# Patient Record
Sex: MALE | Race: WHITE | Employment: OTHER | URBAN - METROPOLITAN AREA
[De-identification: names, ages, dates, MRNs, and addresses within clinical notes are randomized per-mention and may not be internally consistent; named-entity substitution may affect disease eponyms.]

---

## 2017-01-16 PROBLEM — E11.9 TYPE 2 DIABETES MELLITUS WITHOUT COMPLICATION, WITHOUT LONG-TERM CURRENT USE OF INSULIN (HCC): Status: ACTIVE | Noted: 2017-01-16

## 2017-11-29 PROBLEM — C61 PROSTATE CANCER (HCC): Status: ACTIVE | Noted: 2017-11-29

## 2017-12-01 PROBLEM — E78.2 MIXED HYPERLIPIDEMIA: Status: ACTIVE | Noted: 2017-12-01

## 2018-04-13 DIAGNOSIS — I10 ESSENTIAL HYPERTENSION: Chronic | ICD-10-CM

## 2018-04-13 DIAGNOSIS — C61 PROSTATE CANCER (HCC): ICD-10-CM

## 2018-04-13 DIAGNOSIS — E78.2 MIXED HYPERLIPIDEMIA: ICD-10-CM

## 2018-04-16 ENCOUNTER — HOSPITAL ENCOUNTER (OUTPATIENT)
Age: 68
Discharge: HOME | End: 2018-04-18
Payer: MEDICARE

## 2018-04-16 DIAGNOSIS — I10 ESSENTIAL HYPERTENSION: Chronic | ICD-10-CM

## 2018-04-16 DIAGNOSIS — E78.2 MIXED HYPERLIPIDEMIA: ICD-10-CM

## 2018-04-16 DIAGNOSIS — C61 PROSTATE CANCER (HCC): ICD-10-CM

## 2018-04-16 LAB
ANION GAP SERPL CALCULATED.3IONS-SCNC: 18 MMOL/L (ref 7–16)
BASOPHILS ABSOLUTE: 0.11 E9/L (ref 0–0.2)
BASOPHILS RELATIVE PERCENT: 1.4 % (ref 0–2)
BUN BLDV-MCNC: 11 MG/DL (ref 8–23)
CALCIUM SERPL-MCNC: 9.9 MG/DL (ref 8.6–10.2)
CHLORIDE BLD-SCNC: 100 MMOL/L (ref 98–107)
CHOLESTEROL, TOTAL: 167 MG/DL (ref 0–199)
CO2: 26 MMOL/L (ref 22–29)
CREAT SERPL-MCNC: 0.9 MG/DL (ref 0.7–1.2)
EOSINOPHILS ABSOLUTE: 0.32 E9/L (ref 0.05–0.5)
EOSINOPHILS RELATIVE PERCENT: 4.1 % (ref 0–6)
GFR AFRICAN AMERICAN: >60
GFR NON-AFRICAN AMERICAN: >60 ML/MIN/1.73
GLUCOSE BLD-MCNC: 187 MG/DL (ref 74–109)
HBA1C MFR BLD: 8.7 % (ref 4.8–5.9)
HCT VFR BLD CALC: 45.2 % (ref 37–54)
HDLC SERPL-MCNC: 35 MG/DL
HEMOGLOBIN: 15.1 G/DL (ref 12.5–16.5)
IMMATURE GRANULOCYTES #: 0.03 E9/L
IMMATURE GRANULOCYTES %: 0.4 % (ref 0–5)
LDL CHOLESTEROL CALCULATED: 77 MG/DL (ref 0–99)
LYMPHOCYTES ABSOLUTE: 2.48 E9/L (ref 1.5–4)
LYMPHOCYTES RELATIVE PERCENT: 31.6 % (ref 20–42)
MCH RBC QN AUTO: 31.9 PG (ref 26–35)
MCHC RBC AUTO-ENTMCNC: 33.4 % (ref 32–34.5)
MCV RBC AUTO: 95.6 FL (ref 80–99.9)
MONOCYTES ABSOLUTE: 0.78 E9/L (ref 0.1–0.95)
MONOCYTES RELATIVE PERCENT: 9.9 % (ref 2–12)
NEUTROPHILS ABSOLUTE: 4.12 E9/L (ref 1.8–7.3)
NEUTROPHILS RELATIVE PERCENT: 52.6 % (ref 43–80)
PDW BLD-RTO: 14.1 FL (ref 11.5–15)
PLATELET # BLD: 255 E9/L (ref 130–450)
PMV BLD AUTO: 10.7 FL (ref 7–12)
POTASSIUM SERPL-SCNC: 4.6 MMOL/L (ref 3.5–5)
PROSTATE SPECIFIC ANTIGEN: 0.44 NG/ML (ref 0–4)
RBC # BLD: 4.73 E12/L (ref 3.8–5.8)
SODIUM BLD-SCNC: 144 MMOL/L (ref 132–146)
TRIGL SERPL-MCNC: 273 MG/DL (ref 0–149)
VLDLC SERPL CALC-MCNC: 55 MG/DL
WBC # BLD: 7.8 E9/L (ref 4.5–11.5)

## 2018-04-16 PROCEDURE — 80061 LIPID PANEL: CPT

## 2018-04-16 PROCEDURE — 80048 BASIC METABOLIC PNL TOTAL CA: CPT

## 2018-04-16 PROCEDURE — 83036 HEMOGLOBIN GLYCOSYLATED A1C: CPT

## 2018-04-16 PROCEDURE — 85025 COMPLETE CBC W/AUTO DIFF WBC: CPT

## 2018-04-16 PROCEDURE — 84153 ASSAY OF PSA TOTAL: CPT

## 2018-05-16 RX ORDER — CARVEDILOL 12.5 MG/1
12.5 TABLET ORAL 2 TIMES DAILY WITH MEALS
Qty: 180 TABLET | Refills: 1 | Status: SHIPPED | OUTPATIENT
Start: 2018-05-16 | End: 2018-08-21 | Stop reason: SDUPTHER

## 2018-07-17 DIAGNOSIS — C61 PROSTATE CANCER (HCC): ICD-10-CM

## 2018-07-17 DIAGNOSIS — E11.9 TYPE 2 DIABETES MELLITUS WITHOUT COMPLICATION, WITHOUT LONG-TERM CURRENT USE OF INSULIN (HCC): Primary | ICD-10-CM

## 2018-07-17 DIAGNOSIS — E78.5 DYSLIPIDEMIA: ICD-10-CM

## 2018-07-19 ENCOUNTER — HOSPITAL ENCOUNTER (OUTPATIENT)
Age: 68
Discharge: HOME | End: 2018-07-21
Payer: MEDICARE

## 2018-07-19 ENCOUNTER — NURSE ONLY (OUTPATIENT)
Dept: FAMILY MEDICINE CLINIC | Age: 68
End: 2018-07-19
Payer: MEDICARE

## 2018-07-19 DIAGNOSIS — E78.5 DYSLIPIDEMIA: ICD-10-CM

## 2018-07-19 DIAGNOSIS — C61 PROSTATE CANCER (HCC): ICD-10-CM

## 2018-07-19 DIAGNOSIS — E11.9 TYPE 2 DIABETES MELLITUS WITHOUT COMPLICATION, WITHOUT LONG-TERM CURRENT USE OF INSULIN (HCC): ICD-10-CM

## 2018-07-19 DIAGNOSIS — E78.2 MIXED HYPERLIPIDEMIA: ICD-10-CM

## 2018-07-19 DIAGNOSIS — I10 ESSENTIAL HYPERTENSION: Chronic | ICD-10-CM

## 2018-07-19 DIAGNOSIS — I50.22 CHRONIC SYSTOLIC CONGESTIVE HEART FAILURE (HCC): ICD-10-CM

## 2018-07-19 LAB
ALBUMIN SERPL-MCNC: 4 G/DL (ref 3.5–5.2)
ALP BLD-CCNC: 68 U/L (ref 40–129)
ALT SERPL-CCNC: 19 U/L (ref 0–40)
ANION GAP SERPL CALCULATED.3IONS-SCNC: 14 MMOL/L (ref 7–16)
AST SERPL-CCNC: 27 U/L (ref 0–39)
BASOPHILS ABSOLUTE: 0.12 E9/L (ref 0–0.2)
BASOPHILS RELATIVE PERCENT: 1.5 % (ref 0–2)
BILIRUB SERPL-MCNC: 0.6 MG/DL (ref 0–1.2)
BUN BLDV-MCNC: 9 MG/DL (ref 8–23)
CALCIUM SERPL-MCNC: 9.8 MG/DL (ref 8.6–10.2)
CHLORIDE BLD-SCNC: 99 MMOL/L (ref 98–107)
CHOLESTEROL, TOTAL: 168 MG/DL (ref 0–199)
CO2: 25 MMOL/L (ref 22–29)
CREAT SERPL-MCNC: 0.9 MG/DL (ref 0.7–1.2)
EOSINOPHILS ABSOLUTE: 0.31 E9/L (ref 0.05–0.5)
EOSINOPHILS RELATIVE PERCENT: 3.9 % (ref 0–6)
GFR AFRICAN AMERICAN: >60
GFR NON-AFRICAN AMERICAN: >60 ML/MIN/1.73
GLUCOSE BLD-MCNC: 180 MG/DL (ref 74–109)
HBA1C MFR BLD: 7.3 % (ref 4–5.6)
HCT VFR BLD CALC: 46.5 % (ref 37–54)
HDLC SERPL-MCNC: 33 MG/DL
HEMOGLOBIN: 15.6 G/DL (ref 12.5–16.5)
IMMATURE GRANULOCYTES #: 0.05 E9/L
IMMATURE GRANULOCYTES %: 0.6 % (ref 0–5)
LDL CHOLESTEROL CALCULATED: 83 MG/DL (ref 0–99)
LYMPHOCYTES ABSOLUTE: 2.24 E9/L (ref 1.5–4)
LYMPHOCYTES RELATIVE PERCENT: 28.3 % (ref 20–42)
MCH RBC QN AUTO: 32.3 PG (ref 26–35)
MCHC RBC AUTO-ENTMCNC: 33.5 % (ref 32–34.5)
MCV RBC AUTO: 96.3 FL (ref 80–99.9)
MONOCYTES ABSOLUTE: 0.64 E9/L (ref 0.1–0.95)
MONOCYTES RELATIVE PERCENT: 8.1 % (ref 2–12)
NEUTROPHILS ABSOLUTE: 4.55 E9/L (ref 1.8–7.3)
NEUTROPHILS RELATIVE PERCENT: 57.6 % (ref 43–80)
PDW BLD-RTO: 13.8 FL (ref 11.5–15)
PLATELET # BLD: 252 E9/L (ref 130–450)
PMV BLD AUTO: 10.4 FL (ref 7–12)
POTASSIUM SERPL-SCNC: 5 MMOL/L (ref 3.5–5)
PROSTATE SPECIFIC ANTIGEN: 1.54 NG/ML (ref 0–4)
RBC # BLD: 4.83 E12/L (ref 3.8–5.8)
SODIUM BLD-SCNC: 138 MMOL/L (ref 132–146)
TOTAL PROTEIN: 7.8 G/DL (ref 6.4–8.3)
TRIGL SERPL-MCNC: 262 MG/DL (ref 0–149)
VLDLC SERPL CALC-MCNC: 52 MG/DL
WBC # BLD: 7.9 E9/L (ref 4.5–11.5)

## 2018-07-19 PROCEDURE — 85025 COMPLETE CBC W/AUTO DIFF WBC: CPT

## 2018-07-19 PROCEDURE — 36415 COLL VENOUS BLD VENIPUNCTURE: CPT | Performed by: FAMILY MEDICINE

## 2018-07-19 PROCEDURE — 80053 COMPREHEN METABOLIC PANEL: CPT

## 2018-07-19 PROCEDURE — 83036 HEMOGLOBIN GLYCOSYLATED A1C: CPT

## 2018-07-19 PROCEDURE — 80061 LIPID PANEL: CPT

## 2018-07-19 PROCEDURE — 84153 ASSAY OF PSA TOTAL: CPT

## 2018-07-19 NOTE — PROGRESS NOTES
Labs done per Dr. Berlin Colunga's orders.     Electronically signed by Deborah Wilde MA on 7/19/2018 at 8:16 AM

## 2018-07-20 DIAGNOSIS — I25.10 CORONARY ARTERY DISEASE INVOLVING NATIVE HEART WITHOUT ANGINA PECTORIS, UNSPECIFIED VESSEL OR LESION TYPE: ICD-10-CM

## 2018-07-20 DIAGNOSIS — I50.22 CHRONIC SYSTOLIC CONGESTIVE HEART FAILURE (HCC): Primary | ICD-10-CM

## 2018-07-31 ENCOUNTER — TELEPHONE (OUTPATIENT)
Dept: CARDIOLOGY CLINIC | Age: 68
End: 2018-07-31

## 2018-08-01 ENCOUNTER — TELEPHONE (OUTPATIENT)
Dept: SURGERY | Age: 68
End: 2018-08-01

## 2018-08-01 ENCOUNTER — TELEPHONE (OUTPATIENT)
Dept: FAMILY MEDICINE CLINIC | Age: 68
End: 2018-08-01

## 2018-08-01 DIAGNOSIS — J02.9 SORE THROAT: Primary | ICD-10-CM

## 2018-08-01 RX ORDER — AMOXICILLIN 500 MG/1
500 CAPSULE ORAL 3 TIMES DAILY
Qty: 30 CAPSULE | Refills: 0 | Status: SHIPPED | OUTPATIENT
Start: 2018-08-01 | End: 2018-08-11

## 2018-08-01 NOTE — TELEPHONE ENCOUNTER
Patient is requesting antibiotics be sent to [facility] for sinus infection. C/O sore throat; sinus drainage and cough. Says he gets it this time every year.

## 2018-08-21 DIAGNOSIS — I50.9 CONGESTIVE HEART FAILURE, UNSPECIFIED HF CHRONICITY, UNSPECIFIED HEART FAILURE TYPE (HCC): Primary | ICD-10-CM

## 2018-08-21 DIAGNOSIS — E11.9 TYPE 2 DIABETES MELLITUS WITHOUT COMPLICATION, WITHOUT LONG-TERM CURRENT USE OF INSULIN (HCC): ICD-10-CM

## 2018-08-21 RX ORDER — CARVEDILOL 12.5 MG/1
12.5 TABLET ORAL 2 TIMES DAILY WITH MEALS
Qty: 180 TABLET | Refills: 1 | Status: SHIPPED | OUTPATIENT
Start: 2018-08-21 | End: 2018-12-22 | Stop reason: SDUPTHER

## 2018-08-28 ENCOUNTER — OFFICE VISIT (OUTPATIENT)
Dept: SURGERY | Age: 68
End: 2018-08-28

## 2018-08-28 ENCOUNTER — TELEPHONE (OUTPATIENT)
Dept: SURGERY | Age: 68
End: 2018-08-28

## 2018-08-28 ENCOUNTER — PREP FOR PROCEDURE (OUTPATIENT)
Dept: SURGERY | Age: 68
End: 2018-08-28

## 2018-08-28 ENCOUNTER — OFFICE VISIT (OUTPATIENT)
Dept: FAMILY MEDICINE CLINIC | Age: 68
End: 2018-08-28
Payer: MEDICARE

## 2018-08-28 ENCOUNTER — HOSPITAL ENCOUNTER (OUTPATIENT)
Age: 68
Discharge: HOME | End: 2018-08-30
Payer: MEDICARE

## 2018-08-28 VITALS
RESPIRATION RATE: 18 BRPM | DIASTOLIC BLOOD PRESSURE: 80 MMHG | OXYGEN SATURATION: 96 % | HEART RATE: 97 BPM | SYSTOLIC BLOOD PRESSURE: 120 MMHG

## 2018-08-28 VITALS
SYSTOLIC BLOOD PRESSURE: 129 MMHG | WEIGHT: 220 LBS | HEART RATE: 75 BPM | OXYGEN SATURATION: 96 % | RESPIRATION RATE: 16 BRPM | DIASTOLIC BLOOD PRESSURE: 89 MMHG | BODY MASS INDEX: 29.84 KG/M2

## 2018-08-28 DIAGNOSIS — Z86.010 HISTORY OF ADENOMATOUS POLYP OF COLON: Primary | ICD-10-CM

## 2018-08-28 DIAGNOSIS — Z80.9 FAMILY HISTORY OF CANCER: ICD-10-CM

## 2018-08-28 DIAGNOSIS — R53.83 FATIGUE, UNSPECIFIED TYPE: ICD-10-CM

## 2018-08-28 DIAGNOSIS — I50.9 CONGESTIVE HEART FAILURE, UNSPECIFIED HF CHRONICITY, UNSPECIFIED HEART FAILURE TYPE (HCC): Primary | ICD-10-CM

## 2018-08-28 DIAGNOSIS — R42 VERTIGO: ICD-10-CM

## 2018-08-28 DIAGNOSIS — I50.9 CONGESTIVE HEART FAILURE, UNSPECIFIED HF CHRONICITY, UNSPECIFIED HEART FAILURE TYPE (HCC): ICD-10-CM

## 2018-08-28 DIAGNOSIS — Z80.9 FAMILY HISTORY OF CANCER: Primary | ICD-10-CM

## 2018-08-28 DIAGNOSIS — E11.9 TYPE 2 DIABETES MELLITUS WITHOUT COMPLICATION, WITHOUT LONG-TERM CURRENT USE OF INSULIN (HCC): ICD-10-CM

## 2018-08-28 DIAGNOSIS — I10 ESSENTIAL HYPERTENSION: Chronic | ICD-10-CM

## 2018-08-28 DIAGNOSIS — Z86.010 HISTORY OF ADENOMATOUS POLYP OF COLON: ICD-10-CM

## 2018-08-28 LAB
ALBUMIN SERPL-MCNC: 4.1 G/DL (ref 3.5–5.2)
ALP BLD-CCNC: 77 U/L (ref 40–129)
ALT SERPL-CCNC: 20 U/L (ref 0–40)
ANION GAP SERPL CALCULATED.3IONS-SCNC: 23 MMOL/L (ref 7–16)
AST SERPL-CCNC: 24 U/L (ref 0–39)
BASOPHILS ABSOLUTE: 0.09 E9/L (ref 0–0.2)
BASOPHILS RELATIVE PERCENT: 1.1 % (ref 0–2)
BILIRUB SERPL-MCNC: 0.5 MG/DL (ref 0–1.2)
BUN BLDV-MCNC: 14 MG/DL (ref 8–23)
CALCIUM SERPL-MCNC: 9.3 MG/DL (ref 8.6–10.2)
CHLORIDE BLD-SCNC: 100 MMOL/L (ref 98–107)
CO2: 19 MMOL/L (ref 22–29)
CREAT SERPL-MCNC: 0.9 MG/DL (ref 0.7–1.2)
EOSINOPHILS ABSOLUTE: 0.46 E9/L (ref 0.05–0.5)
EOSINOPHILS RELATIVE PERCENT: 5.5 % (ref 0–6)
GFR AFRICAN AMERICAN: >60
GFR NON-AFRICAN AMERICAN: >60 ML/MIN/1.73
GLUCOSE BLD-MCNC: 229 MG/DL (ref 74–109)
HCT VFR BLD CALC: 43.8 % (ref 37–54)
HEMOGLOBIN: 14.2 G/DL (ref 12.5–16.5)
IMMATURE GRANULOCYTES #: 0.04 E9/L
IMMATURE GRANULOCYTES %: 0.5 % (ref 0–5)
LYMPHOCYTES ABSOLUTE: 2.54 E9/L (ref 1.5–4)
LYMPHOCYTES RELATIVE PERCENT: 30.6 % (ref 20–42)
MCH RBC QN AUTO: 31.7 PG (ref 26–35)
MCHC RBC AUTO-ENTMCNC: 32.4 % (ref 32–34.5)
MCV RBC AUTO: 97.8 FL (ref 80–99.9)
MONOCYTES ABSOLUTE: 0.62 E9/L (ref 0.1–0.95)
MONOCYTES RELATIVE PERCENT: 7.5 % (ref 2–12)
NEUTROPHILS ABSOLUTE: 4.56 E9/L (ref 1.8–7.3)
NEUTROPHILS RELATIVE PERCENT: 54.8 % (ref 43–80)
PDW BLD-RTO: 14.2 FL (ref 11.5–15)
PLATELET # BLD: 242 E9/L (ref 130–450)
PMV BLD AUTO: 10.4 FL (ref 7–12)
POTASSIUM SERPL-SCNC: 4.6 MMOL/L (ref 3.5–5)
RBC # BLD: 4.48 E12/L (ref 3.8–5.8)
SODIUM BLD-SCNC: 142 MMOL/L (ref 132–146)
TOTAL PROTEIN: 7.5 G/DL (ref 6.4–8.3)
TSH SERPL DL<=0.05 MIU/L-ACNC: 1.73 UIU/ML (ref 0.27–4.2)
WBC # BLD: 8.3 E9/L (ref 4.5–11.5)

## 2018-08-28 PROCEDURE — G8419 CALC BMI OUT NRM PARAM NOF/U: HCPCS | Performed by: FAMILY MEDICINE

## 2018-08-28 PROCEDURE — 84443 ASSAY THYROID STIM HORMONE: CPT

## 2018-08-28 PROCEDURE — 4040F PNEUMOC VAC/ADMIN/RCVD: CPT | Performed by: FAMILY MEDICINE

## 2018-08-28 PROCEDURE — G8598 ASA/ANTIPLAT THER USED: HCPCS | Performed by: FAMILY MEDICINE

## 2018-08-28 PROCEDURE — 1036F TOBACCO NON-USER: CPT | Performed by: FAMILY MEDICINE

## 2018-08-28 PROCEDURE — 80053 COMPREHEN METABOLIC PANEL: CPT

## 2018-08-28 PROCEDURE — 3045F PR MOST RECENT HEMOGLOBIN A1C LEVEL 7.0-9.0%: CPT | Performed by: FAMILY MEDICINE

## 2018-08-28 PROCEDURE — 99212 OFFICE O/P EST SF 10 MIN: CPT | Performed by: FAMILY MEDICINE

## 2018-08-28 PROCEDURE — 96372 THER/PROPH/DIAG INJ SC/IM: CPT | Performed by: FAMILY MEDICINE

## 2018-08-28 PROCEDURE — G8427 DOCREV CUR MEDS BY ELIG CLIN: HCPCS | Performed by: FAMILY MEDICINE

## 2018-08-28 PROCEDURE — 99999 PR OFFICE/OUTPT VISIT,PROCEDURE ONLY: CPT | Performed by: SURGERY

## 2018-08-28 PROCEDURE — 3017F COLORECTAL CA SCREEN DOC REV: CPT | Performed by: FAMILY MEDICINE

## 2018-08-28 PROCEDURE — 1101F PT FALLS ASSESS-DOCD LE1/YR: CPT | Performed by: FAMILY MEDICINE

## 2018-08-28 PROCEDURE — 85025 COMPLETE CBC W/AUTO DIFF WBC: CPT

## 2018-08-28 PROCEDURE — 1123F ACP DISCUSS/DSCN MKR DOCD: CPT | Performed by: FAMILY MEDICINE

## 2018-08-28 PROCEDURE — 2022F DILAT RTA XM EVC RTNOPTHY: CPT | Performed by: FAMILY MEDICINE

## 2018-08-28 RX ORDER — ASPIRIN 325 MG
325 TABLET ORAL
COMMUNITY
Start: 2013-10-08 | End: 2018-08-28 | Stop reason: CLARIF

## 2018-08-28 RX ORDER — MECLIZINE HYDROCHLORIDE 25 MG/1
25 TABLET ORAL 3 TIMES DAILY PRN
Qty: 30 TABLET | Refills: 2 | Status: SHIPPED | OUTPATIENT
Start: 2018-08-28 | End: 2018-09-07

## 2018-08-28 RX ORDER — 0.9 % SODIUM CHLORIDE 0.9 %
10 VIAL (ML) INJECTION PRN
Status: CANCELLED | OUTPATIENT
Start: 2018-08-28 | End: 2019-08-28

## 2018-08-28 RX ORDER — METHYLPREDNISOLONE 4 MG/1
TABLET ORAL
Qty: 1 KIT | Refills: 0 | Status: SHIPPED | OUTPATIENT
Start: 2018-08-28 | End: 2018-08-30

## 2018-08-28 RX ORDER — 0.9 % SODIUM CHLORIDE 0.9 %
10 VIAL (ML) INJECTION EVERY 12 HOURS SCHEDULED
Status: CANCELLED | OUTPATIENT
Start: 2018-08-28 | End: 2019-08-28

## 2018-08-28 RX ORDER — DEXAMETHASONE SODIUM PHOSPHATE 4 MG/ML
4 INJECTION, SOLUTION INTRA-ARTICULAR; INTRALESIONAL; INTRAMUSCULAR; INTRAVENOUS; SOFT TISSUE ONCE
Status: COMPLETED | OUTPATIENT
Start: 2018-08-28 | End: 2018-08-28

## 2018-08-28 RX ORDER — POLYETHYLENE GLYCOL 3350, SODIUM CHLORIDE, POTASSIUM CHLORIDE, SODIUM BICARBONATE, AND SODIUM SULFATE 240; 5.84; 2.98; 6.72; 22.72 G/4L; G/4L; G/4L; G/4L; G/4L
4000 POWDER, FOR SOLUTION ORAL ONCE
Qty: 1 BOTTLE | Refills: 0 | Status: SHIPPED | OUTPATIENT
Start: 2018-08-28 | End: 2018-08-28

## 2018-08-28 RX ORDER — SODIUM CHLORIDE 9 MG/ML
INJECTION, SOLUTION INTRAVENOUS CONTINUOUS
Status: CANCELLED | OUTPATIENT
Start: 2018-08-28 | End: 2019-08-28

## 2018-08-28 RX ORDER — GUAIFENESIN 600 MG/1
600 TABLET, EXTENDED RELEASE ORAL 2 TIMES DAILY
Qty: 100 TABLET | Refills: 3 | Status: SHIPPED
Start: 2018-08-28 | End: 2020-03-13

## 2018-08-28 RX ORDER — OMEPRAZOLE 20 MG/1
20 CAPSULE, DELAYED RELEASE ORAL
COMMUNITY
Start: 2013-07-05 | End: 2018-08-30

## 2018-08-28 RX ORDER — FUROSEMIDE 20 MG/1
20 TABLET ORAL
COMMUNITY
Start: 2013-10-08 | End: 2018-08-30

## 2018-08-28 RX ADMIN — DEXAMETHASONE SODIUM PHOSPHATE 4 MG: 4 INJECTION, SOLUTION INTRA-ARTICULAR; INTRALESIONAL; INTRAMUSCULAR; INTRAVENOUS; SOFT TISSUE at 13:32

## 2018-08-28 ASSESSMENT — ENCOUNTER SYMPTOMS
GASTROINTESTINAL NEGATIVE: 1
BACK PAIN: 0
SHORTNESS OF BREATH: 1
WHEEZING: 0
EYE REDNESS: 0
BLOOD IN STOOL: 0
SPUTUM PRODUCTION: 0
ORTHOPNEA: 0
EYE DISCHARGE: 0
EYES NEGATIVE: 1
SINUS PAIN: 0
VISUAL CHANGE: 0
EYE PAIN: 0
STRIDOR: 0
BLURRED VISION: 0
DIARRHEA: 0
HEARTBURN: 0
PHOTOPHOBIA: 0
HEMOPTYSIS: 0
CONSTIPATION: 0
DOUBLE VISION: 0

## 2018-08-28 ASSESSMENT — PATIENT HEALTH QUESTIONNAIRE - PHQ9
SUM OF ALL RESPONSES TO PHQ QUESTIONS 1-9: 0
SUM OF ALL RESPONSES TO PHQ9 QUESTIONS 1 & 2: 0
SUM OF ALL RESPONSES TO PHQ QUESTIONS 1-9: 0
1. LITTLE INTEREST OR PLEASURE IN DOING THINGS: 0
2. FEELING DOWN, DEPRESSED OR HOPELESS: 0

## 2018-08-28 NOTE — TELEPHONE ENCOUNTER
Scheduled patient for colonoscopy on 9/11/18 at 1:30pm. Patient needs to report at the front entrance 1 hour prior to the procedure, no ASA products for 5 days, remind patient to do the colon prep as well as a clear liquid diet a day before. Patient verbalized understanding. Instruction letter mailed. Encouraged patient to call our office if any questions.     Electronically signed by Juan Sanderson on 8/28/18 at 2:26 PM

## 2018-08-28 NOTE — PATIENT INSTRUCTIONS
that could be dangerous until the medicine wears off and you can think clearly and react easily. · Rest when you feel tired. Getting enough sleep will help you recover. · You must have a reliable adult drive you home and stay with you overnight after your procedure or your procedure may be cancelled. Diet  · You can eat your normal diet, unless your doctor gives you other instructions. If your stomach is upset, try clear liquids and bland, low-fat foods like plain toast or rice. · Drink plenty of fluids (unless your doctor tells you not to). · Don't drink alcohol for 24 hours. Medicines  · Be safe with medicines. Read and follow all instructions on the label. ¨ If the doctor gave you a prescription medicine for pain, take it as prescribed. ¨ If you are not taking a prescription pain medicine, ask your doctor if you can take an over-the-counter medicine. · If you think your pain medicine is making you sick to your stomach:  ¨ Take your medicine after meals (unless your doctor has told you not to). ¨ Ask your doctor for a different pain medicine. When should you call for help? Call 911 anytime you think you may need emergency care. For example, call if:  · You have severe trouble breathing. · You passed out (lost consciousness). Call your doctor now or seek immediate medical care if:  · You have trouble breathing. · You have ongoing or worsening nausea or vomiting. · You have a fever. · You have a new or worse headache. · The medicine is not wearing off and you can't think clearly. Watch closely for changes in your health, and be sure to contact your doctor if:  · You do not get better as expected. Where can you learn more? Go to [email or web address] org and sign in to your [employer] account. Enter G973 in the [employer] box to learn more about \"Sedation for a Medical Procedure: Care Instructions. \"     If you do not have an account, please click on the \"Sign Up Now\"

## 2018-08-28 NOTE — H&P
GENERAL SURGERY  HISTORY & PHYSICAL    Sherin Rose  76 y.o. male   Nasir Bustillo,      CC:  Colorectal cancer screening    HPI  The patient was sent here to discuss colorectal cancer screening. The patient denies any weight loss, rectal bleeding, or abdominal pain. He occasionally has stool frequency and gas. There is no family history of IBD bu there may be a history of colorectal cancer in his father and/or brother (unsure). He had a tubular adenoma found in august 2015. Past Medical History:   Diagnosis Date    CAD (coronary artery disease)     Combined systolic and diastolic heart failure ([address] 6/15/13    echo LVEF of 30-35%  stage II diastolic dysfunction    [facility]     Diabetes mellitus [facility]     GERD (gastroesophageal reflux disease)     Hypertension     Sigmoid diverticulosis 8/31/2015    Tubular adenoma of colon 8/31/2015       Past Surgical History:   Procedure Laterality Date    CARDIAC DEFIBRILLATOR PLACEMENT      COLONOSCOPY  8/31/15    with polypectomy (x2) - Dr. Pauline Mcmanus  1/13/14    3.5/15 Xience in [place] and 3.0/15 Resolute in th 1st obtuse marginal.    ECHO COMPL W DOP COLOR FLOW  6/14/2013         ECHO COMPL W DOP COLOR FLOW  1/14/2014         JOINT REPLACEMENT      UMBILICAL HERNIA REPAIR         Social History     Social History    Marital status:      Spouse name: N/A    Number of children: N/A    Years of education: N/A     Occupational History    Not on file.      Social History Main Topics    Smoking status: Former Smoker     Packs/day: 2.00     Types: Cigarettes     Quit date: 1/11/2014    Smokeless tobacco: Never Used    Alcohol use No      Comment: rarely    Drug use: No    Sexual activity: Yes     Other Topics Concern    Not on file     Social History Narrative    No narrative on file        Family History   Problem Relation Age of Onset    Heart

## 2018-08-28 NOTE — PROGRESS NOTES
Disease Mother     Cancer Father         abdominal    Cancer Brother         prostate    Cancer Brother         digestive        Current Outpatient Prescriptions on File Prior to Visit   Medication Sig Dispense Refill    atorvastatin (LIPITOR) 40 MG tablet TAKE ONE TABLET BY MOUTH EVERY DAY 90 tablet 1    carvedilol (COREG) 12.5 MG tablet Take 1 tablet by mouth 2 times daily (with meals) 180 tablet 1    metFORMIN (GLUCOPHAGE) 500 MG tablet TAKE ONE TABLET BY MOUTH FOUR TIMES A  tablet 1    clopidogrel (PLAVIX) 75 MG tablet TAKE ONE TABLET BY MOUTH DAILY 90 tablet 0    lisinopril (PRINIVIL;ZESTRIL) 5 MG tablet TAKE ONE TABLET BY MOUTH EVERY DAY 90 tablet 0    spironolactone (ALDACTONE) 25 MG tablet Take 1 tablet by mouth daily 90 tablet 1    nitroGLYCERIN (NITROSTAT) 0.4 MG SL tablet Place 1 tablet under the tongue every 5 minutes as needed for Chest pain. 25 tablet 3    aspirin 81 MG tablet Take 81 mg by mouth daily.        Current Facility-Administered Medications on File Prior to Visit   Medication Dose Route Frequency Provider Last Rate Last Dose    betamethasone acetate-betamethasone sodium phosphate (CELESTONE) injection 6 mg  6 mg Intra-articular Once Hernan Kramer MD        lidocaine 1 % injection 1 mL  1 mL Intradermal Once Hernan Kramer MD           No Known Allergies     Review of Systems  Negative except as noted in HPI and reports vertigo x 2 days; reports heart disease is stable    Physical Exam   /89 (Site: Left Arm, Position: Sitting, Cuff Size: Small Adult)   Pulse 75   Resp 16   Wt 220 lb (99.8 kg)   SpO2 96%   BMI 29.84 kg/m²   General - no acute distress, comfortable   Head - normocephalic,atraumatic  Eyes - pupils symmetric, conjunctivae pink  ENT - nose/ears appear normal,  Upper/lower dentures, moist oral mucosa   Neck - no cervical adenopathy, mass, thyromegaly   Respiratory - normal effort, clear to auscultation  CV - regular rate and rhythm, strong femoral pulses, no pedal edema; AICD left upper chest wall  GI - no scars, non distended,  non tender, no hernia, no mass, no hepatosplenomegaly   Anorectal - deferred  Skin - warm, dry, good turgor; no rash  Musculoskeletal - normal gait, no deformities   Psychiatric - alert and oriented x 3; normal mood, affect, judgement, thought content    Assessment    History of adenomatous polyps  Possible family history of colorectal cancer (father? Brother?)  Vertigo - labyrinthitis? Plan  Screening colonoscopy at [facility] with split dose golytely bowel prep  Sending to Chadd León, DO office today for vertigo eval    I discussed the options for colorectal cancer screening with the patient including options of fecal occult blood testing with flexible sigmoidoscopy or air contrast barium enema. I also discussed the risks and benefits of colonoscopy with possible biopsy/polypectomy/cauterization. I recommended colonoscopy with deep sedation. The patient understands the risks of bleeding and perforation and agrees to proceed. Jazmín Schaefer MD, [facility] General Surgery    NOTE: This report was transcribed using voice recognition software. Every effort was made to ensure accuracy; however, inadvertent computerized transcription errors may be present.

## 2018-08-28 NOTE — PROGRESS NOTES
include no autonomic neuropathy, CVA, nephropathy, peripheral neuropathy, PVD or retinopathy. Risk factors for coronary artery disease include hypertension, male sex, tobacco exposure, stress, diabetes mellitus, dyslipidemia and family history. Current diabetic treatment includes diet and oral agent (monotherapy). He is compliant with treatment some of the time. He is following a generally unhealthy diet. An ACE inhibitor/angiotensin II receptor blocker is being taken. He does not see a podiatrist.Eye exam is not current. ROS:  Review of Systems   Constitutional: Negative for fatigue, malaise/fatigue and weight loss. HENT: Negative. Negative for ear discharge, ear pain, hearing loss, nosebleeds, sinus pain and tinnitus. Eyes: Negative. Negative for blurred vision, double vision, photophobia, pain, discharge and redness. Respiratory: Positive for shortness of breath. Negative for hemoptysis, sputum production, wheezing and stridor. Cardiovascular: Negative. Negative for chest pain, palpitations, orthopnea, claudication, leg swelling and PND. Gastrointestinal: Negative. Negative for blood in stool, constipation, diarrhea, heartburn and melena. Genitourinary: Negative. Negative for dysuria, flank pain, frequency, hematuria and urgency. Musculoskeletal: Negative. Negative for back pain, falls, joint pain and neck pain. Skin: Negative. Negative for itching. Neurological: Positive for weakness. Negative for tingling, tremors, sensory change, speech change, focal weakness, seizures, loss of consciousness and headaches. Endo/Heme/Allergies: Negative for environmental allergies, polydipsia and polyphagia. Bruises/bleeds easily. Psychiatric/Behavioral: Negative.       Past Medical/Surgical Hx;  Reviewed with patient      Diagnosis Date    CAD (coronary artery disease)     Combined systolic and diastolic heart failure [facility] [address] 6/15/13    echo LVEF of 30-35%  stage II diastolic dysfunction    [facility]     Diabetes mellitus ([facility] [address]     GERD (gastroesophageal reflux disease)     Hypertension     Sigmoid diverticulosis 8/31/2015    Tubular adenoma of colon 8/31/2015     Past Surgical History:   Procedure Laterality Date    CARDIAC DEFIBRILLATOR PLACEMENT      COLONOSCOPY  8/31/15    with polypectomy (x2) - Dr. Jolynn Corado  1/13/14    3.5/15 Xience in [place] and 3.0/15 Resolute in th 1st obtuse marginal.    ECHO COMPL W DOP COLOR FLOW  6/14/2013         ECHO COMPL W DOP COLOR FLOW  1/14/2014         JOINT REPLACEMENT      UMBILICAL HERNIA REPAIR         Past Family Hx:  Reviewed with patient  Family History   Problem Relation Age of Onset    Heart Disease Mother     Cancer Father         abdominal    Cancer Brother         prostate    Cancer Brother         digestive       Social Hx:  Reviewed with patient  Social History   Substance Use Topics    Smoking status: Former Smoker     Packs/day: 2.00     Types: Cigarettes     Quit date: 1/11/2014    Smokeless tobacco: Never Used    Alcohol use No      Comment: rarely       OBJECTIVE  /80   Pulse 97   Resp 18   SpO2 96%     Problem List:  Blake Heard  does not have any pertinent problems on file. PHYS EX:  Physical Exam   Constitutional: He is oriented to person, place, and time. He appears well-developed and well-nourished. No distress. HENT:   Head: Normocephalic and atraumatic. Right Ear: External ear normal.   Left Ear: External ear normal.   Nose: Nose normal.   Mouth/Throat: Oropharynx is clear and moist. No oropharyngeal exudate. Eyes: Pupils are equal, round, and reactive to light. Conjunctivae and EOM are normal. Right eye exhibits no discharge. Left eye exhibits no discharge. No scleral icterus. Neck: Normal range of motion. Neck supple. No JVD present. No tracheal deviation present. No thyromegaly present.    Cardiovascular: Normal rate, Facility-Administered Encounter Medications as of 8/28/2018   Medication Dose Route Frequency Provider Last Rate Last Dose    [COMPLETED] dexamethasone (DECADRON) injection 4 mg  4 mg Intramuscular Once Celester Rachel Colunga    4 mg at 08/28/18 1332    betamethasone acetate-betamethasone sodium phosphate (CELESTONE) injection 6 mg  6 mg Intra-articular Once Victor Hugo Lama MD        lidocaine 1 % injection 1 mL  1 mL Intradermal Once Victor Hugo Lama MD           Return in about 14 days (around 9/11/2018).         Reviewed recent labs related to Federico's current problems      Discussed importance of regular Health Maintenance follow up  Health Maintenance   Topic    AAA screen     Hepatitis C screen     Diabetic foot exam     Diabetic retinal exam     DTaP/Tdap/Td vaccine (1 - Tdap)    Shingles Vaccine (1 of 2 - 2 Dose Series)    Pneumococcal low/med risk (1 of 2 - PCV13)    Colon cancer screen colonoscopy     Flu vaccine (1)    Diabetic microalbuminuria test     A1C test (Diabetic or Prediabetic)     Lipid screen     Potassium monitoring     Creatinine monitoring

## 2018-08-30 ENCOUNTER — TELEPHONE (OUTPATIENT)
Dept: FAMILY MEDICINE CLINIC | Age: 68
End: 2018-08-30

## 2018-08-30 NOTE — TELEPHONE ENCOUNTER
Leander from [employer] contacted office. Patient scheduled 9/11 for colonoscopy Anesthesia needs to know model of defibrillator and if it has been interrogated  Delia Michaels also needs to know why patient is taking Antivert.  Delia Michaels is back in office on 9/4

## 2018-08-31 DIAGNOSIS — Z95.810 ICD (IMPLANTABLE CARDIOVERTER-DEFIBRILLATOR) IN PLACE: ICD-10-CM

## 2018-08-31 DIAGNOSIS — I25.10 CORONARY ARTERY DISEASE INVOLVING NATIVE HEART WITHOUT ANGINA PECTORIS, UNSPECIFIED VESSEL OR LESION TYPE: ICD-10-CM

## 2018-08-31 DIAGNOSIS — I50.9 CONGESTIVE HEART FAILURE, UNSPECIFIED HF CHRONICITY, UNSPECIFIED HEART FAILURE TYPE (HCC): Primary | ICD-10-CM

## 2018-09-04 ENCOUNTER — TELEPHONE (OUTPATIENT)
Dept: SURGERY | Age: 68
End: 2018-09-04

## 2018-09-04 NOTE — TELEPHONE ENCOUNTER
MA spoke with pt regarding ICD- pt states that he has a card with the information about the ICD and will drop it off to the office for our records. Pt also states that he believes that at this time his colonoscopy is postponed.      Electronically signed by Khloe Milian MA on 9/4/18 at 10:06 AM

## 2018-09-04 NOTE — TELEPHONE ENCOUNTER
MA spoke with Jose Rafael Escobar at [address] aware that pt is scheduled with [facility] to reestablish. Per Jose Rafael Escobar pt's ICD has not been interrogated since 2016 and Anesthesiology will not agree to put pt under until pt is cleared from a cardiac standpoint. Per last note, pt to drop off ICD information card to office. Pt is scheduled to re-establish with Cardiologist on 10/02.     Electronically signed by Aylin Antoine MA on 9/4/18 at 10:13 AM

## 2018-09-05 NOTE — TELEPHONE ENCOUNTER
Notified the patient that colonoscopy is cancelled. Told the patient to call our clinic once he is cleared by cardiologist and PCP for dizziness. The patient verbalized understanding.    Electronically signed by Sloan Abdullahi on 9/5/18 at 11:26 AM

## 2018-09-07 DIAGNOSIS — R42 DIZZINESS: ICD-10-CM

## 2018-09-07 DIAGNOSIS — I50.9 CONGESTIVE HEART FAILURE, UNSPECIFIED HF CHRONICITY, UNSPECIFIED HEART FAILURE TYPE (HCC): Primary | ICD-10-CM

## 2018-09-26 ENCOUNTER — HOSPITAL ENCOUNTER (OUTPATIENT)
Dept: NUCLEAR MEDICINE | Age: 68
Discharge: HOME | End: 2018-09-28
Payer: MEDICARE

## 2018-09-26 ENCOUNTER — HOSPITAL ENCOUNTER (OUTPATIENT)
Dept: NON INVASIVE DIAGNOSTICS | Age: 68
Discharge: HOME | End: 2018-09-26
Payer: MEDICARE

## 2018-09-26 DIAGNOSIS — I25.10 ATHEROSCLEROSIS OF NATIVE CORONARY ARTERY OF NATIVE HEART WITHOUT ANGINA PECTORIS: ICD-10-CM

## 2018-09-26 LAB
LV EF: 41 %
LVEF MODALITY: NORMAL

## 2018-09-26 PROCEDURE — 3430000000 HC RX DIAGNOSTIC RADIOPHARMACEUTICAL: Performed by: RADIOLOGY

## 2018-09-26 PROCEDURE — 93017 CV STRESS TEST TRACING ONLY: CPT

## 2018-09-26 PROCEDURE — 78452 HT MUSCLE IMAGE SPECT MULT: CPT

## 2018-09-26 PROCEDURE — A9500 TC99M SESTAMIBI: HCPCS | Performed by: RADIOLOGY

## 2018-09-26 PROCEDURE — 6360000002 HC RX W HCPCS: Performed by: INTERNAL MEDICINE

## 2018-09-26 RX ADMIN — Medication 34 MILLICURIE: at 14:54

## 2018-09-26 RX ADMIN — REGADENOSON 0.4 MG: 0.08 INJECTION, SOLUTION INTRAVENOUS at 14:43

## 2018-09-26 RX ADMIN — Medication 10 MILLICURIE: at 12:24

## 2018-09-26 NOTE — PROCEDURES
Procedure: Israel David stress test     Ordering physician: Katina Reynoso  Referring physician: Brittney Colunga    Indication Chest Pain   Pretest evaluation no chest pain, no shortness of breath  Resting EKG showed: sinus rhythm with T inversion in the inferolateral leads     Protocol: Patient was given 0.4mg of Lexiscan followed by Cardiolite injection    Heart rate response:   Resting heart rate: 82 BPM   Stress heart rate: 78 BPM     Blood pressure response:   Resting blood pressure:128/84 mmHg   Stress blood pressure: 104/60 mmHg     Symptoms and signs: shortness of breath     EKG changes:  Resting EKG: T wave inversion in the inferolateral leads  Stress EKG :T wave inversion in the inferolateral leads with occasional PVCs.     Impression:   Clinical: nonischemic   EKG: T wave inversion in the inferolateral leads    Cardiolite injected and nuclear images are pending

## 2018-10-01 DIAGNOSIS — I71.40 ABDOMINAL AORTIC ANEURYSM (AAA) WITHOUT RUPTURE: Primary | ICD-10-CM

## 2018-10-01 DIAGNOSIS — I50.9 CONGESTIVE HEART FAILURE, UNSPECIFIED HF CHRONICITY, UNSPECIFIED HEART FAILURE TYPE (HCC): ICD-10-CM

## 2018-10-01 RX ORDER — SOTALOL HYDROCHLORIDE 80 MG/1
40 TABLET ORAL 2 TIMES DAILY
Qty: 60 TABLET | Refills: 3 | Status: SHIPPED | OUTPATIENT
Start: 2018-10-01 | End: 2019-01-28 | Stop reason: SDUPTHER

## 2018-10-25 ENCOUNTER — TELEPHONE (OUTPATIENT)
Dept: FAMILY MEDICINE CLINIC | Age: 68
End: 2018-10-25

## 2018-10-25 ENCOUNTER — TELEPHONE (OUTPATIENT)
Dept: SURGERY | Age: 68
End: 2018-10-25

## 2018-10-26 DIAGNOSIS — F17.210 SMOKING GREATER THAN 30 PACK YEARS: ICD-10-CM

## 2018-10-26 DIAGNOSIS — R73.01 IFG (IMPAIRED FASTING GLUCOSE): ICD-10-CM

## 2018-10-26 DIAGNOSIS — Z87.891 PERSONAL HISTORY OF TOBACCO USE: Primary | ICD-10-CM

## 2018-10-26 DIAGNOSIS — R53.83 FATIGUE, UNSPECIFIED TYPE: ICD-10-CM

## 2018-10-26 DIAGNOSIS — I71.40 ABDOMINAL AORTIC ANEURYSM (AAA) WITHOUT RUPTURE: ICD-10-CM

## 2018-10-26 DIAGNOSIS — E78.5 DYSLIPIDEMIA: ICD-10-CM

## 2018-10-26 DIAGNOSIS — J01.90 ACUTE SINUSITIS, RECURRENCE NOT SPECIFIED, UNSPECIFIED LOCATION: ICD-10-CM

## 2018-10-26 DIAGNOSIS — C61 PROSTATE CANCER (HCC): ICD-10-CM

## 2018-10-26 RX ORDER — FLUTICASONE PROPIONATE 50 MCG
1 SPRAY, SUSPENSION (ML) NASAL DAILY
Qty: 1 BOTTLE | Refills: 3 | Status: SHIPPED
Start: 2018-10-26 | End: 2020-03-13

## 2018-10-26 RX ORDER — GUAIFENESIN 600 MG/1
600 TABLET, EXTENDED RELEASE ORAL 2 TIMES DAILY
Qty: 100 TABLET | Refills: 3 | Status: SHIPPED
Start: 2018-10-26 | End: 2020-03-13

## 2018-10-26 RX ORDER — AMOXICILLIN 500 MG/1
500 CAPSULE ORAL 3 TIMES DAILY
Qty: 30 CAPSULE | Refills: 0 | Status: SHIPPED | OUTPATIENT
Start: 2018-10-26 | End: 2018-11-05

## 2018-11-16 ENCOUNTER — TELEPHONE (OUTPATIENT)
Dept: FAMILY MEDICINE CLINIC | Age: 68
End: 2018-11-16

## 2018-11-16 DIAGNOSIS — K21.00 GASTROESOPHAGEAL REFLUX DISEASE WITH ESOPHAGITIS: Primary | ICD-10-CM

## 2018-11-16 RX ORDER — SUCRALFATE 1 G/1
1 TABLET ORAL 4 TIMES DAILY
Qty: 120 TABLET | Refills: 3 | Status: SHIPPED
Start: 2018-11-16 | End: 2020-03-13

## 2018-11-16 NOTE — TELEPHONE ENCOUNTER
Patient called this afternoon stating you were communicating with him and going to send a medicine in for him. Says he went to [facility] and they don't have anything.

## 2018-12-22 DIAGNOSIS — I50.9 CONGESTIVE HEART FAILURE, UNSPECIFIED HF CHRONICITY, UNSPECIFIED HEART FAILURE TYPE (HCC): ICD-10-CM

## 2018-12-22 RX ORDER — CARVEDILOL 12.5 MG/1
12.5 TABLET ORAL 2 TIMES DAILY WITH MEALS
Qty: 180 TABLET | Refills: 1 | Status: SHIPPED | OUTPATIENT
Start: 2018-12-22 | End: 2019-04-23 | Stop reason: SDUPTHER

## 2019-01-03 ENCOUNTER — TELEPHONE (OUTPATIENT)
Dept: FAMILY MEDICINE CLINIC | Age: 69
End: 2019-01-03

## 2019-01-04 ENCOUNTER — TELEPHONE (OUTPATIENT)
Dept: SURGERY | Age: 69
End: 2019-01-04

## 2019-01-25 DIAGNOSIS — I10 ESSENTIAL HYPERTENSION: Primary | ICD-10-CM

## 2019-01-25 RX ORDER — LISINOPRIL 10 MG/1
10 TABLET ORAL DAILY
Qty: 90 TABLET | Refills: 1 | Status: SHIPPED | OUTPATIENT
Start: 2019-01-25 | End: 2019-01-28 | Stop reason: SDUPTHER

## 2019-01-28 DIAGNOSIS — E11.9 TYPE 2 DIABETES MELLITUS WITHOUT COMPLICATION, WITHOUT LONG-TERM CURRENT USE OF INSULIN (HCC): ICD-10-CM

## 2019-01-28 DIAGNOSIS — I10 ESSENTIAL HYPERTENSION: ICD-10-CM

## 2019-01-28 DIAGNOSIS — I50.9 CONGESTIVE HEART FAILURE, UNSPECIFIED HF CHRONICITY, UNSPECIFIED HEART FAILURE TYPE (HCC): ICD-10-CM

## 2019-01-28 RX ORDER — SOTALOL HYDROCHLORIDE 80 MG/1
40 TABLET ORAL 2 TIMES DAILY
Qty: 90 TABLET | Refills: 1 | Status: SHIPPED | OUTPATIENT
Start: 2019-01-28 | End: 2019-04-23 | Stop reason: SDUPTHER

## 2019-01-28 RX ORDER — LISINOPRIL 10 MG/1
10 TABLET ORAL DAILY
Qty: 90 TABLET | Refills: 1 | Status: SHIPPED | OUTPATIENT
Start: 2019-01-28 | End: 2019-04-23 | Stop reason: SDUPTHER

## 2019-01-28 RX ORDER — CLOPIDOGREL BISULFATE 75 MG/1
TABLET ORAL
Qty: 90 TABLET | Refills: 1 | Status: SHIPPED | OUTPATIENT
Start: 2019-01-28 | End: 2019-04-23 | Stop reason: SDUPTHER

## 2019-02-01 ENCOUNTER — NURSE ONLY (OUTPATIENT)
Dept: FAMILY MEDICINE CLINIC | Age: 69
End: 2019-02-01

## 2019-02-01 ENCOUNTER — HOSPITAL ENCOUNTER (OUTPATIENT)
Age: 69
Discharge: HOME | End: 2019-02-03
Payer: MEDICARE

## 2019-02-01 DIAGNOSIS — E78.5 DYSLIPIDEMIA: ICD-10-CM

## 2019-02-01 DIAGNOSIS — R73.01 IFG (IMPAIRED FASTING GLUCOSE): ICD-10-CM

## 2019-02-01 DIAGNOSIS — R53.83 FATIGUE, UNSPECIFIED TYPE: ICD-10-CM

## 2019-02-01 DIAGNOSIS — C61 PROSTATE CANCER (HCC): ICD-10-CM

## 2019-02-01 LAB
ANION GAP SERPL CALCULATED.3IONS-SCNC: 16 MMOL/L (ref 7–16)
BASOPHILS ABSOLUTE: 0.1 E9/L (ref 0–0.2)
BASOPHILS RELATIVE PERCENT: 1.4 % (ref 0–2)
BUN BLDV-MCNC: 12 MG/DL (ref 8–23)
CALCIUM SERPL-MCNC: 9.3 MG/DL (ref 8.6–10.2)
CHLORIDE BLD-SCNC: 98 MMOL/L (ref 98–107)
CHOLESTEROL, TOTAL: 157 MG/DL (ref 0–199)
CO2: 24 MMOL/L (ref 22–29)
CREAT SERPL-MCNC: 0.8 MG/DL (ref 0.7–1.2)
EOSINOPHILS ABSOLUTE: 0.4 E9/L (ref 0.05–0.5)
EOSINOPHILS RELATIVE PERCENT: 5.7 % (ref 0–6)
GFR AFRICAN AMERICAN: >60
GFR NON-AFRICAN AMERICAN: >60 ML/MIN/1.73
GLUCOSE BLD-MCNC: 196 MG/DL (ref 74–99)
HBA1C MFR BLD: 8.6 % (ref 4–5.6)
HCT VFR BLD CALC: 45.3 % (ref 37–54)
HDLC SERPL-MCNC: 37 MG/DL
HEMOGLOBIN: 14.7 G/DL (ref 12.5–16.5)
IMMATURE GRANULOCYTES #: 0.03 E9/L
IMMATURE GRANULOCYTES %: 0.4 % (ref 0–5)
LDL CHOLESTEROL CALCULATED: 71 MG/DL (ref 0–99)
LYMPHOCYTES ABSOLUTE: 1.41 E9/L (ref 1.5–4)
LYMPHOCYTES RELATIVE PERCENT: 20.2 % (ref 20–42)
MCH RBC QN AUTO: 31.7 PG (ref 26–35)
MCHC RBC AUTO-ENTMCNC: 32.5 % (ref 32–34.5)
MCV RBC AUTO: 97.8 FL (ref 80–99.9)
MONOCYTES ABSOLUTE: 0.74 E9/L (ref 0.1–0.95)
MONOCYTES RELATIVE PERCENT: 10.6 % (ref 2–12)
NEUTROPHILS ABSOLUTE: 4.31 E9/L (ref 1.8–7.3)
NEUTROPHILS RELATIVE PERCENT: 61.7 % (ref 43–80)
PDW BLD-RTO: 13.7 FL (ref 11.5–15)
PLATELET # BLD: 236 E9/L (ref 130–450)
PMV BLD AUTO: 9.9 FL (ref 7–12)
POTASSIUM SERPL-SCNC: 4.9 MMOL/L (ref 3.5–5)
PROSTATE SPECIFIC ANTIGEN: 0.83 NG/ML (ref 0–4)
RBC # BLD: 4.63 E12/L (ref 3.8–5.8)
SODIUM BLD-SCNC: 138 MMOL/L (ref 132–146)
TRIGL SERPL-MCNC: 244 MG/DL (ref 0–149)
TSH SERPL DL<=0.05 MIU/L-ACNC: 2.26 UIU/ML (ref 0.27–4.2)
VLDLC SERPL CALC-MCNC: 49 MG/DL
WBC # BLD: 7 E9/L (ref 4.5–11.5)

## 2019-02-01 PROCEDURE — 80061 LIPID PANEL: CPT

## 2019-02-01 PROCEDURE — 80048 BASIC METABOLIC PNL TOTAL CA: CPT

## 2019-02-01 PROCEDURE — 85025 COMPLETE CBC W/AUTO DIFF WBC: CPT

## 2019-02-01 PROCEDURE — 84443 ASSAY THYROID STIM HORMONE: CPT

## 2019-02-01 PROCEDURE — 84153 ASSAY OF PSA TOTAL: CPT

## 2019-02-01 PROCEDURE — 83036 HEMOGLOBIN GLYCOSYLATED A1C: CPT

## 2019-02-22 DIAGNOSIS — E78.2 MIXED HYPERLIPIDEMIA: Primary | ICD-10-CM

## 2019-02-22 RX ORDER — ICOSAPENT ETHYL 1000 MG/1
2 CAPSULE ORAL 2 TIMES DAILY
Qty: 360 CAPSULE | Refills: 3 | Status: SHIPPED | OUTPATIENT
Start: 2019-02-22 | End: 2019-07-31 | Stop reason: ALTCHOICE

## 2019-03-18 ENCOUNTER — CARE COORDINATION (OUTPATIENT)
Dept: CARE COORDINATION | Age: 69
End: 2019-03-18

## 2019-03-20 RX ORDER — ATORVASTATIN CALCIUM 40 MG/1
TABLET, FILM COATED ORAL
Qty: 90 TABLET | Refills: 1 | Status: SHIPPED | OUTPATIENT
Start: 2019-03-20 | End: 2019-04-23 | Stop reason: SDUPTHER

## 2019-04-01 ENCOUNTER — TELEPHONE (OUTPATIENT)
Dept: SURGERY | Age: 69
End: 2019-04-01

## 2019-04-01 NOTE — TELEPHONE ENCOUNTER
He can be scheduled for his colonoscopy at [facility] ([facility]) (not [facility] this time). I don't need to see him in the office first - unless the patient prefers to see me before the scope.     Claudio Lewis MD, FACS

## 2019-04-01 NOTE — TELEPHONE ENCOUNTER
Pt was scheduled for a colonoscopy on 9/11/18. Procedure was canceled due to pt experiencing vertigo. Dr. Lynn Lee wanted us to wait 3 months before rescheduling because pt was also going through radiation. Routing message to Dr. Luis Phillip to see if pt needs to be seen in office prior to scheduling procedure.      Electronically signed by Rodney Prieto CMA on 4/1/19 at 12:54 PM

## 2019-04-17 ENCOUNTER — TELEPHONE (OUTPATIENT)
Dept: SURGERY | Age: 69
End: 2019-04-17

## 2019-04-17 NOTE — TELEPHONE ENCOUNTER
2nd attempt to contact pt to schedule a colonoscopy with Dr. Luis Phillip. Per Dr. Luis Phillip, pt does not need to be seen in office prior to the procedure. Pt does not have a voicemail set up.     Electronically signed by Rodney Prieto CMA on 4/17/19 at 9:37 AM

## 2019-04-23 DIAGNOSIS — E11.9 TYPE 2 DIABETES MELLITUS WITHOUT COMPLICATION, WITHOUT LONG-TERM CURRENT USE OF INSULIN (HCC): ICD-10-CM

## 2019-04-23 DIAGNOSIS — I10 ESSENTIAL HYPERTENSION: ICD-10-CM

## 2019-04-23 DIAGNOSIS — I50.9 CONGESTIVE HEART FAILURE, UNSPECIFIED HF CHRONICITY, UNSPECIFIED HEART FAILURE TYPE (HCC): ICD-10-CM

## 2019-04-23 RX ORDER — LISINOPRIL 10 MG/1
10 TABLET ORAL DAILY
Qty: 90 TABLET | Refills: 1 | Status: SHIPPED | OUTPATIENT
Start: 2019-04-23 | End: 2019-11-30 | Stop reason: SDUPTHER

## 2019-04-23 RX ORDER — CARVEDILOL 12.5 MG/1
12.5 TABLET ORAL 2 TIMES DAILY WITH MEALS
Qty: 180 TABLET | Refills: 1 | Status: SHIPPED | OUTPATIENT
Start: 2019-04-23 | End: 2019-09-09 | Stop reason: DRUGHIGH

## 2019-04-23 RX ORDER — CLOPIDOGREL BISULFATE 75 MG/1
TABLET ORAL
Qty: 90 TABLET | Refills: 1 | Status: SHIPPED | OUTPATIENT
Start: 2019-04-23 | End: 2019-11-20 | Stop reason: SDUPTHER

## 2019-04-23 RX ORDER — SOTALOL HYDROCHLORIDE 80 MG/1
40 TABLET ORAL 2 TIMES DAILY
Qty: 90 TABLET | Refills: 1 | Status: SHIPPED | OUTPATIENT
Start: 2019-04-23 | End: 2019-11-20 | Stop reason: SDUPTHER

## 2019-04-23 RX ORDER — ATORVASTATIN CALCIUM 40 MG/1
TABLET, FILM COATED ORAL
Qty: 90 TABLET | Refills: 1 | Status: SHIPPED | OUTPATIENT
Start: 2019-04-23 | End: 2019-10-16 | Stop reason: SDUPTHER

## 2019-04-23 NOTE — TELEPHONE ENCOUNTER
Pharmacy called the clinical care line requesting medication refill. Chart reviewed and medication sent to the pharmacy.     Electronically signed by Devan Soliman on 4/23/19 at 4:09 PM

## 2019-05-06 ENCOUNTER — HOSPITAL ENCOUNTER (OUTPATIENT)
Age: 69
Discharge: HOME | End: 2019-05-06
Payer: MEDICARE

## 2019-05-06 LAB — PROSTATE SPECIFIC ANTIGEN: 0.35 NG/ML (ref 0–4)

## 2019-05-06 PROCEDURE — 36415 COLL VENOUS BLD VENIPUNCTURE: CPT

## 2019-05-06 PROCEDURE — 84153 ASSAY OF PSA TOTAL: CPT

## 2019-06-19 DIAGNOSIS — C61 PROSTATE CANCER (HCC): Primary | ICD-10-CM

## 2019-06-19 DIAGNOSIS — E78.2 MIXED HYPERLIPIDEMIA: ICD-10-CM

## 2019-06-19 DIAGNOSIS — E11.9 TYPE 2 DIABETES MELLITUS WITHOUT COMPLICATION, WITHOUT LONG-TERM CURRENT USE OF INSULIN (HCC): ICD-10-CM

## 2019-06-19 DIAGNOSIS — R53.83 FATIGUE, UNSPECIFIED TYPE: ICD-10-CM

## 2019-06-25 ENCOUNTER — HOSPITAL ENCOUNTER (OUTPATIENT)
Age: 69
Discharge: HOME | End: 2019-06-27
Payer: MEDICARE

## 2019-06-25 ENCOUNTER — NURSE ONLY (OUTPATIENT)
Dept: FAMILY MEDICINE CLINIC | Age: 69
End: 2019-06-25
Payer: MEDICARE

## 2019-06-25 DIAGNOSIS — R53.83 FATIGUE, UNSPECIFIED TYPE: ICD-10-CM

## 2019-06-25 DIAGNOSIS — I10 ESSENTIAL HYPERTENSION: Chronic | ICD-10-CM

## 2019-06-25 DIAGNOSIS — E78.2 MIXED HYPERLIPIDEMIA: ICD-10-CM

## 2019-06-25 DIAGNOSIS — C61 PROSTATE CANCER (HCC): ICD-10-CM

## 2019-06-25 DIAGNOSIS — J44.9 COPD, VERY SEVERE (HCC): Chronic | ICD-10-CM

## 2019-06-25 DIAGNOSIS — I50.9 CONGESTIVE HEART FAILURE, UNSPECIFIED HF CHRONICITY, UNSPECIFIED HEART FAILURE TYPE (HCC): ICD-10-CM

## 2019-06-25 DIAGNOSIS — E11.649 UNCONTROLLED TYPE 2 DIABETES MELLITUS WITH HYPOGLYCEMIA WITHOUT COMA (HCC): Chronic | ICD-10-CM

## 2019-06-25 DIAGNOSIS — E11.9 TYPE 2 DIABETES MELLITUS WITHOUT COMPLICATION, WITHOUT LONG-TERM CURRENT USE OF INSULIN (HCC): ICD-10-CM

## 2019-06-25 DIAGNOSIS — I25.10 CORONARY ARTERY DISEASE INVOLVING NATIVE HEART WITHOUT ANGINA PECTORIS, UNSPECIFIED VESSEL OR LESION TYPE: ICD-10-CM

## 2019-06-25 LAB
ALBUMIN SERPL-MCNC: 4 G/DL (ref 3.5–5.2)
ALP BLD-CCNC: 107 U/L (ref 40–129)
ALT SERPL-CCNC: 15 U/L (ref 0–40)
ANION GAP SERPL CALCULATED.3IONS-SCNC: 14 MMOL/L (ref 7–16)
AST SERPL-CCNC: 19 U/L (ref 0–39)
BASOPHILS ABSOLUTE: 0.08 E9/L (ref 0–0.2)
BASOPHILS RELATIVE PERCENT: 1.1 % (ref 0–2)
BILIRUB SERPL-MCNC: 0.4 MG/DL (ref 0–1.2)
BUN BLDV-MCNC: 9 MG/DL (ref 8–23)
CALCIUM SERPL-MCNC: 9.8 MG/DL (ref 8.6–10.2)
CHLORIDE BLD-SCNC: 102 MMOL/L (ref 98–107)
CHOLESTEROL, TOTAL: 163 MG/DL (ref 0–199)
CO2: 22 MMOL/L (ref 22–29)
CREAT SERPL-MCNC: 0.9 MG/DL (ref 0.7–1.2)
EOSINOPHILS ABSOLUTE: 0.28 E9/L (ref 0.05–0.5)
EOSINOPHILS RELATIVE PERCENT: 3.8 % (ref 0–6)
GFR AFRICAN AMERICAN: >60
GFR NON-AFRICAN AMERICAN: >60 ML/MIN/1.73
GLUCOSE BLD-MCNC: 226 MG/DL (ref 74–99)
HBA1C MFR BLD: 8.7 % (ref 4–5.6)
HCT VFR BLD CALC: 43.6 % (ref 37–54)
HDLC SERPL-MCNC: 34 MG/DL
HEMOGLOBIN: 14.6 G/DL (ref 12.5–16.5)
IMMATURE GRANULOCYTES #: 0.06 E9/L
IMMATURE GRANULOCYTES %: 0.8 % (ref 0–5)
LDL CHOLESTEROL CALCULATED: 79 MG/DL (ref 0–99)
LYMPHOCYTES ABSOLUTE: 1.69 E9/L (ref 1.5–4)
LYMPHOCYTES RELATIVE PERCENT: 22.7 % (ref 20–42)
MCH RBC QN AUTO: 31.9 PG (ref 26–35)
MCHC RBC AUTO-ENTMCNC: 33.5 % (ref 32–34.5)
MCV RBC AUTO: 95.4 FL (ref 80–99.9)
MONOCYTES ABSOLUTE: 0.7 E9/L (ref 0.1–0.95)
MONOCYTES RELATIVE PERCENT: 9.4 % (ref 2–12)
NEUTROPHILS ABSOLUTE: 4.62 E9/L (ref 1.8–7.3)
NEUTROPHILS RELATIVE PERCENT: 62.2 % (ref 43–80)
PDW BLD-RTO: 14.3 FL (ref 11.5–15)
PLATELET # BLD: 250 E9/L (ref 130–450)
PMV BLD AUTO: 10.1 FL (ref 7–12)
POTASSIUM SERPL-SCNC: 4.5 MMOL/L (ref 3.5–5)
PROSTATE SPECIFIC ANTIGEN: 0.27 NG/ML (ref 0–4)
RBC # BLD: 4.57 E12/L (ref 3.8–5.8)
SODIUM BLD-SCNC: 138 MMOL/L (ref 132–146)
TOTAL PROTEIN: 7.4 G/DL (ref 6.4–8.3)
TRIGL SERPL-MCNC: 249 MG/DL (ref 0–149)
TSH SERPL DL<=0.05 MIU/L-ACNC: 2.97 UIU/ML (ref 0.27–4.2)
VLDLC SERPL CALC-MCNC: 50 MG/DL
WBC # BLD: 7.4 E9/L (ref 4.5–11.5)

## 2019-06-25 PROCEDURE — 36415 COLL VENOUS BLD VENIPUNCTURE: CPT | Performed by: FAMILY MEDICINE

## 2019-06-25 PROCEDURE — 84443 ASSAY THYROID STIM HORMONE: CPT

## 2019-06-25 PROCEDURE — 80061 LIPID PANEL: CPT

## 2019-06-25 PROCEDURE — 83036 HEMOGLOBIN GLYCOSYLATED A1C: CPT

## 2019-06-25 PROCEDURE — 84153 ASSAY OF PSA TOTAL: CPT

## 2019-06-25 PROCEDURE — 80053 COMPREHEN METABOLIC PANEL: CPT

## 2019-06-25 PROCEDURE — 85025 COMPLETE CBC W/AUTO DIFF WBC: CPT

## 2019-06-25 NOTE — PROGRESS NOTES
Labs drawn per Dr. Karie Palacios orders.     Electronically signed by Sheldon Acevedo MA on 6/25/19 at 8:10 AM

## 2019-07-17 DIAGNOSIS — L03.312 CELLULITIS OF BACK EXCEPT BUTTOCK: Primary | ICD-10-CM

## 2019-07-17 RX ORDER — DOXYCYCLINE HYCLATE 100 MG
100 TABLET ORAL 2 TIMES DAILY
Qty: 20 TABLET | Refills: 0 | Status: SHIPPED | OUTPATIENT
Start: 2019-07-17 | End: 2019-07-27

## 2019-09-09 ENCOUNTER — NURSE ONLY (OUTPATIENT)
Dept: FAMILY MEDICINE CLINIC | Age: 69
End: 2019-09-09
Payer: MEDICARE

## 2019-09-09 ENCOUNTER — HOSPITAL ENCOUNTER (OUTPATIENT)
Age: 69
Discharge: HOME | End: 2019-09-11
Payer: MEDICARE

## 2019-09-09 DIAGNOSIS — E11.9 TYPE 2 DIABETES MELLITUS WITHOUT COMPLICATION, WITHOUT LONG-TERM CURRENT USE OF INSULIN (HCC): Primary | ICD-10-CM

## 2019-09-09 DIAGNOSIS — E11.9 TYPE 2 DIABETES MELLITUS WITHOUT COMPLICATION, WITHOUT LONG-TERM CURRENT USE OF INSULIN (HCC): ICD-10-CM

## 2019-09-09 DIAGNOSIS — I50.9 CONGESTIVE HEART FAILURE, UNSPECIFIED HF CHRONICITY, UNSPECIFIED HEART FAILURE TYPE (HCC): ICD-10-CM

## 2019-09-09 DIAGNOSIS — C61 PROSTATE CANCER (HCC): ICD-10-CM

## 2019-09-09 DIAGNOSIS — R53.83 FATIGUE, UNSPECIFIED TYPE: ICD-10-CM

## 2019-09-09 DIAGNOSIS — E78.5 DYSLIPIDEMIA: ICD-10-CM

## 2019-09-09 LAB
ANION GAP SERPL CALCULATED.3IONS-SCNC: 16 MMOL/L (ref 7–16)
BASOPHILS ABSOLUTE: 0.08 E9/L (ref 0–0.2)
BASOPHILS RELATIVE PERCENT: 1.1 % (ref 0–2)
BUN BLDV-MCNC: 10 MG/DL (ref 8–23)
CALCIUM SERPL-MCNC: 9.5 MG/DL (ref 8.6–10.2)
CHLORIDE BLD-SCNC: 100 MMOL/L (ref 98–107)
CHOLESTEROL, TOTAL: 147 MG/DL (ref 0–199)
CO2: 24 MMOL/L (ref 22–29)
CREAT SERPL-MCNC: 0.9 MG/DL (ref 0.7–1.2)
EOSINOPHILS ABSOLUTE: 0.2 E9/L (ref 0.05–0.5)
EOSINOPHILS RELATIVE PERCENT: 2.8 % (ref 0–6)
GFR AFRICAN AMERICAN: >60
GFR NON-AFRICAN AMERICAN: >60 ML/MIN/1.73
GLUCOSE BLD-MCNC: 174 MG/DL (ref 74–99)
HBA1C MFR BLD: 8.4 % (ref 4–5.6)
HCT VFR BLD CALC: 46.6 % (ref 37–54)
HDLC SERPL-MCNC: 30 MG/DL
HEMOGLOBIN: 15.2 G/DL (ref 12.5–16.5)
IMMATURE GRANULOCYTES #: 0.04 E9/L
IMMATURE GRANULOCYTES %: 0.6 % (ref 0–5)
LDL CHOLESTEROL CALCULATED: 84 MG/DL (ref 0–99)
LYMPHOCYTES ABSOLUTE: 1.56 E9/L (ref 1.5–4)
LYMPHOCYTES RELATIVE PERCENT: 22.2 % (ref 20–42)
MCH RBC QN AUTO: 31.5 PG (ref 26–35)
MCHC RBC AUTO-ENTMCNC: 32.6 % (ref 32–34.5)
MCV RBC AUTO: 96.5 FL (ref 80–99.9)
MONOCYTES ABSOLUTE: 0.67 E9/L (ref 0.1–0.95)
MONOCYTES RELATIVE PERCENT: 9.5 % (ref 2–12)
NEUTROPHILS ABSOLUTE: 4.49 E9/L (ref 1.8–7.3)
NEUTROPHILS RELATIVE PERCENT: 63.8 % (ref 43–80)
PDW BLD-RTO: 13.7 FL (ref 11.5–15)
PLATELET # BLD: 245 E9/L (ref 130–450)
PMV BLD AUTO: 10.1 FL (ref 7–12)
POTASSIUM SERPL-SCNC: 4.9 MMOL/L (ref 3.5–5)
PROSTATE SPECIFIC ANTIGEN: 0.22 NG/ML (ref 0–4)
RBC # BLD: 4.83 E12/L (ref 3.8–5.8)
SODIUM BLD-SCNC: 140 MMOL/L (ref 132–146)
TRIGL SERPL-MCNC: 167 MG/DL (ref 0–149)
TSH SERPL DL<=0.05 MIU/L-ACNC: 2.28 UIU/ML (ref 0.27–4.2)
VLDLC SERPL CALC-MCNC: 33 MG/DL
WBC # BLD: 7 E9/L (ref 4.5–11.5)

## 2019-09-09 PROCEDURE — 84153 ASSAY OF PSA TOTAL: CPT

## 2019-09-09 PROCEDURE — 93000 ELECTROCARDIOGRAM COMPLETE: CPT | Performed by: FAMILY MEDICINE

## 2019-09-09 PROCEDURE — 84443 ASSAY THYROID STIM HORMONE: CPT

## 2019-09-09 PROCEDURE — 83036 HEMOGLOBIN GLYCOSYLATED A1C: CPT

## 2019-09-09 PROCEDURE — 80048 BASIC METABOLIC PNL TOTAL CA: CPT

## 2019-09-09 PROCEDURE — 80061 LIPID PANEL: CPT

## 2019-09-09 PROCEDURE — 85025 COMPLETE CBC W/AUTO DIFF WBC: CPT

## 2019-09-09 RX ORDER — CARVEDILOL 12.5 MG/1
6.25 TABLET ORAL 2 TIMES DAILY WITH MEALS
Qty: 180 TABLET | Refills: 1 | Status: SHIPPED
Start: 2019-09-09 | End: 2019-10-16 | Stop reason: SDUPTHER

## 2019-09-11 DIAGNOSIS — E11.9 TYPE 2 DIABETES MELLITUS WITHOUT COMPLICATION, WITHOUT LONG-TERM CURRENT USE OF INSULIN (HCC): Primary | ICD-10-CM

## 2019-09-11 RX ORDER — METFORMIN HYDROCHLORIDE 750 MG/1
750 TABLET, EXTENDED RELEASE ORAL
Qty: 90 TABLET | Refills: 1 | Status: SHIPPED
Start: 2019-09-11 | End: 2020-03-13

## 2019-10-16 DIAGNOSIS — I50.9 CONGESTIVE HEART FAILURE, UNSPECIFIED HF CHRONICITY, UNSPECIFIED HEART FAILURE TYPE (HCC): ICD-10-CM

## 2019-10-17 RX ORDER — ATORVASTATIN CALCIUM 40 MG/1
TABLET, FILM COATED ORAL
Qty: 90 TABLET | Refills: 1 | Status: SHIPPED
Start: 2019-10-17 | End: 2020-03-13

## 2019-10-17 RX ORDER — CARVEDILOL 12.5 MG/1
TABLET ORAL
Qty: 180 TABLET | Refills: 1 | Status: SHIPPED
Start: 2019-10-17 | End: 2020-03-13

## 2019-11-20 DIAGNOSIS — I50.9 CONGESTIVE HEART FAILURE, UNSPECIFIED HF CHRONICITY, UNSPECIFIED HEART FAILURE TYPE (HCC): ICD-10-CM

## 2019-11-22 RX ORDER — CLOPIDOGREL BISULFATE 75 MG/1
TABLET ORAL
Qty: 90 TABLET | Refills: 1 | Status: SHIPPED
Start: 2019-11-22 | End: 2020-02-24 | Stop reason: SDUPTHER

## 2019-11-22 RX ORDER — SOTALOL HYDROCHLORIDE 80 MG/1
TABLET ORAL
Qty: 90 TABLET | Refills: 1 | Status: SHIPPED
Start: 2019-11-22 | End: 2020-02-24 | Stop reason: SDUPTHER

## 2020-01-06 RX ORDER — AMOXICILLIN 500 MG/1
500 CAPSULE ORAL 3 TIMES DAILY
Qty: 21 CAPSULE | Refills: 0 | Status: SHIPPED | OUTPATIENT
Start: 2020-01-06 | End: 2020-01-13

## 2020-01-08 RX ORDER — METHYLPREDNISOLONE 4 MG/1
TABLET ORAL
Qty: 1 KIT | Refills: 0 | Status: SHIPPED | OUTPATIENT
Start: 2020-01-08 | End: 2020-01-14

## 2020-02-06 ENCOUNTER — HOSPITAL ENCOUNTER (OUTPATIENT)
Age: 70
Discharge: HOME | End: 2020-02-08
Payer: MEDICARE

## 2020-02-06 LAB
ALBUMIN SERPL-MCNC: 4.1 G/DL (ref 3.5–5.2)
ALP BLD-CCNC: 83 U/L (ref 40–129)
ALT SERPL-CCNC: 17 U/L (ref 0–40)
ANION GAP SERPL CALCULATED.3IONS-SCNC: 18 MMOL/L (ref 7–16)
AST SERPL-CCNC: 22 U/L (ref 0–39)
BASOPHILS ABSOLUTE: 0.08 E9/L (ref 0–0.2)
BASOPHILS RELATIVE PERCENT: 1.1 % (ref 0–2)
BILIRUB SERPL-MCNC: 0.5 MG/DL (ref 0–1.2)
BUN BLDV-MCNC: 10 MG/DL (ref 8–23)
CALCIUM SERPL-MCNC: 10.4 MG/DL (ref 8.6–10.2)
CHLORIDE BLD-SCNC: 101 MMOL/L (ref 98–107)
CHOLESTEROL, TOTAL: 158 MG/DL (ref 0–199)
CO2: 21 MMOL/L (ref 22–29)
CREAT SERPL-MCNC: 1.1 MG/DL (ref 0.7–1.2)
EOSINOPHILS ABSOLUTE: 0.17 E9/L (ref 0.05–0.5)
EOSINOPHILS RELATIVE PERCENT: 2.4 % (ref 0–6)
GFR AFRICAN AMERICAN: >60
GFR NON-AFRICAN AMERICAN: >60 ML/MIN/1.73
GLUCOSE BLD-MCNC: 188 MG/DL (ref 74–99)
HBA1C MFR BLD: 7.7 % (ref 4–5.6)
HCT VFR BLD CALC: 48.5 % (ref 37–54)
HDLC SERPL-MCNC: 32 MG/DL
HEMOGLOBIN: 15.5 G/DL (ref 12.5–16.5)
IMMATURE GRANULOCYTES #: 0.03 E9/L
IMMATURE GRANULOCYTES %: 0.4 % (ref 0–5)
LDL CHOLESTEROL CALCULATED: 77 MG/DL (ref 0–99)
LYMPHOCYTES ABSOLUTE: 1.83 E9/L (ref 1.5–4)
LYMPHOCYTES RELATIVE PERCENT: 26 % (ref 20–42)
MCH RBC QN AUTO: 30.4 PG (ref 26–35)
MCHC RBC AUTO-ENTMCNC: 32 % (ref 32–34.5)
MCV RBC AUTO: 95.1 FL (ref 80–99.9)
MONOCYTES ABSOLUTE: 0.65 E9/L (ref 0.1–0.95)
MONOCYTES RELATIVE PERCENT: 9.2 % (ref 2–12)
NEUTROPHILS ABSOLUTE: 4.28 E9/L (ref 1.8–7.3)
NEUTROPHILS RELATIVE PERCENT: 60.9 % (ref 43–80)
PDW BLD-RTO: 13.9 FL (ref 11.5–15)
PLATELET # BLD: 239 E9/L (ref 130–450)
PMV BLD AUTO: 10.2 FL (ref 7–12)
POTASSIUM SERPL-SCNC: 4.7 MMOL/L (ref 3.5–5)
PROSTATE SPECIFIC ANTIGEN: 0.09 NG/ML (ref 0–4)
RBC # BLD: 5.1 E12/L (ref 3.8–5.8)
SODIUM BLD-SCNC: 140 MMOL/L (ref 132–146)
TOTAL PROTEIN: 7.8 G/DL (ref 6.4–8.3)
TRIGL SERPL-MCNC: 247 MG/DL (ref 0–149)
TSH SERPL DL<=0.05 MIU/L-ACNC: 3.39 UIU/ML (ref 0.27–4.2)
VLDLC SERPL CALC-MCNC: 49 MG/DL
WBC # BLD: 7 E9/L (ref 4.5–11.5)

## 2020-02-06 PROCEDURE — G0103 PSA SCREENING: HCPCS

## 2020-02-06 PROCEDURE — 83036 HEMOGLOBIN GLYCOSYLATED A1C: CPT

## 2020-02-06 PROCEDURE — 80061 LIPID PANEL: CPT

## 2020-02-06 PROCEDURE — 84443 ASSAY THYROID STIM HORMONE: CPT

## 2020-02-06 PROCEDURE — 80053 COMPREHEN METABOLIC PANEL: CPT

## 2020-02-06 PROCEDURE — 85025 COMPLETE CBC W/AUTO DIFF WBC: CPT

## 2020-02-24 VITALS — SYSTOLIC BLOOD PRESSURE: 120 MMHG | HEART RATE: 63 BPM | RESPIRATION RATE: 18 BRPM | DIASTOLIC BLOOD PRESSURE: 78 MMHG

## 2020-02-24 RX ORDER — SOTALOL HYDROCHLORIDE 80 MG/1
TABLET ORAL
Qty: 90 TABLET | Refills: 1 | Status: SHIPPED
Start: 2020-02-24 | End: 2020-03-13

## 2020-02-24 RX ORDER — CLOPIDOGREL BISULFATE 75 MG/1
TABLET ORAL
Qty: 90 TABLET | Refills: 1 | Status: SHIPPED
Start: 2020-02-24 | End: 2020-03-13

## 2020-03-13 ENCOUNTER — APPOINTMENT (OUTPATIENT)
Dept: GENERAL RADIOLOGY | Age: 70
DRG: 956 | End: 2020-03-13
Payer: MEDICARE

## 2020-03-13 ENCOUNTER — APPOINTMENT (OUTPATIENT)
Dept: CT IMAGING | Age: 70
DRG: 956 | End: 2020-03-13
Payer: MEDICARE

## 2020-03-13 ENCOUNTER — HOSPITAL ENCOUNTER (INPATIENT)
Age: 70
LOS: 3 days | Discharge: NURSING, REHAB OR LONG-TERM CARE | DRG: 956 | End: 2020-03-16
Attending: EMERGENCY MEDICINE | Admitting: INTERNAL MEDICINE
Payer: MEDICARE

## 2020-03-13 PROBLEM — S72.001A HIP FRACTURE REQUIRING OPERATIVE REPAIR, RIGHT, CLOSED, INITIAL ENCOUNTER (HCC): Status: ACTIVE | Noted: 2020-03-13

## 2020-03-13 LAB
ABO/RH: NORMAL
ALBUMIN SERPL-MCNC: 3.9 G/DL (ref 3.5–5.2)
ALP BLD-CCNC: 86 U/L (ref 40–129)
ALT SERPL-CCNC: 20 U/L (ref 0–40)
ANION GAP SERPL CALCULATED.3IONS-SCNC: 13 MMOL/L (ref 7–16)
ANTIBODY SCREEN: NORMAL
APTT: 28.7 SEC (ref 24.5–35.1)
AST SERPL-CCNC: 34 U/L (ref 0–39)
BASOPHILS ABSOLUTE: 0.07 E9/L (ref 0–0.2)
BASOPHILS RELATIVE PERCENT: 0.7 % (ref 0–2)
BILIRUB SERPL-MCNC: 0.7 MG/DL (ref 0–1.2)
BUN BLDV-MCNC: 10 MG/DL (ref 8–23)
CALCIUM SERPL-MCNC: 10 MG/DL (ref 8.6–10.2)
CHLORIDE BLD-SCNC: 98 MMOL/L (ref 98–107)
CO2: 24 MMOL/L (ref 22–29)
CREAT SERPL-MCNC: 0.8 MG/DL (ref 0.7–1.2)
EKG ATRIAL RATE: 81 BPM
EKG P AXIS: 47 DEGREES
EKG P-R INTERVAL: 144 MS
EKG Q-T INTERVAL: 376 MS
EKG QRS DURATION: 78 MS
EKG QTC CALCULATION (BAZETT): 436 MS
EKG R AXIS: 54 DEGREES
EKG T AXIS: -73 DEGREES
EKG VENTRICULAR RATE: 81 BPM
EOSINOPHILS ABSOLUTE: 0.05 E9/L (ref 0.05–0.5)
EOSINOPHILS RELATIVE PERCENT: 0.5 % (ref 0–6)
GFR AFRICAN AMERICAN: >60
GFR NON-AFRICAN AMERICAN: >60 ML/MIN/1.73
GLUCOSE BLD-MCNC: 185 MG/DL (ref 74–99)
HCT VFR BLD CALC: 44.6 % (ref 37–54)
HEMOGLOBIN: 14.5 G/DL (ref 12.5–16.5)
IMMATURE GRANULOCYTES #: 0.05 E9/L
IMMATURE GRANULOCYTES %: 0.5 % (ref 0–5)
INR BLD: 1.1
LYMPHOCYTES ABSOLUTE: 0.74 E9/L (ref 1.5–4)
LYMPHOCYTES RELATIVE PERCENT: 7.7 % (ref 20–42)
MAGNESIUM: 1.6 MG/DL (ref 1.6–2.6)
MCH RBC QN AUTO: 29.9 PG (ref 26–35)
MCHC RBC AUTO-ENTMCNC: 32.5 % (ref 32–34.5)
MCV RBC AUTO: 92 FL (ref 80–99.9)
MONOCYTES ABSOLUTE: 0.48 E9/L (ref 0.1–0.95)
MONOCYTES RELATIVE PERCENT: 5 % (ref 2–12)
NEUTROPHILS ABSOLUTE: 8.26 E9/L (ref 1.8–7.3)
NEUTROPHILS RELATIVE PERCENT: 85.6 % (ref 43–80)
PDW BLD-RTO: 13.4 FL (ref 11.5–15)
PHOSPHORUS: 2.3 MG/DL (ref 2.5–4.5)
PLATELET # BLD: 233 E9/L (ref 130–450)
PMV BLD AUTO: 9.5 FL (ref 7–12)
POTASSIUM REFLEX MAGNESIUM: 5.2 MMOL/L (ref 3.5–5)
PROTHROMBIN TIME: 12.1 SEC (ref 9.3–12.4)
RBC # BLD: 4.85 E12/L (ref 3.8–5.8)
SODIUM BLD-SCNC: 135 MMOL/L (ref 132–146)
TOTAL PROTEIN: 7.8 G/DL (ref 6.4–8.3)
TROPONIN: <0.01 NG/ML (ref 0–0.03)
WBC # BLD: 9.7 E9/L (ref 4.5–11.5)

## 2020-03-13 PROCEDURE — 85610 PROTHROMBIN TIME: CPT

## 2020-03-13 PROCEDURE — 83735 ASSAY OF MAGNESIUM: CPT

## 2020-03-13 PROCEDURE — 85025 COMPLETE CBC W/AUTO DIFF WBC: CPT

## 2020-03-13 PROCEDURE — 99285 EMERGENCY DEPT VISIT HI MDM: CPT

## 2020-03-13 PROCEDURE — 96374 THER/PROPH/DIAG INJ IV PUSH: CPT

## 2020-03-13 PROCEDURE — 70450 CT HEAD/BRAIN W/O DYE: CPT

## 2020-03-13 PROCEDURE — 85730 THROMBOPLASTIN TIME PARTIAL: CPT

## 2020-03-13 PROCEDURE — 99222 1ST HOSP IP/OBS MODERATE 55: CPT | Performed by: ORTHOPAEDIC SURGERY

## 2020-03-13 PROCEDURE — 2580000003 HC RX 258: Performed by: INTERNAL MEDICINE

## 2020-03-13 PROCEDURE — 93005 ELECTROCARDIOGRAM TRACING: CPT | Performed by: EMERGENCY MEDICINE

## 2020-03-13 PROCEDURE — 1200000000 HC SEMI PRIVATE

## 2020-03-13 PROCEDURE — 6360000002 HC RX W HCPCS: Performed by: INTERNAL MEDICINE

## 2020-03-13 PROCEDURE — 93010 ELECTROCARDIOGRAM REPORT: CPT | Performed by: INTERNAL MEDICINE

## 2020-03-13 PROCEDURE — 72125 CT NECK SPINE W/O DYE: CPT

## 2020-03-13 PROCEDURE — 86850 RBC ANTIBODY SCREEN: CPT

## 2020-03-13 PROCEDURE — 6370000000 HC RX 637 (ALT 250 FOR IP): Performed by: INTERNAL MEDICINE

## 2020-03-13 PROCEDURE — 84100 ASSAY OF PHOSPHORUS: CPT

## 2020-03-13 PROCEDURE — 73502 X-RAY EXAM HIP UNI 2-3 VIEWS: CPT

## 2020-03-13 PROCEDURE — 86901 BLOOD TYPING SEROLOGIC RH(D): CPT

## 2020-03-13 PROCEDURE — 71045 X-RAY EXAM CHEST 1 VIEW: CPT

## 2020-03-13 PROCEDURE — 84484 ASSAY OF TROPONIN QUANT: CPT

## 2020-03-13 PROCEDURE — 6360000002 HC RX W HCPCS: Performed by: PHYSICAL MEDICINE & REHABILITATION

## 2020-03-13 PROCEDURE — 86900 BLOOD TYPING SEROLOGIC ABO: CPT

## 2020-03-13 PROCEDURE — 80053 COMPREHEN METABOLIC PANEL: CPT

## 2020-03-13 RX ORDER — LISINOPRIL 10 MG/1
10 TABLET ORAL DAILY
Status: DISCONTINUED | OUTPATIENT
Start: 2020-03-13 | End: 2020-03-15

## 2020-03-13 RX ORDER — ATORVASTATIN CALCIUM 40 MG/1
40 TABLET, FILM COATED ORAL DAILY
COMMUNITY
End: 2020-06-12 | Stop reason: SDUPTHER

## 2020-03-13 RX ORDER — OXYCODONE HYDROCHLORIDE 5 MG/1
5 TABLET ORAL EVERY 4 HOURS PRN
Status: DISCONTINUED | OUTPATIENT
Start: 2020-03-13 | End: 2020-03-14

## 2020-03-13 RX ORDER — KETOROLAC TROMETHAMINE 30 MG/ML
15 INJECTION, SOLUTION INTRAMUSCULAR; INTRAVENOUS EVERY 6 HOURS PRN
Status: DISCONTINUED | OUTPATIENT
Start: 2020-03-13 | End: 2020-03-16 | Stop reason: HOSPADM

## 2020-03-13 RX ORDER — ONDANSETRON 2 MG/ML
4 INJECTION INTRAMUSCULAR; INTRAVENOUS EVERY 6 HOURS PRN
Status: DISCONTINUED | OUTPATIENT
Start: 2020-03-13 | End: 2020-03-13

## 2020-03-13 RX ORDER — FENTANYL CITRATE 50 UG/ML
50 INJECTION, SOLUTION INTRAMUSCULAR; INTRAVENOUS ONCE
Status: COMPLETED | OUTPATIENT
Start: 2020-03-13 | End: 2020-03-13

## 2020-03-13 RX ORDER — SODIUM CHLORIDE 0.9 % (FLUSH) 0.9 %
10 SYRINGE (ML) INJECTION EVERY 12 HOURS SCHEDULED
Status: DISCONTINUED | OUTPATIENT
Start: 2020-03-13 | End: 2020-03-16 | Stop reason: HOSPADM

## 2020-03-13 RX ORDER — CARVEDILOL 6.25 MG/1
12.5 TABLET ORAL 2 TIMES DAILY WITH MEALS
Status: DISCONTINUED | OUTPATIENT
Start: 2020-03-13 | End: 2020-03-15

## 2020-03-13 RX ORDER — POLYETHYLENE GLYCOL 3350 17 G/17G
17 POWDER, FOR SOLUTION ORAL DAILY PRN
Status: DISCONTINUED | OUTPATIENT
Start: 2020-03-13 | End: 2020-03-16 | Stop reason: HOSPADM

## 2020-03-13 RX ORDER — ACETAMINOPHEN 325 MG/1
650 TABLET ORAL EVERY 6 HOURS PRN
Status: DISCONTINUED | OUTPATIENT
Start: 2020-03-13 | End: 2020-03-16 | Stop reason: HOSPADM

## 2020-03-13 RX ORDER — CLOPIDOGREL BISULFATE 75 MG/1
75 TABLET ORAL DAILY
Status: DISCONTINUED | OUTPATIENT
Start: 2020-03-13 | End: 2020-03-14

## 2020-03-13 RX ORDER — HEPARIN SODIUM 10000 [USP'U]/ML
5000 INJECTION, SOLUTION INTRAVENOUS; SUBCUTANEOUS EVERY 8 HOURS SCHEDULED
Status: DISCONTINUED | OUTPATIENT
Start: 2020-03-13 | End: 2020-03-16 | Stop reason: HOSPADM

## 2020-03-13 RX ORDER — SOTALOL HYDROCHLORIDE 80 MG/1
40 TABLET ORAL 2 TIMES DAILY
Status: DISCONTINUED | OUTPATIENT
Start: 2020-03-13 | End: 2020-03-16 | Stop reason: HOSPADM

## 2020-03-13 RX ORDER — PROMETHAZINE HYDROCHLORIDE 25 MG/1
12.5 TABLET ORAL EVERY 6 HOURS PRN
Status: DISCONTINUED | OUTPATIENT
Start: 2020-03-13 | End: 2020-03-13

## 2020-03-13 RX ORDER — LISINOPRIL 10 MG/1
10 TABLET ORAL DAILY
COMMUNITY
End: 2020-06-12 | Stop reason: SDUPTHER

## 2020-03-13 RX ORDER — ACETAMINOPHEN 650 MG/1
650 SUPPOSITORY RECTAL EVERY 6 HOURS PRN
Status: DISCONTINUED | OUTPATIENT
Start: 2020-03-13 | End: 2020-03-16 | Stop reason: HOSPADM

## 2020-03-13 RX ORDER — SODIUM CHLORIDE 0.9 % (FLUSH) 0.9 %
10 SYRINGE (ML) INJECTION PRN
Status: DISCONTINUED | OUTPATIENT
Start: 2020-03-13 | End: 2020-03-16 | Stop reason: HOSPADM

## 2020-03-13 RX ORDER — IBUPROFEN 200 MG
200 TABLET ORAL EVERY 6 HOURS PRN
COMMUNITY
End: 2021-09-13

## 2020-03-13 RX ORDER — CARVEDILOL 12.5 MG/1
6.25 TABLET ORAL 2 TIMES DAILY WITH MEALS
COMMUNITY
End: 2020-06-11

## 2020-03-13 RX ORDER — CLOPIDOGREL BISULFATE 75 MG/1
75 TABLET ORAL DAILY
COMMUNITY
End: 2020-12-10 | Stop reason: SDUPTHER

## 2020-03-13 RX ORDER — SOTALOL HYDROCHLORIDE 80 MG/1
40 TABLET ORAL 2 TIMES DAILY
COMMUNITY
End: 2020-12-07

## 2020-03-13 RX ORDER — ATORVASTATIN CALCIUM 40 MG/1
40 TABLET, FILM COATED ORAL DAILY
Status: DISCONTINUED | OUTPATIENT
Start: 2020-03-13 | End: 2020-03-16 | Stop reason: HOSPADM

## 2020-03-13 RX ORDER — CEFAZOLIN SODIUM 2 G/50ML
2 SOLUTION INTRAVENOUS SEE ADMIN INSTRUCTIONS
Status: DISCONTINUED | OUTPATIENT
Start: 2020-03-13 | End: 2020-03-14 | Stop reason: HOSPADM

## 2020-03-13 RX ORDER — MORPHINE SULFATE 2 MG/ML
2 INJECTION, SOLUTION INTRAMUSCULAR; INTRAVENOUS EVERY 4 HOURS PRN
Status: DISCONTINUED | OUTPATIENT
Start: 2020-03-13 | End: 2020-03-14

## 2020-03-13 RX ORDER — ASPIRIN 81 MG/1
81 TABLET, CHEWABLE ORAL DAILY
Status: DISCONTINUED | OUTPATIENT
Start: 2020-03-13 | End: 2020-03-14

## 2020-03-13 RX ADMIN — LISINOPRIL 10 MG: 10 TABLET ORAL at 18:03

## 2020-03-13 RX ADMIN — OXYCODONE 5 MG: 5 TABLET ORAL at 20:40

## 2020-03-13 RX ADMIN — METFORMIN HYDROCHLORIDE 1000 MG: 1000 TABLET ORAL at 18:03

## 2020-03-13 RX ADMIN — ATORVASTATIN CALCIUM 40 MG: 40 TABLET, FILM COATED ORAL at 20:40

## 2020-03-13 RX ADMIN — Medication 10 ML: at 20:38

## 2020-03-13 RX ADMIN — CARVEDILOL 12.5 MG: 6.25 TABLET, FILM COATED ORAL at 18:02

## 2020-03-13 RX ADMIN — FENTANYL CITRATE 50 MCG: 50 INJECTION, SOLUTION INTRAMUSCULAR; INTRAVENOUS at 13:50

## 2020-03-13 RX ADMIN — KETOROLAC TROMETHAMINE 15 MG: 30 INJECTION, SOLUTION INTRAMUSCULAR; INTRAVENOUS at 16:33

## 2020-03-13 RX ADMIN — SOTALOL HYDROCHLORIDE 40 MG: 80 TABLET ORAL at 20:40

## 2020-03-13 ASSESSMENT — PAIN DESCRIPTION - FREQUENCY: FREQUENCY: CONTINUOUS

## 2020-03-13 ASSESSMENT — ENCOUNTER SYMPTOMS
RESPIRATORY NEGATIVE: 1
EYES NEGATIVE: 1
ALLERGIC/IMMUNOLOGIC NEGATIVE: 1
GASTROINTESTINAL NEGATIVE: 1

## 2020-03-13 ASSESSMENT — PAIN DESCRIPTION - PAIN TYPE
TYPE: ACUTE PAIN
TYPE: ACUTE PAIN

## 2020-03-13 ASSESSMENT — PAIN DESCRIPTION - LOCATION
LOCATION: HIP
LOCATION: HIP

## 2020-03-13 ASSESSMENT — PAIN SCALES - GENERAL
PAINLEVEL_OUTOF10: 7
PAINLEVEL_OUTOF10: 5
PAINLEVEL_OUTOF10: 8
PAINLEVEL_OUTOF10: 5
PAINLEVEL_OUTOF10: 8

## 2020-03-13 ASSESSMENT — PAIN DESCRIPTION - DESCRIPTORS: DESCRIPTORS: ACHING

## 2020-03-13 ASSESSMENT — PAIN DESCRIPTION - ORIENTATION
ORIENTATION: RIGHT
ORIENTATION: RIGHT

## 2020-03-13 NOTE — CONSULTS
tobacco.  ETOH:   reports no history of alcohol use. DRUGS:   reports no history of drug use. ACTIVITIES OF DAILY LIVING:    OCCUPATION:    Family History:       Problem Relation Age of Onset    Heart Disease Mother     Cancer Father         abdominal    Cancer Brother         prostate    Cancer Brother         digestive       REVIEW OF SYSTEMS:  CONSTITUTIONAL:  negative for  fevers, chills  EYES:  negative for blurred vision, visual disturbance  HEENT:  negative for  hearing loss, voice change  RESPIRATORY:  negative for  dyspnea, wheezing  CARDIOVASCULAR:  negative for  chest pain, palpitations  GASTROINTESTINAL:  negative for nausea, vomiting  GENITOURINARY:  negative for frequency, urinary incontinence  HEMATOLOGIC/LYMPHATIC:  negative for bleeding and petechiae  MUSCULOSKELETAL:  positive for  pain  NEUROLOGICAL:  negative for headaches, dizziness  BEHAVIOR/PSYCH:  negative for increased agitation and anxiety    PHYSICAL EXAM:    VITALS:  BP (!) 188/110   Pulse 81   Temp 97.6 °F (36.4 °C)   Resp 16   Wt 208 lb (94.3 kg)   SpO2 95%   BMI 28.21 kg/m²   CONSTITUTIONAL:  awake, alert, cooperative, no apparent distress, and appears stated age  MUSCULOSKELETAL:  Right lower Extremity:  · Skin intact  · No ecchymosis  · Compartments soft compressible  · Positive tenderness palpation at the right hip  · Positive logroll  · No pain at the knee or ankle  · Abrasion to right knee  · Demonstrates ability plantarflex/dorsiflex/extend great toe  · Sensation intact light touch DP/SP/T/S/S nerve distributions  · DP pulse 2+    Secondary Exam:   · bilateralUE: No obvious signs of trauma. -TTP to fingers, hand, wrist, forearm, elbow, humerus, shoulder or clavicle. -- Patient able to flex/extend fingers, wrist, elbow and shoulder with active and passive ROM without pain, +2/4 Radial pulse, cap refill <3sec, +AIN/PIN/Radial/Ulnar/Median N, distal sensation grossly intact to C4-T1 dermatomes, compartments soft and compressible. · leftLE: No obvious signs of trauma. -TTP to foot, ankle, leg, knee, thigh, hip.-- Patient able to flex/extend toes, ankle, knee and hip with active and passive ROM without pain,+2/4 DP & PT pulses, cap refill <3sec, +5/5 PF/DF/EHL, distal sensation grossly intact to L4-S1 dermatomes, compartments soft and compressible. · Pelvis: -TTP, -Log roll, -Heel strike     DATA:    CBC:   Lab Results   Component Value Date    WBC 7.0 02/06/2020    RBC 5.10 02/06/2020    HGB 15.5 02/06/2020    HCT 48.5 02/06/2020    MCV 95.1 02/06/2020    MCH 30.4 02/06/2020    MCHC 32.0 02/06/2020    RDW 13.9 02/06/2020     02/06/2020    MPV 10.2 02/06/2020     PT/INR:    Lab Results   Component Value Date    PROTIME 12.3  01/11/2014    INR 1.0 01/11/2014       Radiology Review:  XR hip right:  Demonstrates a intertrochanteric fracture with minimal displacement    IMPRESSION:  · Closed right intertrochanteric fracture    PLAN:  · Nonweightbearing right lower extremity  · Plan for ORIF tomorrow  · Preop labs and imaging  · Treatment consent  · N.p.o. after midnight  · No anticoagulants  · Medical admission  · Pain IV and p.o. · Discussed with Dr. Emmie Benitez Attending     I have seen and evaluated the patient with the resident and agree with the above assessments on today's visit. I have performed the key components of the history and physical examination and concur completely with the findings and plans as documented above.     Review of Systems   Constitutional: Negative for fever and chills. HENT: Negative for ear pain, sore throat and sinus pressure. Eyes: Negative for pain, discharge and redness. Respiratory: Negative for cough, shortness of breath and wheezing. Cardiovascular: Negative for chest pain. Gastrointestinal: Negative for nausea, vomiting, diarrhea and abdominal distention. Genitourinary: Negative for dysuria and frequency.    Musculoskeletal: Negative for back pain and

## 2020-03-13 NOTE — CONSULTS
to  confrontation. Hearing is equal bilaterally. MUSCULOSKELETAL:  Complaining of pain in the right hip. NEUROLOGICAL:  Movements of the cervical spine are fair. Handgrip is  equal bilaterally. No focal motor or sensory deficit noted in the upper  extremities. No focal motor or sensory deficit noted in the left lower  extremity. He has difficulty lifting his right leg because of the  fracture, but sensation intact in that leg, and also the distal motor  functions are intact. DIAGNOSTIC DATA:  The studies were reviewed by me. IMPRESSION:  1. Possible left cerebellar hemorrhagic area. 2.  Close head trauma, etiology not clear. 3.  Fracture of the right hip.  4.  Multiple medical comorbidities. RECOMMENDATIONS:  As the patient has been on Plavix and aspirin, a  repeat CT scan of the brain will be helpful in ruling out increase in  the size of the intracranial hemorrhage. I will follow along. No  neurosurgical intervention is planned or indicated at this time.         Lety Castellanos MD    D: 03/13/2020 11:56:13       T: 03/13/2020 13:44:38     [email or web address]  Job#: [id number]     Doc#: [id number]    CC:

## 2020-03-13 NOTE — ED NOTES
Bed: 21  Expected date:   Expected time:   Means of arrival:   Comments:  [address] [facility] [address], RN  03/13/20 0900

## 2020-03-13 NOTE — ED PROVIDER NOTES
Patient is a 70 yo male who presents via EMS with complaints of R hip pain following mechanical fall from standing height. He was taking out his trash this morning when he tripped over the curb. He landed on his right side. He was unable to get up from the ground. The patient's neighbor saw him on the ground and called EMS. He denies any syncopal episodes prior to the fall or loss of consciousness proceeding the fall. He denies hitting his head. Patient does take Plavix. Review of Systems   Constitutional: Negative. HENT: Negative. Eyes: Negative. Respiratory: Negative. Cardiovascular: Negative. Gastrointestinal: Negative. Endocrine: Negative. Genitourinary: Negative. Musculoskeletal:        Right hip pain   Skin: Negative. Allergic/Immunologic: Negative. Neurological: Negative. Hematological: Negative. Psychiatric/Behavioral: Negative. Physical Exam  Vitals signs and nursing note reviewed. Constitutional:       General: He is awake. He is not in acute distress. Appearance: Normal appearance. HENT:      Head: Normocephalic and atraumatic. Nose: Nose normal.      Mouth/Throat:      Mouth: Mucous membranes are moist.      Pharynx: Oropharynx is clear. Eyes:      Extraocular Movements: Extraocular movements intact. Conjunctiva/sclera: Conjunctivae normal.      Pupils: Pupils are equal, round, and reactive to light. Neck:      Musculoskeletal: Normal range of motion and neck supple. Cardiovascular:      Rate and Rhythm: Normal rate and regular rhythm. Pulses: Normal pulses. Heart sounds: Normal heart sounds. Pulmonary:      Effort: Pulmonary effort is normal.      Breath sounds: Normal breath sounds. Abdominal:      General: Bowel sounds are normal.      Palpations: Abdomen is soft. Musculoskeletal:      Right hip: He exhibits decreased range of motion and tenderness. Skin:     General: Skin is warm and dry. Neurological:      General: No focal deficit present. Mental Status: He is alert and oriented to person, place, and time. Cranial Nerves: No cranial nerve deficit. Sensory: No sensory deficit. Psychiatric:         Mood and Affect: Mood normal.         Behavior: Behavior normal. Behavior is cooperative. Thought Content: Thought content normal.         Judgment: Judgment normal.          Procedures     MDM  Number of Diagnoses or Management Options  Closed fracture of right hip, initial encounter [facility]):   Diagnosis management comments: Intertrochanteric hip fracture 2/2 mechanical fall from standing height. Stabe, small hyperdensity seen within left cerebellum likely 2/2 petechial hemorrhage. Neurosurgery and orthopedic surgery consulted. Patient is on blood thinners. Patient to be admitted for surgical intervention of hip fracture.           --------------------------------------------- PAST HISTORY ---------------------------------------------  Past Medical History:  has a past medical history of Arthritis, CAD (coronary artery disease), Cancer ([facility] Combined systolic and diastolic heart failure ([facility] [address] COPD (chronic obstructive pulmonary disease) ([facility] [address] Diabetes mellitus ([facility] [address] GERD (gastroesophageal reflux disease), History of placement of internal cardiac defibrillator, Hypertension, Sigmoid diverticulosis, Sinusitis, Tubular adenoma of colon, and Vertigo. Past Surgical History:  has a past surgical history that includes Coronary angioplasty with stent (1/13/14); Umbilical hernia repair; Colonoscopy (8/31/15); Cardiac defibrillator placement; and Hip fracture surgery (Right, 3/14/2020). Social History:  reports that he quit smoking about 6 years ago. His smoking use included cigarettes. He smoked 2.00 packs per day. He has never used smokeless tobacco. He reports that he does not drink alcohol or use drugs.     Family History: family history includes Cancer in his brother, brother, and father; Heart Disease in his mother. The patients home medications have been reviewed. Allergies: Patient has no known allergies.     -------------------------------------------------- RESULTS -------------------------------------------------    LABS:  Results for orders placed or performed during the hospital encounter of 03/13/20   CBC Auto Differential   Result Value Ref Range    WBC 9.7 4.5 - 11.5 E9/L    RBC 4.85 3.80 - 5.80 E12/L    Hemoglobin 14.5 12.5 - 16.5 g/dL    Hematocrit 44.6 37.0 - 54.0 %    MCV 92.0 80.0 - 99.9 fL    MCH 29.9 26.0 - 35.0 pg    MCHC 32.5 32.0 - 34.5 %    RDW 13.4 11.5 - 15.0 fL    Platelets 475 154 - 707 E9/L    MPV 9.5 7.0 - 12.0 fL    Neutrophils % 85.6 (H) 43.0 - 80.0 %    Immature Granulocytes % 0.5 0.0 - 5.0 %    Lymphocytes % 7.7 (L) 20.0 - 42.0 %    Monocytes % 5.0 2.0 - 12.0 %    Eosinophils % 0.5 0.0 - 6.0 %    Basophils % 0.7 0.0 - 2.0 %    Neutrophils Absolute 8.26 (H) 1.80 - 7.30 E9/L    Immature Granulocytes # 0.05 E9/L    Lymphocytes Absolute 0.74 (L) 1.50 - 4.00 E9/L    Monocytes Absolute 0.48 0.10 - 0.95 E9/L    Eosinophils Absolute 0.05 0.05 - 0.50 E9/L    Basophils Absolute 0.07 0.00 - 0.20 E9/L   Comprehensive Metabolic Panel w/ Reflex to MG   Result Value Ref Range    Sodium 135 132 - 146 mmol/L    Potassium reflex Magnesium 5.2 (H) 3.5 - 5.0 mmol/L    Chloride 98 98 - 107 mmol/L    CO2 24 22 - 29 mmol/L    Anion Gap 13 7 - 16 mmol/L    Glucose 185 (H) 74 - 99 mg/dL    BUN 10 8 - 23 mg/dL    CREATININE 0.8 0.7 - 1.2 mg/dL    GFR Non-African American >60 >=60 mL/min/1.73    GFR African American >60     Calcium 10.0 8.6 - 10.2 mg/dL    Total Protein 7.8 6.4 - 8.3 g/dL    Alb 3.9 3.5 - 5.2 g/dL    Total Bilirubin 0.7 0.0 - 1.2 mg/dL    Alkaline Phosphatase 86 40 - 129 U/L    ALT 20 0 - 40 U/L    AST 34 0 - 39 U/L   Troponin   Result Value Ref Range    Troponin <0.01 0.00 - 0.03 ng/mL   Magnesium   Result Value Ref Range    Magnesium 1.6 1.6 - 2.6 mg/dL mg/dL    CREATININE 1.0 0.7 - 1.2 mg/dL    GFR Non-African American >60 >=60 mL/min/1.73    GFR African American >60     Calcium 9.1 8.6 - 10.2 mg/dL    Total Protein 6.6 6.4 - 8.3 g/dL    Alb 3.4 (L) 3.5 - 5.2 g/dL    Total Bilirubin 0.6 0.0 - 1.2 mg/dL    Alkaline Phosphatase 63 40 - 129 U/L    ALT 14 0 - 40 U/L    AST 19 0 - 39 U/L   CBC auto differential   Result Value Ref Range    WBC 13.4 (H) 4.5 - 11.5 E9/L    RBC 4.06 3.80 - 5.80 E12/L    Hemoglobin 12.4 (L) 12.5 - 16.5 g/dL    Hematocrit 37.5 37.0 - 54.0 %    MCV 92.4 80.0 - 99.9 fL    MCH 30.5 26.0 - 35.0 pg    MCHC 33.1 32.0 - 34.5 %    RDW 13.5 11.5 - 15.0 fL    Platelets 759 170 - 493 E9/L    MPV 9.9 7.0 - 12.0 fL    Neutrophils % 85.8 (H) 43.0 - 80.0 %    Immature Granulocytes % 0.4 0.0 - 5.0 %    Lymphocytes % 6.0 (L) 20.0 - 42.0 %    Monocytes % 7.7 2.0 - 12.0 %    Eosinophils % 0.0 0.0 - 6.0 %    Basophils % 0.1 0.0 - 2.0 %    Neutrophils Absolute 11.50 (H) 1.80 - 7.30 E9/L    Immature Granulocytes # 0.06 E9/L    Lymphocytes Absolute 0.81 (L) 1.50 - 4.00 E9/L    Monocytes Absolute 1.04 (H) 0.10 - 0.95 E9/L    Eosinophils Absolute 0.00 (L) 0.05 - 0.50 E9/L    Basophils Absolute 0.02 0.00 - 0.20 J1/C   Basic metabolic panel   Result Value Ref Range    Sodium 131 (L) 132 - 146 mmol/L    Potassium 4.2 3.5 - 5.0 mmol/L    Chloride 97 (L) 98 - 107 mmol/L    CO2 20 (L) 22 - 29 mmol/L    Anion Gap 14 7 - 16 mmol/L    Glucose 216 (H) 74 - 99 mg/dL    BUN 32 (H) 8 - 23 mg/dL    CREATININE 1.3 (H) 0.7 - 1.2 mg/dL    GFR Non-African American 55 >=60 mL/min/1.73    GFR African American >60     Calcium 9.1 8.6 - 10.2 mg/dL   Magnesium   Result Value Ref Range    Magnesium 1.8 1.6 - 2.6 mg/dL   Phosphorus   Result Value Ref Range    Phosphorus 3.7 2.5 - 4.5 mg/dL   CBC   Result Value Ref Range    WBC 13.6 (H) 4.5 - 11.5 E9/L    RBC 3.53 (L) 3.80 - 5.80 E12/L    Hemoglobin 10.9 (L) 12.5 - 16.5 g/dL    Hematocrit 33.1 (L) 37.0 - 54.0 %    MCV 93.8 80.0 - 99.9 fL

## 2020-03-14 ENCOUNTER — ANESTHESIA EVENT (OUTPATIENT)
Dept: OPERATING ROOM | Age: 70
DRG: 956 | End: 2020-03-14
Payer: MEDICARE

## 2020-03-14 ENCOUNTER — ANESTHESIA (OUTPATIENT)
Dept: OPERATING ROOM | Age: 70
DRG: 956 | End: 2020-03-14
Payer: MEDICARE

## 2020-03-14 ENCOUNTER — APPOINTMENT (OUTPATIENT)
Dept: GENERAL RADIOLOGY | Age: 70
DRG: 956 | End: 2020-03-14
Payer: MEDICARE

## 2020-03-14 ENCOUNTER — APPOINTMENT (OUTPATIENT)
Dept: CT IMAGING | Age: 70
DRG: 956 | End: 2020-03-14
Payer: MEDICARE

## 2020-03-14 VITALS — SYSTOLIC BLOOD PRESSURE: 92 MMHG | OXYGEN SATURATION: 99 % | DIASTOLIC BLOOD PRESSURE: 63 MMHG

## 2020-03-14 PROBLEM — I61.4 CEREBELLAR HEMORRHAGE (HCC): Status: ACTIVE | Noted: 2020-03-14

## 2020-03-14 PROBLEM — I50.22 CHRONIC SYSTOLIC (CONGESTIVE) HEART FAILURE (HCC): Chronic | Status: ACTIVE | Noted: 2020-03-14

## 2020-03-14 LAB
ANION GAP SERPL CALCULATED.3IONS-SCNC: 15 MMOL/L (ref 7–16)
BASOPHILS ABSOLUTE: 0.09 E9/L (ref 0–0.2)
BASOPHILS RELATIVE PERCENT: 0.9 % (ref 0–2)
BUN BLDV-MCNC: 15 MG/DL (ref 8–23)
CALCIUM SERPL-MCNC: 9.7 MG/DL (ref 8.6–10.2)
CHLORIDE BLD-SCNC: 100 MMOL/L (ref 98–107)
CO2: 22 MMOL/L (ref 22–29)
CREAT SERPL-MCNC: 1 MG/DL (ref 0.7–1.2)
EOSINOPHILS ABSOLUTE: 0.19 E9/L (ref 0.05–0.5)
EOSINOPHILS RELATIVE PERCENT: 1.9 % (ref 0–6)
GFR AFRICAN AMERICAN: >60
GFR NON-AFRICAN AMERICAN: >60 ML/MIN/1.73
GLUCOSE BLD-MCNC: 174 MG/DL (ref 74–99)
HBA1C MFR BLD: 7.4 % (ref 4–5.6)
HCT VFR BLD CALC: 41.7 % (ref 37–54)
HEMOGLOBIN: 13.6 G/DL (ref 12.5–16.5)
IMMATURE GRANULOCYTES #: 0.04 E9/L
IMMATURE GRANULOCYTES %: 0.4 % (ref 0–5)
LYMPHOCYTES ABSOLUTE: 0.98 E9/L (ref 1.5–4)
LYMPHOCYTES RELATIVE PERCENT: 9.6 % (ref 20–42)
MCH RBC QN AUTO: 30 PG (ref 26–35)
MCHC RBC AUTO-ENTMCNC: 32.6 % (ref 32–34.5)
MCV RBC AUTO: 92.1 FL (ref 80–99.9)
METER GLUCOSE: 159 MG/DL (ref 74–99)
METER GLUCOSE: 193 MG/DL (ref 74–99)
METER GLUCOSE: 217 MG/DL (ref 74–99)
MONOCYTES ABSOLUTE: 0.96 E9/L (ref 0.1–0.95)
MONOCYTES RELATIVE PERCENT: 9.4 % (ref 2–12)
NEUTROPHILS ABSOLUTE: 7.93 E9/L (ref 1.8–7.3)
NEUTROPHILS RELATIVE PERCENT: 77.8 % (ref 43–80)
PDW BLD-RTO: 13.8 FL (ref 11.5–15)
PLATELET # BLD: 219 E9/L (ref 130–450)
PMV BLD AUTO: 9.9 FL (ref 7–12)
POTASSIUM REFLEX MAGNESIUM: 4.3 MMOL/L (ref 3.5–5)
RBC # BLD: 4.53 E12/L (ref 3.8–5.8)
SODIUM BLD-SCNC: 137 MMOL/L (ref 132–146)
VITAMIN D 25-HYDROXY: 6 NG/ML (ref 30–100)
WBC # BLD: 10.2 E9/L (ref 4.5–11.5)

## 2020-03-14 PROCEDURE — C1713 ANCHOR/SCREW BN/BN,TIS/BN: HCPCS | Performed by: ORTHOPAEDIC SURGERY

## 2020-03-14 PROCEDURE — 36415 COLL VENOUS BLD VENIPUNCTURE: CPT

## 2020-03-14 PROCEDURE — 82962 GLUCOSE BLOOD TEST: CPT

## 2020-03-14 PROCEDURE — 3209999900 FLUORO FOR SURGICAL PROCEDURES

## 2020-03-14 PROCEDURE — 2720000010 HC SURG SUPPLY STERILE: Performed by: ORTHOPAEDIC SURGERY

## 2020-03-14 PROCEDURE — 82306 VITAMIN D 25 HYDROXY: CPT

## 2020-03-14 PROCEDURE — 6360000002 HC RX W HCPCS: Performed by: STUDENT IN AN ORGANIZED HEALTH CARE EDUCATION/TRAINING PROGRAM

## 2020-03-14 PROCEDURE — 6370000000 HC RX 637 (ALT 250 FOR IP): Performed by: STUDENT IN AN ORGANIZED HEALTH CARE EDUCATION/TRAINING PROGRAM

## 2020-03-14 PROCEDURE — 2580000003 HC RX 258: Performed by: INTERNAL MEDICINE

## 2020-03-14 PROCEDURE — 7100000000 HC PACU RECOVERY - FIRST 15 MIN: Performed by: ORTHOPAEDIC SURGERY

## 2020-03-14 PROCEDURE — 73552 X-RAY EXAM OF FEMUR 2/>: CPT

## 2020-03-14 PROCEDURE — 7100000001 HC PACU RECOVERY - ADDTL 15 MIN: Performed by: ORTHOPAEDIC SURGERY

## 2020-03-14 PROCEDURE — 27245 TREAT THIGH FRACTURE: CPT | Performed by: ORTHOPAEDIC SURGERY

## 2020-03-14 PROCEDURE — 3700000001 HC ADD 15 MINUTES (ANESTHESIA): Performed by: ORTHOPAEDIC SURGERY

## 2020-03-14 PROCEDURE — 3600000005 HC SURGERY LEVEL 5 BASE: Performed by: ORTHOPAEDIC SURGERY

## 2020-03-14 PROCEDURE — 6360000002 HC RX W HCPCS: Performed by: NURSE ANESTHETIST, CERTIFIED REGISTERED

## 2020-03-14 PROCEDURE — 0QS606Z REPOSITION RIGHT UPPER FEMUR WITH INTRAMEDULLARY INTERNAL FIXATION DEVICE, OPEN APPROACH: ICD-10-PCS | Performed by: ORTHOPAEDIC SURGERY

## 2020-03-14 PROCEDURE — 80048 BASIC METABOLIC PNL TOTAL CA: CPT

## 2020-03-14 PROCEDURE — 70470 CT HEAD/BRAIN W/O & W/DYE: CPT

## 2020-03-14 PROCEDURE — 2580000003 HC RX 258: Performed by: NURSE ANESTHETIST, CERTIFIED REGISTERED

## 2020-03-14 PROCEDURE — 2500000003 HC RX 250 WO HCPCS: Performed by: NURSE ANESTHETIST, CERTIFIED REGISTERED

## 2020-03-14 PROCEDURE — 6360000004 HC RX CONTRAST MEDICATION: Performed by: RADIOLOGY

## 2020-03-14 PROCEDURE — C1776 JOINT DEVICE (IMPLANTABLE): HCPCS | Performed by: ORTHOPAEDIC SURGERY

## 2020-03-14 PROCEDURE — 3600000015 HC SURGERY LEVEL 5 ADDTL 15MIN: Performed by: ORTHOPAEDIC SURGERY

## 2020-03-14 PROCEDURE — 2580000003 HC RX 258: Performed by: STUDENT IN AN ORGANIZED HEALTH CARE EDUCATION/TRAINING PROGRAM

## 2020-03-14 PROCEDURE — 73502 X-RAY EXAM HIP UNI 2-3 VIEWS: CPT

## 2020-03-14 PROCEDURE — 1200000000 HC SEMI PRIVATE

## 2020-03-14 PROCEDURE — 2709999900 HC NON-CHARGEABLE SUPPLY: Performed by: ORTHOPAEDIC SURGERY

## 2020-03-14 PROCEDURE — 3700000000 HC ANESTHESIA ATTENDED CARE: Performed by: ORTHOPAEDIC SURGERY

## 2020-03-14 PROCEDURE — 83036 HEMOGLOBIN GLYCOSYLATED A1C: CPT

## 2020-03-14 PROCEDURE — 2500000003 HC RX 250 WO HCPCS: Performed by: ANESTHESIOLOGY

## 2020-03-14 PROCEDURE — 85025 COMPLETE CBC W/AUTO DIFF WBC: CPT

## 2020-03-14 DEVICE — SCREW BNE L38MM DIA5MM TIB LT GRN TI ST CANN LOK FULL THRD: Type: IMPLANTABLE DEVICE | Site: HIP | Status: FUNCTIONAL

## 2020-03-14 DEVICE — SCREW BNE L42MM DIA5MM TIB LT GRN TI ST CANN LOK FULL THRD: Type: IMPLANTABLE DEVICE | Site: HIP | Status: FUNCTIONAL

## 2020-03-14 DEVICE — NAIL IM L400MM DIA11MM 130DEG LNG R PROX FEM GRN TI CANN: Type: IMPLANTABLE DEVICE | Site: HIP | Status: FUNCTIONAL

## 2020-03-14 DEVICE — SCREW TFNA FEN 100MM: Type: IMPLANTABLE DEVICE | Site: HIP | Status: FUNCTIONAL

## 2020-03-14 RX ORDER — DEXTROSE MONOHYDRATE 50 MG/ML
100 INJECTION, SOLUTION INTRAVENOUS PRN
Status: DISCONTINUED | OUTPATIENT
Start: 2020-03-14 | End: 2020-03-16 | Stop reason: HOSPADM

## 2020-03-14 RX ORDER — MORPHINE SULFATE 4 MG/ML
4 INJECTION, SOLUTION INTRAMUSCULAR; INTRAVENOUS
Status: DISCONTINUED | OUTPATIENT
Start: 2020-03-14 | End: 2020-03-16

## 2020-03-14 RX ORDER — ONDANSETRON 2 MG/ML
4 INJECTION INTRAMUSCULAR; INTRAVENOUS EVERY 6 HOURS PRN
Status: DISCONTINUED | OUTPATIENT
Start: 2020-03-14 | End: 2020-03-14 | Stop reason: ALTCHOICE

## 2020-03-14 RX ORDER — LIDOCAINE HYDROCHLORIDE 20 MG/ML
INJECTION, SOLUTION INTRAVENOUS PRN
Status: DISCONTINUED | OUTPATIENT
Start: 2020-03-14 | End: 2020-03-14 | Stop reason: SDUPTHER

## 2020-03-14 RX ORDER — PROMETHAZINE HYDROCHLORIDE 25 MG/1
12.5 TABLET ORAL EVERY 6 HOURS PRN
Status: DISCONTINUED | OUTPATIENT
Start: 2020-03-14 | End: 2020-03-16 | Stop reason: HOSPADM

## 2020-03-14 RX ORDER — PHENYLEPHRINE HYDROCHLORIDE 10 MG/ML
INJECTION INTRAVENOUS PRN
Status: DISCONTINUED | OUTPATIENT
Start: 2020-03-14 | End: 2020-03-14 | Stop reason: SDUPTHER

## 2020-03-14 RX ORDER — SODIUM CHLORIDE 0.9 % (FLUSH) 0.9 %
10 SYRINGE (ML) INJECTION PRN
Status: DISCONTINUED | OUTPATIENT
Start: 2020-03-14 | End: 2020-03-16 | Stop reason: HOSPADM

## 2020-03-14 RX ORDER — MEPERIDINE HYDROCHLORIDE 50 MG/ML
12.5 INJECTION INTRAMUSCULAR; INTRAVENOUS; SUBCUTANEOUS EVERY 5 MIN PRN
Status: DISCONTINUED | OUTPATIENT
Start: 2020-03-14 | End: 2020-03-14 | Stop reason: HOSPADM

## 2020-03-14 RX ORDER — PROPOFOL 10 MG/ML
INJECTION, EMULSION INTRAVENOUS PRN
Status: DISCONTINUED | OUTPATIENT
Start: 2020-03-14 | End: 2020-03-14 | Stop reason: SDUPTHER

## 2020-03-14 RX ORDER — CEFAZOLIN SODIUM 1 G/3ML
INJECTION, POWDER, FOR SOLUTION INTRAMUSCULAR; INTRAVENOUS PRN
Status: DISCONTINUED | OUTPATIENT
Start: 2020-03-14 | End: 2020-03-14 | Stop reason: SDUPTHER

## 2020-03-14 RX ORDER — ASPIRIN 81 MG/1
81 TABLET, CHEWABLE ORAL DAILY
Status: DISCONTINUED | OUTPATIENT
Start: 2020-03-15 | End: 2020-03-14 | Stop reason: SDUPTHER

## 2020-03-14 RX ORDER — FENTANYL CITRATE 50 UG/ML
INJECTION, SOLUTION INTRAMUSCULAR; INTRAVENOUS PRN
Status: DISCONTINUED | OUTPATIENT
Start: 2020-03-14 | End: 2020-03-14 | Stop reason: SDUPTHER

## 2020-03-14 RX ORDER — ONDANSETRON 2 MG/ML
INJECTION INTRAMUSCULAR; INTRAVENOUS PRN
Status: DISCONTINUED | OUTPATIENT
Start: 2020-03-14 | End: 2020-03-14 | Stop reason: SDUPTHER

## 2020-03-14 RX ORDER — OXYCODONE HYDROCHLORIDE AND ACETAMINOPHEN 5; 325 MG/1; MG/1
1 TABLET ORAL EVERY 6 HOURS PRN
Qty: 28 TABLET | Refills: 0 | Status: SHIPPED | OUTPATIENT
Start: 2020-03-14 | End: 2020-03-21

## 2020-03-14 RX ORDER — CLOPIDOGREL BISULFATE 75 MG/1
75 TABLET ORAL DAILY
Status: DISCONTINUED | OUTPATIENT
Start: 2020-03-15 | End: 2020-03-14 | Stop reason: SDUPTHER

## 2020-03-14 RX ORDER — SODIUM CHLORIDE 0.9 % (FLUSH) 0.9 %
10 SYRINGE (ML) INJECTION EVERY 12 HOURS SCHEDULED
Status: DISCONTINUED | OUTPATIENT
Start: 2020-03-14 | End: 2020-03-16 | Stop reason: HOSPADM

## 2020-03-14 RX ORDER — OXYCODONE HYDROCHLORIDE 10 MG/1
10 TABLET ORAL EVERY 4 HOURS PRN
Status: DISCONTINUED | OUTPATIENT
Start: 2020-03-14 | End: 2020-03-16 | Stop reason: HOSPADM

## 2020-03-14 RX ORDER — MIDAZOLAM HYDROCHLORIDE 1 MG/ML
INJECTION INTRAMUSCULAR; INTRAVENOUS PRN
Status: DISCONTINUED | OUTPATIENT
Start: 2020-03-14 | End: 2020-03-14 | Stop reason: SDUPTHER

## 2020-03-14 RX ORDER — GLYCOPYRROLATE 1 MG/5 ML
SYRINGE (ML) INTRAVENOUS PRN
Status: DISCONTINUED | OUTPATIENT
Start: 2020-03-14 | End: 2020-03-14 | Stop reason: SDUPTHER

## 2020-03-14 RX ORDER — ASPIRIN 81 MG/1
81 TABLET, CHEWABLE ORAL DAILY
Status: DISCONTINUED | OUTPATIENT
Start: 2020-03-15 | End: 2020-03-16 | Stop reason: HOSPADM

## 2020-03-14 RX ORDER — NICOTINE POLACRILEX 4 MG
15 LOZENGE BUCCAL PRN
Status: DISCONTINUED | OUTPATIENT
Start: 2020-03-14 | End: 2020-03-16 | Stop reason: HOSPADM

## 2020-03-14 RX ORDER — PROMETHAZINE HYDROCHLORIDE 25 MG/ML
25 INJECTION, SOLUTION INTRAMUSCULAR; INTRAVENOUS PRN
Status: DISCONTINUED | OUTPATIENT
Start: 2020-03-14 | End: 2020-03-14 | Stop reason: HOSPADM

## 2020-03-14 RX ORDER — POLYETHYLENE GLYCOL 3350 17 G/17G
17 POWDER, FOR SOLUTION ORAL DAILY PRN
Status: DISCONTINUED | OUTPATIENT
Start: 2020-03-14 | End: 2020-03-14 | Stop reason: SDUPTHER

## 2020-03-14 RX ORDER — SODIUM CHLORIDE 9 MG/ML
INJECTION, SOLUTION INTRAVENOUS CONTINUOUS PRN
Status: DISCONTINUED | OUTPATIENT
Start: 2020-03-14 | End: 2020-03-14 | Stop reason: SDUPTHER

## 2020-03-14 RX ORDER — DEXTROSE MONOHYDRATE 25 G/50ML
12.5 INJECTION, SOLUTION INTRAVENOUS PRN
Status: DISCONTINUED | OUTPATIENT
Start: 2020-03-14 | End: 2020-03-16 | Stop reason: HOSPADM

## 2020-03-14 RX ORDER — PROMETHAZINE HYDROCHLORIDE 25 MG/1
12.5 TABLET ORAL EVERY 6 HOURS PRN
Status: DISCONTINUED | OUTPATIENT
Start: 2020-03-14 | End: 2020-03-14 | Stop reason: ALTCHOICE

## 2020-03-14 RX ORDER — CLOPIDOGREL BISULFATE 75 MG/1
75 TABLET ORAL DAILY
Status: DISCONTINUED | OUTPATIENT
Start: 2020-03-15 | End: 2020-03-16 | Stop reason: HOSPADM

## 2020-03-14 RX ORDER — NEOSTIGMINE METHYLSULFATE 1 MG/ML
INJECTION, SOLUTION INTRAVENOUS PRN
Status: DISCONTINUED | OUTPATIENT
Start: 2020-03-14 | End: 2020-03-14 | Stop reason: SDUPTHER

## 2020-03-14 RX ORDER — DEXAMETHASONE SODIUM PHOSPHATE 10 MG/ML
INJECTION, SOLUTION INTRAMUSCULAR; INTRAVENOUS PRN
Status: DISCONTINUED | OUTPATIENT
Start: 2020-03-14 | End: 2020-03-14 | Stop reason: SDUPTHER

## 2020-03-14 RX ORDER — LABETALOL HYDROCHLORIDE 5 MG/ML
5 INJECTION, SOLUTION INTRAVENOUS EVERY 10 MIN PRN
Status: DISCONTINUED | OUTPATIENT
Start: 2020-03-14 | End: 2020-03-14 | Stop reason: HOSPADM

## 2020-03-14 RX ORDER — MORPHINE SULFATE 2 MG/ML
2 INJECTION, SOLUTION INTRAMUSCULAR; INTRAVENOUS
Status: DISCONTINUED | OUTPATIENT
Start: 2020-03-14 | End: 2020-03-16

## 2020-03-14 RX ORDER — OXYCODONE HYDROCHLORIDE 5 MG/1
5 TABLET ORAL EVERY 4 HOURS PRN
Status: DISCONTINUED | OUTPATIENT
Start: 2020-03-14 | End: 2020-03-16 | Stop reason: HOSPADM

## 2020-03-14 RX ORDER — ROCURONIUM BROMIDE 10 MG/ML
INJECTION, SOLUTION INTRAVENOUS PRN
Status: DISCONTINUED | OUTPATIENT
Start: 2020-03-14 | End: 2020-03-14 | Stop reason: SDUPTHER

## 2020-03-14 RX ORDER — CEFAZOLIN SODIUM 2 G/50ML
2 SOLUTION INTRAVENOUS EVERY 8 HOURS
Status: COMPLETED | OUTPATIENT
Start: 2020-03-14 | End: 2020-03-15

## 2020-03-14 RX ADMIN — INSULIN LISPRO 1 UNITS: 100 INJECTION, SOLUTION INTRAVENOUS; SUBCUTANEOUS at 17:33

## 2020-03-14 RX ADMIN — PHENYLEPHRINE HYDROCHLORIDE 200 MCG: 10 INJECTION INTRAVENOUS at 13:11

## 2020-03-14 RX ADMIN — FENTANYL CITRATE 50 MCG: 50 INJECTION, SOLUTION INTRAMUSCULAR; INTRAVENOUS at 12:53

## 2020-03-14 RX ADMIN — ONDANSETRON HYDROCHLORIDE 4 MG: 2 INJECTION, SOLUTION INTRAMUSCULAR; INTRAVENOUS at 13:17

## 2020-03-14 RX ADMIN — PHENYLEPHRINE HYDROCHLORIDE 200 MCG: 10 INJECTION INTRAVENOUS at 13:16

## 2020-03-14 RX ADMIN — ATORVASTATIN CALCIUM 40 MG: 40 TABLET, FILM COATED ORAL at 21:10

## 2020-03-14 RX ADMIN — INSULIN LISPRO 1 UNITS: 100 INJECTION, SOLUTION INTRAVENOUS; SUBCUTANEOUS at 21:11

## 2020-03-14 RX ADMIN — FENTANYL CITRATE 100 MCG: 50 INJECTION, SOLUTION INTRAMUSCULAR; INTRAVENOUS at 12:09

## 2020-03-14 RX ADMIN — PROPOFOL 150 MG: 10 INJECTION, EMULSION INTRAVENOUS at 12:09

## 2020-03-14 RX ADMIN — SODIUM CHLORIDE: 9 INJECTION, SOLUTION INTRAVENOUS at 12:04

## 2020-03-14 RX ADMIN — PHENYLEPHRINE HYDROCHLORIDE 100 MCG: 10 INJECTION INTRAVENOUS at 13:06

## 2020-03-14 RX ADMIN — CARVEDILOL 12.5 MG: 6.25 TABLET, FILM COATED ORAL at 17:38

## 2020-03-14 RX ADMIN — PHENYLEPHRINE HYDROCHLORIDE 50 MCG: 10 INJECTION INTRAVENOUS at 13:01

## 2020-03-14 RX ADMIN — Medication 0.6 MG: at 13:17

## 2020-03-14 RX ADMIN — ROCURONIUM BROMIDE 40 MG: 10 INJECTION, SOLUTION INTRAVENOUS at 12:09

## 2020-03-14 RX ADMIN — CEFAZOLIN 2000 MG: 1 INJECTION, POWDER, FOR SOLUTION INTRAMUSCULAR; INTRAVENOUS at 12:27

## 2020-03-14 RX ADMIN — LABETALOL HYDROCHLORIDE 5 MG: 5 INJECTION INTRAVENOUS at 14:08

## 2020-03-14 RX ADMIN — LIDOCAINE HYDROCHLORIDE 100 MG: 20 INJECTION, SOLUTION INTRAVENOUS at 12:09

## 2020-03-14 RX ADMIN — FENTANYL CITRATE 50 MCG: 50 INJECTION, SOLUTION INTRAMUSCULAR; INTRAVENOUS at 13:33

## 2020-03-14 RX ADMIN — OXYCODONE HYDROCHLORIDE 10 MG: 10 TABLET ORAL at 21:09

## 2020-03-14 RX ADMIN — ROCURONIUM BROMIDE 10 MG: 10 INJECTION, SOLUTION INTRAVENOUS at 12:39

## 2020-03-14 RX ADMIN — Medication 10 ML: at 09:00

## 2020-03-14 RX ADMIN — FENTANYL CITRATE 50 MCG: 50 INJECTION, SOLUTION INTRAMUSCULAR; INTRAVENOUS at 12:49

## 2020-03-14 RX ADMIN — IOPAMIDOL 90 ML: 755 INJECTION, SOLUTION INTRAVENOUS at 01:09

## 2020-03-14 RX ADMIN — Medication 3 MG: at 13:17

## 2020-03-14 RX ADMIN — Medication 10 ML: at 21:10

## 2020-03-14 RX ADMIN — MIDAZOLAM 2 MG: 1 INJECTION INTRAMUSCULAR; INTRAVENOUS at 12:04

## 2020-03-14 RX ADMIN — DEXAMETHASONE SODIUM PHOSPHATE 8 MG: 10 INJECTION INTRAMUSCULAR; INTRAVENOUS at 12:09

## 2020-03-14 RX ADMIN — CEFAZOLIN SODIUM 2 G: 2 SOLUTION INTRAVENOUS at 21:11

## 2020-03-14 RX ADMIN — SOTALOL HYDROCHLORIDE 40 MG: 80 TABLET ORAL at 21:10

## 2020-03-14 ASSESSMENT — PULMONARY FUNCTION TESTS
PIF_VALUE: 16
PIF_VALUE: 1
PIF_VALUE: 14
PIF_VALUE: 19
PIF_VALUE: 0
PIF_VALUE: 19
PIF_VALUE: 17
PIF_VALUE: 20
PIF_VALUE: 19
PIF_VALUE: 19
PIF_VALUE: 18
PIF_VALUE: 10
PIF_VALUE: 19
PIF_VALUE: 20
PIF_VALUE: 8
PIF_VALUE: 10
PIF_VALUE: 19
PIF_VALUE: 20
PIF_VALUE: 14
PIF_VALUE: 19
PIF_VALUE: 1
PIF_VALUE: 20
PIF_VALUE: 19
PIF_VALUE: 3
PIF_VALUE: 18
PIF_VALUE: 14
PIF_VALUE: 19
PIF_VALUE: 18
PIF_VALUE: 2
PIF_VALUE: 19
PIF_VALUE: 30
PIF_VALUE: 16
PIF_VALUE: 20
PIF_VALUE: 19
PIF_VALUE: 5
PIF_VALUE: 19
PIF_VALUE: 2
PIF_VALUE: 2
PIF_VALUE: 29
PIF_VALUE: 14
PIF_VALUE: 18
PIF_VALUE: 19
PIF_VALUE: 18
PIF_VALUE: 5
PIF_VALUE: 19
PIF_VALUE: 18
PIF_VALUE: 19
PIF_VALUE: 19
PIF_VALUE: 18
PIF_VALUE: 19
PIF_VALUE: 2
PIF_VALUE: 18
PIF_VALUE: 19
PIF_VALUE: 19
PIF_VALUE: 1
PIF_VALUE: 19
PIF_VALUE: 2
PIF_VALUE: 19
PIF_VALUE: 18
PIF_VALUE: 1
PIF_VALUE: 1
PIF_VALUE: 19
PIF_VALUE: 19
PIF_VALUE: 16
PIF_VALUE: 19
PIF_VALUE: 19
PIF_VALUE: 14
PIF_VALUE: 20
PIF_VALUE: 18
PIF_VALUE: 17
PIF_VALUE: 19
PIF_VALUE: 14
PIF_VALUE: 19
PIF_VALUE: 6
PIF_VALUE: 19
PIF_VALUE: 19
PIF_VALUE: 18
PIF_VALUE: 18

## 2020-03-14 ASSESSMENT — PAIN DESCRIPTION - PAIN TYPE
TYPE: ACUTE PAIN
TYPE: ACUTE PAIN

## 2020-03-14 ASSESSMENT — PAIN DESCRIPTION - ONSET
ONSET: GRADUAL
ONSET: ON-GOING

## 2020-03-14 ASSESSMENT — PAIN DESCRIPTION - LOCATION
LOCATION: HIP
LOCATION: HIP

## 2020-03-14 ASSESSMENT — PAIN DESCRIPTION - FREQUENCY
FREQUENCY: CONTINUOUS
FREQUENCY: INTERMITTENT

## 2020-03-14 ASSESSMENT — PAIN - FUNCTIONAL ASSESSMENT: PAIN_FUNCTIONAL_ASSESSMENT: PREVENTS OR INTERFERES WITH MANY ACTIVE NOT PASSIVE ACTIVITIES

## 2020-03-14 ASSESSMENT — PAIN SCALES - GENERAL
PAINLEVEL_OUTOF10: 0
PAINLEVEL_OUTOF10: 2
PAINLEVEL_OUTOF10: 7
PAINLEVEL_OUTOF10: 0

## 2020-03-14 ASSESSMENT — PAIN DESCRIPTION - PROGRESSION: CLINICAL_PROGRESSION: NOT CHANGED

## 2020-03-14 ASSESSMENT — PAIN DESCRIPTION - ORIENTATION
ORIENTATION: RIGHT
ORIENTATION: RIGHT

## 2020-03-14 ASSESSMENT — COPD QUESTIONNAIRES: CAT_SEVERITY: SEVERE

## 2020-03-14 ASSESSMENT — PAIN DESCRIPTION - DESCRIPTORS
DESCRIPTORS: DISCOMFORT;SORE;SHOOTING
DESCRIPTORS: DISCOMFORT

## 2020-03-14 NOTE — PROCEDURES
Spoke with Matthew Colby, patient has a defibrillator placed in around 2010. We need make and model to determine MRI eligibility.

## 2020-03-14 NOTE — H&P
[address] [facility]  History and Physical      CHIEF COMPLAINT:  Fall    History of Present Illness:   Patient suffered what he thinks was a mechanical fall yesterday. He does not remember the events surrounding the fall very well. He is not even sure if he hit his head. He did fall on his right side and was found to have a right intertrochanteric hip fracture. He currently describes severe nonradiating sharp pain in the right hip, worse if he moves it around, somewhat alleviated by analgesics. He does not recall any chest pain, shortness of breath, chest pressure, or other anginal type symptoms prior to this event. He currently is chest pain-free. He has no shortness of breath. He states that prior to this hip fracture, he was quite active and could easily climb a few flights of stairs without developing chest pain or shortness of breath. He does have a distant history of 2 stents, he states it was many years ago. He denies any history of stroke. He has an ICD in place. I do not have recent echocardiograms, although I do have a stress test in September 2018 showing estimated ejection fraction 41% and no reversible ischemia. In addition to the hip fracture, he was found to have a small possible ICH. He denies any headache or neurologic symptoms such as focal weakness or sensory changes. Past Medical History:   Diagnosis Date    Arthritis     CAD (coronary artery disease)     Cancer ([address] 2017    Colon    Combined systolic and diastolic heart failure ([address] 6/15/13    echo LVEF of 30-35%  stage II diastolic dysfunction    COPD (chronic obstructive pulmonary disease) ([address]     Diabetes mellitus ([address]     GERD (gastroesophageal reflux disease)     History of placement of internal cardiac defibrillator 2014?     [employer] ([place])    Hypertension     Sigmoid diverticulosis 8/31/2015    Sinusitis     Tubular adenoma of colon 8/31/2015    Vertigo          Past Surgical History:   Procedure Laterality Date    CARDIAC DEFIBRILLATOR PLACEMENT      COLONOSCOPY  8/31/15    with polypectomy (x2) - Dr. Cameron Livers  1/13/14    3.5/15 Xience in [place] and 3.0/15 Resolute in th 1st obtuse marginal.    UMBILICAL HERNIA REPAIR         Medications Prior to Admission:    Medications Prior to Admission: atorvastatin (LIPITOR) 40 MG tablet, Take 40 mg by mouth daily  carvedilol (COREG) 12.5 MG tablet, Take 12.5 mg by mouth 2 times daily (with meals)  clopidogrel (PLAVIX) 75 MG tablet, Take 75 mg by mouth daily  lisinopril (PRINIVIL;ZESTRIL) 10 MG tablet, Take 10 mg by mouth daily  metFORMIN (GLUCOPHAGE) 500 MG tablet, Take 500 mg by mouth 4 times daily  sotalol (BETAPACE) 80 MG tablet, Take 40 mg by mouth 2 times daily  ibuprofen (ADVIL;MOTRIN) 200 MG tablet, Take 200 mg by mouth every 6 hours as needed for Pain  aspirin 81 MG tablet, Take 81 mg by mouth daily. nitroGLYCERIN (NITROSTAT) 0.4 MG SL tablet, Place 1 tablet under the tongue every 5 minutes as needed for Chest pain. Note that the patient's home medications were reviewed and the above list is accurate to the best of my knowledge at the time of the exam.    Allergies:    Patient has no known allergies. Social History:    reports that he quit smoking about 6 years ago. His smoking use included cigarettes. He smoked 2.00 packs per day. He has never used smokeless tobacco. He reports that he does not drink alcohol or use drugs. Family History:   family history includes Cancer in his brother, brother, and father; Heart Disease in his mother.     REVIEW OF SYSTEMS:  As above in the HPI, otherwise negative    PHYSICAL EXAM:    Vitals:  BP (!) 159/81   Pulse 81   Temp 97.7 °F (36.5 °C) (Temporal)   Resp 20   Ht 6' (1.829 m)   Wt 208 lb 12.4 oz (94.7 kg)   SpO2 (!) 88%   BMI 28.32 kg/m²     General appearance: NAD, conversant  HEENT: AT/NC, MMM  Neck: FROM, supple  Lungs: Clear to auscultation  CV: RRR, no MRGs  Abdomen: Soft, non-tender; no masses or HSM, +BS  Extremities: No peripheral edema or digital cyanosis  Skin: no rash, lesions or ulcers  Psych: Calm and cooperative  Neuro: Alert and interactive, nonfocal.  Face symmetric. Speech fluent. LABS:  All labs reviewed. Of note:  CBC:   Lab Results   Component Value Date    WBC 10.2 03/14/2020    RBC 4.53 03/14/2020    HGB 13.6 03/14/2020    HCT 41.7 03/14/2020    MCV 92.1 03/14/2020    MCH 30.0 03/14/2020    MCHC 32.6 03/14/2020    RDW 13.8 03/14/2020     03/14/2020    MPV 9.9 03/14/2020     BMP:    Lab Results   Component Value Date     03/14/2020    K 4.3 03/14/2020     03/14/2020    CO2 22 03/14/2020    BUN 15 03/14/2020    LABALBU 3.9 03/13/2020    LABALBU 4.1 03/03/2012    CREATININE 1.0 03/14/2020    CALCIUM 9.7 03/14/2020    GFRAA >60 03/14/2020    LABGLOM >60 03/14/2020    GLUCOSE 174 03/14/2020    GLUCOSE 124 03/03/2012     Last 3 Troponin:    Lab Results   Component Value Date    TROPONINI <0.01 03/13/2020    TROPONINI <0.01 01/11/2014    TROPONINI 0.04 06/13/2013    TROPONINI <0.01 03/03/2012    TROPONINI <0.01 03/03/2012       Imaging:  I've personally reviewed the patient's pelvis XR  Right femur intratrochanteric fracture    EKG:  I've personally reviewed the patient's EKG:  Nsr, a few PVCs, nonspecific sub-1mm lateral STD's which are unchanged from 2019, and some new inferior sub-1mm STD's    ASSESSMENT/PLAN:  Principal Problem:    Hip fracture requiring operative repair, right, closed, initial encounter ([facility]  Active Problems:    Essential hypertension    COPD, very severe ([facility] [address]    Diabetes mellitus type II, uncontrolled ([facility] [address]    Cerebellar hemorrhage ([facility] [address]    Chronic systolic (congestive) heart failure ([address]  Resolved Problems:    * No resolved hospital problems. *        He appears medically optimized for hip fracture repair.   Due to multiple medical comorbidities, he is at above-average risk for

## 2020-03-14 NOTE — PROGRESS NOTES
Patient arrived back to assigned room. Calm and quiet. Ice water offered to patient and left at bedside. Patient incision site covered with 2 Aquacel dressing dry clean and intact. Patient denies any complaints of pain at this time. reoriented to the room. Patient left resting in bed call light in reach no complaints of discomfort at this time.  Will continue to monitor

## 2020-03-14 NOTE — ANESTHESIA PRE PROCEDURE
Department of Anesthesiology  Preprocedure Note       Name:  Marc Arguello   Age:  71 y.o.  :  1950                                          MRN:  [id number]         Date:  3/14/2020      Surgeon: Pita Cochran):  Guerda Nur DO    Procedure: RIGHT HIP OPEN REDUCTION INTERNAL FIXATION NAIL-SYNTHES -- OC 2 (Right )    Medications prior to admission:   Prior to Admission medications    Medication Sig Start Date End Date Taking? Authorizing Provider   atorvastatin (LIPITOR) 40 MG tablet Take 40 mg by mouth daily   Yes Historical Provider, MD   carvedilol (COREG) 12.5 MG tablet Take 12.5 mg by mouth 2 times daily (with meals)   Yes Historical Provider, MD   clopidogrel (PLAVIX) 75 MG tablet Take 75 mg by mouth daily   Yes Historical Provider, MD   lisinopril (PRINIVIL;ZESTRIL) 10 MG tablet Take 10 mg by mouth daily   Yes Historical Provider, MD   metFORMIN (GLUCOPHAGE) 500 MG tablet Take 500 mg by mouth 4 times daily   Yes Historical Provider, MD   sotalol (BETAPACE) 80 MG tablet Take 40 mg by mouth 2 times daily   Yes Historical Provider, MD   ibuprofen (ADVIL;MOTRIN) 200 MG tablet Take 200 mg by mouth every 6 hours as needed for Pain   Yes Historical Provider, MD   aspirin 81 MG tablet Take 81 mg by mouth daily. Yes Historical Provider, MD   nitroGLYCERIN (NITROSTAT) 0.4 MG SL tablet Place 1 tablet under the tongue every 5 minutes as needed for Chest pain.  14   Liliana Byrd, DO       Current medications:    Current Facility-Administered Medications   Medication Dose Route Frequency Provider Last Rate Last Dose    ceFAZolin (ANCEF) 2 g in dextrose 3 % 50 mL IVPB (duplex)  2 g Intravenous See Admin Instructions Rosita Sneed DO        oxyCODONE (ROXICODONE) immediate release tablet 5 mg  5 mg Oral Q4H PRN Bonita Velazquez MD   5 mg at 20    ketorolac (TORADOL) injection 15 mg  15 mg Intravenous Q6H PRN Bonita Velazquez MD   15 mg at 20 1633    morphine (PF) injection 2 mg  2 without long-term current use of insulin (HCC) E11.9    Prostate cancer ([facility] C61    Mixed hyperlipidemia E78.2    History of adenomatous polyp of colon Z86.010    Family history of cancer Z80.9    ICD (implantable cardioverter-defibrillator) in place Z95.810    Hip fracture requiring operative repair, right, closed, initial encounter ([facility] S72.001A       Past Medical History:        Diagnosis Date    Arthritis     CAD (coronary artery disease)     Cancer ([facility] 2017    Colon    Combined systolic and diastolic heart failure ([facility] 6/15/13    echo LVEF of 30-35%  stage II diastolic dysfunction    COPD (chronic obstructive pulmonary disease) ([facility]     Diabetes mellitus ([facility]     GERD (gastroesophageal reflux disease)     History of placement of internal cardiac defibrillator ?     [employer] ([place])    Hypertension     Sigmoid diverticulosis 2015    Sinusitis     Tubular adenoma of colon 2015    Vertigo        Past Surgical History:        Procedure Laterality Date    CARDIAC DEFIBRILLATOR PLACEMENT      COLONOSCOPY  8/31/15    with polypectomy (x2) - Dr. Thom Corbin  14    3.5/15 Xience in [place] and 3.0/15 Resolute in th 1st obtuse marginal.    UMBILICAL HERNIA REPAIR         Social History:    Social History     Tobacco Use    Smoking status: Former Smoker     Packs/day: 2.00     Types: Cigarettes     Last attempt to quit: 2014     Years since quittin.1    Smokeless tobacco: Never Used   Substance Use Topics    Alcohol use: No     Comment: rarely                                Counseling given: Not Answered      Vital Signs (Current):   Vitals:    20 2145 20 2245 20 0000 20 0330   BP: 132/88  136/73 (!) 159/81   Pulse: 88  62 81   Resp:   18 20   Temp:   99.2 °F (37.3 °C) 97.7 °F (36.5 °C)   TempSrc:   Temporal Temporal   SpO2: 97% 92%  (!) 88%   Weight:       Height:

## 2020-03-14 NOTE — PROGRESS NOTES
MRI called to notify that because of the defibrillator MRI can not and will not preformed. MRI staff says they will notify .

## 2020-03-15 ENCOUNTER — APPOINTMENT (OUTPATIENT)
Dept: CT IMAGING | Age: 70
DRG: 956 | End: 2020-03-15
Payer: MEDICARE

## 2020-03-15 LAB
ALBUMIN SERPL-MCNC: 3.4 G/DL (ref 3.5–5.2)
ALP BLD-CCNC: 63 U/L (ref 40–129)
ALT SERPL-CCNC: 14 U/L (ref 0–40)
ANION GAP SERPL CALCULATED.3IONS-SCNC: 13 MMOL/L (ref 7–16)
ANION GAP SERPL CALCULATED.3IONS-SCNC: 14 MMOL/L (ref 7–16)
AST SERPL-CCNC: 19 U/L (ref 0–39)
BASOPHILS ABSOLUTE: 0.02 E9/L (ref 0–0.2)
BASOPHILS RELATIVE PERCENT: 0.1 % (ref 0–2)
BILIRUB SERPL-MCNC: 0.6 MG/DL (ref 0–1.2)
BUN BLDV-MCNC: 22 MG/DL (ref 8–23)
BUN BLDV-MCNC: 32 MG/DL (ref 8–23)
CALCIUM SERPL-MCNC: 9.1 MG/DL (ref 8.6–10.2)
CALCIUM SERPL-MCNC: 9.1 MG/DL (ref 8.6–10.2)
CHLORIDE BLD-SCNC: 100 MMOL/L (ref 98–107)
CHLORIDE BLD-SCNC: 97 MMOL/L (ref 98–107)
CO2: 20 MMOL/L (ref 22–29)
CO2: 23 MMOL/L (ref 22–29)
CREAT SERPL-MCNC: 1 MG/DL (ref 0.7–1.2)
CREAT SERPL-MCNC: 1.3 MG/DL (ref 0.7–1.2)
EOSINOPHILS ABSOLUTE: 0 E9/L (ref 0.05–0.5)
EOSINOPHILS RELATIVE PERCENT: 0 % (ref 0–6)
GFR AFRICAN AMERICAN: >60
GFR AFRICAN AMERICAN: >60
GFR NON-AFRICAN AMERICAN: 55 ML/MIN/1.73
GFR NON-AFRICAN AMERICAN: >60 ML/MIN/1.73
GLUCOSE BLD-MCNC: 210 MG/DL (ref 74–99)
GLUCOSE BLD-MCNC: 216 MG/DL (ref 74–99)
HCT VFR BLD CALC: 33.1 % (ref 37–54)
HCT VFR BLD CALC: 37.5 % (ref 37–54)
HEMOGLOBIN: 10.9 G/DL (ref 12.5–16.5)
HEMOGLOBIN: 12.4 G/DL (ref 12.5–16.5)
IMMATURE GRANULOCYTES #: 0.06 E9/L
IMMATURE GRANULOCYTES %: 0.4 % (ref 0–5)
LYMPHOCYTES ABSOLUTE: 0.81 E9/L (ref 1.5–4)
LYMPHOCYTES RELATIVE PERCENT: 6 % (ref 20–42)
MAGNESIUM: 1.8 MG/DL (ref 1.6–2.6)
MCH RBC QN AUTO: 30.5 PG (ref 26–35)
MCH RBC QN AUTO: 30.9 PG (ref 26–35)
MCHC RBC AUTO-ENTMCNC: 32.9 % (ref 32–34.5)
MCHC RBC AUTO-ENTMCNC: 33.1 % (ref 32–34.5)
MCV RBC AUTO: 92.4 FL (ref 80–99.9)
MCV RBC AUTO: 93.8 FL (ref 80–99.9)
METER GLUCOSE: 164 MG/DL (ref 74–99)
METER GLUCOSE: 166 MG/DL (ref 74–99)
METER GLUCOSE: 206 MG/DL (ref 74–99)
METER GLUCOSE: 216 MG/DL (ref 74–99)
METER GLUCOSE: 235 MG/DL (ref 74–99)
METER GLUCOSE: 235 MG/DL (ref 74–99)
MONOCYTES ABSOLUTE: 1.04 E9/L (ref 0.1–0.95)
MONOCYTES RELATIVE PERCENT: 7.7 % (ref 2–12)
NEUTROPHILS ABSOLUTE: 11.5 E9/L (ref 1.8–7.3)
NEUTROPHILS RELATIVE PERCENT: 85.8 % (ref 43–80)
PDW BLD-RTO: 13.5 FL (ref 11.5–15)
PDW BLD-RTO: 13.7 FL (ref 11.5–15)
PHOSPHORUS: 3.7 MG/DL (ref 2.5–4.5)
PLATELET # BLD: 185 E9/L (ref 130–450)
PLATELET # BLD: 186 E9/L (ref 130–450)
PMV BLD AUTO: 10 FL (ref 7–12)
PMV BLD AUTO: 9.9 FL (ref 7–12)
POTASSIUM REFLEX MAGNESIUM: 4.8 MMOL/L (ref 3.5–5)
POTASSIUM SERPL-SCNC: 4.2 MMOL/L (ref 3.5–5)
RBC # BLD: 3.53 E12/L (ref 3.8–5.8)
RBC # BLD: 4.06 E12/L (ref 3.8–5.8)
SODIUM BLD-SCNC: 131 MMOL/L (ref 132–146)
SODIUM BLD-SCNC: 136 MMOL/L (ref 132–146)
TOTAL PROTEIN: 6.6 G/DL (ref 6.4–8.3)
WBC # BLD: 13.4 E9/L (ref 4.5–11.5)
WBC # BLD: 13.6 E9/L (ref 4.5–11.5)

## 2020-03-15 PROCEDURE — 93005 ELECTROCARDIOGRAM TRACING: CPT | Performed by: INTERNAL MEDICINE

## 2020-03-15 PROCEDURE — 6370000000 HC RX 637 (ALT 250 FOR IP): Performed by: STUDENT IN AN ORGANIZED HEALTH CARE EDUCATION/TRAINING PROGRAM

## 2020-03-15 PROCEDURE — 85027 COMPLETE CBC AUTOMATED: CPT

## 2020-03-15 PROCEDURE — 84100 ASSAY OF PHOSPHORUS: CPT

## 2020-03-15 PROCEDURE — 6360000002 HC RX W HCPCS: Performed by: STUDENT IN AN ORGANIZED HEALTH CARE EDUCATION/TRAINING PROGRAM

## 2020-03-15 PROCEDURE — 2580000003 HC RX 258: Performed by: STUDENT IN AN ORGANIZED HEALTH CARE EDUCATION/TRAINING PROGRAM

## 2020-03-15 PROCEDURE — 36415 COLL VENOUS BLD VENIPUNCTURE: CPT

## 2020-03-15 PROCEDURE — 2580000003 HC RX 258: Performed by: INTERNAL MEDICINE

## 2020-03-15 PROCEDURE — 83735 ASSAY OF MAGNESIUM: CPT

## 2020-03-15 PROCEDURE — 80053 COMPREHEN METABOLIC PANEL: CPT

## 2020-03-15 PROCEDURE — 97535 SELF CARE MNGMENT TRAINING: CPT

## 2020-03-15 PROCEDURE — 80048 BASIC METABOLIC PNL TOTAL CA: CPT

## 2020-03-15 PROCEDURE — 97166 OT EVAL MOD COMPLEX 45 MIN: CPT

## 2020-03-15 PROCEDURE — 82962 GLUCOSE BLOOD TEST: CPT

## 2020-03-15 PROCEDURE — 97530 THERAPEUTIC ACTIVITIES: CPT

## 2020-03-15 PROCEDURE — 70450 CT HEAD/BRAIN W/O DYE: CPT

## 2020-03-15 PROCEDURE — 85025 COMPLETE CBC W/AUTO DIFF WBC: CPT

## 2020-03-15 PROCEDURE — 2060000000 HC ICU INTERMEDIATE R&B

## 2020-03-15 PROCEDURE — 97162 PT EVAL MOD COMPLEX 30 MIN: CPT

## 2020-03-15 RX ORDER — SODIUM CHLORIDE, SODIUM LACTATE, POTASSIUM CHLORIDE, CALCIUM CHLORIDE 600; 310; 30; 20 MG/100ML; MG/100ML; MG/100ML; MG/100ML
INJECTION, SOLUTION INTRAVENOUS CONTINUOUS
Status: DISCONTINUED | OUTPATIENT
Start: 2020-03-15 | End: 2020-03-16 | Stop reason: HOSPADM

## 2020-03-15 RX ORDER — SODIUM CHLORIDE 9 MG/ML
INJECTION, SOLUTION INTRAVENOUS CONTINUOUS
Status: DISCONTINUED | OUTPATIENT
Start: 2020-03-15 | End: 2020-03-15

## 2020-03-15 RX ORDER — 0.9 % SODIUM CHLORIDE 0.9 %
250 INTRAVENOUS SOLUTION INTRAVENOUS ONCE
Status: COMPLETED | OUTPATIENT
Start: 2020-03-15 | End: 2020-03-15

## 2020-03-15 RX ORDER — CARVEDILOL 3.12 MG/1
3.12 TABLET ORAL 2 TIMES DAILY WITH MEALS
Status: DISCONTINUED | OUTPATIENT
Start: 2020-03-16 | End: 2020-03-16 | Stop reason: HOSPADM

## 2020-03-15 RX ADMIN — INSULIN LISPRO 1 UNITS: 100 INJECTION, SOLUTION INTRAVENOUS; SUBCUTANEOUS at 21:59

## 2020-03-15 RX ADMIN — LISINOPRIL 10 MG: 10 TABLET ORAL at 09:00

## 2020-03-15 RX ADMIN — ASPIRIN 81 MG 81 MG: 81 TABLET ORAL at 09:00

## 2020-03-15 RX ADMIN — ATORVASTATIN CALCIUM 40 MG: 40 TABLET, FILM COATED ORAL at 21:55

## 2020-03-15 RX ADMIN — Medication 10 ML: at 21:55

## 2020-03-15 RX ADMIN — CARVEDILOL 12.5 MG: 6.25 TABLET, FILM COATED ORAL at 09:00

## 2020-03-15 RX ADMIN — PROMETHAZINE HYDROCHLORIDE 12.5 MG: 25 TABLET ORAL at 21:59

## 2020-03-15 RX ADMIN — CEFAZOLIN SODIUM 2 G: 2 SOLUTION INTRAVENOUS at 03:45

## 2020-03-15 RX ADMIN — SODIUM CHLORIDE 75 ML/HR: 9 INJECTION, SOLUTION INTRAVENOUS at 08:15

## 2020-03-15 RX ADMIN — SOTALOL HYDROCHLORIDE 40 MG: 80 TABLET ORAL at 21:57

## 2020-03-15 RX ADMIN — OXYCODONE HYDROCHLORIDE 10 MG: 10 TABLET ORAL at 20:35

## 2020-03-15 RX ADMIN — SODIUM CHLORIDE, POTASSIUM CHLORIDE, SODIUM LACTATE AND CALCIUM CHLORIDE: 600; 310; 30; 20 INJECTION, SOLUTION INTRAVENOUS at 22:30

## 2020-03-15 RX ADMIN — SOTALOL HYDROCHLORIDE 40 MG: 80 TABLET ORAL at 08:16

## 2020-03-15 RX ADMIN — INSULIN LISPRO 3 UNITS: 100 INJECTION, SOLUTION INTRAVENOUS; SUBCUTANEOUS at 11:54

## 2020-03-15 RX ADMIN — CLOPIDOGREL 75 MG: 75 TABLET, FILM COATED ORAL at 09:00

## 2020-03-15 RX ADMIN — CARVEDILOL 12.5 MG: 6.25 TABLET, FILM COATED ORAL at 17:49

## 2020-03-15 RX ADMIN — INSULIN LISPRO 2 UNITS: 100 INJECTION, SOLUTION INTRAVENOUS; SUBCUTANEOUS at 08:18

## 2020-03-15 RX ADMIN — SODIUM CHLORIDE 250 ML: 9 INJECTION, SOLUTION INTRAVENOUS at 20:28

## 2020-03-15 ASSESSMENT — PAIN SCALES - GENERAL
PAINLEVEL_OUTOF10: 0
PAINLEVEL_OUTOF10: 7
PAINLEVEL_OUTOF10: 0
PAINLEVEL_OUTOF10: 0

## 2020-03-15 NOTE — PROGRESS NOTES
Physical Therapy  Physical Therapy Initial Assessment     Name: Virginia Adjutant  : 1950  MRN: [id number]    Referring Provider:  Norberto Collazo DO    Date of Service: 3/15/2020    Evaluating PT:  Kleber Chicas PT, DPT. [id number]    Room #:  9568/2295-B  Diagnosis:  Hip fx requiring operative repair, right   Reason for admission:  Fall   Precautions:  Falls, WBAT RLE, orthostatic hypotension  Procedures: 3/14 R ORIF   Equipment Needs:  TBD     SUBJECTIVE:  Pt lives alone in a split level home with 1-2 stair(s) to enter and 1 rail(s) into bottom level of home. Bed is on 2nd floor and bath is on 2nd floor with 6+6 steps and 1 rail up. If pt enters front entrance it is either 6 up or 6 down. Pt ambulated with no AD PTA. Pt independent  for ADL performance. OBJECTIVE:   Initial Evaluation  Date: 3/15 Treatment   Short Term/ Long Term   Goals   AM-PAC 6 Clicks      Was pt agreeable to Eval/treatment? Yes      Does pt have pain?  Denies at rest; 7/10 with activity      Bed Mobility  Rolling: NT  Supine to sit: MaxA x2  Sit to supine: Dep x2  Scooting: Dep x2  Shashank   Transfers Sit to stand: NT  Stand to sit: NT  Stand pivot: NT  -see comments  Shashank   Ambulation    NT    >50 feet with AAD Shashank   Stair negotiation: ascended and descended  NT  >6 steps with 1 rail Shashank   ROM BUE:  See OT eval   BLE:  WFL     Strength BUE:  See OT eval   RLE deferred testing, LLE 5/5  5/5   Balance Sitting EOB:  Shashank  Dynamic Standing:  NT  Sitting EOB:  independent  Dynamic Standing:  Shashank AAD     -Pt is A & O x 4  -Sensation:  Unremarkable   -Edema:  Unremarkable     Therapeutic Exercises:  Functional activity - educated on supine therex completion    Patient education  Pt educated on safety, sequencing of transfers, weight bearing, and role of PT    Patient response to education:   Pt verbalized understanding Pt demonstrated skill Pt requires further education in this area   Yes  No  Yes      ASSESSMENT:    Comments:  Pt modalities to obtain goals. Patient and family education will also be administered as needed. Frequency of treatments: 2-5x/week x 1-2 weeks. Time in  0834  Time out  0910    Total Treatment Time 25 minutes     Evaluation Time includes thorough review of current medical information, gathering information on past medical history/social history and prior level of function, completion of standardized testing/informal observation of tasks, assessment of data and education on plan of care and goals.     CPT codes:  [x] Low Complexity PT evaluation 07579  [] Moderate Complexity PT evaluation 24152  [] High Complexity PT evaluation 81993  [] PT Re-evaluation 52307  [] Gait training 69649 - minutes  [] Manual therapy 35687 - minutes  [x] Therapeutic activities 06265 25 minutes  [] Therapeutic exercises 08997 - minutes  [] Neuromuscular reeducation 46373 - minutes     Siva Hull, PT, DPT  [id number]

## 2020-03-15 NOTE — OP NOTE
[address]                  [address] [facility]  [facility]                                OPERATIVE REPORT    PATIENT NAME: Connor Culver                  :        1950  MED REC NO:   [id number]                            ROOM:       5418  ACCOUNT NO:   [de-identified]                           ADMIT DATE: 2020  PROVIDER:     Leanne Hurtado DO    DATE OF PROCEDURE:  2020    SURGEON:  Leanne Hurtado DO    ASSISTANTS:  Cristofer Ohara DO    PREOPERATIVE DIAGNOSIS:  Right femur intertrochanteric fracture. POSTOPERATIVE DIAGNOSIS:  Right femur intertrochanteric fracture. PROCEDURE:  Right femur intertrochanteric fracture cephalomedullary nail  stabilization. ANESTHESIA:  General.    ESTIMATED BLOOD LOSS:  Minimal.    COMPLICATIONS:  None. IMPLANTS:  Synthes TFNA 11 x 400 mm, 130-degree neck shaft angle. One  proximal lag screw and one distal interlocking screw. INDICATIONS:  Right femur intertrochanteric fracture in a community  ambulator. The patient was admitted to the ER after transfer from [facility] for his fracture. Treatment options were discussed  and outlined. He elected for surgical intervention. He was seen and  optimized by the medicine team and cleared for surgery. The risks and  benefits of surgery were outlined in detail with the patient and family. He verbalized understanding of all that was discussed. All questions  were addressed to their satisfaction. He did elect to proceed with the  procedure as outlined. DESCRIPTION OF THE PROCEDURE:  The patient was brought to the operating  suite where he received a general anesthetic by Department of Anesthesia  as well as 2 gm of Ancef intravenously. He was now carefully  transferred onto the fracture table in the supine position. All bony  prominences were carefully padded. Feet were padded and secured to the  traction boots.   Surgical timeout was now performed per protocol by all  members of the surgical team.  The leg was now manipulated to the  fracture table. Once we were able to obtain appropriate reduction in  the AP and lateral views, the right hemipelvis and right thigh was  sterilely prepped and draped out in standard sterile fashion using  shower curtain drape. Fluoroscopic localization for bony landmarks. Proximal incision was now made to the tip of the greater trochanter  laterally. Skin was incised with scalpel. Electrocautery was taken  through the subcutaneous tissues. Fascia was bluntly divided. Threaded  guide pin was now placed on the tip of the greater trochanter. Appropriate starting point was confirmed with fluoroscopy in the AP and  lateral views. This was passed in the proximal segment. The open  reamer was now passed over the wire within a soft tissue protection  sleeve. These were removed. A ball-tip guidewire was now seated down  the femoral canal.  The length of the wire was measured. Canal was now  reamed incrementally starting with an end-cutting reamer and ending at  12.5 mm. There was some isthmal chatter at 11 mm. An 11 mm diameter  peyton was now passed over the wire. Seating was confirmed with  fluoroscopy using the guide trocar system. A separate stab incision was  made over the proximal lateral fascia through the skin, subcutaneous  tissue and iliotibial band. Trocar was compressed against the lateral  cortex of the femur. Threaded guidepin was now placed through the guide  system in the center-center position of the femoral head. Length of  wire was measured. Step reamer was utilized followed by insertion of  the lag screw. Once this was appropriately seated, rotational mechanism  was locked proximally. Guide system was removed proximally and  distally.   A single interlocking screw was placed from lateral to medial  trajectory using perfect [place] fluoroscopy through a small

## 2020-03-15 NOTE — PROGRESS NOTES
became lightheaded at EOB-unable to test)   Modified [place]    Bed Mobility  Supine to sit: Maximal Assist x2 (d/t pain)  Sit to supine: Dependent (pt became unresponsive-read treatment section below)  Supine to sit: Minimal Assist   Sit to supine: Minimal Assist    Functional Transfers NT (pt became dizzy at EOB)   Minimal Assist w/ ww   Functional Mobility NT (pt became dizzy at EOB)  Minimal Assist w/ ww   Balance Sitting:     Static: Min. A    Dynamic:Min. A  Standing: NT       Activity Tolerance Fair-  good   Visual/  Perceptual Glasses: readers                  Hand dominance: R     Strength ROM Additional Info:    RUE  WFL noted during ADLs WFL good  and wfl FMC/dexterity noted during ADL tasks       LUE WFL noted during ADLs  WFL good  and wfl FMC/dexterity noted during ADL tasks         Hearing: WFL   Sensation:  No c/o numbness or tingling   Tone: WFL   Edema: none noted            Treatment: Upon arrival, patient lying in bed and agreeable to OT session at this time in collaboration with PT. RN approved. Therapist facilitated bed mobility(increased assistance noted d/t pain and slow in movement) - skilled cuing on hand placement, posture, body mechanics and safety. Therapist facilitated self-care retraining: UB/LB self-care tasks(socks; simulated gown) and seated grooming task(EOB) while educating pt on modified techniques, posture, safety and energy conservation techniques. Skilled monitoring of HR, O2 sats and pts response to treatment. Pt on 3L O2 and maintained WNL throughout session. When pt was seated EOB c/o of mild dizziness, educated pt on taking rest breaks and pursed lip breathing after positional changes. After sitting EOB for 5 mins, pt became unresponsive to verbal commands and face became flushed and pale in color. Pt placed back into supine position and was starting to respond to verbal commands after 45 secs in supine. RN aware and checked PZ=207/68.  Pt's color in face back to normal after ~1 min in supine position and pt reported dizziness had subsided. At end of session, patient lying in bed eating breakfast with call light and phone within reach, all lines and tubes intact. RN aware. Comments:  Overall pt demonstrated decreased independence and safety during completion of ADL/functional transfers/mobility tasks. Pt demonstrating fair understanding of education/techniques, requiring additional training / education. Pt would benefit from continued skilled OT to increase functional independence and quality of life.       Eval Complexity: Mod  mod  Profile and History- med (extensive chart review)  Assessment of Occupational Performance and Identification of Deficits- med  Clinical Decision Making- med    (Evaluation time includes thorough review of current medical information, gathering information on past medical history/social history and prior level of function, completion of standardized testing/informal observation of tasks, assessment of data, and development of POC/Goals.)      Assessment of current deficits   Functional mobility [x]  ADLs [x] Strength [x]  Cognition []  Functional transfers  [x] IADLs [x] Safety Awareness [x]  Endurance [x]  Fine Motor Coordination [] Balance [x] Vision/perception [] Sensation []   Gross Motor Coordination [] ROM [] Delirium []                  Motor Control []    Plan of Care (5-7 days):   ADL retraining [x]   Equipment needs [x]   Neuromuscular re-education [x] Energy Conservation Techniques [x]  Functional Transfer training [x] Patient and/or Family Education [x]  Functional Mobility training [x]  Environmental Modifications [x]  Cognitive re-training []   Compensatory techniques for ADLs [x]  Splinting Needs []   Positioning to improve overall function [x]   Therapeutic Activity [x]  Therapeutic Exercise  [x]  Visual/Perceptual: []    Delirium prevention/treatment  []   Other:  []    Rehab Potential: Good for established goals     Patient / Family Goal: Not stated     Patient and/or family were instructed diagnosis, prognosis/goals and plan of care. Demonstrated fair understanding.         Mod Evaluation +   Treatment Time In: 8:40            Treatment Time Out: 9:10              Treatment Charges: Mins Units   Ther Ex  04219     Manual Therapy 51544     Thera Activities 95104 20 1   ADL/Home Mgt 31169 10 1   Neuro Re-ed 02561     Group Therapy      Orthotic manage/training  16307     Non-Billable Time     Total Timed Treatment 30 Andre 172, OTR/L #[id number]

## 2020-03-15 NOTE — ANESTHESIA POSTPROCEDURE EVALUATION
Department of Anesthesiology  Postprocedure Note    Patient: Dominick De Jesus  MRN: [id number]  [place]: 1950  Date of evaluation: 3/15/2020  Time:  6:26 AM     Procedure Summary     Date:  03/14/20 Room / Location:  [facility] 09 / [facility]    Anesthesia Start:  1409 Anesthesia Stop:  2921    Procedure:  RIGHT HIP OPEN REDUCTION INTERNAL FIXATION NAIL-SYNTHES -- OC 2 (Right Hip) Diagnosis:  (.)    Surgeon:  Ora Cardona DO Responsible Provider:  Rom Palm MD    Anesthesia Type:  general ASA Status:  3          Anesthesia Type: general    Arpan Phase I: Arpan Score: 10    Arpan Phase II:      Last vitals: Reviewed and per EMR flowsheets.        Anesthesia Post Evaluation    Patient location during evaluation: PACU  Patient participation: complete - patient participated  Level of consciousness: awake  Airway patency: patent  Nausea & Vomiting: no nausea and no vomiting  Complications: no  Cardiovascular status: hemodynamically stable  Respiratory status: acceptable  Hydration status: stable

## 2020-03-15 NOTE — PROGRESS NOTES
Subjective:    Patient feels okay today. Pain is well controlled. Objective:    /75   Pulse 71   Temp 98.4 °F (36.9 °C) (Temporal)   Resp 16   Ht 6' (1.829 m)   Wt 208 lb 12.4 oz (94.7 kg)   SpO2 97%   BMI 28.32 kg/m²     Current medications that patient is taking have been reviewed. General appearance: NAD, conversant  HEENT: AT/NC, MMM  Neck: FROM, supple  Lungs: Clear to auscultation  CV: RRR, no MRGs  Abdomen: Soft, non-tender; no masses or HSM, +BS  Extremities: Appropriate degree of pain with range of motion of the right hip. Appropriate degree of postoperative swelling. Skin: no rash, lesions or ulcers  Psych: Calm and cooperative  Neuro: Alert and interactive, nonfocal    Labs:  CBC:   Lab Results   Component Value Date    WBC 13.4 03/15/2020    RBC 4.06 03/15/2020    HGB 12.4 03/15/2020    HCT 37.5 03/15/2020    MCV 92.4 03/15/2020    MCH 30.5 03/15/2020    MCHC 33.1 03/15/2020    RDW 13.5 03/15/2020     03/15/2020    MPV 9.9 03/15/2020     BMP:    Lab Results   Component Value Date     03/15/2020    K 4.8 03/15/2020     03/15/2020    CO2 23 03/15/2020    BUN 22 03/15/2020    LABALBU 3.4 03/15/2020    LABALBU 4.1 03/03/2012    CREATININE 1.0 03/15/2020    CALCIUM 9.1 03/15/2020    GFRAA >60 03/15/2020    LABGLOM >60 03/15/2020    GLUCOSE 210 03/15/2020    GLUCOSE 124 03/03/2012        Assessment/Plan:    Principal Problem:    Hip fracture requiring operative repair, right, closed, initial encounter ([address]  Active Problems:    Essential hypertension    COPD, very severe ([address]    Diabetes mellitus type II, uncontrolled ([address]    Cerebellar hemorrhage ([address]    Chronic systolic (congestive) heart failure ([address]  Resolved Problems:    * No resolved hospital problems.  *    POD#1 s/p IMN R hip  Doing well  Glucoses reasonably well controlled  BP well controlled  COPD stable  Euvolemic from CHF standpoint  Appreciate neurosurgery assistance with the intracranial hemorrhage

## 2020-03-15 NOTE — PROGRESS NOTES
No new findings neurosurgically. There is no change in patient's neurosurgical status. Will continue to follow along. Nothing new to add from neurosurgical stand point at this time.

## 2020-03-16 ENCOUNTER — TELEPHONE (OUTPATIENT)
Dept: ORTHOPEDIC SURGERY | Age: 70
End: 2020-03-16

## 2020-03-16 VITALS
SYSTOLIC BLOOD PRESSURE: 158 MMHG | OXYGEN SATURATION: 95 % | TEMPERATURE: 97.7 F | HEIGHT: 72 IN | WEIGHT: 208.78 LBS | HEART RATE: 83 BPM | BODY MASS INDEX: 28.28 KG/M2 | RESPIRATION RATE: 18 BRPM | DIASTOLIC BLOOD PRESSURE: 90 MMHG

## 2020-03-16 LAB
ANION GAP SERPL CALCULATED.3IONS-SCNC: 16 MMOL/L (ref 7–16)
BUN BLDV-MCNC: 29 MG/DL (ref 8–23)
CALCIUM SERPL-MCNC: 8.9 MG/DL (ref 8.6–10.2)
CHLORIDE BLD-SCNC: 104 MMOL/L (ref 98–107)
CO2: 20 MMOL/L (ref 22–29)
CREAT SERPL-MCNC: 1 MG/DL (ref 0.7–1.2)
GFR AFRICAN AMERICAN: >60
GFR NON-AFRICAN AMERICAN: >60 ML/MIN/1.73
GLUCOSE BLD-MCNC: 173 MG/DL (ref 74–99)
HCT VFR BLD CALC: 31.3 % (ref 37–54)
HEMOGLOBIN: 10 G/DL (ref 12.5–16.5)
MAGNESIUM: 1.9 MG/DL (ref 1.6–2.6)
MCH RBC QN AUTO: 30.5 PG (ref 26–35)
MCHC RBC AUTO-ENTMCNC: 31.9 % (ref 32–34.5)
MCV RBC AUTO: 95.4 FL (ref 80–99.9)
METER GLUCOSE: 151 MG/DL (ref 74–99)
METER GLUCOSE: 176 MG/DL (ref 74–99)
METER GLUCOSE: 212 MG/DL (ref 74–99)
PDW BLD-RTO: 13.7 FL (ref 11.5–15)
PLATELET # BLD: 180 E9/L (ref 130–450)
PMV BLD AUTO: 10.7 FL (ref 7–12)
POTASSIUM SERPL-SCNC: 4.7 MMOL/L (ref 3.5–5)
RBC # BLD: 3.28 E12/L (ref 3.8–5.8)
SODIUM BLD-SCNC: 140 MMOL/L (ref 132–146)
WBC # BLD: 10.4 E9/L (ref 4.5–11.5)

## 2020-03-16 PROCEDURE — 97535 SELF CARE MNGMENT TRAINING: CPT

## 2020-03-16 PROCEDURE — 6370000000 HC RX 637 (ALT 250 FOR IP): Performed by: STUDENT IN AN ORGANIZED HEALTH CARE EDUCATION/TRAINING PROGRAM

## 2020-03-16 PROCEDURE — 2580000003 HC RX 258: Performed by: STUDENT IN AN ORGANIZED HEALTH CARE EDUCATION/TRAINING PROGRAM

## 2020-03-16 PROCEDURE — 85027 COMPLETE CBC AUTOMATED: CPT

## 2020-03-16 PROCEDURE — 83735 ASSAY OF MAGNESIUM: CPT

## 2020-03-16 PROCEDURE — 6370000000 HC RX 637 (ALT 250 FOR IP): Performed by: INTERNAL MEDICINE

## 2020-03-16 PROCEDURE — 80048 BASIC METABOLIC PNL TOTAL CA: CPT

## 2020-03-16 PROCEDURE — 82962 GLUCOSE BLOOD TEST: CPT

## 2020-03-16 PROCEDURE — 2580000003 HC RX 258: Performed by: INTERNAL MEDICINE

## 2020-03-16 PROCEDURE — 97530 THERAPEUTIC ACTIVITIES: CPT

## 2020-03-16 PROCEDURE — 36415 COLL VENOUS BLD VENIPUNCTURE: CPT

## 2020-03-16 RX ORDER — SENNA PLUS 8.6 MG/1
2 TABLET ORAL NIGHTLY PRN
Qty: 60 TABLET | Refills: 11 | DISCHARGE
Start: 2020-03-16 | End: 2020-05-12 | Stop reason: CLARIF

## 2020-03-16 RX ORDER — POLYETHYLENE GLYCOL 3350 17 G/17G
17 POWDER, FOR SOLUTION ORAL DAILY
Qty: 527 G | Refills: 0 | DISCHARGE
Start: 2020-03-16 | End: 2020-04-15

## 2020-03-16 RX ADMIN — CARVEDILOL 3.12 MG: 6.25 TABLET, FILM COATED ORAL at 16:46

## 2020-03-16 RX ADMIN — CARVEDILOL 3.12 MG: 6.25 TABLET, FILM COATED ORAL at 08:33

## 2020-03-16 RX ADMIN — ASPIRIN 81 MG 81 MG: 81 TABLET ORAL at 08:32

## 2020-03-16 RX ADMIN — SODIUM CHLORIDE, POTASSIUM CHLORIDE, SODIUM LACTATE AND CALCIUM CHLORIDE: 600; 310; 30; 20 INJECTION, SOLUTION INTRAVENOUS at 07:47

## 2020-03-16 RX ADMIN — Medication 10 ML: at 11:43

## 2020-03-16 RX ADMIN — METFORMIN HYDROCHLORIDE 1000 MG: 1000 TABLET ORAL at 08:33

## 2020-03-16 RX ADMIN — CLOPIDOGREL 75 MG: 75 TABLET, FILM COATED ORAL at 08:32

## 2020-03-16 RX ADMIN — INSULIN LISPRO 1 UNITS: 100 INJECTION, SOLUTION INTRAVENOUS; SUBCUTANEOUS at 11:24

## 2020-03-16 RX ADMIN — SOTALOL HYDROCHLORIDE 40 MG: 80 TABLET ORAL at 08:33

## 2020-03-16 RX ADMIN — OXYCODONE HYDROCHLORIDE 10 MG: 10 TABLET ORAL at 08:32

## 2020-03-16 RX ADMIN — INSULIN LISPRO 1 UNITS: 100 INJECTION, SOLUTION INTRAVENOUS; SUBCUTANEOUS at 08:32

## 2020-03-16 RX ADMIN — INSULIN LISPRO 2 UNITS: 100 INJECTION, SOLUTION INTRAVENOUS; SUBCUTANEOUS at 16:48

## 2020-03-16 RX ADMIN — METFORMIN HYDROCHLORIDE 1000 MG: 1000 TABLET ORAL at 16:46

## 2020-03-16 ASSESSMENT — PAIN DESCRIPTION - LOCATION
LOCATION: HIP

## 2020-03-16 ASSESSMENT — PAIN SCALES - GENERAL
PAINLEVEL_OUTOF10: 0
PAINLEVEL_OUTOF10: 4
PAINLEVEL_OUTOF10: 0
PAINLEVEL_OUTOF10: 5
PAINLEVEL_OUTOF10: 8
PAINLEVEL_OUTOF10: 7

## 2020-03-16 ASSESSMENT — PAIN DESCRIPTION - PAIN TYPE
TYPE: ACUTE PAIN

## 2020-03-16 ASSESSMENT — PAIN DESCRIPTION - PROGRESSION
CLINICAL_PROGRESSION: NOT CHANGED
CLINICAL_PROGRESSION: GRADUALLY IMPROVING
CLINICAL_PROGRESSION: GRADUALLY IMPROVING

## 2020-03-16 ASSESSMENT — PAIN - FUNCTIONAL ASSESSMENT
PAIN_FUNCTIONAL_ASSESSMENT: PREVENTS OR INTERFERES SOME ACTIVE ACTIVITIES AND ADLS

## 2020-03-16 ASSESSMENT — PAIN DESCRIPTION - FREQUENCY
FREQUENCY: INTERMITTENT

## 2020-03-16 ASSESSMENT — PAIN DESCRIPTION - ORIENTATION
ORIENTATION: RIGHT

## 2020-03-16 ASSESSMENT — PAIN DESCRIPTION - DESCRIPTORS
DESCRIPTORS: DISCOMFORT

## 2020-03-16 ASSESSMENT — PAIN DESCRIPTION - ONSET
ONSET: GRADUAL
ONSET: GRADUAL

## 2020-03-16 ASSESSMENT — PAIN SCALES - WONG BAKER
WONGBAKER_NUMERICALRESPONSE: 0
WONGBAKER_NUMERICALRESPONSE: 8
WONGBAKER_NUMERICALRESPONSE: 2
WONGBAKER_NUMERICALRESPONSE: 0

## 2020-03-16 NOTE — PROGRESS NOTES
Physical Therapy  Physical Therapy Treatment Note    Name: Christos Mallory  : 1950  MRN: [id number]    Referring Provider:  Oneil Topete DO    Date of Service: 3/16/2020    Evaluating PT:  Tessa Jaeger, PT, DPT. [id number]    Room #:  0319/7551-I  Diagnosis:  Hip fx requiring operative repair, right   Reason for admission:  Fall   Precautions:  Falls, WBAT RLE, orthostatic hypotension  Procedures: 3/14 R ORIF   Equipment Needs:  TBD     SUBJECTIVE:  Pt lives alone in a split level home with 1-2 stair(s) to enter and 1 rail(s) into bottom level of home. Bed is on 2nd floor and bath is on 2nd floor with 6+6 steps and 1 rail up. If pt enters front entrance it is either 6 up or 6 down. Pt ambulated with no AD PTA. Pt independent  for ADL performance. OBJECTIVE:   Initial Evaluation  Date: 3/15 Treatment  Date see above    Short Term/ Long Term   Goals   AM-PAC 6 Clicks 4/63 3/48    Was pt agreeable to Eval/treatment? Yes  Yes     Does pt have pain?  Denies at rest; /10 with activity  6 or 7/10 hip pain     Be d Mobility  Rolling: NT  Supine to sit: MaxA x2  Sit to supine: Dep x2  Scooting: Dep x2 Rolling nt  Supine to sit  Max 2  Scooting min assist  In Supine using rails  Sit to supine max of 2   See comments   Shashank   Transfers Sit to stand: NT  Stand to sit: NT  Stand pivot: NT  -see comments Sit to stand max of 2  Stand to sit max of 2  Flexed posture  Stand pivot nt  See comments  Shashank   Ambulation    NT   NT >50 feet with AAD Shashank   Stair negotiation: ascended and descended  NT NT >6 steps with 1 rail Shashank   ROM BUE:  See OT eval   BLE:  WFL  NT    Strength BUE:  See OT eval   RLE deferred testing, LLE 5/5 NT 5/5   Balance Sitting EOB:  Shashank  Dynamic Standing:  NT Sitting eob  sba  Standing max 2  Sitting EOB:  independent  Dynamic Standing:  Shashank AAD   Pt is A & O x  3      Therapeutic Exercises:   Function, glut sets  Tried to do quad sets pt not understanding    Patient education  Pt educated

## 2020-03-16 NOTE — CARE COORDINATION
Spoke with patient, he is from home alone (with his dog.) We discussed [facility] for rehab. He is unsure and asked that he go to a facility where Dr. Steve Real has privileges if possible. I discussed with him [facility] since his PCP does have privileges there. [facility]. Referral pending to [facility], notified facility that patient is likely stable for discharge today if they can accept. Patient accepted at [facility]. Bed available today. Spoke with sisterUlises and updated her that patient is accepted there. Patient for discharge to [facility] the [place] today at 1800. [facility] ambulance arranged. SisterUlises notified of discharge time. The Plan for Transition of Care is related to the following treatment goals: discharge planning when stable     The Patient and/or patient representative Lexy Ortez was provided with a choice of provider and agrees   with the discharge plan. [x] Yes [] No    Freedom of choice list was provided with basic dialogue that supports the patient's individualized plan of care/goals, treatment preferences and shares the quality data associated with the providers.  [x] Yes [] No

## 2020-03-16 NOTE — DISCHARGE SUMMARY
Physician Discharge Summary     Patient ID:  Kiara Ayala  [id number]  65 y.o.  1950    Admit date: 3/13/2020    Discharge date and time:  3/16/2020    Discharge Diagnoses: Principal Problem:    Hip fracture requiring operative repair, right, closed, initial encounter [facility])  Active Problems:    Essential hypertension    COPD, very severe ([facility]    Diabetes mellitus type II, uncontrolled ([facility]    Cerebellar hemorrhage ([facility]    Chronic systolic (congestive) heart failure ([facility]  Resolved Problems:    * No resolved hospital problems. *      Consults: IP CONSULT TO ORTHOPEDIC SURGERY  IP CONSULT TO NEUROSURGERY  IP CONSULT TO HOSPITALIST  IP CONSULT TO CASE MANAGEMENT  IP CONSULT TO SOCIAL WORK    Procedures: see below    Hospital Course: 20-year-old male admitted status post fall with right hip fracture s/p IMN R hip 3/14. Initially there was some concern about intracranial hemorrhage. Multiple CT scans of the head showed no change in the area of hypodensity. Patient was evaluated by neurosurgery with no recommended intervention. Full anticoagulation and antiplatelet should be held until cleared by neurosurgery. Check labs today--reviewed  Pain control, mobilize  Orthopedic consult appreciated  Neurosurgery consult appreciated--   DC planning  Likely SNF today    Discharge Exam:  See progress note from today    Condition:  Stable    Disposition: SNF    Patient Instructions:   Current Discharge Medication List      START taking these medications    Details   polyethylene glycol (GLYCOLAX) packet Take 17 g by mouth daily  Qty: 527 g, Refills: 0      senna (SENOKOT) 8.6 MG tablet Take 2 tablets by mouth nightly as needed for Constipation  Qty: 60 tablet, Refills: 11      oxyCODONE-acetaminophen (PERCOCET) 5-325 MG per tablet Take 1 tablet by mouth every 6 hours as needed for Pain for up to 7 days. Intended supply: 7 days.  Take lowest dose possible to manage pain  Qty: 28 tablet, Refills: 0    Comments: Reduce

## 2020-03-16 NOTE — DISCHARGE INSTR - COC
I49.9    Essential hypertension I10    CHF (congestive heart failure) (HCC) I50.9    COPD, very severe ([facility]) J44.9    Reflux laryngitis J04.0, K21.9    CAD (coronary artery disease) I25.10    Abscess of bursa, left elbow M71.022    Diabetes mellitus type II, uncontrolled ([facility]) E11.65    Tubular adenoma of colon D12.6    Sigmoid diverticulosis K57.30    Adhesive capsulitis of both shoulders M75.01, M75.02    Type 2 diabetes mellitus without complication, without long-term current use of insulin (HCC) E11.9    Prostate cancer ([facility]) C61    Mixed hyperlipidemia E78.2    History of adenomatous polyp of colon Z86.010    Family history of cancer Z80.9    ICD (implantable cardioverter-defibrillator) in place Z95.810    Hip fracture requiring operative repair, right, closed, initial encounter ([facility] S72.001A    Cerebellar hemorrhage ([facility]) I61.4    Chronic systolic (congestive) heart failure ([facility]) I50.22       Isolation/Infection:   Isolation          No Isolation        Patient Infection Status     None to display          Nurse Assessment:  Last Vital Signs: /78   Pulse 102   Temp 98 °F (36.7 °C) (Temporal)   Resp 18   Ht 6' (1.829 m)   Wt 208 lb 12.4 oz (94.7 kg)   SpO2 96%   BMI 28.32 kg/m²     Last documented pain score (0-10 scale): Pain Level: 8  Last Weight:   Wt Readings from Last 1 Encounters:   03/13/20 208 lb 12.4 oz (94.7 kg)     Mental Status:  oriented    IV Access:  - None    Nursing Mobility/ADLs:  Walking   Assisted  Transfer  Assisted  Bathing  Assisted  Dressing  Assisted  Toileting  Assisted  Feeding  Independent  Med Admin  Independent  Med Delivery   whole    Wound Care Documentation and Therapy:        Elimination:  Continence:   · Bowel: {YES / DD:36917}  · Bladder: {YES / EK:39644}  Urinary Catheter: None   Colostomy/Ileostomy/Ileal Conduit: {YES / JM:71623}       Date of Last BM: ***    Intake/Output Summary (Last 24 hours) at 3/16/2020 1002  Last data filed at 3/16/2020 0611  Gross per 24 hour   Intake 1047 ml   Output 500 ml   Net 547 ml     I/O last 3 completed shifts: In: 4758 [P.O.:720; I.V.:567]  Out: 700 [Urine:700]    Safety Concerns: At Risk for Falls    Impairments/Disabilities:      None    Nutrition Therapy:  Current Nutrition Therapy:   - Oral Diet:  Carb Control 4 carbs/meal (1800kcals/day)    Routes of Feeding: Oral  Liquids: No Restrictions  Daily Fluid Restriction: no  Last Modified Barium Swallow with Video (Video Swallowing Test): not done    Treatments at the Time of Hospital Discharge:   Respiratory Treatments: ***  Oxygen Therapy:  is not on home oxygen therapy.   Ventilator:    - No ventilator support    Rehab Therapies: Physical Therapy  Weight Bearing Status/Restrictions: No weight bearing restirctions  Other Medical Equipment (for information only, NOT a DME order):  walker and bedside commode  Other Treatments:    Patient's personal belongings (please select all that are sent with patient):  Dentures lower    RN SIGNATURE:  Electronically signed by Ajit Sury on 3/16/20 at 11:40 AM EDT    CASE MANAGEMENT/SOCIAL WORK SECTION    Inpatient Status Date: 03/16/2020    Readmission Risk Assessment Score:  Readmission Risk              Risk of Unplanned Readmission:        15           Discharging to Facility/ Agency   · Name: [facility]  · Address:  · Phone:  · Fax:    Dialysis Facility (if applicable)   · Name:  · Address:  · Dialysis Schedule:  · Phone:  · Fax:    / signature: Electronically signed by COURTNEY Rodriguez on 3/16/20 at 10:02 AM EDT    PHYSICIAN SECTION    Prognosis: Good    Condition at Discharge: Stable    Rehab Potential (if transferring to Rehab): Good    Recommended Labs or Other Treatments After Discharge: ***    Physician Certification: I certify the above information and transfer of Burton Ratliff  is necessary for the continuing treatment of the diagnosis listed and that he

## 2020-03-16 NOTE — PROGRESS NOTES
Department of Orthopedic Surgery  Resident Progress Note    Patient seen and examined. Pain controlled. No new complaints. Denies chest pain, shortness of breath, calf pain, dizziness/lightheadedness. VITALS:  /62   Pulse 106   Temp 97 °F (36.1 °C) (Temporal)   Resp 18   Ht 6' (1.829 m)   Wt 208 lb 12.4 oz (94.7 kg)   SpO2 93%   BMI 28.32 kg/m²     GENERAL: alert, mildly confused  MUSCULOSKELETAL:   right lower extremity:  · Dressings C/D/I  · Compartments soft and compressible, calf non-tender  · Palpable dorsalis pedis and posterior tibialis pulse, brisk cap refill to toes, foot warm and perfused  · Sensation intact to light touch in sural/deep peroneal/superficial peroneal/saphenous/posterior tibial nerve distributions to foot/ankle. · Demonstrates active ankle plantar/dorsiflexion/great toe extension    CBC:   Lab Results   Component Value Date    WBC 13.6 03/15/2020    HGB 10.9 03/15/2020    HCT 33.1 03/15/2020     03/15/2020       ASSESSMENT  · S/p R CMN 3/14    PLAN    WBAT  Pain control PO  DVT prophylaxis- plavix and aspirin, early mobilization  Monitor Labs  Bowel regiment  Pulmonary hygiene   Trend H&H- 10.9 today  PT/OT  D/C planning, SW/PT recs  Discuss with attending    Orthopaedic Attending    I have seen and evaluated the patient with the resident and agree with the above assessments on today's visit. I have performed the key components of the history and physical examination and concur completely with the findings and plans as documented above.     Electronically signed by   Livan Michaud DO  3/16/2020

## 2020-03-16 NOTE — SIGNIFICANT EVENT
Rapid Response Team Note  Date of event: 3/15/2020   Time of event: Dillan Veras 71y.o. year old male   YOB: 1950   Admit date:  3/13/2020   [facility]: [facility]   Witnessed? : []Yes  [] No  Monitored? : []Yes  [] No  Code status: [x] Full  [] DNR-CCA  []DNR-CC  ______________________________________________________________________  Reason for RRT:    [] RR < 8     [] RR > 28   [] SpO2 <90%   [] HR < 40 bpm   [] HR > 130 bpm  [] SBP < 90 mmHg    [] SpO2 <90%   [] LOC   [] Seizures    [] Significant Bleeding Event    [x] Other: Manda Lopez patient felt \"passing out\"  Subjective:   70-year old male with past medical history of CAD, COPD, HTN,diabetes, HFrEF on ICD, who was admitted on 3/13/2020 after mechanical fall, he underwent ORIF of right hip yesterday. Patient was RRT dizziness and felt like passing out when he was trying to sit up in his bed. On arrival, patient was awake alert oriented x3, no neurological deficit noted, patient reports feeling dizzy when he was trying to sit up like he was about to pass out. Patient denies any headache, blurring of vision, nausea vomiting,shortness of breath, no weakness. He reports he feels better.     On examination,  Objective:   Vital signs: Temp: 97.9 BP: 103/66RR:18 HR: 82  Initial Condition:  Conscious   [x] Yes  [] No     Breathing [x] Yes  [] No     Pulse  [x] Yes  [] No    Airway:   [x] Open/ Clear     Intervention: [x] None  [] Pooled secretions     [] Suctioned  [] Stridor      [] Intubation    Lungs:   [] Symmetrical chest rise/ CTABL Intervention: [] None  [] Use of accessory muscles    [] NIV (CPAP/BiPAP)  [] Cyanosis      [] Nasal Oxygen/Mask  [] Wheezing       [] ABG             [] CXR  [] Other:    Circulation:   Rhythm:  [x] Sinus [x] Other : with PVC Intervention: [x] None            [x] IV Access  [] Peripheral              [] Central            [x] EKG            [] Cardioversion            [] Defibrillation     Capillary Refill:  [] > 2 seconds [] < 2 seconds    Neurologic:   [] NIHSS      [] Pupillary Response: Reactive and bilateral equal.  Response to pain:   [x] Yes  [] No  Follow commands:  [x] Yes  [] No  Facial asymmetry:  [] Yes  [x] No  Motor strength:  [] Equal  [] Focal deficit: none  Diagnostic Test:  Blood Sugar:  235 mg/dL  EKG:    Sinus rhythm with PVC  ABG:    No results found for: PH, PCO2, PO2, HCO3, O2SAT  Medication(s) Given:  []  Narcan (Naloxone)   []  Romazicon (Flumazenil)    []  Breathing Treatment    []  Steroids (Prednisone, Solu-Medrol)  []  Adenosine  [] Cardiac Medicines: IV fluid    Infusion(s):   [x] Normal Saline    Amount: 500  Cc bolus      [] Lactate Ringers      [] Other:     Imaging:   [] CXR:   [] Normal         [] Pneumothorax         [] Pulmonary Edema  [] Infiltrate          [x] CT Head  [] Normal          [] ICB          [] [facility]          [] Other:    Laboratory Tests:     CBC:   Lab Results   Component Value Date    WBC 13.6 03/15/2020    RBC 3.53 03/15/2020    HGB 10.9 03/15/2020    HCT 33.1 03/15/2020    MCV 93.8 03/15/2020    MCH 30.9 03/15/2020    MCHC 32.9 03/15/2020    RDW 13.7 03/15/2020     03/15/2020    MPV 10.0 03/15/2020     BMP:    Lab Results   Component Value Date     03/15/2020    K 4.2 03/15/2020    K 4.8 03/15/2020    CL 97 03/15/2020    CO2 20 03/15/2020    BUN 32 03/15/2020    LABALBU 3.4 03/15/2020    LABALBU 4.1 03/03/2012    CREATININE 1.3 03/15/2020    CALCIUM 9.1 03/15/2020    GFRAA >60 03/15/2020    LABGLOM 55 03/15/2020    GLUCOSE 216 03/15/2020    GLUCOSE 124 03/03/2012         Teams Assessment and Plan:  1. Near syncopal, likely secondary to Orthostatic Hypotension. ?  -orthostatic hypotension noted , supine 103/66, sitting 83/53    - ivf bolus given    - will check CBC, BMP,CT head     -BP med's with holding parameter  ?  - transfer to telemetry    ?   Disposition:  [] No transfer   [x] Transfer to monitor floor  [] Transfer to: [] MICU [] NICU []

## 2020-03-17 ASSESSMENT — ENCOUNTER SYMPTOMS
ABDOMINAL PAIN: 0
NAUSEA: 0
VOMITING: 0
EYE REDNESS: 0
SHORTNESS OF BREATH: 0

## 2020-03-17 NOTE — ED PROVIDER NOTES
Chief complaint: Fall      HPI:  3/17/20, Time: 4:25 PM EDT         Luciano Sosa is a 71 y.o. male presenting to the ED for R hip pain following mechanical fall from standing height. He was taking out his trash this morning when he tripped over the curb. He landed on his right side. He was unable to get up from the ground. The patient's neighbor saw him on the ground and called EMS. He denies any syncopal episodes prior to the fall or loss of consciousness proceeding the fall. He denies hitting his head. Patient does take Plavix. Pain is located in the right hip. Pain described as sharp, pain is nonradiating. Pain currently rated 10/10. Pain is worse with palpation and attempted movement. The patient has not any treatments for pain prior to arrival.  He denies any numbness, weakness or paresthesias of his right leg. The patient denies hitting his head with the fall. ROS:   Review of Systems   Constitutional: Negative for chills and fever. HENT: Negative for congestion. Eyes: Negative for redness. Respiratory: Negative for shortness of breath. Cardiovascular: Negative for chest pain. Gastrointestinal: Negative for abdominal pain, nausea and vomiting. Genitourinary: Negative for dysuria. Musculoskeletal: Positive for arthralgias. Skin: Negative for rash. Neurological: Negative for light-headedness. Psychiatric/Behavioral: Negative for confusion.       --------------------------------------------- PAST HISTORY ---------------------------------------------  Past Medical History:  has a past medical history of Arthritis, CAD (coronary artery disease), Cancer ([facility] Combined systolic and diastolic heart failure ([facility] COPD (chronic obstructive pulmonary disease) ([facility] Diabetes mellitus ([facility] GERD (gastroesophageal reflux disease), History of placement of internal cardiac defibrillator, Hypertension, Sigmoid diverticulosis, Sinusitis, Tubular adenoma of colon, and Vertigo.     Past Result Value Ref Range    aPTT 28.7 24.5 - 35.1 sec   Vitamin D 25 Hydroxy   Result Value Ref Range    Vit D, 25-Hydroxy 6 (L) 30 - 100 ng/mL   Basic Metabolic Panel w/ Reflex to MG   Result Value Ref Range    Sodium 137 132 - 146 mmol/L    Potassium reflex Magnesium 4.3 3.5 - 5.0 mmol/L    Chloride 100 98 - 107 mmol/L    CO2 22 22 - 29 mmol/L    Anion Gap 15 7 - 16 mmol/L    Glucose 174 (H) 74 - 99 mg/dL    BUN 15 8 - 23 mg/dL    CREATININE 1.0 0.7 - 1.2 mg/dL    GFR Non-African American >60 >=60 mL/min/1.73    GFR African American >60     Calcium 9.7 8.6 - 10.2 mg/dL   CBC auto differential   Result Value Ref Range    WBC 10.2 4.5 - 11.5 E9/L    RBC 4.53 3.80 - 5.80 E12/L    Hemoglobin 13.6 12.5 - 16.5 g/dL    Hematocrit 41.7 37.0 - 54.0 %    MCV 92.1 80.0 - 99.9 fL    MCH 30.0 26.0 - 35.0 pg    MCHC 32.6 32.0 - 34.5 %    RDW 13.8 11.5 - 15.0 fL    Platelets 004 014 - 333 E9/L    MPV 9.9 7.0 - 12.0 fL    Neutrophils % 77.8 43.0 - 80.0 %    Immature Granulocytes % 0.4 0.0 - 5.0 %    Lymphocytes % 9.6 (L) 20.0 - 42.0 %    Monocytes % 9.4 2.0 - 12.0 %    Eosinophils % 1.9 0.0 - 6.0 %    Basophils % 0.9 0.0 - 2.0 %    Neutrophils Absolute 7.93 (H) 1.80 - 7.30 E9/L    Immature Granulocytes # 0.04 E9/L    Lymphocytes Absolute 0.98 (L) 1.50 - 4.00 E9/L    Monocytes Absolute 0.96 (H) 0.10 - 0.95 E9/L    Eosinophils Absolute 0.19 0.05 - 0.50 E9/L    Basophils Absolute 0.09 0.00 - 0.20 E9/L   Hemoglobin A1c   Result Value Ref Range    Hemoglobin A1C 7.4 (H) 4.0 - 5.6 %   Comprehensive Metabolic Panel w/ Reflex to MG   Result Value Ref Range    Sodium 136 132 - 146 mmol/L    Potassium reflex Magnesium 4.8 3.5 - 5.0 mmol/L    Chloride 100 98 - 107 mmol/L    CO2 23 22 - 29 mmol/L    Anion Gap 13 7 - 16 mmol/L    Glucose 210 (H) 74 - 99 mg/dL    BUN 22 8 - 23 mg/dL    CREATININE 1.0 0.7 - 1.2 mg/dL    GFR Non-African American >60 >=60 mL/min/1.73    GFR African American >60     Calcium 9.1 8.6 - 10.2 mg/dL    Total Protein degrees    R Axis 54 degrees    T Axis -73 degrees   TYPE AND SCREEN   Result Value Ref Range    ABO/Rh O POS     Antibody Screen NEG        RADIOLOGY:  Interpreted by Radiologist.  CT Head WO Contrast   Final Result   Unchanged hypodensity in the left cerebellar hemisphere. See comments   above. Atrophy and suspected chronic microvascular ischemic disease. XR FEMUR RIGHT (MIN 2 VIEWS)   Final Result   Anatomic alignment of intertrochanteric fracture fragments post   placement of gamma nail with long femoral intramedullary peyton. No   unexpected findings. XR HIP RIGHT (2-3 VIEWS)   Final Result   Anatomic alignment of intertrochanteric fracture fragments post   placement of gamma nail with long femoral intramedullary peyton. No   unexpected findings. FLUORO FOR SURGICAL PROCEDURES   Final Result   ORIF right hip fracture. This study was dictated by Sonia Mckeon PA-C and Ron Varghese MD   reviewed and concurred with the findings. CT HEAD W WO CONTRAST   Final Result   Persistent small focus of hyperdensity left cerebellar   hemisphere as above commented. The can consider additional evaluation   with MRI study. CT Head WO Contrast   Final Result   Persistent tiny petechial hemorrhage/hemorrhagic mass in the left   cerebellar hemisphere. Consider further assessment by MRI. There is slight ectasia of the basilar artery tip. XR CHEST PORTABLE   Final Result   No acute process               CT Head WO Contrast   Final Result   Moderate chronic ischemic/degenerative changes in the white matter. 4 mm hyperdense area in the left cerebellar hemisphere. Although this   could reflect an area of hemorrhage, a vascular malformation is not   excluded. Recommend MRI without and with contrast.         CT Cervical Spine WO Contrast   Final Result   No cervical fractures are noted. Moderate amount of bilateral carotid arterial vascular plaque.    Recommend duplex Doppler carotid ultrasound         XR HIP 2-3 VW W PELVIS RIGHT   Final Result   Right femur intratrochanteric fracture                   ------------------------- NURSING NOTES AND VITALS REVIEWED ---------------------------   The nursing notes within the ED encounter and vital signs as below have been reviewed by myself. BP (!) 158/90   Pulse 83   Temp 97.7 °F (36.5 °C) (Temporal)   Resp 18   Ht 6' (1.829 m)   Wt 208 lb 12.4 oz (94.7 kg)   SpO2 95%   BMI 28.32 kg/m²   Oxygen Saturation Interpretation: Normal    The patients available past medical records and past encounters were reviewed. ------------------------------ ED COURSE/MEDICAL DECISION MAKING----------------------  Medications   fentaNYL (SUBLIMAZE) injection 50 mcg (50 mcg Intravenous Given 3/13/20 1350)   iopamidol (ISOVUE-370) 76 % injection 90 mL (90 mLs Intravenous Given 3/14/20 0109)   ceFAZolin (ANCEF) 2 g in dextrose 3 % 50 mL IVPB (duplex) (0 g Intravenous Stopped 3/15/20 0415)   0.9 % sodium chloride bolus (0 mLs Intravenous Stopped 3/15/20 2058)             Medical Decision Making:   I, Dr. Sophie Martinez am the primary physician of record. Annmarie Rios is a 71 y.o. male presenting to the emergency department the chief plane of fall. Imaging reviewed. Patient found to have hip fracture. Orthopedic consultation. Patient will be admitted for further treatment and evaluation. Critical Care: Please note that the withdrawal or failure to initiate urgent interventions for this patient would likely result in a life threatening deterioration or permanent disability. Accordingly this patient received 0 minutes of critical care time, excluding separately billable procedures. This patient's ED course included: History, physical examination, reevaluation prior to disposition, labs, imaging    This patient has remained hemodynamically stable during their ED course. Counseling:    The emergency provider has spoken with the

## 2020-03-18 LAB
EKG ATRIAL RATE: 89 BPM
EKG P AXIS: 41 DEGREES
EKG P-R INTERVAL: 136 MS
EKG Q-T INTERVAL: 386 MS
EKG QRS DURATION: 84 MS
EKG QTC CALCULATION (BAZETT): 469 MS
EKG R AXIS: 5 DEGREES
EKG T AXIS: -147 DEGREES
EKG VENTRICULAR RATE: 89 BPM

## 2020-03-20 ENCOUNTER — TELEPHONE (OUTPATIENT)
Dept: ORTHOPEDIC SURGERY | Age: 70
End: 2020-03-20

## 2020-03-24 ENCOUNTER — TELEPHONE (OUTPATIENT)
Dept: ORTHOPEDIC SURGERY | Age: 70
End: 2020-03-24

## 2020-03-31 ENCOUNTER — HOSPITAL ENCOUNTER (OUTPATIENT)
Dept: GENERAL RADIOLOGY | Age: 70
Discharge: HOME | End: 2020-04-02
Payer: MEDICARE

## 2020-03-31 ENCOUNTER — OFFICE VISIT (OUTPATIENT)
Dept: ORTHOPEDIC SURGERY | Age: 70
End: 2020-03-31
Payer: MEDICARE

## 2020-03-31 ENCOUNTER — TELEPHONE (OUTPATIENT)
Dept: ORTHOPEDIC SURGERY | Age: 70
End: 2020-03-31

## 2020-03-31 VITALS
HEIGHT: 71 IN | HEART RATE: 86 BPM | SYSTOLIC BLOOD PRESSURE: 91 MMHG | BODY MASS INDEX: 28 KG/M2 | DIASTOLIC BLOOD PRESSURE: 63 MMHG | WEIGHT: 200 LBS

## 2020-03-31 PROCEDURE — 73552 X-RAY EXAM OF FEMUR 2/>: CPT

## 2020-03-31 PROCEDURE — 73502 X-RAY EXAM HIP UNI 2-3 VIEWS: CPT

## 2020-03-31 PROCEDURE — 99212 OFFICE O/P EST SF 10 MIN: CPT

## 2020-03-31 PROCEDURE — 99024 POSTOP FOLLOW-UP VISIT: CPT | Performed by: PHYSICIAN ASSISTANT

## 2020-03-31 RX ORDER — TRAMADOL HYDROCHLORIDE 50 MG/1
50 TABLET ORAL EVERY 6 HOURS PRN
COMMUNITY
End: 2020-05-12 | Stop reason: CLARIF

## 2020-03-31 NOTE — TELEPHONE ENCOUNTER
Spoke with patients sister, his emergency contact, Daisy Florez. Questions were specifically in regards to patient being discharged from SNF so that they could prepare for him at home. Recommended that she speak with SW/CM at patient's SNF as they will provide more information re:discharge as that does not come from orthopedic order. All questions answered.    Electronically signed by Heidi Wong PA-C on 3/31/2020 at 5:35 PM

## 2020-03-31 NOTE — PATIENT INSTRUCTIONS
ORDERS FOR [facility]  Sutures removed today  Can shower in a couple days, NO Soaking or submerging incision in water until fully healed & all scabs are gone    WB:  Weight bearing as tolerated on right lower extremity  To continue to work with physical therapy and occupational therapy on gait training, balance and proprioception, fall prevention, range of motion and strengthening     DVT: Patient needs to be on aspirin 81 mg daily for DVT prevention for 4 weeks post op up (from 3/14- 4/11/2020)  Pain control : Per facility physician   Patient states that his pain is not well controlled, would recommend adding acetaminophen 1000 milligrams every 8 hours  PRN pain in addition to Ultram and Ibuprofen    Continue with ice to the injured extremity 2-3 times per day for swelling  If able continue with elevation and compression    Follow up in 4 weeks with XR of the hip and femur- this can be done remotely and by virtual visit if needed

## 2020-04-08 ENCOUNTER — TELEPHONE (OUTPATIENT)
Dept: ORTHOPEDIC SURGERY | Age: 70
End: 2020-04-08

## 2020-04-08 NOTE — TELEPHONE ENCOUNTER
Call placed to [facility] at the [place], spoke with Chaya Coyle. Inquired if upcoming visit on 4/27 could be switch to Vv or tele visit. Per Violette Boston this would be possible but she is not for certain if it could be virtual but tele visit for sure. Requested XR right hip and femur be obtain prior to visit and disc sent to office. Violette Boston verbalized understanding, questions answered.

## 2020-04-17 ENCOUNTER — VIRTUAL VISIT (OUTPATIENT)
Dept: FAMILY MEDICINE CLINIC | Age: 70
End: 2020-04-17
Payer: MEDICARE

## 2020-04-17 ENCOUNTER — TELEPHONE (OUTPATIENT)
Dept: FAMILY MEDICINE CLINIC | Age: 70
End: 2020-04-17

## 2020-04-17 VITALS — HEIGHT: 72 IN | WEIGHT: 200 LBS | BODY MASS INDEX: 27.09 KG/M2

## 2020-04-17 PROCEDURE — 1111F DSCHRG MED/CURRENT MED MERGE: CPT | Performed by: FAMILY MEDICINE

## 2020-04-17 PROCEDURE — 99496 TRANSJ CARE MGMT HIGH F2F 7D: CPT | Performed by: FAMILY MEDICINE

## 2020-04-17 ASSESSMENT — PATIENT HEALTH QUESTIONNAIRE - PHQ9
SUM OF ALL RESPONSES TO PHQ9 QUESTIONS 1 & 2: 0
SUM OF ALL RESPONSES TO PHQ QUESTIONS 1-9: 0
1. LITTLE INTEREST OR PLEASURE IN DOING THINGS: 0
2. FEELING DOWN, DEPRESSED OR HOPELESS: 0
SUM OF ALL RESPONSES TO PHQ QUESTIONS 1-9: 0

## 2020-04-17 NOTE — TELEPHONE ENCOUNTER
Rosaura Garcia from [facility] called stating they have patient scheduled for home care on Tuesday is this ok?

## 2020-04-23 ENCOUNTER — TELEPHONE (OUTPATIENT)
Dept: ORTHOPEDIC SURGERY | Age: 70
End: 2020-04-23

## 2020-04-23 NOTE — TELEPHONE ENCOUNTER
Pts sister called regarding 4/27 appt. Pt was discharged from facility and sister domonique was told 4/27 appt was cancelled. Please call her back regarding status of appointment.  Thanks

## 2020-04-23 NOTE — TELEPHONE ENCOUNTER
Call placed to pt's sister, no answer so VM was left. Pt can be scheduled to come in to office on original appt date on 4/27. Call back number provided.

## 2020-04-27 ENCOUNTER — TELEPHONE (OUTPATIENT)
Dept: FAMILY MEDICINE CLINIC | Age: 70
End: 2020-04-27

## 2020-04-27 RX ORDER — TRAMADOL HYDROCHLORIDE 50 MG/1
50 TABLET ORAL EVERY 8 HOURS PRN
Qty: 90 TABLET | Refills: 0 | Status: SHIPPED
Start: 2020-04-27 | End: 2020-05-12 | Stop reason: CLARIF

## 2020-05-12 ENCOUNTER — HOSPITAL ENCOUNTER (OUTPATIENT)
Age: 70
Discharge: HOME | End: 2020-05-14
Payer: MEDICARE

## 2020-05-12 ENCOUNTER — OFFICE VISIT (OUTPATIENT)
Dept: FAMILY MEDICINE CLINIC | Age: 70
End: 2020-05-12
Payer: MEDICARE

## 2020-05-12 VITALS
HEIGHT: 72 IN | SYSTOLIC BLOOD PRESSURE: 118 MMHG | RESPIRATION RATE: 18 BRPM | TEMPERATURE: 97 F | OXYGEN SATURATION: 100 % | HEART RATE: 77 BPM | WEIGHT: 197 LBS | DIASTOLIC BLOOD PRESSURE: 78 MMHG | BODY MASS INDEX: 26.68 KG/M2

## 2020-05-12 LAB
ALBUMIN SERPL-MCNC: 3.7 G/DL (ref 3.5–5.2)
ALP BLD-CCNC: 153 U/L (ref 40–129)
ALT SERPL-CCNC: 34 U/L (ref 0–40)
ANION GAP SERPL CALCULATED.3IONS-SCNC: 12 MMOL/L (ref 7–16)
AST SERPL-CCNC: 33 U/L (ref 0–39)
BASOPHILS ABSOLUTE: 0.08 E9/L (ref 0–0.2)
BASOPHILS RELATIVE PERCENT: 1.2 % (ref 0–2)
BILIRUB SERPL-MCNC: 0.3 MG/DL (ref 0–1.2)
BUN BLDV-MCNC: 14 MG/DL (ref 8–23)
CALCIUM SERPL-MCNC: 9.6 MG/DL (ref 8.6–10.2)
CHLORIDE BLD-SCNC: 102 MMOL/L (ref 98–107)
CHOLESTEROL, TOTAL: 160 MG/DL (ref 0–199)
CO2: 22 MMOL/L (ref 22–29)
CREAT SERPL-MCNC: 0.8 MG/DL (ref 0.7–1.2)
EOSINOPHILS ABSOLUTE: 0.3 E9/L (ref 0.05–0.5)
EOSINOPHILS RELATIVE PERCENT: 4.7 % (ref 0–6)
GFR AFRICAN AMERICAN: >60
GFR NON-AFRICAN AMERICAN: >60 ML/MIN/1.73
GLUCOSE BLD-MCNC: 230 MG/DL (ref 74–99)
HBA1C MFR BLD: 6.3 % (ref 4–5.6)
HCT VFR BLD CALC: 43.4 % (ref 37–54)
HDLC SERPL-MCNC: 35 MG/DL
HEMOGLOBIN: 14 G/DL (ref 12.5–16.5)
IMMATURE GRANULOCYTES #: 0.02 E9/L
IMMATURE GRANULOCYTES %: 0.3 % (ref 0–5)
LDL CHOLESTEROL CALCULATED: 83 MG/DL (ref 0–99)
LYMPHOCYTES ABSOLUTE: 1.38 E9/L (ref 1.5–4)
LYMPHOCYTES RELATIVE PERCENT: 21.5 % (ref 20–42)
MCH RBC QN AUTO: 30.8 PG (ref 26–35)
MCHC RBC AUTO-ENTMCNC: 32.3 % (ref 32–34.5)
MCV RBC AUTO: 95.4 FL (ref 80–99.9)
MONOCYTES ABSOLUTE: 0.54 E9/L (ref 0.1–0.95)
MONOCYTES RELATIVE PERCENT: 8.4 % (ref 2–12)
NEUTROPHILS ABSOLUTE: 4.09 E9/L (ref 1.8–7.3)
NEUTROPHILS RELATIVE PERCENT: 63.9 % (ref 43–80)
PDW BLD-RTO: 13.9 FL (ref 11.5–15)
PLATELET # BLD: 278 E9/L (ref 130–450)
PMV BLD AUTO: 9.8 FL (ref 7–12)
POTASSIUM SERPL-SCNC: 5.4 MMOL/L (ref 3.5–5)
PROSTATE SPECIFIC ANTIGEN: 0.12 NG/ML (ref 0–4)
RBC # BLD: 4.55 E12/L (ref 3.8–5.8)
SODIUM BLD-SCNC: 136 MMOL/L (ref 132–146)
TOTAL PROTEIN: 7.6 G/DL (ref 6.4–8.3)
TRIGL SERPL-MCNC: 212 MG/DL (ref 0–149)
TSH SERPL DL<=0.05 MIU/L-ACNC: 1.48 UIU/ML (ref 0.27–4.2)
URIC ACID, SERUM: 5.3 MG/DL (ref 3.4–7)
VLDLC SERPL CALC-MCNC: 42 MG/DL
WBC # BLD: 6.4 E9/L (ref 4.5–11.5)

## 2020-05-12 PROCEDURE — G0296 VISIT TO DETERM LDCT ELIG: HCPCS | Performed by: FAMILY MEDICINE

## 2020-05-12 PROCEDURE — 80061 LIPID PANEL: CPT

## 2020-05-12 PROCEDURE — 85025 COMPLETE CBC W/AUTO DIFF WBC: CPT

## 2020-05-12 PROCEDURE — G0103 PSA SCREENING: HCPCS

## 2020-05-12 PROCEDURE — 1111F DSCHRG MED/CURRENT MED MERGE: CPT | Performed by: FAMILY MEDICINE

## 2020-05-12 PROCEDURE — 84550 ASSAY OF BLOOD/URIC ACID: CPT

## 2020-05-12 PROCEDURE — 83036 HEMOGLOBIN GLYCOSYLATED A1C: CPT

## 2020-05-12 PROCEDURE — 84443 ASSAY THYROID STIM HORMONE: CPT

## 2020-05-12 PROCEDURE — 80053 COMPREHEN METABOLIC PANEL: CPT

## 2020-05-12 PROCEDURE — 99214 OFFICE O/P EST MOD 30 MIN: CPT | Performed by: FAMILY MEDICINE

## 2020-05-12 NOTE — PROGRESS NOTES
SUBJECTIVE  Sherwin Carrizales is a 71 y.o. male. HPI/Chief C/O:  Chief Complaint   Patient presents with    Follow-Up from Hospital     Pt here for HFU for hip fracture     No Known Allergies    ROS:  Review of Systems     Past Medical/Surgical Hx;  Reviewed with patient      Diagnosis Date    Arthritis     CAD (coronary artery disease)     Cancer ([facility] 2017    Colon    Combined systolic and diastolic heart failure ([facility] 6/15/13    echo LVEF of 30-35%  stage II diastolic dysfunction    COPD (chronic obstructive pulmonary disease) ([facility]     Diabetes mellitus ([facility]     GERD (gastroesophageal reflux disease)     History of placement of internal cardiac defibrillator ?     [employer] ([place])    Hypertension     Sigmoid diverticulosis 2015    Sinusitis     Tubular adenoma of colon 2015    Vertigo      Past Surgical History:   Procedure Laterality Date    CARDIAC DEFIBRILLATOR PLACEMENT      COLONOSCOPY  8/31/15    with polypectomy (x2) - Dr. Derrel Opitz  14    3.5/15 Xience in [place] and 3.0/15 Resolute in th 1st obtuse marginal.    HIP FRACTURE SURGERY Right 3/14/2020    RIGHT HIP OPEN REDUCTION INTERNAL FIXATION NAIL-SYNTHES -- OC 2 performed by Zina Dennison DO at [address]         Past Family Hx:  Reviewed with patient      Problem Relation Age of Onset    Heart Disease Mother     Cancer Father         abdominal    Cancer Brother         prostate    Cancer Brother         digestive       Social Hx:  Reviewed with patient  Social History     Tobacco Use    Smoking status: Former Smoker     Packs/day: 2.00     Years: 50.00     Pack years: 100.00     Types: Cigarettes     Start date: 1970     Last attempt to quit: 2014     Years since quittin.3    Smokeless tobacco: Never Used   Substance Use Topics    Alcohol use: No     Comment: rarely       OBJECTIVE  /78   Pulse 77   Temp 97 °F Differential; Future  -     [place] DISCHARGE MEDS RECONCILED W/ CURRENT OUTPATIENT MED LIST    Screening PSA (prostate specific antigen)  -     Psa screening; Future  -     [place] DISCHARGE MEDS RECONCILED W/ CURRENT OUTPATIENT MED LIST    Personal history of tobacco use  -     TN DISCHARGE MEDS RECONCILED W/ CURRENT OUTPATIENT MED LIST  -     TN VISIT TO DISCUSS LUNG CA SCREEN W LDCT  -     CT Lung Screen (Annual); Future        Outpatient Encounter Medications as of 5/12/2020   Medication Sig Dispense Refill    atorvastatin (LIPITOR) 40 MG tablet Take 40 mg by mouth daily      carvedilol (COREG) 12.5 MG tablet Take 6.25 mg by mouth 2 times daily (with meals)       clopidogrel (PLAVIX) 75 MG tablet Take 75 mg by mouth daily      lisinopril (PRINIVIL;ZESTRIL) 10 MG tablet Take 10 mg by mouth daily      metFORMIN (GLUCOPHAGE) 500 MG tablet Take 1,000 mg by mouth 2 times daily (with meals)       sotalol (BETAPACE) 80 MG tablet Take 40 mg by mouth 2 times daily      ibuprofen (ADVIL;MOTRIN) 200 MG tablet Take 200 mg by mouth every 6 hours as needed for Pain      nitroGLYCERIN (NITROSTAT) 0.4 MG SL tablet Place 1 tablet under the tongue every 5 minutes as needed for Chest pain. 25 tablet 3    [DISCONTINUED] traMADol (ULTRAM) 50 MG tablet Take 1 tablet by mouth every 8 hours as needed for Pain for up to 30 days. Intended supply: 7 days. Take lowest dose possible to manage pain 90 tablet 0    [DISCONTINUED] traMADol (ULTRAM) 50 MG tablet Take 50 mg by mouth every 6 hours as needed.  [DISCONTINUED] senna (SENOKOT) 8.6 MG tablet Take 2 tablets by mouth nightly as needed for Constipation (Patient not taking: Reported on 4/17/2020) 60 tablet 11     No facility-administered encounter medications on file as of 5/12/2020. No follow-ups on file.         Reviewed recent labs related to Federico's current problems      Discussed importance of regular Health Maintenance follow up  Health Maintenance   Topic    AAA screen     Hepatitis C screen     Diabetic foot exam     Diabetic retinal exam     DTaP/Tdap/Td vaccine (1 - Tdap)    Shingles Vaccine (1 of 2)    Low dose CT lung screening     Pneumococcal 65+ years Vaccine (1 of 1 - PPSV23)    Colon cancer screen colonoscopy     Diabetic microalbuminuria test     Annual Wellness Visit (AWV)     Lipid screen     PSA counseling     A1C test (Diabetic or Prediabetic)     Potassium monitoring     Creatinine monitoring     Flu vaccine     Hepatitis A vaccine     Hib vaccine     Meningococcal (ACWY) vaccine                                              Low Dose CT (LDCT) Lung Screening criteria met   Age 50-69   Pack year smoking >30   Still smoking or less than 15 year since quit   No sign or symptoms of lung cancer   > 11 months since last LDCT     Risks and benefits of lung cancer screening with LDCT scans discussed:    Significance of positive screen - False-positive LDCT results often occur. 95% of all positive results do not lead to a diagnosis of cancer. Usually further imaging can resolve most false-positive results; however, some patients may require invasive procedures. Over diagnosis risk - 10% to 12% of screen-detected lung cancer cases are over diagnosed--that is, the cancer would not have been detected in the patient's lifetime without the screening. Need for follow up screens annually to continue lung cancer screening effectiveness     Risks associated with radiation from annual LDCT- Radiation exposure is about the same as for a mammogram, which is about 1/3 of the annual background radiation exposure from everyday life. Starting screening at age 54 is not likely to increase cancer risk from radiation exposure. Patients with comorbidities resulting in life expectancy of < 10 years, or that would preclude treatment of an abnormality identified on CT, should not be screened due to lack of benefit.     To obtain maximal benefit from this tablet  Take 6.25 mg by mouth 2 times daily (with meals)              clopidogrel (PLAVIX) 75 MG tablet  Take 75 mg by mouth daily             ibuprofen (ADVIL;MOTRIN) 200 MG tablet  Take 200 mg by mouth every 6 hours as needed for Pain             lisinopril (PRINIVIL;ZESTRIL) 10 MG tablet  Take 10 mg by mouth daily             metFORMIN (GLUCOPHAGE) 500 MG tablet  Take 1,000 mg by mouth 2 times daily (with meals)              nitroGLYCERIN (NITROSTAT) 0.4 MG SL tablet  Place 1 tablet under the tongue every 5 minutes as needed for Chest pain. sotalol (BETAPACE) 80 MG tablet  Take 40 mg by mouth 2 times daily                   Medications marked \"taking\" at this time  Outpatient Medications Marked as Taking for the 5/12/20 encounter (Office Visit) with Leah Scruggs, DO   Medication Sig Dispense Refill    atorvastatin (LIPITOR) 40 MG tablet Take 40 mg by mouth daily      carvedilol (COREG) 12.5 MG tablet Take 6.25 mg by mouth 2 times daily (with meals)       clopidogrel (PLAVIX) 75 MG tablet Take 75 mg by mouth daily      lisinopril (PRINIVIL;ZESTRIL) 10 MG tablet Take 10 mg by mouth daily      metFORMIN (GLUCOPHAGE) 500 MG tablet Take 1,000 mg by mouth 2 times daily (with meals)       sotalol (BETAPACE) 80 MG tablet Take 40 mg by mouth 2 times daily      ibuprofen (ADVIL;MOTRIN) 200 MG tablet Take 200 mg by mouth every 6 hours as needed for Pain      nitroGLYCERIN (NITROSTAT) 0.4 MG SL tablet Place 1 tablet under the tongue every 5 minutes as needed for Chest pain. 25 tablet 3        Medications patient taking as of now reconciled against medications ordered at time of hospital discharge: Yes    Chief Complaint   Patient presents with   [address] from [facility] here for HFU for hip fracture       History of Present illness - Follow up of Hospital diagnosis(es): right hip fracture, Type II diabetes, CAD, CHF    Inpatient course: Discharge summary reviewed- see chart.     Interval

## 2020-05-26 ENCOUNTER — TELEPHONE (OUTPATIENT)
Dept: FAMILY MEDICINE CLINIC | Age: 70
End: 2020-05-26

## 2020-06-04 NOTE — PROGRESS NOTES
Overdue results letter mailed to patient regarding cologuard order.   Electronically signed by Dean Vazquez on 6/4/2020 at 7:23 AM
needs it. Patient does agree to proceed with telemedicine consultation. Patient's location: home address in [facility]  Physician  location home address in [facility] other people involved in call, are the patient and his sister         Time spent: Greater than 35    This visit was completed virtually using Doxy. me            Assessment/Plan:  1. Hip fracture requiring operative repair, right, closed, initial encounter ([facility]  --set up rehab at home    2. Congestive heart failure, unspecified HF chronicity, unspecified heart failure type ([facility]  ---VASCULAR PANEL  A) asa, PLAVIX, aggrenox  B) coumadin, pletal, tzd, STATIN  C) ACE, hctz, folic, ccb  D) cannikinumab, fish oils     ---CARDIAC---PLAVIX, ACE, BETA, STATIN, hctz, ( ccb )    3.  Type 2 diabetes mellitus without complication, without long-term current use of insulin (HCC)  --stable on current care planning  -- continue treatment as we are meeting goals           Medical Decision Making: high complexity

## 2020-06-11 ENCOUNTER — HOSPITAL ENCOUNTER (OUTPATIENT)
Dept: GENERAL RADIOLOGY | Age: 70
Discharge: HOME | End: 2020-06-13
Payer: MEDICARE

## 2020-06-11 ENCOUNTER — OFFICE VISIT (OUTPATIENT)
Dept: ORTHOPEDIC SURGERY | Age: 70
End: 2020-06-11
Payer: MEDICARE

## 2020-06-11 VITALS
HEIGHT: 72 IN | SYSTOLIC BLOOD PRESSURE: 138 MMHG | WEIGHT: 187 LBS | DIASTOLIC BLOOD PRESSURE: 85 MMHG | BODY MASS INDEX: 25.33 KG/M2 | HEART RATE: 72 BPM

## 2020-06-11 PROCEDURE — 20610 DRAIN/INJ JOINT/BURSA W/O US: CPT | Performed by: ORTHOPAEDIC SURGERY

## 2020-06-11 PROCEDURE — 2500000003 HC RX 250 WO HCPCS

## 2020-06-11 PROCEDURE — 99212 OFFICE O/P EST SF 10 MIN: CPT | Performed by: ORTHOPAEDIC SURGERY

## 2020-06-11 PROCEDURE — 73552 X-RAY EXAM OF FEMUR 2/>: CPT

## 2020-06-11 PROCEDURE — 73502 X-RAY EXAM HIP UNI 2-3 VIEWS: CPT

## 2020-06-11 PROCEDURE — 99999 PR OFFICE/OUTPT VISIT,PROCEDURE ONLY: CPT | Performed by: ORTHOPAEDIC SURGERY

## 2020-06-11 PROCEDURE — 6360000002 HC RX W HCPCS

## 2020-06-11 RX ORDER — BUPIVACAINE HYDROCHLORIDE 2.5 MG/ML
3 INJECTION, SOLUTION EPIDURAL; INFILTRATION; INTRACAUDAL ONCE
Status: COMPLETED | OUTPATIENT
Start: 2020-06-11 | End: 2020-06-11

## 2020-06-11 RX ORDER — LIDOCAINE HYDROCHLORIDE 10 MG/ML
3 INJECTION, SOLUTION EPIDURAL; INFILTRATION; INTRACAUDAL; PERINEURAL ONCE
Status: COMPLETED | OUTPATIENT
Start: 2020-06-11 | End: 2020-06-11

## 2020-06-11 RX ORDER — TRIAMCINOLONE ACETONIDE 40 MG/ML
40 INJECTION, SUSPENSION INTRA-ARTICULAR; INTRAMUSCULAR ONCE
Status: COMPLETED | OUTPATIENT
Start: 2020-06-11 | End: 2020-06-11

## 2020-06-11 RX ADMIN — BUPIVACAINE HYDROCHLORIDE 7.5 MG: 2.5 INJECTION, SOLUTION EPIDURAL; INFILTRATION; INTRACAUDAL at 10:30

## 2020-06-11 RX ADMIN — LIDOCAINE HYDROCHLORIDE 3 ML: 10 INJECTION, SOLUTION EPIDURAL; INFILTRATION; INTRACAUDAL; PERINEURAL at 10:30

## 2020-06-11 RX ADMIN — TRIAMCINOLONE ACETONIDE 40 MG: 40 INJECTION, SUSPENSION INTRA-ARTICULAR; INTRAMUSCULAR at 10:31

## 2020-06-11 NOTE — PROGRESS NOTES
Assisted Vance Cotton DO with steroid injection of 3mL marcaine 0.25%, 3mL lidocaine 1%, 1cc Kenalog 40mg/mL into right knee. Pt tolerated procedure well. Verbal instructions given and questions answered.
appreciable effusion  -Compartments supple throughout thigh and leg  -Calf supple and nontender  -Demonstrates active knee flexion/extension and palpable crepitus  -States sensation intact to touch in sural/deep peroneal/superficial peroneal/saphenous/posterior tibial nerve distributions to foot/ankle.   -Palpable dorsalis pedis and posterior tibialis pulses, cap refill brisk in toes, foot warm/perfused. /85   Pulse 72   Ht 6' (1.829 m)   Wt 187 lb (84.8 kg)   BMI 25.36 kg/m²     XR:   AP pelvis and 2 views of the right hip as well as right femur are obtained today. This demonstrates stable positioning of cephalo-medullary nail. No interval change in fracture alignment. No evidence of hardware failure or loosening. Moderate to severe diffuse tricompartmental osteoarthritic changes noted in the right knee    Assessment:  S/P Right hip IMN on 3/14/20  Right knee severe osteoarthritis    Plan:  -Weightbearing as tolerated right lower extremity  -Patient with progressive, chronic right knee pain. He states he has side effects with over the counter medication for pain relief. He is interested in corticosteroid injection today  -After obtaining consent, injection of combination (3:3:1) marcaine/lidocaine/kenalog given in right knee after sterilely prepping the lateral joint space. The patient tolerated the procedure well. Educated to ice and elevate the extremity as needed for the next few days. Instructed if he develops fever or chills, increased redness or pain to notify the office.  -Patient to follow up in 12 weeks or sooner if needed  -Plan discussed with Dr. Walter Choudhary Attending    I have seen and evaluated the patient with the resident and agree with the above assessments on today's visit. I have performed the key components of the history and physical examination and concur completely with the findings and plans as documented above.     Patient doing well from his right hip CMN from

## 2020-06-12 RX ORDER — ATORVASTATIN CALCIUM 40 MG/1
40 TABLET, FILM COATED ORAL DAILY
Qty: 90 TABLET | Refills: 1 | Status: SHIPPED | OUTPATIENT
Start: 2020-06-12 | End: 2020-12-10 | Stop reason: SDUPTHER

## 2020-06-12 RX ORDER — LISINOPRIL 10 MG/1
10 TABLET ORAL DAILY
Qty: 90 TABLET | Refills: 1 | Status: SHIPPED | OUTPATIENT
Start: 2020-06-12 | End: 2020-12-07

## 2020-06-12 NOTE — TELEPHONE ENCOUNTER
Patient called for refill. Chart reviewed - Rx sent to the pharmacy.     Electronically signed by Alberto Elizondo MA on 6/12/20 at 9:46 AM EDT

## 2020-07-21 RX ORDER — TRAMADOL HYDROCHLORIDE 50 MG/1
50 TABLET ORAL EVERY 6 HOURS PRN
Qty: 28 TABLET | Refills: 0 | Status: SHIPPED | OUTPATIENT
Start: 2020-07-21 | End: 2020-07-28

## 2020-07-21 RX ORDER — CEPHALEXIN 500 MG/1
500 CAPSULE ORAL 2 TIMES DAILY
Qty: 14 CAPSULE | Refills: 0 | Status: SHIPPED | OUTPATIENT
Start: 2020-07-21 | End: 2020-07-28

## 2020-08-21 ENCOUNTER — CLINICAL DOCUMENTATION (OUTPATIENT)
Dept: FAMILY MEDICINE CLINIC | Age: 70
End: 2020-08-21

## 2020-09-02 ENCOUNTER — HOSPITAL ENCOUNTER (OUTPATIENT)
Age: 70
Discharge: HOME | End: 2020-09-04
Payer: MEDICARE

## 2020-09-02 ENCOUNTER — HOSPITAL ENCOUNTER (OUTPATIENT)
Dept: GENERAL RADIOLOGY | Age: 70
Discharge: HOME | End: 2020-09-04
Payer: MEDICARE

## 2020-09-02 PROCEDURE — 73552 X-RAY EXAM OF FEMUR 2/>: CPT

## 2020-09-02 PROCEDURE — 73502 X-RAY EXAM HIP UNI 2-3 VIEWS: CPT

## 2020-09-03 ENCOUNTER — HOSPITAL ENCOUNTER (OUTPATIENT)
Dept: GENERAL RADIOLOGY | Age: 70
Discharge: HOME | End: 2020-09-05
Payer: MEDICARE

## 2020-09-03 ENCOUNTER — OFFICE VISIT (OUTPATIENT)
Dept: ORTHOPEDIC SURGERY | Age: 70
End: 2020-09-03
Payer: MEDICARE

## 2020-09-03 VITALS — TEMPERATURE: 97.3 F | DIASTOLIC BLOOD PRESSURE: 84 MMHG | HEART RATE: 78 BPM | SYSTOLIC BLOOD PRESSURE: 130 MMHG

## 2020-09-03 PROBLEM — M17.11 LOCALIZED OSTEOARTHRITIS OF RIGHT KNEE: Status: ACTIVE | Noted: 2020-09-03

## 2020-09-03 PROCEDURE — 2500000003 HC RX 250 WO HCPCS

## 2020-09-03 PROCEDURE — G8428 CUR MEDS NOT DOCUMENT: HCPCS | Performed by: PHYSICIAN ASSISTANT

## 2020-09-03 PROCEDURE — 99212 OFFICE O/P EST SF 10 MIN: CPT | Performed by: PHYSICIAN ASSISTANT

## 2020-09-03 PROCEDURE — 99213 OFFICE O/P EST LOW 20 MIN: CPT | Performed by: PHYSICIAN ASSISTANT

## 2020-09-03 PROCEDURE — 1036F TOBACCO NON-USER: CPT | Performed by: PHYSICIAN ASSISTANT

## 2020-09-03 PROCEDURE — 6360000002 HC RX W HCPCS

## 2020-09-03 PROCEDURE — 4040F PNEUMOC VAC/ADMIN/RCVD: CPT | Performed by: PHYSICIAN ASSISTANT

## 2020-09-03 PROCEDURE — 3017F COLORECTAL CA SCREEN DOC REV: CPT | Performed by: PHYSICIAN ASSISTANT

## 2020-09-03 PROCEDURE — 1123F ACP DISCUSS/DSCN MKR DOCD: CPT | Performed by: PHYSICIAN ASSISTANT

## 2020-09-03 PROCEDURE — 20610 DRAIN/INJ JOINT/BURSA W/O US: CPT | Performed by: PHYSICIAN ASSISTANT

## 2020-09-03 PROCEDURE — G8417 CALC BMI ABV UP PARAM F/U: HCPCS | Performed by: PHYSICIAN ASSISTANT

## 2020-09-03 RX ORDER — METOPROLOL SUCCINATE 50 MG/1
50 TABLET, EXTENDED RELEASE ORAL DAILY
COMMUNITY
End: 2021-02-12 | Stop reason: SDUPTHER

## 2020-09-03 RX ORDER — TRIAMCINOLONE ACETONIDE 40 MG/ML
40 INJECTION, SUSPENSION INTRA-ARTICULAR; INTRAMUSCULAR ONCE
Status: COMPLETED | OUTPATIENT
Start: 2020-09-03 | End: 2020-09-03

## 2020-09-03 RX ORDER — LIDOCAINE HYDROCHLORIDE 10 MG/ML
2.5 INJECTION, SOLUTION INFILTRATION; PERINEURAL ONCE
Status: COMPLETED | OUTPATIENT
Start: 2020-09-03 | End: 2020-09-03

## 2020-09-03 RX ORDER — BUPIVACAINE HYDROCHLORIDE 2.5 MG/ML
3 INJECTION, SOLUTION EPIDURAL; INFILTRATION; INTRACAUDAL ONCE
Status: COMPLETED | OUTPATIENT
Start: 2020-09-03 | End: 2020-09-03

## 2020-09-03 RX ADMIN — LIDOCAINE HYDROCHLORIDE 2.5 ML: 10 INJECTION, SOLUTION INFILTRATION; PERINEURAL at 12:41

## 2020-09-03 RX ADMIN — TRIAMCINOLONE ACETONIDE 40 MG: 40 INJECTION, SUSPENSION INTRA-ARTICULAR; INTRAMUSCULAR at 12:42

## 2020-09-03 RX ADMIN — BUPIVACAINE HYDROCHLORIDE 7.5 MG: 2.5 INJECTION, SOLUTION EPIDURAL; INFILTRATION; INTRACAUDAL at 12:41

## 2020-09-09 ENCOUNTER — HOSPITAL ENCOUNTER (OUTPATIENT)
Age: 70
Discharge: HOME | End: 2020-09-09
Payer: MEDICARE

## 2020-09-09 LAB
ANION GAP SERPL CALCULATED.3IONS-SCNC: 10 MMOL/L (ref 7–16)
BASOPHILS ABSOLUTE: 0.1 E9/L (ref 0–0.2)
BASOPHILS RELATIVE PERCENT: 1.4 % (ref 0–2)
BUN BLDV-MCNC: 21 MG/DL (ref 8–23)
CALCIUM SERPL-MCNC: 9.9 MG/DL (ref 8.6–10.2)
CHLORIDE BLD-SCNC: 103 MMOL/L (ref 98–107)
CHOLESTEROL, TOTAL: 167 MG/DL (ref 0–199)
CO2: 26 MMOL/L (ref 22–29)
CREAT SERPL-MCNC: 1.1 MG/DL (ref 0.7–1.2)
EOSINOPHILS ABSOLUTE: 0.19 E9/L (ref 0.05–0.5)
EOSINOPHILS RELATIVE PERCENT: 2.7 % (ref 0–6)
GFR AFRICAN AMERICAN: >60
GFR NON-AFRICAN AMERICAN: >60 ML/MIN/1.73
GLUCOSE BLD-MCNC: 187 MG/DL (ref 74–99)
HBA1C MFR BLD: 6.5 % (ref 4–5.6)
HCT VFR BLD CALC: 44.8 % (ref 37–54)
HDLC SERPL-MCNC: 46 MG/DL
HEMOGLOBIN: 15.1 G/DL (ref 12.5–16.5)
IMMATURE GRANULOCYTES #: 0.04 E9/L
IMMATURE GRANULOCYTES %: 0.6 % (ref 0–5)
LDL CHOLESTEROL CALCULATED: 97 MG/DL (ref 0–99)
LYMPHOCYTES ABSOLUTE: 1.51 E9/L (ref 1.5–4)
LYMPHOCYTES RELATIVE PERCENT: 21.8 % (ref 20–42)
MCH RBC QN AUTO: 31.5 PG (ref 26–35)
MCHC RBC AUTO-ENTMCNC: 33.7 % (ref 32–34.5)
MCV RBC AUTO: 93.3 FL (ref 80–99.9)
MICROALBUMIN UR-MCNC: 529.4 MG/L
MONOCYTES ABSOLUTE: 0.52 E9/L (ref 0.1–0.95)
MONOCYTES RELATIVE PERCENT: 7.5 % (ref 2–12)
NEUTROPHILS ABSOLUTE: 4.56 E9/L (ref 1.8–7.3)
NEUTROPHILS RELATIVE PERCENT: 66 % (ref 43–80)
PDW BLD-RTO: 14.1 FL (ref 11.5–15)
PLATELET # BLD: 250 E9/L (ref 130–450)
PMV BLD AUTO: 8.9 FL (ref 7–12)
POTASSIUM SERPL-SCNC: 5.1 MMOL/L (ref 3.5–5)
RBC # BLD: 4.8 E12/L (ref 3.8–5.8)
SODIUM BLD-SCNC: 139 MMOL/L (ref 132–146)
TRIGL SERPL-MCNC: 118 MG/DL (ref 0–149)
TSH SERPL DL<=0.05 MIU/L-ACNC: 2.67 UIU/ML (ref 0.27–4.2)
URIC ACID, SERUM: 6.1 MG/DL (ref 3.4–7)
VLDLC SERPL CALC-MCNC: 24 MG/DL
WBC # BLD: 6.9 E9/L (ref 4.5–11.5)

## 2020-09-09 PROCEDURE — 83036 HEMOGLOBIN GLYCOSYLATED A1C: CPT

## 2020-09-09 PROCEDURE — 84153 ASSAY OF PSA TOTAL: CPT

## 2020-09-09 PROCEDURE — 82044 UR ALBUMIN SEMIQUANTITATIVE: CPT

## 2020-09-09 PROCEDURE — G0103 PSA SCREENING: HCPCS

## 2020-09-09 PROCEDURE — 85025 COMPLETE CBC W/AUTO DIFF WBC: CPT

## 2020-09-09 PROCEDURE — 80048 BASIC METABOLIC PNL TOTAL CA: CPT

## 2020-09-09 PROCEDURE — 84550 ASSAY OF BLOOD/URIC ACID: CPT

## 2020-09-09 PROCEDURE — 80061 LIPID PANEL: CPT

## 2020-09-09 PROCEDURE — 36415 COLL VENOUS BLD VENIPUNCTURE: CPT

## 2020-09-09 PROCEDURE — 84443 ASSAY THYROID STIM HORMONE: CPT

## 2020-09-09 NOTE — PROGRESS NOTES
Chief Complaint   Patient presents with    Follow Up After Procedure     Rt hip \"doing much better\" . Presents with slow gait and cane. States \"I am afraid of falling\"     Other     Rt knee pain injected LOV 6- with relief up until \"several weeks ago. Almita Mcdonald is an 79 y.o. male who presents to the office for follow up on right knee pain. Patient now 6 months s/p R hip fracture s/p CMN. Patient continues to walk slowly with a cane but feel that he has no hip pain only knee pain. Patient had CSI in June which gave him at least 8 weeks of significant relief. Denies any new falls or injuries. Endorses deep aching sensation to the right knee with popping, no reported locking or catching sensation. Review of Systems -   General ROS: negative for - chills, fatigue, fever or night sweats  Respiratory ROS: no cough, shortness of breath, or wheezing  Cardiovascular ROS: no chest pain or dyspnea on exertion  Gastrointestinal ROS: no abdominal pain, change in bowel habits, or black or bloody stools  Genitourinary: no hematuria, dysuria, or incontinence   Musculoskeletal ROS:see above  Neurological ROS: no TIA or stroke symptoms       OBJECTIVE:   Alert and oriented X 3, no acute distress, respirations easy and unlabored with no audible wheezes, skin warm and dry, speech and dress appropriate for noted age, affect euthymic.     Extremity:  Right Lower Extremity  Skin clean dry and intact, without signs of infection  Incisions well approximated without signs of redness, warmth or drainage  No edema noted to the right lateral thigh with healing ecchymosis also noted  Noticeable degenerative deformity to the right knee with palpable osteophyte formation  Moderate tenderness along the medial and lateral joint spaces of the right knee, non-erythematous, no appreciable effusion  Compartments supple throughout thigh and leg  Calf supple and nontender  Demonstrates active knee flexion/extension 3 ml PF 0.25% marcaine and 3 ml 1% PF Lidocaine were injected using the lateral inferior approach without difficulty. The patient tolerated this well. A band-aid was applied. The patient ambulated out of clinic on their own accord without difficulty. PLAN:  CSI as above  Post injection care given  WB XR of R Knee at next visit. Patient doing very well from right hip fracture s/p CMN. Continue activity as tolerated  Follow up in 3 months for repeat injection if needed    Electronically signed by Colletta Marshal, PA-C on 9/9/2020 at 11:13 AM  Note: This report was completed using [employer] voiced recognition software. Every effort has been made to ensure accuracy; however, inadvertent computerized transcription errors may be present.

## 2020-09-16 LAB
PROSTATE SPECIFIC ANTIGEN: 0.06 NG/ML (ref 0–4)
PROSTATE SPECIFIC ANTIGEN: ABNORMAL NG/ML (ref 0–4)

## 2020-12-07 RX ORDER — SOTALOL HYDROCHLORIDE 80 MG/1
TABLET ORAL
Qty: 90 TABLET | Refills: 0 | Status: SHIPPED
Start: 2020-12-07 | End: 2021-02-12 | Stop reason: SDUPTHER

## 2020-12-07 RX ORDER — LISINOPRIL 10 MG/1
TABLET ORAL
Qty: 90 TABLET | Refills: 0 | Status: SHIPPED
Start: 2020-12-07 | End: 2021-02-12 | Stop reason: SDUPTHER

## 2020-12-07 RX ORDER — METFORMIN HYDROCHLORIDE 750 MG/1
TABLET, EXTENDED RELEASE ORAL
Qty: 90 TABLET | Refills: 0 | Status: SHIPPED
Start: 2020-12-07 | End: 2021-02-01

## 2020-12-10 RX ORDER — CLOPIDOGREL BISULFATE 75 MG/1
75 TABLET ORAL DAILY
Qty: 90 TABLET | Refills: 0 | Status: SHIPPED
Start: 2020-12-10 | End: 2021-02-12 | Stop reason: SDUPTHER

## 2020-12-10 RX ORDER — ATORVASTATIN CALCIUM 40 MG/1
40 TABLET, FILM COATED ORAL DAILY
Qty: 90 TABLET | Refills: 1 | Status: SHIPPED
Start: 2020-12-10 | End: 2021-02-12 | Stop reason: SDUPTHER

## 2020-12-13 RX ORDER — AMOXICILLIN 500 MG/1
500 CAPSULE ORAL 3 TIMES DAILY
Qty: 21 CAPSULE | Refills: 0 | Status: SHIPPED | OUTPATIENT
Start: 2020-12-13 | End: 2020-12-20

## 2020-12-13 RX ORDER — METHYLPREDNISOLONE 4 MG/1
TABLET ORAL
Qty: 1 KIT | Refills: 0 | Status: SHIPPED | OUTPATIENT
Start: 2020-12-13 | End: 2020-12-19

## 2020-12-15 DIAGNOSIS — J02.9 SORE THROAT: ICD-10-CM

## 2020-12-16 LAB
SARS-COV-2: NOT DETECTED
SOURCE: NORMAL

## 2020-12-29 ENCOUNTER — NURSE ONLY (OUTPATIENT)
Dept: FAMILY MEDICINE CLINIC | Age: 70
End: 2020-12-29
Payer: MEDICARE

## 2020-12-29 PROCEDURE — 90694 VACC AIIV4 NO PRSRV 0.5ML IM: CPT | Performed by: FAMILY MEDICINE

## 2020-12-29 PROCEDURE — G0008 ADMIN INFLUENZA VIRUS VAC: HCPCS | Performed by: FAMILY MEDICINE

## 2021-02-12 ENCOUNTER — OFFICE VISIT (OUTPATIENT)
Dept: FAMILY MEDICINE CLINIC | Age: 71
End: 2021-02-12
Payer: MEDICARE

## 2021-02-12 VITALS
SYSTOLIC BLOOD PRESSURE: 138 MMHG | HEIGHT: 72 IN | RESPIRATION RATE: 16 BRPM | TEMPERATURE: 97.8 F | BODY MASS INDEX: 28.44 KG/M2 | HEART RATE: 78 BPM | DIASTOLIC BLOOD PRESSURE: 80 MMHG | OXYGEN SATURATION: 99 % | WEIGHT: 210 LBS

## 2021-02-12 DIAGNOSIS — E11.649 UNCONTROLLED TYPE 2 DIABETES MELLITUS WITH HYPOGLYCEMIA WITHOUT COMA (HCC): Chronic | ICD-10-CM

## 2021-02-12 DIAGNOSIS — E78.5 DYSLIPIDEMIA: ICD-10-CM

## 2021-02-12 DIAGNOSIS — Z00.00 ROUTINE GENERAL MEDICAL EXAMINATION AT A HEALTH CARE FACILITY: ICD-10-CM

## 2021-02-12 DIAGNOSIS — I50.9 CONGESTIVE HEART FAILURE, UNSPECIFIED HF CHRONICITY, UNSPECIFIED HEART FAILURE TYPE (HCC): ICD-10-CM

## 2021-02-12 DIAGNOSIS — E11.9 TYPE 2 DIABETES MELLITUS WITHOUT COMPLICATION, WITHOUT LONG-TERM CURRENT USE OF INSULIN (HCC): ICD-10-CM

## 2021-02-12 DIAGNOSIS — R53.83 FATIGUE, UNSPECIFIED TYPE: ICD-10-CM

## 2021-02-12 DIAGNOSIS — I10 ESSENTIAL HYPERTENSION: Chronic | ICD-10-CM

## 2021-02-12 DIAGNOSIS — J44.9 COPD, VERY SEVERE (HCC): ICD-10-CM

## 2021-02-12 DIAGNOSIS — E11.65 UNCONTROLLED TYPE 2 DIABETES MELLITUS WITH HYPERGLYCEMIA (HCC): ICD-10-CM

## 2021-02-12 DIAGNOSIS — C61 PROSTATE CANCER (HCC): ICD-10-CM

## 2021-02-12 DIAGNOSIS — Z12.11 SCREEN FOR COLON CANCER: ICD-10-CM

## 2021-02-12 DIAGNOSIS — Z00.00 ENCOUNTER FOR MEDICARE ANNUAL WELLNESS EXAM: Primary | ICD-10-CM

## 2021-02-12 LAB
ALBUMIN SERPL-MCNC: 3.8 G/DL (ref 3.5–5.2)
ALP BLD-CCNC: 125 U/L (ref 40–129)
ALT SERPL-CCNC: 10 U/L (ref 0–40)
ANION GAP SERPL CALCULATED.3IONS-SCNC: 6 MMOL/L (ref 7–16)
AST SERPL-CCNC: 13 U/L (ref 0–39)
BASOPHILS ABSOLUTE: 0.09 E9/L (ref 0–0.2)
BASOPHILS RELATIVE PERCENT: 1.4 % (ref 0–2)
BILIRUB SERPL-MCNC: 0.3 MG/DL (ref 0–1.2)
BUN BLDV-MCNC: 17 MG/DL (ref 8–23)
CALCIUM SERPL-MCNC: 9.8 MG/DL (ref 8.6–10.2)
CHLORIDE BLD-SCNC: 104 MMOL/L (ref 98–107)
CHOLESTEROL, TOTAL: 139 MG/DL (ref 0–199)
CO2: 29 MMOL/L (ref 22–29)
CREAT SERPL-MCNC: 0.9 MG/DL (ref 0.7–1.2)
CREATININE URINE: 209 MG/DL (ref 40–278)
EOSINOPHILS ABSOLUTE: 0.66 E9/L (ref 0.05–0.5)
EOSINOPHILS RELATIVE PERCENT: 10.1 % (ref 0–6)
GFR AFRICAN AMERICAN: >60
GFR NON-AFRICAN AMERICAN: >60 ML/MIN/1.73
GLUCOSE BLD-MCNC: 188 MG/DL (ref 74–99)
HBA1C MFR BLD: 7.4 % (ref 4–5.6)
HCT VFR BLD CALC: 43.6 % (ref 37–54)
HDLC SERPL-MCNC: 38 MG/DL
HEMOGLOBIN: 14.7 G/DL (ref 12.5–16.5)
IMMATURE GRANULOCYTES #: 0.03 E9/L
IMMATURE GRANULOCYTES %: 0.5 % (ref 0–5)
LDL CHOLESTEROL CALCULATED: 77 MG/DL (ref 0–99)
LYMPHOCYTES ABSOLUTE: 1.6 E9/L (ref 1.5–4)
LYMPHOCYTES RELATIVE PERCENT: 24.5 % (ref 20–42)
MCH RBC QN AUTO: 31.7 PG (ref 26–35)
MCHC RBC AUTO-ENTMCNC: 33.7 % (ref 32–34.5)
MCV RBC AUTO: 94.2 FL (ref 80–99.9)
MICROALBUMIN UR-MCNC: 1474 MG/L
MICROALBUMIN/CREAT UR-RTO: 705.3 (ref 0–30)
MONOCYTES ABSOLUTE: 0.69 E9/L (ref 0.1–0.95)
MONOCYTES RELATIVE PERCENT: 10.6 % (ref 2–12)
NEUTROPHILS ABSOLUTE: 3.45 E9/L (ref 1.8–7.3)
NEUTROPHILS RELATIVE PERCENT: 52.9 % (ref 43–80)
PDW BLD-RTO: 13.9 FL (ref 11.5–15)
PLATELET # BLD: 229 E9/L (ref 130–450)
PMV BLD AUTO: 9.9 FL (ref 7–12)
POTASSIUM SERPL-SCNC: 5.3 MMOL/L (ref 3.5–5)
PROSTATE SPECIFIC ANTIGEN: 0.03 NG/ML (ref 0–4)
RBC # BLD: 4.63 E12/L (ref 3.8–5.8)
SODIUM BLD-SCNC: 139 MMOL/L (ref 132–146)
TOTAL PROTEIN: 7.8 G/DL (ref 6.4–8.3)
TRIGL SERPL-MCNC: 118 MG/DL (ref 0–149)
TSH SERPL DL<=0.05 MIU/L-ACNC: 1.74 UIU/ML (ref 0.27–4.2)
URIC ACID, SERUM: 6.5 MG/DL (ref 3.4–7)
VLDLC SERPL CALC-MCNC: 24 MG/DL
WBC # BLD: 6.5 E9/L (ref 4.5–11.5)

## 2021-02-12 PROCEDURE — G0438 PPPS, INITIAL VISIT: HCPCS | Performed by: FAMILY MEDICINE

## 2021-02-12 PROCEDURE — 3017F COLORECTAL CA SCREEN DOC REV: CPT | Performed by: FAMILY MEDICINE

## 2021-02-12 PROCEDURE — 1123F ACP DISCUSS/DSCN MKR DOCD: CPT | Performed by: FAMILY MEDICINE

## 2021-02-12 PROCEDURE — 4040F PNEUMOC VAC/ADMIN/RCVD: CPT | Performed by: FAMILY MEDICINE

## 2021-02-12 PROCEDURE — G8484 FLU IMMUNIZE NO ADMIN: HCPCS | Performed by: FAMILY MEDICINE

## 2021-02-12 PROCEDURE — 3046F HEMOGLOBIN A1C LEVEL >9.0%: CPT | Performed by: FAMILY MEDICINE

## 2021-02-12 RX ORDER — SOTALOL HYDROCHLORIDE 80 MG/1
TABLET ORAL
Qty: 180 TABLET | Refills: 1 | Status: SHIPPED
Start: 2021-02-12 | End: 2021-06-23 | Stop reason: SDUPTHER

## 2021-02-12 RX ORDER — METOPROLOL SUCCINATE 50 MG/1
50 TABLET, EXTENDED RELEASE ORAL DAILY
Qty: 90 TABLET | Refills: 1 | Status: SHIPPED
Start: 2021-02-12 | End: 2021-06-23 | Stop reason: SDUPTHER

## 2021-02-12 RX ORDER — CLOPIDOGREL BISULFATE 75 MG/1
75 TABLET ORAL DAILY
Qty: 90 TABLET | Refills: 1 | Status: SHIPPED
Start: 2021-02-12 | End: 2021-06-23 | Stop reason: SDUPTHER

## 2021-02-12 RX ORDER — LISINOPRIL 10 MG/1
TABLET ORAL
Qty: 90 TABLET | Refills: 1 | Status: SHIPPED
Start: 2021-02-12 | End: 2021-06-23 | Stop reason: SDUPTHER

## 2021-02-12 RX ORDER — ATORVASTATIN CALCIUM 40 MG/1
40 TABLET, FILM COATED ORAL DAILY
Qty: 90 TABLET | Refills: 1 | Status: SHIPPED
Start: 2021-02-12 | End: 2021-06-23 | Stop reason: SDUPTHER

## 2021-02-12 ASSESSMENT — LIFESTYLE VARIABLES
HOW OFTEN DO YOU HAVE SIX OR MORE DRINKS ON ONE OCCASION: 0
HOW OFTEN DURING THE LAST YEAR HAVE YOU HAD A FEELING OF GUILT OR REMORSE AFTER DRINKING: 0
HOW OFTEN DO YOU HAVE A DRINK CONTAINING ALCOHOL: 1
AUDIT TOTAL SCORE: 1
HOW OFTEN DURING THE LAST YEAR HAVE YOU BEEN UNABLE TO REMEMBER WHAT HAPPENED THE NIGHT BEFORE BECAUSE YOU HAD BEEN DRINKING: 0
HAS A RELATIVE, FRIEND, DOCTOR, OR ANOTHER HEALTH PROFESSIONAL EXPRESSED CONCERN ABOUT YOUR DRINKING OR SUGGESTED YOU CUT DOWN: 0
HOW OFTEN DURING THE LAST YEAR HAVE YOU NEEDED AN ALCOHOLIC DRINK FIRST THING IN THE MORNING TO GET YOURSELF GOING AFTER A NIGHT OF HEAVY DRINKING: 0
HOW OFTEN DURING THE LAST YEAR HAVE YOU FOUND THAT YOU WERE NOT ABLE TO STOP DRINKING ONCE YOU HAD STARTED: 0

## 2021-02-12 ASSESSMENT — PATIENT HEALTH QUESTIONNAIRE - PHQ9
SUM OF ALL RESPONSES TO PHQ QUESTIONS 1-9: 1
SUM OF ALL RESPONSES TO PHQ QUESTIONS 1-9: 1

## 2021-02-12 NOTE — PATIENT INSTRUCTIONS
Patient Education        Learning About Meal Planning for Diabetes  Why plan your meals? Meal planning can be a key part of managing diabetes. Planning meals and snacks with the right balance of carbohydrate, protein, and fat can help you keep your blood sugar at the target level you set with your doctor. You don't have to eat special foods. You can eat what your family eats, including sweets once in a while. But you do have to pay attention to how often you eat and how much you eat of certain foods. You may want to work with a dietitian or a certified diabetes educator. He or she can give you tips and meal ideas and can answer your questions about meal planning. This health professional can also help you reach a healthy weight if that is one of your goals. What plan is right for you? Your dietitian or diabetes educator may suggest that you start with the plate format or carbohydrate counting. The plate format  The plate format is a simple way to help you manage how you eat. You plan meals by learning how much space each food should take on a plate. Using the plate format helps you spread carbohydrate throughout the day. It can make it easier to keep your blood sugar level within your target range. It also helps you see if you're eating healthy portion sizes. To use the plate format, you put non-starchy vegetables on half your plate. Add meat or meat substitutes on one-quarter of the plate. Put a grain or starchy vegetable (such as brown rice or a potato) on the final quarter of the plate. You can add a small piece of fruit and some low-fat or fat-free milk or yogurt, depending on your carbohydrate goal for each meal.  Here are some tips for using the plate format:  · Make sure that you are not using an oversized plate. A 9-inch plate is best. Many restaurants use larger plates. · Get used to using the plate format at home. Then you can use it when you eat out.   · Write down your questions about using the plate format. Talk to your doctor, a dietitian, or a diabetes educator about your concerns. Carbohydrate counting  With carbohydrate counting, you plan meals based on the amount of carbohydrate in each food. Carbohydrate raises blood sugar higher and more quickly than any other nutrient. It is found in desserts, breads and cereals, and fruit. It's also found in starchy vegetables such as potatoes and corn, grains such as rice and pasta, and milk and yogurt. Spreading carbohydrate throughout the day helps keep your blood sugar levels within your target range. Your daily amount depends on several things, including your weight, how active you are, which diabetes medicines you take, and what your goals are for your blood sugar levels. A registered dietitian or diabetes educator can help you plan how much carbohydrate to include in each meal and snack. A guideline for your daily amount of carbohydrate is:  · 45 to 60 grams at each meal. That's about the same as 3 to 4 carbohydrate servings. · 15 to 20 grams at each snack. That's about the same as 1 carbohydrate serving. The Nutrition Facts label on packaged foods tells you how much carbohydrate is in a serving of the food. First, look at the serving size on the food label. Is that the amount you eat in a serving? All of the nutrition information on a food label is based on that serving size. So if you eat more or less than that, you'll need to adjust the other numbers. Total carbohydrate is the next thing you need to look for on the label. If you count carbohydrate servings, one serving of carbohydrate is 15 grams. For foods that don't come with labels, such as fresh fruits and vegetables, you'll need a guide that lists carbohydrate in these foods. Ask your doctor, dietitian, or diabetes educator about books or other nutrition guides you can use.   If you take insulin, you need to know how many grams of carbohydrate are in a meal. This lets you know how much rapid-acting insulin to take before you eat. If you use an insulin pump, you get a constant rate of insulin during the day. So the pump must be programmed at meals to give you extra insulin to cover the rise in blood sugar after meals. When you know how much carbohydrate you will eat, you can take the right amount of insulin. Or, if you always use the same amount of insulin, you need to make sure that you eat the same amount of carbohydrate at meals. If you need more help to understand carbohydrate counting and food labels, ask your doctor, dietitian, or diabetes educator. How do you get started with meal planning? Here are some tips to get started:  · Plan your meals a week at a time. Don't forget to include snacks too. · Use cookbooks or online recipes to plan several main meals. Plan some quick meals for busy nights. You also can double some recipes that freeze well. Then you can save half for other busy nights when you don't have time to cook. · Make sure you have the ingredients you need for your recipes. If you're running low on basic items, put these items on your shopping list too. · List foods that you use to make breakfasts, lunches, and snacks. List plenty of fruits and vegetables. · Post this list on the refrigerator. Add to it as you think of more things you need. · Take the list to the store to do your weekly shopping. Follow-up care is a key part of your treatment and safety. Be sure to make and go to all appointments, and call your doctor if you are having problems. It's also a good idea to know your test results and keep a list of the medicines you take. Where can you learn more? Go to [email or web address].[email or web address] org and sign in to your [employer] account. Enter X034 in the [facility] box to learn more about \"Learning About Meal Planning for Diabetes. \"     If you do not have an account, please click on the \"Sign Up Now\" link.   Current as of: December 20, 5989               [employer] Version: 12.6  © 1898-9929 [employer]. Care instructions adapted under license by [facility] ([facility]). If you have questions about a medical condition or this instruction, always ask your healthcare professional. [address] any warranty or liability for your use of this information. Personalized Preventive Plan for Jose Bautista - 2/12/2021  Medicare offers a range of preventive health benefits. Some of the tests and screenings are paid in full while other may be subject to a deductible, co-insurance, and/or copay. Some of these benefits include a comprehensive review of your medical history including lifestyle, illnesses that may run in your family, and various assessments and screenings as appropriate. After reviewing your medical record and screening and assessments performed today your provider may have ordered immunizations, labs, imaging, and/or referrals for you. A list of these orders (if applicable) as well as your Preventive Care list are included within your After Visit Summary for your review. Other Preventive Recommendations:    · A preventive eye exam performed by an eye specialist is recommended every 1-2 years to screen for glaucoma; cataracts, macular degeneration, and other eye disorders. · A preventive dental visit is recommended every 6 months. · Try to get at least 150 minutes of exercise per week or 10,000 steps per day on a pedometer . · Order or download the FREE \"Exercise & Physical Activity: Your Everyday Guide\" from [employer]. Call 8-645.868.6671 or search [employer] online. · You need 1491-7493 mg of calcium and 3731-1139 IU of vitamin D per day.  It is possible to meet your calcium requirement with diet alone, but a vitamin D supplement is usually necessary to meet this goal.  · When exposed to the sun, use a sunscreen that protects against both UVA and UVB radiation with an SPF of 30 or greater. Reapply every 2 to 3 hours or after sweating, drying off with a towel, or swimming. · Always wear a seat belt when traveling in a car. Always wear a helmet when riding a bicycle or motorcycle.

## 2021-02-12 NOTE — PROGRESS NOTES
History of placement of internal cardiac defibrillator 2014? [employer] ([place])    Hypertension     Sigmoid diverticulosis 8/31/2015    Sinusitis     Tubular adenoma of colon 8/31/2015    Vertigo        Past Surgical History:   Procedure Laterality Date    CARDIAC DEFIBRILLATOR PLACEMENT      COLONOSCOPY  8/31/15    with polypectomy (x2) - Dr. Maude Rodríguez  1/13/14    3.5/15 Xience in [place] and 3.0/15 Resolute in th 1st obtuse marginal.    HIP FRACTURE SURGERY Right 3/14/2020    RIGHT HIP OPEN REDUCTION INTERNAL FIXATION NAIL-SYNTHES -- OC 2 performed by Farhat Jorgensen DO at [address]           Family History   Problem Relation Age of Onset    Heart Disease Mother     Cancer Father         abdominal    Cancer Brother         prostate    Cancer Brother         digestive       CareTeam (Including outside providers/suppliers regularly involved in providing care):   Patient Care Team:  Oneyda Melo DO as PCP - General (Family Medicine)  Oneyda Melo DO as PCP - [facility] Empaneled Provider    Wt Readings from Last 3 Encounters:   02/12/21 210 lb (95.3 kg)   06/11/20 187 lb (84.8 kg)   05/12/20 197 lb (89.4 kg)     Vitals:    02/12/21 1031 02/12/21 1037   BP: (!) 140/84 138/80   Pulse: 78    Resp: 16    Temp: 97.8 °F (36.6 °C)    SpO2: 99%    Weight: 210 lb (95.3 kg)    Height: 6' (1.829 m)      Body mass index is 28.48 kg/m². Based upon direct observation of the patient, evaluation of cognition reveals recent and remote memory intact.     General Appearance: alert and oriented to person, place and time, well-developed and well-nourished, in no acute distress  Skin: warm and dry, no rash or erythema  Head: normocephalic and atraumatic  Eyes: pupils equal, round, and reactive to light, extraocular eye movements intact, conjunctivae normal  ENT: tympanic membrane, external ear and ear canal normal bilaterally, oropharynx clear and moist with normal mucous membranes and hearing grossly normal bilaterally  Neck: neck supple and non tender without mass, no thyromegaly or thyroid nodules, no cervical lymphadenopathy   Pulmonary/Chest: clear to auscultation bilaterally- no wheezes, rales or rhonchi, normal air movement, no respiratory distress and no chest wall tenderness  Cardiovascular: normal rate, regular rhythm, normal S1 and S2, no murmurs, no gallops, intact distal pulses and no carotid bruits  Abdomen: soft, non-tender, non-distended, normal bowel sounds, no masses or organomegaly  Genitourinary: genitals normal without hernia or inguinal adenopathy, H/O prostate cancer and treats with specialist   Extremities: no cyanosis and no clubbing  Musculoskeletal: H/O right hip fracture with surgery   Neurologic: needs cane for ambulation     Patient's complete Health Risk Assessment and screening values have been reviewed and are found in Flowsheets. The following problems were reviewed today and where indicated follow up appointments were made and/or referrals ordered. Positive Risk Factor Screenings with Interventions:     Fall Risk:  Timed Up and Go Test > 12 seconds? (Complete if either Fall Risk answers are Yes): no  2 or more falls in past year?: no  Fall with injury in past year?: (!) yes  Fall Risk Interventions:    · Home safety tips provided          General Health and ACP:  General  In general, how would you say your health is?: Good  In the past 7 days, have you experienced any of the following?  New or Increased Pain, New or Increased Fatigue, Loneliness, Social Isolation, Stress or Anger?: (!) New or Increased Pain  Do you get the social and emotional support that you need?: (!) No  Do you have a Living Will?: (!) No  Advance Directives     Power of  Living Will ACP-Advance Directive ACP-Power of     Not on File Filed on 09/11/14 Filed Not on File      General Health Risk Interventions:  · he feels good. He is getting around well post hip fracture.  He follows with urology for prostate cancer     Health Habits/Nutrition:  Health Habits/Nutrition  Do you exercise for at least 20 minutes 2-3 times per week?: (!) No  Have you lost any weight without trying in the past 3 months?: No  Do you eat only one meal per day?: (!) Yes  Have you seen the dentist within the past year?: (!) No  Body mass index: (!) 28.48  Health Habits/Nutrition Interventions:  · no acute issues    Hearing/Vision:  No exam data present  Hearing/Vision  Do you or your family notice any trouble with your hearing that hasn't been managed with hearing aids?: No  Do you have difficulty driving, watching TV, or doing any of your daily activities because of your eyesight?: No  Have you had an eye exam within the past year?: (!) No  Hearing/Vision Interventions:  · no acute issues    Safety:  Safety  Do you have working smoke detectors?: Yes  Have all throw rugs been removed or fastened?: Yes  Do you have non-slip mats or surfaces in all bathtubs/showers?: Yes  Do all of your stairways have a railing or banister?: Yes  Are your doorways, halls and stairs free of clutter?: Yes  Do you always fasten your seatbelt when you are in a car?: (!) No  Safety Interventions:  · Home safety tips provided     Personalized Preventive Plan   Current Health Maintenance Status  Immunization History   Administered Date(s) Administered    Influenza, Quadv, adjuvanted, 65 yrs +, IM, PF (Fluad) 12/29/2020    Influenza, Triv, inactivated, subunit, adjuvanted, IM (Fluad 65 yrs and older) 11/19/2019        Health Maintenance   Topic Date Due    AAA screen  1950    Hepatitis C screen  1950    Diabetic foot exam  07/05/1960    Diabetic retinal exam  07/05/1960    COVID-19 Vaccine (1 of 2) 07/05/1966    DTaP/Tdap/Td vaccine (1 - Tdap) 07/05/1969    Shingles Vaccine (1 of 2) 07/05/2000    Low dose CT lung screening  06/13/2014    Pneumococcal 65+ years Vaccine (1 of 1 - PPSV23) 07/05/2015    Colon cancer screen colonoscopy  08/31/2018    Annual Wellness Visit (AWV)  06/19/2019    A1C test (Diabetic or Prediabetic)  09/09/2021    Diabetic microalbuminuria test  09/09/2021    Lipid screen  09/09/2021    Potassium monitoring  09/09/2021    Creatinine monitoring  09/09/2021    PSA counseling  09/09/2021    Flu vaccine  Completed    Hepatitis A vaccine  Aged Out    Hib vaccine  Aged Out    Meningococcal (ACWY) vaccine  Aged Out     Recommendations for [employer] Due: see orders and patient instructions/AVS.  . Recommended screening schedule for the next 5-10 years is provided to the patient in written form: see Patient Instructions/AVS.    Conda Severs was seen today for medicare awv. Diagnoses and all orders for this visit:    Encounter for Medicare annual wellness exam  -     Comprehensive Metabolic Panel; Future  -     CBC Auto Differential; Future    ---VASCULAR PANEL  A) asa, PLAVIX, aggrenox  B) coumadin, pletal, tzd, STATIN  C) ACE, hctz, folic, ccb  D) cannikinumab, fish oils     ---CARDIAC---PLAVIX, ACE, BETA, STATIN, hctz, ( ccb )    COPD, very severe (HCC)  -     Comprehensive Metabolic Panel; Future  -     CBC Auto Differential; Future    Congestive heart failure, unspecified HF chronicity, unspecified heart failure type (HCC)  -     clopidogrel (PLAVIX) 75 MG tablet; Take 1 tablet by mouth daily  -     lisinopril (PRINIVIL;ZESTRIL) 10 MG tablet; TAKE ONE TABLET BY MOUTH EVERY DAY  -     sotalol (BETAPACE) 80 MG tablet; TAKE ONE-HALF TABLET BY MOUTH TWICE DAILY  -     metoprolol succinate (TOPROL XL) 50 MG extended release tablet; Take 1 tablet by mouth daily  -     Comprehensive Metabolic Panel; Future  -     CBC Auto Differential; Future    Uncontrolled type 2 diabetes mellitus with hyperglycemia (HCC)  -     Comprehensive Metabolic Panel;  Future  -     CBC Auto Differential; Future  -     Hemoglobin A1C; Future  - Microalbumin, Ur; Future    [facility] ([facility]  -     PSA, Diagnostic; Future    Dyslipidemia  -     atorvastatin (LIPITOR) 40 MG tablet; Take 1 tablet by mouth daily  -     Comprehensive Metabolic Panel; Future  -     Lipid Panel; Future  -     CBC Auto Differential; Future    Screen for colon cancer  -     Cologuard (For External Results Only); Future  -     Comprehensive Metabolic Panel; Future  -     CBC Auto Differential; Future    Fatigue, unspecified type  -     TSH without Reflex; Future  -     Uric Acid; Future  -     Comprehensive Metabolic Panel;  Future  -     CBC Auto Differential; Future

## 2021-02-14 ENCOUNTER — CLINICAL DOCUMENTATION (OUTPATIENT)
Dept: FAMILY MEDICINE CLINIC | Age: 71
End: 2021-02-14

## 2021-02-14 NOTE — PROGRESS NOTES
The 10-year ASCVD risk score (Carrie Sanchez et al., 2013) is: 38.4%    Values used to calculate the score:      Age: 79 years      Sex: Male      Is Non- : No      Diabetic: Yes      Tobacco smoker: No      Systolic Blood Pressure: 208 mmHg      Is BP treated: Yes      HDL Cholesterol: 38 mg/dL      Total Cholesterol: 139 mg/dL

## 2021-02-25 NOTE — PROGRESS NOTES
Overdue results letter mailed to patient regarding cologuard order.   Electronically signed by Yolanda Chan on 2/25/2021 at 8:16 AM

## 2021-06-23 DIAGNOSIS — I50.9 CONGESTIVE HEART FAILURE, UNSPECIFIED HF CHRONICITY, UNSPECIFIED HEART FAILURE TYPE (HCC): ICD-10-CM

## 2021-06-23 DIAGNOSIS — E78.5 DYSLIPIDEMIA: ICD-10-CM

## 2021-06-23 RX ORDER — CLOPIDOGREL BISULFATE 75 MG/1
75 TABLET ORAL DAILY
Qty: 90 TABLET | Refills: 1 | Status: SHIPPED
Start: 2021-06-23 | End: 2022-01-07

## 2021-06-23 RX ORDER — SOTALOL HYDROCHLORIDE 80 MG/1
TABLET ORAL
Qty: 180 TABLET | Refills: 1 | Status: SHIPPED
Start: 2021-06-23 | End: 2022-01-07

## 2021-06-23 RX ORDER — ATORVASTATIN CALCIUM 40 MG/1
40 TABLET, FILM COATED ORAL DAILY
Qty: 90 TABLET | Refills: 1 | Status: SHIPPED
Start: 2021-06-23 | End: 2022-01-07 | Stop reason: SDUPTHER

## 2021-06-23 RX ORDER — METOPROLOL SUCCINATE 50 MG/1
50 TABLET, EXTENDED RELEASE ORAL DAILY
Qty: 90 TABLET | Refills: 1 | Status: SHIPPED
Start: 2021-06-23 | End: 2022-01-07 | Stop reason: SDUPTHER

## 2021-06-23 RX ORDER — LISINOPRIL 10 MG/1
TABLET ORAL
Qty: 90 TABLET | Refills: 1 | Status: SHIPPED
Start: 2021-06-23 | End: 2022-01-07 | Stop reason: SDUPTHER

## 2021-06-24 ENCOUNTER — TELEPHONE (OUTPATIENT)
Dept: FAMILY MEDICINE CLINIC | Age: 71
End: 2021-06-24

## 2021-06-24 NOTE — TELEPHONE ENCOUNTER
Clifton Estrada     Key: [id number]    PA Case ID: [id number]    Rx #: [id number]    Status  Sent to Plan    Drug  Farxiga 5MG tablets    Form  [employer] 4-Part NCPDP Electronic PA Form

## 2021-07-23 RX ORDER — CEPHALEXIN 500 MG/1
500 CAPSULE ORAL 2 TIMES DAILY
Qty: 14 CAPSULE | Refills: 0 | Status: SHIPPED | OUTPATIENT
Start: 2021-07-23 | End: 2021-07-30

## 2021-07-27 ENCOUNTER — HOSPITAL ENCOUNTER (OUTPATIENT)
Age: 71
Discharge: HOME | End: 2021-07-27
Payer: MEDICARE

## 2021-07-27 PROCEDURE — 87088 URINE BACTERIA CULTURE: CPT

## 2021-07-28 DIAGNOSIS — E78.2 MIXED HYPERLIPIDEMIA: ICD-10-CM

## 2021-07-28 DIAGNOSIS — I10 ESSENTIAL HYPERTENSION: ICD-10-CM

## 2021-07-28 DIAGNOSIS — C61 PROSTATE CANCER (HCC): ICD-10-CM

## 2021-07-28 DIAGNOSIS — E11.9 TYPE 2 DIABETES MELLITUS WITHOUT COMPLICATION, WITHOUT LONG-TERM CURRENT USE OF INSULIN (HCC): Primary | ICD-10-CM

## 2021-07-28 DIAGNOSIS — N50.812 PAIN IN BOTH TESTICLES: Primary | ICD-10-CM

## 2021-07-28 DIAGNOSIS — R53.83 FATIGUE, UNSPECIFIED TYPE: ICD-10-CM

## 2021-07-28 DIAGNOSIS — N50.811 PAIN IN BOTH TESTICLES: Primary | ICD-10-CM

## 2021-07-29 ENCOUNTER — HOSPITAL ENCOUNTER (OUTPATIENT)
Age: 71
Discharge: HOME | End: 2021-07-29
Payer: MEDICARE

## 2021-07-29 DIAGNOSIS — R31.0 GROSS HEMATURIA: Primary | ICD-10-CM

## 2021-07-29 DIAGNOSIS — K57.30 SIGMOID DIVERTICULOSIS: ICD-10-CM

## 2021-07-29 DIAGNOSIS — C61 PROSTATE CANCER (HCC): ICD-10-CM

## 2021-07-29 DIAGNOSIS — D12.6 TUBULAR ADENOMA OF COLON: ICD-10-CM

## 2021-07-29 DIAGNOSIS — I10 ESSENTIAL HYPERTENSION: ICD-10-CM

## 2021-07-29 DIAGNOSIS — E11.9 TYPE 2 DIABETES MELLITUS WITHOUT COMPLICATION, WITHOUT LONG-TERM CURRENT USE OF INSULIN (HCC): ICD-10-CM

## 2021-07-29 DIAGNOSIS — E78.2 MIXED HYPERLIPIDEMIA: ICD-10-CM

## 2021-07-29 DIAGNOSIS — R53.83 FATIGUE, UNSPECIFIED TYPE: ICD-10-CM

## 2021-07-29 LAB
ANION GAP SERPL CALCULATED.3IONS-SCNC: 10 MMOL/L (ref 7–16)
BASOPHILS ABSOLUTE: 0.12 E9/L (ref 0–0.2)
BASOPHILS RELATIVE PERCENT: 1.8 % (ref 0–2)
BUN BLDV-MCNC: 16 MG/DL (ref 6–23)
CALCIUM SERPL-MCNC: 9.5 MG/DL (ref 8.6–10.2)
CHLORIDE BLD-SCNC: 103 MMOL/L (ref 98–107)
CHOLESTEROL, TOTAL: 164 MG/DL (ref 0–199)
CO2: 24 MMOL/L (ref 22–29)
CREAT SERPL-MCNC: 1.1 MG/DL (ref 0.7–1.2)
EOSINOPHILS ABSOLUTE: 0.46 E9/L (ref 0.05–0.5)
EOSINOPHILS RELATIVE PERCENT: 6.9 % (ref 0–6)
GFR AFRICAN AMERICAN: >60
GFR NON-AFRICAN AMERICAN: >60 ML/MIN/1.73
GLUCOSE BLD-MCNC: 227 MG/DL (ref 74–99)
HBA1C MFR BLD: 7.7 % (ref 4–5.6)
HCT VFR BLD CALC: 43.8 % (ref 37–54)
HDLC SERPL-MCNC: 33 MG/DL
HEMOGLOBIN: 15.1 G/DL (ref 12.5–16.5)
IMMATURE GRANULOCYTES #: 0.04 E9/L
IMMATURE GRANULOCYTES %: 0.6 % (ref 0–5)
LDL CHOLESTEROL CALCULATED: 95 MG/DL (ref 0–99)
LYMPHOCYTES ABSOLUTE: 1.73 E9/L (ref 1.5–4)
LYMPHOCYTES RELATIVE PERCENT: 26.1 % (ref 20–42)
MCH RBC QN AUTO: 31.5 PG (ref 26–35)
MCHC RBC AUTO-ENTMCNC: 34.5 % (ref 32–34.5)
MCV RBC AUTO: 91.4 FL (ref 80–99.9)
MICROALBUMIN UR-MCNC: 1162.9 MG/L
MONOCYTES ABSOLUTE: 0.66 E9/L (ref 0.1–0.95)
MONOCYTES RELATIVE PERCENT: 10 % (ref 2–12)
NEUTROPHILS ABSOLUTE: 3.62 E9/L (ref 1.8–7.3)
NEUTROPHILS RELATIVE PERCENT: 54.6 % (ref 43–80)
PDW BLD-RTO: 13.3 FL (ref 11.5–15)
PLATELET # BLD: 244 E9/L (ref 130–450)
PMV BLD AUTO: 8.9 FL (ref 7–12)
POTASSIUM SERPL-SCNC: 4.3 MMOL/L (ref 3.5–5)
PROSTATE SPECIFIC ANTIGEN: <0.03 NG/ML (ref 0–4)
RBC # BLD: 4.79 E12/L (ref 3.8–5.8)
SODIUM BLD-SCNC: 137 MMOL/L (ref 132–146)
TRIGL SERPL-MCNC: 180 MG/DL (ref 0–149)
TSH SERPL DL<=0.05 MIU/L-ACNC: 2.29 UIU/ML (ref 0.27–4.2)
URIC ACID, SERUM: 7 MG/DL (ref 3.4–7)
URINE CULTURE, ROUTINE: NORMAL
VLDLC SERPL CALC-MCNC: 36 MG/DL
WBC # BLD: 6.6 E9/L (ref 4.5–11.5)

## 2021-07-29 PROCEDURE — 80061 LIPID PANEL: CPT

## 2021-07-29 PROCEDURE — 84443 ASSAY THYROID STIM HORMONE: CPT

## 2021-07-29 PROCEDURE — 36415 COLL VENOUS BLD VENIPUNCTURE: CPT

## 2021-07-29 PROCEDURE — 83036 HEMOGLOBIN GLYCOSYLATED A1C: CPT

## 2021-07-29 PROCEDURE — 84153 ASSAY OF PSA TOTAL: CPT

## 2021-07-29 PROCEDURE — 82044 UR ALBUMIN SEMIQUANTITATIVE: CPT

## 2021-07-29 PROCEDURE — 80048 BASIC METABOLIC PNL TOTAL CA: CPT

## 2021-07-29 PROCEDURE — 84550 ASSAY OF BLOOD/URIC ACID: CPT

## 2021-07-29 PROCEDURE — 85025 COMPLETE CBC W/AUTO DIFF WBC: CPT

## 2021-08-05 ENCOUNTER — TELEPHONE (OUTPATIENT)
Dept: FAMILY MEDICINE CLINIC | Age: 71
End: 2021-08-05
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
## 2021-08-05 NOTE — TELEPHONE ENCOUNTER
This MA spoke with patient about message.   Electronically signed by Henna Dietrich on 8/5/2021 at 2:13 PM

## 2021-08-19 RX ORDER — CEPHALEXIN 500 MG/1
500 CAPSULE ORAL NIGHTLY
Qty: 30 CAPSULE | Refills: 0 | Status: SHIPPED | OUTPATIENT
Start: 2021-08-19 | End: 2021-09-18

## 2021-09-02 ENCOUNTER — HOSPITAL ENCOUNTER (OUTPATIENT)
Age: 71
Discharge: HOME | End: 2021-09-02
Payer: MEDICARE

## 2021-09-02 ENCOUNTER — HOSPITAL ENCOUNTER (OUTPATIENT)
Dept: ULTRASOUND IMAGING | Age: 71
Discharge: HOME | End: 2021-09-04
Payer: MEDICARE

## 2021-09-02 ENCOUNTER — HOSPITAL ENCOUNTER (OUTPATIENT)
Dept: CT IMAGING | Age: 71
Discharge: HOME | End: 2021-09-04
Payer: MEDICARE

## 2021-09-02 DIAGNOSIS — N50.812 PAIN IN BOTH TESTICLES: ICD-10-CM

## 2021-09-02 DIAGNOSIS — D12.6 TUBULAR ADENOMA OF COLON: ICD-10-CM

## 2021-09-02 DIAGNOSIS — K57.30 SIGMOID DIVERTICULOSIS: ICD-10-CM

## 2021-09-02 DIAGNOSIS — R31.0 GROSS HEMATURIA: ICD-10-CM

## 2021-09-02 DIAGNOSIS — C61 PROSTATE CANCER (HCC): ICD-10-CM

## 2021-09-02 DIAGNOSIS — N50.811 PAIN IN BOTH TESTICLES: ICD-10-CM

## 2021-09-02 PROCEDURE — 6360000004 HC RX CONTRAST MEDICATION: Performed by: RADIOLOGY

## 2021-09-02 PROCEDURE — 74178 CT ABD&PLV WO CNTR FLWD CNTR: CPT

## 2021-09-02 PROCEDURE — 2580000003 HC RX 258: Performed by: RADIOLOGY

## 2021-09-02 PROCEDURE — 76870 US EXAM SCROTUM: CPT

## 2021-09-02 PROCEDURE — 87088 URINE BACTERIA CULTURE: CPT

## 2021-09-02 RX ORDER — SODIUM CHLORIDE 0.9 % (FLUSH) 0.9 %
10 SYRINGE (ML) INJECTION
Status: COMPLETED | OUTPATIENT
Start: 2021-09-02 | End: 2021-09-02

## 2021-09-02 RX ADMIN — IOPAMIDOL 90 ML: 755 INJECTION, SOLUTION INTRAVENOUS at 15:46

## 2021-09-02 RX ADMIN — Medication 10 ML: at 15:46

## 2021-09-03 ENCOUNTER — HOSPITAL ENCOUNTER (EMERGENCY)
Age: 71
Discharge: HOME | End: 2021-09-03
Attending: EMERGENCY MEDICINE
Payer: MEDICARE

## 2021-09-03 ENCOUNTER — APPOINTMENT (OUTPATIENT)
Dept: GENERAL RADIOLOGY | Age: 71
End: 2021-09-03
Payer: MEDICARE

## 2021-09-03 VITALS
SYSTOLIC BLOOD PRESSURE: 117 MMHG | RESPIRATION RATE: 20 BRPM | HEART RATE: 71 BPM | BODY MASS INDEX: 28.44 KG/M2 | DIASTOLIC BLOOD PRESSURE: 77 MMHG | WEIGHT: 210 LBS | HEIGHT: 72 IN | TEMPERATURE: 95.6 F | OXYGEN SATURATION: 98 %

## 2021-09-03 DIAGNOSIS — I71.40 AAA (ABDOMINAL AORTIC ANEURYSM) WITHOUT RUPTURE: ICD-10-CM

## 2021-09-03 DIAGNOSIS — I71.40 ABDOMINAL AORTIC ANEURYSM (AAA) GREATER THAN 5.5 CM IN DIAMETER IN MALE: Primary | ICD-10-CM

## 2021-09-03 DIAGNOSIS — R31.9 HEMATURIA, UNSPECIFIED TYPE: Primary | ICD-10-CM

## 2021-09-03 PROBLEM — S72.001A HIP FRACTURE REQUIRING OPERATIVE REPAIR, RIGHT, CLOSED, INITIAL ENCOUNTER (HCC): Status: RESOLVED | Noted: 2020-03-13 | Resolved: 2021-09-03

## 2021-09-03 LAB
ABO/RH: NORMAL
ANION GAP SERPL CALCULATED.3IONS-SCNC: 10 MMOL/L (ref 7–16)
ANTIBODY SCREEN: NORMAL
APTT: 27.5 SEC (ref 24.5–35.1)
BACTERIA: ABNORMAL /HPF
BASOPHILS ABSOLUTE: 0.11 E9/L (ref 0–0.2)
BASOPHILS RELATIVE PERCENT: 1.5 % (ref 0–2)
BILIRUBIN URINE: NEGATIVE
BLOOD, URINE: ABNORMAL
BUN BLDV-MCNC: 13 MG/DL (ref 6–23)
CALCIUM SERPL-MCNC: 9 MG/DL (ref 8.6–10.2)
CHLORIDE BLD-SCNC: 95 MMOL/L (ref 98–107)
CLARITY: ABNORMAL
CO2: 24 MMOL/L (ref 22–29)
COLOR: ABNORMAL
CREAT SERPL-MCNC: 1 MG/DL (ref 0.7–1.2)
EOSINOPHILS ABSOLUTE: 0.37 E9/L (ref 0.05–0.5)
EOSINOPHILS RELATIVE PERCENT: 5.2 % (ref 0–6)
GFR AFRICAN AMERICAN: >60
GFR NON-AFRICAN AMERICAN: >60 ML/MIN/1.73
GLUCOSE BLD-MCNC: 230 MG/DL (ref 74–99)
GLUCOSE URINE: NEGATIVE MG/DL
HCT VFR BLD CALC: 42.2 % (ref 37–54)
HEMOGLOBIN: 14.6 G/DL (ref 12.5–16.5)
IMMATURE GRANULOCYTES #: 0.05 E9/L
IMMATURE GRANULOCYTES %: 0.7 % (ref 0–5)
INR BLD: 1
KETONES, URINE: NEGATIVE MG/DL
LEUKOCYTE ESTERASE, URINE: NEGATIVE
LYMPHOCYTES ABSOLUTE: 1.59 E9/L (ref 1.5–4)
LYMPHOCYTES RELATIVE PERCENT: 22.2 % (ref 20–42)
MCH RBC QN AUTO: 30.8 PG (ref 26–35)
MCHC RBC AUTO-ENTMCNC: 34.6 % (ref 32–34.5)
MCV RBC AUTO: 89 FL (ref 80–99.9)
MONOCYTES ABSOLUTE: 0.66 E9/L (ref 0.1–0.95)
MONOCYTES RELATIVE PERCENT: 9.2 % (ref 2–12)
NEUTROPHILS ABSOLUTE: 4.39 E9/L (ref 1.8–7.3)
NEUTROPHILS RELATIVE PERCENT: 61.2 % (ref 43–80)
NITRITE, URINE: NEGATIVE
PDW BLD-RTO: 13.3 FL (ref 11.5–15)
PH UA: 5 (ref 5–9)
PLATELET # BLD: 270 E9/L (ref 130–450)
PMV BLD AUTO: 9.4 FL (ref 7–12)
POTASSIUM SERPL-SCNC: 4.1 MMOL/L (ref 3.5–5)
PROTEIN UA: 100 MG/DL
PROTHROMBIN TIME: 11.2 SEC (ref 9.3–12.4)
RBC # BLD: 4.74 E12/L (ref 3.8–5.8)
RBC UA: ABNORMAL /HPF (ref 0–2)
SODIUM BLD-SCNC: 129 MMOL/L (ref 132–146)
SPECIFIC GRAVITY UA: >=1.03 (ref 1–1.03)
UROBILINOGEN, URINE: 0.2 E.U./DL
WBC # BLD: 7.2 E9/L (ref 4.5–11.5)
WBC UA: ABNORMAL /HPF (ref 0–5)

## 2021-09-03 PROCEDURE — 86900 BLOOD TYPING SEROLOGIC ABO: CPT

## 2021-09-03 PROCEDURE — 88112 CYTOPATH CELL ENHANCE TECH: CPT

## 2021-09-03 PROCEDURE — 71046 X-RAY EXAM CHEST 2 VIEWS: CPT

## 2021-09-03 PROCEDURE — 93005 ELECTROCARDIOGRAM TRACING: CPT | Performed by: EMERGENCY MEDICINE

## 2021-09-03 PROCEDURE — 85610 PROTHROMBIN TIME: CPT

## 2021-09-03 PROCEDURE — 81001 URINALYSIS AUTO W/SCOPE: CPT

## 2021-09-03 PROCEDURE — 80048 BASIC METABOLIC PNL TOTAL CA: CPT

## 2021-09-03 PROCEDURE — 36415 COLL VENOUS BLD VENIPUNCTURE: CPT

## 2021-09-03 PROCEDURE — 86901 BLOOD TYPING SEROLOGIC RH(D): CPT

## 2021-09-03 PROCEDURE — 85730 THROMBOPLASTIN TIME PARTIAL: CPT

## 2021-09-03 PROCEDURE — 99283 EMERGENCY DEPT VISIT LOW MDM: CPT

## 2021-09-03 PROCEDURE — 86850 RBC ANTIBODY SCREEN: CPT

## 2021-09-03 PROCEDURE — 99214 OFFICE O/P EST MOD 30 MIN: CPT | Performed by: SURGERY

## 2021-09-03 PROCEDURE — 93005 ELECTROCARDIOGRAM TRACING: CPT | Performed by: SURGERY

## 2021-09-03 PROCEDURE — 85025 COMPLETE CBC W/AUTO DIFF WBC: CPT

## 2021-09-03 NOTE — ED NOTES
This nurse attempted to complete EKG but patient is down in radiology department     Nisha Linn, RN  09/03/21 [id number]

## 2021-09-03 NOTE — CONSULTS
tablet by mouth daily 90 tablet 1    lisinopril (PRINIVIL;ZESTRIL) 10 MG tablet TAKE ONE TABLET BY MOUTH EVERY DAY 90 tablet 1    sotalol (BETAPACE) 80 MG tablet TAKE ONE-HALF TABLET BY MOUTH TWICE DAILY 180 tablet 1    metoprolol succinate (TOPROL XL) 50 MG extended release tablet Take 1 tablet by mouth daily 90 tablet 1    metFORMIN (GLUCOPHAGE) 500 MG tablet Take 1 tablet by mouth 2 times daily (with meals) 180 tablet 1    ibuprofen (ADVIL;MOTRIN) 200 MG tablet Take 200 mg by mouth every 6 hours as needed for Pain      nitroGLYCERIN (NITROSTAT) 0.4 MG SL tablet Place 1 tablet under the tongue every 5 minutes as needed for Chest pain. (Patient not taking: Reported on 9/3/2020) 25 tablet 3       Past Medical History:   Diagnosis Date    Arthritis     CAD (coronary artery disease)     Cancer ([facility] 2017    Colon    Combined systolic and diastolic heart failure ([facility] 6/15/13    echo LVEF of 30-35%  stage II diastolic dysfunction    COPD (chronic obstructive pulmonary disease) ([facility] [address] [facility]     Diabetes mellitus (HCC)     GERD (gastroesophageal reflux disease)     History of placement of internal cardiac defibrillator 2014?     [employer] ([place])    Hypertension     Sigmoid diverticulosis 8/31/2015    Sinusitis     Tubular adenoma of colon 8/31/2015    Vertigo        Past Surgical History:   Procedure Laterality Date    CARDIAC DEFIBRILLATOR PLACEMENT      COLONOSCOPY  8/31/15    with polypectomy (x2) - Dr. Sheila Atkins  1/13/14    3.5/15 Xience in [place] and 3.0/15 Resolute in th 1st obtuse marginal.    HIP FRACTURE SURGERY Right 3/14/2020    RIGHT HIP OPEN REDUCTION INTERNAL FIXATION NAIL-SYNTHES -- OC 2 performed by Dee Callaway DO at [address]         Family History   Problem Relation Age of Onset    Heart Disease Mother     Cancer Father         abdominal    Cancer Brother         prostate    Cancer Brother         digestive Social History     Socioeconomic History    Marital status:      Spouse name: Not on file    Number of children: Not on file    Years of education: Not on file    Highest education level: Not on file   Occupational History    Not on file   Tobacco Use    Smoking status: Former Smoker     Packs/day: 2.00     Years: 50.00     Pack years: 100.00     Types: Cigarettes     Start date: 1970     Quit date: 2014     Years since quittin.6    Smokeless tobacco: Never Used   Vaping Use    Vaping Use: Some days   Substance and Sexual Activity    Alcohol use: No     Comment: rarely    Drug use: No    Sexual activity: Not on file   Other Topics Concern    Not on file   Social History Narrative    Not on file     Social Determinants of Health     Financial Resource Strain:     Difficulty of Paying Living Expenses:    Food Insecurity:     Worried About [address] in the Last Year:     [address] [employer] in the Last Year:    Transportation Needs:     Lack of Transportation (Medical):  Lack of Transportation (Non-Medical):    Physical Activity:     Days of Exercise per Week:     Minutes of Exercise per Session:    Stress:     Feeling of Stress :    Social Connections:     Frequency of Communication with Friends and Family:     Frequency of Social Gatherings with Friends and Family:     Attends Gnosticist Services:     Active Member of Clubs or Organizations:     Attends Club or Organization Meetings:     Marital Status:    Intimate Partner Violence:     Fear of Current or Ex-Partner:     Emotionally Abused:     Physically Abused:     Sexually Abused:        Review of Systems:  Skin:  No abnormal pigmentation or rash. Eyes:  No blurring, diplopia or vision loss. Ears/Nose/Throat:  No hearing loss or vertigo.   Hoarseness of voice noted, patient was told most likely this was due to acid reflux laryngitis, of many years duration    Respiratory:  No cough, pleuritic chest pain, dyspnea, or wheezing. Cardiovascular: No angina, palpitations . Coronary artery disease, history of cardiac arrhythmia, left ventricle dysfunction, ejection fraction on the last stress test was approximately 41%, did not have access to the recent 2D echo of the heart etc. that would then probably done as an outpatient, history of chronic systolic congestive heart failure, hypertension, hyperlipidemia    Gastrointestinal:  No nausea or vomiting; no abdominal pain or rectal bleeding. History of diverticulosis, GERD, reflux laryngitis    Musculoskeletal:  No arthritis or weakness. Neurologic:  No paralysis, paresis, seizures or headaches. Hematologic/Lymphatic/Immunologic:  No anemia, abnormal bleeding/bruising. Endocrine:  No heat or cold intolerance. No polyphagia, polydipsia or polyuria. Diabetes mellitus    Urology: History of carcinoma of the prostate, currently has hematuria, with a CT scan suspicious for bladder cancer      Physical Exam:  /77   Pulse 71   Temp 95.6 °F (35.3 °C) (Temporal)   Resp 20   Ht 6' (1.829 m)   Wt 210 lb (95.3 kg)   SpO2 98%   BMI 28.48 kg/m²   General appearance:  Alert, awake, oriented x 3. No distress. Skin:  Warm and dry. Head:  Normocephalic. No masses, lesions or tenderness. Eyes:  Conjunctivae appear normal; PERRL. Ears:  External ears normal.  Nose/Sinuses:  Septum midline, mucosa normal; no drainage. Oropharynx:  Clear, no exudate noted. Hoarseness of voice noted      Neck:  No jugular venous distention, lymphadenopathy or thyromegaly. No evidence of carotid bruit    Chest: Defibrillator noted over the left subclavian fossa      Lungs:  Clear to ausculation bilaterally. No rhonchi, crackles, wheezes. Heart:  Regular rate and rhythm. No rub or murmur. .    Abdomen:  Soft, non-tender. No masses, organomegaly.   Prominent pulse noted on deep palpation nontender    Musculoskeletal: No joint effusions, tenderness swelling or warmth. Neuro: Speech is intact. Moving all extremities. No focal motor or sensory deficits. Extremities:  Both feet are warm to touch. The color of both feet is normal.          Pulses Right  Left    Brachial 3 3    Radial    3=normal   Femoral 3 3  2=diminished   Popliteal    1=barely palpable   Dorsalis pedis 2 2  0=absent   Posterior tibial    4=aneurysmal           Other pertinent information:1. The past medical records were reviewed. 2.    Lab Results   Component Value Date    WBC 7.2 09/03/2021    HGB 14.6 09/03/2021    HCT 42.2 09/03/2021    MCV 89.0 09/03/2021     09/03/2021      Lab Results   Component Value Date     (L) 09/03/2021    K 4.1 09/03/2021    CL 95 (L) 09/03/2021    CO2 24 09/03/2021    BUN 13 09/03/2021    CREATININE 1.0 09/03/2021    GLUCOSE 230 (H) 09/03/2021    CALCIUM 9.0 09/03/2021    PROT 7.8 02/12/2021    LABALBU 3.8 02/12/2021    BILITOT 0.3 02/12/2021    ALKPHOS 125 02/12/2021    AST 13 02/12/2021    ALT 10 02/12/2021    LABGLOM >60 09/03/2021    GFRAA >60 09/03/2021     Lab Results   Component Value Date    APTT 27.5 09/03/2021      Lab Results   Component Value Date    INR 1.0 09/03/2021    INR 1.1 03/13/2020    INR 1.0 01/11/2014    PROTIME 11.2 09/03/2021    PROTIME 12.1 03/13/2020    PROTIME 12.3  01/11/2014        3. XR CHEST (2 VW)   Final Result   No acute cardiopulmonary disease. No significant change. 4.  The previous records, stress test, etc. were reviewed    5. The CT scan of abdomen pelvis was personally reviewed by me, revealed infrarenal abdominal aortic condition, approximately 6 x 6.2 cm diameter, with dilatation of the right common iliac artery, 2.6 cm, left common iliac artery, 2.2 cm with evidence of diverticulosis as well    Patient also thickening of the bladder wall, suspicious for malignancy versus a clot    Assessment:    1.   Asymptomatic abdominal aortic rhythm, maximum diameter 6.2 cm, with dilated iliac arteries bilaterally    2. Hematuria, suprapubic discomfort on and off for last 2 months at least, with CT scan revealing evidence of thickening of the bladder wall on the left side, suspicious for malignancy versus clot, history of carcinoma of the prostate    3.   Coronary artery disease, left ventricle dysfunction, history of congestive heart failure, cardiac arrhythmia, post insertion of a defibrillator, hypertension, hyperlipidemia      Patient Active Problem List   Diagnosis    Dysrhythmia    Essential hypertension    CHF (congestive heart failure) (HCC)    COPD, very severe (HCC)    Reflux laryngitis    CAD (coronary artery disease)    Abscess of bursa, left elbow    Diabetes mellitus type II, uncontrolled ([address]    Tubular adenoma of colon    Sigmoid diverticulosis    Adhesive capsulitis of both shoulders    Type 2 diabetes mellitus without complication, without long-term current use of insulin (HCC)    Prostate cancer ([address]    Mixed hyperlipidemia    History of adenomatous polyp of colon    Family history of cancer    ICD (implantable cardioverter-defibrillator) in place    Hip fracture requiring operative repair, right, closed, initial encounter ([address]    Cerebellar hemorrhage ([address]    Chronic systolic (congestive) heart failure ([address]    Localized osteoarthritis of right knee            Plan:     I had a long and detailed discussion with patient, options, risks benefits and alternatives were explained to the patient, patient was informed that he does indeed have a 6 cm abdominal aortic aneurysm and in the ideal situation, should consider intervention provided patient stable from medical and cardiac point of view    As he does have significant cardiac issues, informed him, that I will contact both his cardiologist office for their input before considering intervention    Also will need urology opinion regarding the hematuria that the patient is having, possible bladder tumor, hematuria of 2 months duration, told me that he does have an appointment to see his urologist    Discussed with the ER physician, Dr. Rachna Chavez, at the patient otherwise stable from a vascular perspective, if the patient is admitted, for hematuria will follow him in the hospital setting    If discharged, will set up an appointment for the patient to see Dr. Jovany Bland next week on Wednesday and I will have the office contact the patient to set up the appointment    I have already discussed Dr. Jovany Bland who already reviewed the CT scan of the abdomen pelvis    Patient also was informed, if discharged, to come back to the hospital for any abdominal pain or back pain clearly explained    All his questions were answered    Thank you for letting me participate in the care of your patient            Electronically signed by Comer Goltz, MD on 9/3/2021 at 4:20 PM

## 2021-09-03 NOTE — ED PROVIDER NOTES
[facility]  Department of Emergency Medicine   ED  Encounter Note  Admit Date/RoomTime: 9/3/2021  1:39 PM  ED Room: 15/15    NAME: Susy Singer  : 1950  MRN: [id number]     Chief Complaint:  Abdominal Pain and Hematuria    History of Present Illness        Susy Singer is a 70 y.o. old male who presents to the emergency department for evaluation of abnormal outpatient testing. He has had some lower abdominal/scrotal discomfort and hematuria for the past 1 to 2 months. His family doctor obtained an ultrasound and CAT scan. He was found to have thickening of the bladder concerning for possible neoplasm as well as 6.2 cm abdominal aortic aneurysm. He was sent in by his PCP today for vascular surgery consultation. Patient currently denies any abdominal pain or back pain. No syncope or lightheadedness. .  ROS   Pertinent positives and negatives are stated within HPI, all other systems reviewed and are negative. Past Medical History:  has a past medical history of Abdominal aortic aneurysm without rupture ([address] Arthritis, CAD (coronary artery disease), Cancer ([address] Combined systolic and diastolic heart failure ([address] COPD (chronic obstructive pulmonary disease) ([address] Diabetes mellitus ([address] GERD (gastroesophageal reflux disease), History of placement of internal cardiac defibrillator, Hypertension, Sigmoid diverticulosis, Sinusitis, Tubular adenoma of colon, and Vertigo. Surgical History:  has a past surgical history that includes Coronary angioplasty with stent (14); Umbilical hernia repair; Colonoscopy (8/31/15); Cardiac defibrillator placement; and Hip fracture surgery (Right, 3/14/2020). Social History:  reports that he quit smoking about 7 years ago. His smoking use included cigarettes. He started smoking about 51 years ago. He has a 100.00 pack-year smoking history.  He has never used smokeless tobacco. He reports that he does not drink alcohol and does not use drugs. Family History: family history includes Cancer in his brother, brother, and father; Heart Disease in his mother. Allergies: Patient has no known allergies. Physical Exam   Oxygen Saturation Interpretation: Normal.        ED Triage Vitals [09/03/21 1345]   BP Temp Temp Source Pulse Resp SpO2 Height Weight   117/77 95.6 °F (35.3 °C) Temporal 71 20 98 % 6' (1.829 m) 210 lb (95.3 kg)         General Appearance/Constitutional:  Alert, development consistent with age. HEENT:  NC/NT. PERRLA. Airway patent. Neck:  Supple. No lymphadenopathy. Respiratory: Lungs Clear to auscultation and breath sounds equal.  CV:  Regular rate and rhythm. Pacer L chest  GI:  normal appearing, non-distended with no visible hernias. Bowel sounds: normal bowel sounds. Tenderness: No abdominal tenderness, guarding, rebound, rigidity or pulsatile mass. .           Liver: non-tender. Spleen:  non-tender. Back: CVA Tenderness: No CVA tenderness. Integument:  Normal turgor. Warm, dry, without visible rash, unless noted elsewhere. Neurological:  Orientation age-appropriate. Motor functions intact.     Lab / Imaging Results   (All laboratory and radiology results have been personally reviewed by myself)  Labs:  Results for orders placed or performed during the hospital encounter of 09/03/21   CBC Auto Differential   Result Value Ref Range    WBC 7.2 4.5 - 11.5 E9/L    RBC 4.74 3.80 - 5.80 E12/L    Hemoglobin 14.6 12.5 - 16.5 g/dL    Hematocrit 42.2 37.0 - 54.0 %    MCV 89.0 80.0 - 99.9 fL    MCH 30.8 26.0 - 35.0 pg    MCHC 34.6 (H) 32.0 - 34.5 %    RDW 13.3 11.5 - 15.0 fL    Platelets 717 226 - 184 E9/L    MPV 9.4 7.0 - 12.0 fL    Neutrophils % 61.2 43.0 - 80.0 %    Immature Granulocytes % 0.7 0.0 - 5.0 %    Lymphocytes % 22.2 20.0 - 42.0 %    Monocytes % 9.2 2.0 - 12.0 %    Eosinophils % 5.2 0.0 - 6.0 %    Basophils % 1.5 0.0 - 2.0 %    Neutrophils Absolute 4.39 1.80 - 7.30 E9/L    Immature Granulocytes # 0.05 E9/L    Lymphocytes Absolute 1.59 1.50 - 4.00 E9/L    Monocytes Absolute 0.66 0.10 - 0.95 E9/L    Eosinophils Absolute 0.37 0.05 - 0.50 E9/L    Basophils Absolute 0.11 0.00 - 0.20 V2/V   Basic metabolic panel   Result Value Ref Range    Sodium 129 (L) 132 - 146 mmol/L    Potassium 4.1 3.5 - 5.0 mmol/L    Chloride 95 (L) 98 - 107 mmol/L    CO2 24 22 - 29 mmol/L    Anion Gap 10 7 - 16 mmol/L    Glucose 230 (H) 74 - 99 mg/dL    BUN 13 6 - 23 mg/dL    CREATININE 1.0 0.7 - 1.2 mg/dL    GFR Non-African American >60 >=60 mL/min/1.73    GFR African American >60     Calcium 9.0 8.6 - 10.2 mg/dL   Urinalysis   Result Value Ref Range    Color, UA BROWN (A) Straw/Yellow    Clarity, UA CLOUDY (A) Clear    Glucose, Ur Negative Negative mg/dL    Bilirubin Urine Negative Negative    Ketones, Urine Negative Negative mg/dL    Specific Gravity, UA >=1.030 1.005 - 1.030    Blood, Urine LARGE (A) Negative    pH, UA 5.0 5.0 - 9.0    Protein,  (A) Negative mg/dL    Urobilinogen, Urine 0.2 <2.0 E.U./dL    Nitrite, Urine Negative Negative    Leukocyte Esterase, Urine Negative Negative   Protime-INR   Result Value Ref Range    Protime 11.2 9.3 - 12.4 sec    INR 1.0    APTT   Result Value Ref Range    aPTT 27.5 24.5 - 35.1 sec   Microscopic Urinalysis   Result Value Ref Range    WBC, UA 1-3 0 - 5 /HPF    RBC, UA PACKED 0 - 2 /HPF    Bacteria, UA MODERATE (A) None Seen /HPF   EKG 12 Lead   Result Value Ref Range    Ventricular Rate 71 BPM    Atrial Rate 71 BPM    P-R Interval 156 ms    QRS Duration 86 ms    Q-T Interval 428 ms    QTc Calculation (Bazett) 465 ms    P Axis 29 degrees    R Axis 2 degrees    T Axis -83 degrees   TYPE AND SCREEN   Result Value Ref Range    ABO/Rh O POS     Antibody Screen NEG      Imaging: All Radiology results interpreted by Radiologist unless otherwise noted. XR CHEST (2 VW)   Final Result   No acute cardiopulmonary disease. No significant change. ED Course / Medical Decision Making   Medications - No data to display     Re-Evaluations:  9/3/21      Patients condition remains unchanged. Consultations:             IP CONSULT TO VASCULAR SURGERY    Procedures:   none    MDM: Patient presents to the ED for evaluation of 6.2 cm abdominal aortic aneurysm found on outpatient imaging. He currently has no abdominal pain or pulsatile mass on exam.  No back pain or syncopal events. He was evaluated by vascular surgery here in the emergency department. Patient will be discharged home and will need several follow-up appointments. He states he is already scheduled to see urology for his hematuria, likely will need a cystoscopy to evaluate for possible malignancy. He additionally will see his cardiologist, Dr. Bria Johnson for cardiac clearance. He will see vascular surgery next week for further evaluation and possible surgical intervention. Strict return precautions to the ED discussed including but not limited to abdominal pain, back pain and syncope. Plan of Care/Counseling:  Regis Schultz DO  reviewed today's visit with the patient in addition to providing specific details for the plan of care and counseling regarding the diagnosis and prognosis. Questions are answered at this time and are agreeable with the plan. Assessment      1. Hematuria, unspecified type    2. AAA (abdominal aortic aneurysm) without rupture ([address]      This patient's ED course included: a personal history and physicial examination  This patient has remained hemodynamically stable during their ED course. Plan   Discharged home  Patient condition is stable. New Medications     Discharge Medication List as of 9/3/2021  4:38 PM        Electronically signed by Regis Schultz DO   DD: 9/3/21  **This report was transcribed using voice recognition software. Every effort was made to ensure accuracy; however, inadvertent computerized transcription errors may be present.   END OF PROVIDER NOTE        Kimmie Gloria DO  09/03/21 1959

## 2021-09-04 LAB
EKG ATRIAL RATE: 71 BPM
EKG P AXIS: 29 DEGREES
EKG P-R INTERVAL: 156 MS
EKG Q-T INTERVAL: 428 MS
EKG QRS DURATION: 86 MS
EKG QTC CALCULATION (BAZETT): 465 MS
EKG R AXIS: 2 DEGREES
EKG T AXIS: -83 DEGREES
EKG VENTRICULAR RATE: 71 BPM

## 2021-09-05 LAB — URINE CULTURE, ROUTINE: NORMAL

## 2021-09-06 LAB
EKG ATRIAL RATE: 68 BPM
EKG P AXIS: 43 DEGREES
EKG P-R INTERVAL: 158 MS
EKG Q-T INTERVAL: 414 MS
EKG QRS DURATION: 88 MS
EKG QTC CALCULATION (BAZETT): 440 MS
EKG R AXIS: 13 DEGREES
EKG T AXIS: -101 DEGREES
EKG VENTRICULAR RATE: 68 BPM

## 2021-09-07 ENCOUNTER — TELEPHONE (OUTPATIENT)
Dept: VASCULAR SURGERY | Age: 71
End: 2021-09-07

## 2021-09-08 ENCOUNTER — OFFICE VISIT (OUTPATIENT)
Dept: VASCULAR SURGERY | Age: 71
End: 2021-09-08
Payer: MEDICARE

## 2021-09-08 ENCOUNTER — TELEPHONE (OUTPATIENT)
Dept: ADMINISTRATIVE | Age: 71
End: 2021-09-08

## 2021-09-08 VITALS — SYSTOLIC BLOOD PRESSURE: 126 MMHG | DIASTOLIC BLOOD PRESSURE: 88 MMHG

## 2021-09-08 DIAGNOSIS — I71.40 AAA (ABDOMINAL AORTIC ANEURYSM) WITHOUT RUPTURE: Primary | ICD-10-CM

## 2021-09-08 DIAGNOSIS — Z85.46 H/O PROSTATE CANCER: ICD-10-CM

## 2021-09-08 DIAGNOSIS — R31.0 GROSS HEMATURIA: ICD-10-CM

## 2021-09-08 DIAGNOSIS — I71.40 ABDOMINAL AORTIC ANEURYSM (AAA) WITHOUT RUPTURE: Primary | ICD-10-CM

## 2021-09-08 DIAGNOSIS — N32.89 BLADDER WALL THICKENING: Primary | ICD-10-CM

## 2021-09-08 DIAGNOSIS — Z80.9 FAMILY HISTORY OF CANCER: ICD-10-CM

## 2021-09-08 DIAGNOSIS — Z01.818 PRE-OP EVALUATION: ICD-10-CM

## 2021-09-08 PROCEDURE — 1123F ACP DISCUSS/DSCN MKR DOCD: CPT | Performed by: SURGERY

## 2021-09-08 PROCEDURE — G8427 DOCREV CUR MEDS BY ELIG CLIN: HCPCS | Performed by: SURGERY

## 2021-09-08 PROCEDURE — 3017F COLORECTAL CA SCREEN DOC REV: CPT | Performed by: SURGERY

## 2021-09-08 PROCEDURE — 4040F PNEUMOC VAC/ADMIN/RCVD: CPT | Performed by: SURGERY

## 2021-09-08 PROCEDURE — G8417 CALC BMI ABV UP PARAM F/U: HCPCS | Performed by: SURGERY

## 2021-09-08 PROCEDURE — 99214 OFFICE O/P EST MOD 30 MIN: CPT | Performed by: SURGERY

## 2021-09-08 PROCEDURE — 1036F TOBACCO NON-USER: CPT | Performed by: SURGERY

## 2021-09-08 RX ORDER — ASPIRIN 81 MG/1
81 TABLET ORAL DAILY
COMMUNITY
End: 2021-10-13 | Stop reason: CLARIF

## 2021-09-08 NOTE — PROGRESS NOTES
Vascular Surgery Outpatient Progress Note      Chief Complaint   Patient presents with    Surgical Consult     AAA       HISTORY OF PRESENT ILLNESS:                The patient is a 70 y.o. male who returns for follow-up evaluation of AAA. Patient was seen in ED 9/3 for incidentally found 6.2 cm abdominal aortic aneurysm on CT abd/pelv ordered for hematuria. CT showed possible bladder neoplasm that he is seeing urology for. He has a defibrillator in place, follows with EP, Dr. Javon Donovan. He has not seen Dr. Sampson Al, his cardiologist in a couple of years. He denies any abdominal or back pain. He denies any prior abdominal surgeries. Past Medical History:        Diagnosis Date    Abdominal aortic aneurysm without rupture ([facility] 9/3/2021    Arthritis     CAD (coronary artery disease)     Cancer ([facility] 2017    Colon    Combined systolic and diastolic heart failure ([facility] 6/15/13    echo LVEF of 30-35%  stage II diastolic dysfunction    COPD (chronic obstructive pulmonary disease) ([facility]     Diabetes mellitus (HCC)     GERD (gastroesophageal reflux disease)     History of placement of internal cardiac defibrillator 2014? [employer] ([place])    Hypertension     Sigmoid diverticulosis 8/31/2015    Sinusitis     Tubular adenoma of colon 8/31/2015    Vertigo      Past Surgical History:        Procedure Laterality Date    CARDIAC DEFIBRILLATOR PLACEMENT      COLONOSCOPY  8/31/15    with polypectomy (x2) - Dr. Lance Fisher  1/13/14    3.5/15 Xience in [place] and 3.0/15 Resolute in th 1st obtuse marginal.    HIP FRACTURE SURGERY Right 3/14/2020    RIGHT HIP OPEN REDUCTION INTERNAL FIXATION NAIL-SYNTHES -- OC 2 performed by Taqueria Dean DO at [address]       Current Medications:   Prior to Admission medications    Medication Sig Start Date End Date Taking?  Authorizing Provider   aspirin EC 81 MG EC tablet Take 81 mg by mouth daily   Yes Historical Provider, MD   cephALEXin (KEFLEX) 500 MG capsule Take 1 capsule by mouth nightly 21 Yes Gamaliel Colunga DO   clopidogrel (PLAVIX) 75 MG tablet Take 1 tablet by mouth daily 21  Yes Gamaliel Colunga, DO   atorvastatin (LIPITOR) 40 MG tablet Take 1 tablet by mouth daily 21 Yes Gamaliel Colunga, DO   lisinopril (PRINIVIL;ZESTRIL) 10 MG tablet TAKE ONE TABLET BY MOUTH EVERY DAY 21  Yes Toñito Colunga, DO   sotalol (BETAPACE) 80 MG tablet TAKE ONE-HALF TABLET BY MOUTH TWICE DAILY 21  Yes Toñito Colunga, DO   metoprolol succinate (TOPROL XL) 50 MG extended release tablet Take 1 tablet by mouth daily 21  Yes Gamaliel Colunga DO   metFORMIN (GLUCOPHAGE) 500 MG tablet Take 1 tablet by mouth 2 times daily (with meals) 21  Yes Toñito Colunga DO   ibuprofen (ADVIL;MOTRIN) 200 MG tablet Take 200 mg by mouth every 6 hours as needed for Pain   Yes Historical Provider, MD   nitroGLYCERIN (NITROSTAT) 0.4 MG SL tablet Place 1 tablet under the tongue every 5 minutes as needed for Chest pain. Patient not taking: Reported on 9/3/2020 1/17/14   Jermaine Conde DO     Allergies:  Patient has no known allergies.     Social History     Socioeconomic History    Marital status:      Spouse name: Not on file    Number of children: Not on file    Years of education: Not on file    Highest education level: Not on file   Occupational History    Not on file   Tobacco Use    Smoking status: Former Smoker     Packs/day: 2.00     Years: 50.00     Pack years: 100.00     Types: Cigarettes     Start date: 1970     Quit date: 2014     Years since quittin.6    Smokeless tobacco: Never Used   Vaping Use    Vaping Use: Some days    Substances: Nicotine   Substance and Sexual Activity    Alcohol use: Yes     Comment: rarely    Drug use: No    Sexual activity: Not on file   Other Topics Concern    Not on file   Social History Narrative    Not on file     Social Determinants of Health     Financial Resource Strain:     Difficulty of Paying Living Expenses:    Food Insecurity:     Worried About Running Out of Food in the Last Year:     [address] in the Last Year:    Transportation Needs:     Lack of Transportation (Medical):      Lack of Transportation (Non-Medical):    Physical Activity:     Days of Exercise per Week:     Minutes of Exercise per Session:    Stress:     Feeling of Stress :    Social Connections:     Frequency of Communication with Friends and Family:     Frequency of Social Gatherings with Friends and Family:     Attends Spiritism Services:     Active Member of Clubs or Organizations:     Attends Club or Organization Meetings:     Marital Status:    Intimate Partner Violence:     Fear of Current or Ex-Partner:     Emotionally Abused:     Physically Abused:     Sexually Abused:         Family History   Problem Relation Age of Onset    Heart Disease Mother     Cancer Father         abdominal    Cancer Brother         prostate    Cancer Brother         digestive       REVIEW OF SYSTEMS (New symptoms):    Eyes:      Blurred vision:  No [x]/Yes []               Diplopia:   No [x]/Yes []               Vision loss:       No [x]/Yes []   Ears, nose, throat:             Hearing loss:    No [x]/Yes []      Vertigo:   No [x]/Yes []                       Swallowing problem:  No [x]/Yes []               Nose bleeds:   No [x]/Yes []      Voice hoarseness:  No [x]/Yes []  Respiratory:             Cough:   No [x]/Yes []      Pleuritic chest pain:  No [x]/Yes []                        Dyspnea:   No [x]/Yes []      Wheezing:   No [x]/Yes []  Cardiovascular:             Angina:   No [x]/Yes []      Palpitations:   No [x]/Yes []          Claudication:    No [x]/Yes []      Leg swelling:   No [x]/Yes []  Gastrointestinal:             Nausea or vomiting:  No [x]/Yes []               Abdominal pain:  No [x]/Yes []                     Intestinal bleeding: No [x]/Yes []  Musculoskeletal:             Leg pain:   No [x]/Yes []      Back pain:   No [x]/Yes []                    Weakness:   No [x]/Yes []  Neurologic:             Numbness:   No [x]/Yes []      Paralysis:   No [x]/Yes []                       Headaches:   No [x]/Yes []  Hematologic, lymphatic:   Anemia:   No [x]/Yes []              Bleeding or bruising:  No [x]/Yes []              Fevers or chills: No [x]/Yes []  Endocrine:             Temp intolerance:   No [x]/Yes []                       Polydipsia, polyuria:  No [x]/Yes []  Skin:              Rash:    No [x]/Yes []      Ulcers:   No [x]/Yes []              Abnorm pigment: No [x]/Yes []  :              Frequency/urgency:  No [x]/Yes []      Hematuria:    No [x]/Yes []                      Incontinence:    No [x]/Yes []    PHYSICAL EXAM:  Vitals:    09/08/21 1127   BP: 126/88     General Appearance: alert and oriented to person, place and time, in no acute distress, well developed and well- nourished  Neurologic: no cranial nerve deficit, speech normal  Head: normocephalic and atraumatic  Eyes: extraocular eye movements intact, conjunctivae normal  ENT: external ear and ear canal normal bilaterally, nose without deformity, no carotid bruits  Pulmonary/Chest: normal air movement, no respiratory distress  Cardiovascular: normal rate, regular rhythm, no murmur  Abdomen: non-distended, + palpable abdominal aorta  Musculoskeletal: no joint deformity or tenderness  Extremities: no leg edema bilaterally  Skin: warm and dry, no rash or erythema    PULSE EXAM      Right      Left   Brachial     Radial     Femoral     Popliteal     Dorsalis Pedis 3 3   Posterior Tibial 3 3   (3=normal, 2=diminished, 1=barely palpable, 4=widened)    RADIOLOGY: SA today    Problem List Items Addressed This Visit     None          Tamica Holder is a 69 yo male who presents with 6.2 cm AAA    - Reviewed CT abdomen and pelv.  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 We discussed that he is a candidate for EVAR. We discussed with patient that we will likely need to cover his bilateral hypogastric arteries with stents when we do the procedure. The risks, benefits, alternatives, and potential complications of the procedure, including the risks of bleeding, infection, injury to surrounding structures, need for additional procedures, and death were explained to the patient. All questions were answered. The patient understands and agrees to proceed with the procedure. - Before scheduling procedure we discussed that he will need to have cardiac clearance with his Cardiologist Dr. Vicki Figueroa. Patient also requested that he discuss the procedure with his primary care physician Dr. Bustillo. Electronically signed by Tammy Adams MD on 9/8/2021 at 12:11 PM        No follow-ups on file. This patient was seen and examined. Agree with above. At this point he has a 6.2 cm infrarenal abdominal aortic aneurysm. I have reviewed this with the Tarkio wraps. At this point I think he be amenable to an endovascular stent graft. I have reviewed risk benefits and alternatives with the patient in the office include but not limited to bleeding, infection, arteriovenous nerve injury, myocardial infarction, respiratory failure, anesthetic reaction, pain swelling or tenderness, bowel ischemia, distal embolization, failure of the procedure need for conversion to open lower extremity limb loss and or death. He understands and wishes to proceed. His cardiologist is Dr. Vicki Figueroa. We will see if we can get cardiac evaluation and clearance. All questions were answered according to the patient satisfaction.     Cortney Gramajo

## 2021-09-08 NOTE — TELEPHONE ENCOUNTER
Patient Appointment Form:      PCP: Dr. Antoni Lynch  Referring: Dr. Rubio Griffin    Has the Patient:    Seen a Cardiologist? yes    date:3-4 yrs ago  Physician:Dr. Therese Soares  location:office and hospital  - seen by Dr. Jeffie Hodgkin 9/19/18  Had a heart catheterization? no    Had heart surgery? no    Had a stress test or nuclear stress test? yes   date: 9/26/18   facility name:  [facility]    Had an echocardiogram? yes   date: 2014   facility name:  [address] [place] [address],[address]    Had a vascular ultrasound?  other: not sure    Had a 24/48 heart monitor or extended cardiac event monitor? no    Had recent blood work in the last 6 months? yes    date: 9-3-21    ordering physician: Dr. Epi Aguilar    Had a pacemaker/ICD/ILR implant? yes: ICD     device company: [employer]    Seen an Electrophysiologist? yes  date: 2021   physician: Dr. Teofilo Degroot   location: office    Had a cardiac ablation? unknown      Will send records via: fax      Date & time of appointment:  9/13/21 1:30 Dr. Pascual Vasquez

## 2021-09-13 ENCOUNTER — TELEPHONE (OUTPATIENT)
Dept: VASCULAR SURGERY | Age: 71
End: 2021-09-13

## 2021-09-13 ENCOUNTER — OFFICE VISIT (OUTPATIENT)
Dept: CARDIOLOGY CLINIC | Age: 71
End: 2021-09-13
Payer: MEDICARE

## 2021-09-13 VITALS
SYSTOLIC BLOOD PRESSURE: 117 MMHG | RESPIRATION RATE: 18 BRPM | DIASTOLIC BLOOD PRESSURE: 71 MMHG | BODY MASS INDEX: 28.42 KG/M2 | HEIGHT: 72 IN | HEART RATE: 90 BPM | WEIGHT: 209.8 LBS

## 2021-09-13 DIAGNOSIS — I25.10 CORONARY ARTERY DISEASE INVOLVING NATIVE HEART WITHOUT ANGINA PECTORIS, UNSPECIFIED VESSEL OR LESION TYPE: ICD-10-CM

## 2021-09-13 DIAGNOSIS — I71.40 ABDOMINAL AORTIC ANEURYSM WITHOUT RUPTURE: ICD-10-CM

## 2021-09-13 DIAGNOSIS — Z01.810 PREOP CARDIOVASCULAR EXAM: Primary | ICD-10-CM

## 2021-09-13 DIAGNOSIS — I42.0 DILATED CARDIOMYOPATHY (HCC): ICD-10-CM

## 2021-09-13 PROBLEM — Z80.9 FAMILY HISTORY OF CANCER: Status: RESOLVED | Noted: 2018-08-28 | Resolved: 2021-09-13

## 2021-09-13 PROBLEM — M17.11 LOCALIZED OSTEOARTHRITIS OF RIGHT KNEE: Status: RESOLVED | Noted: 2020-09-03 | Resolved: 2021-09-13

## 2021-09-13 PROBLEM — Z86.010 HISTORY OF ADENOMATOUS POLYP OF COLON: Status: RESOLVED | Noted: 2018-08-28 | Resolved: 2021-09-13

## 2021-09-13 PROCEDURE — 1123F ACP DISCUSS/DSCN MKR DOCD: CPT | Performed by: INTERNAL MEDICINE

## 2021-09-13 PROCEDURE — 93000 ELECTROCARDIOGRAM COMPLETE: CPT | Performed by: INTERNAL MEDICINE

## 2021-09-13 PROCEDURE — G8427 DOCREV CUR MEDS BY ELIG CLIN: HCPCS | Performed by: INTERNAL MEDICINE

## 2021-09-13 PROCEDURE — G8417 CALC BMI ABV UP PARAM F/U: HCPCS | Performed by: INTERNAL MEDICINE

## 2021-09-13 PROCEDURE — 1036F TOBACCO NON-USER: CPT | Performed by: INTERNAL MEDICINE

## 2021-09-13 PROCEDURE — 4040F PNEUMOC VAC/ADMIN/RCVD: CPT | Performed by: INTERNAL MEDICINE

## 2021-09-13 PROCEDURE — 3017F COLORECTAL CA SCREEN DOC REV: CPT | Performed by: INTERNAL MEDICINE

## 2021-09-13 PROCEDURE — 99205 OFFICE O/P NEW HI 60 MIN: CPT | Performed by: INTERNAL MEDICINE

## 2021-09-13 NOTE — PROGRESS NOTES
Chief Complaint   Patient presents with    Cardiac Clearance    Cardiomyopathy    Coronary Artery Disease       Patient Active Problem List    Diagnosis Date Noted    Essential hypertension 03/03/2012     Priority: Medium    Abdominal aortic aneurysm without rupture ([facility] 09/03/2021     Overview Note:     6 x 6.2 cm in diameter with the dilated iliac arteries      Cerebellar hemorrhage ([address] 03/14/2020    Dilated cardiomyopathy ([address] 03/14/2020     Overview Note:     A. Echo 2012: EF 30-35%  B. Echo 2014: normal EF      ICD (implantable cardioverter-defibrillator) in place 08/31/2018    Mixed hyperlipidemia 12/01/2017    Prostate cancer ([address] 11/29/2017    Type 2 diabetes mellitus without complication, without long-term current use of insulin ([facility] 01/16/2017    Adhesive capsulitis of both shoulders 04/18/2016    Tubular adenoma of colon 08/31/2015    Sigmoid diverticulosis 08/31/2015    Diabetes mellitus type II, uncontrolled ([address] 09/15/2014    CAD (coronary artery disease) 02/03/2014     Overview Note:     A.  PCI to OM1 and mid CX 2014 [facility]      COPD, very severe ([facility] 06/14/2013    Reflux laryngitis 06/14/2013       Current Outpatient Medications   Medication Sig Dispense Refill    aspirin EC 81 MG EC tablet Take 81 mg by mouth daily      cephALEXin (KEFLEX) 500 MG capsule Take 1 capsule by mouth nightly 30 capsule 0    clopidogrel (PLAVIX) 75 MG tablet Take 1 tablet by mouth daily 90 tablet 1    atorvastatin (LIPITOR) 40 MG tablet Take 1 tablet by mouth daily 90 tablet 1    lisinopril (PRINIVIL;ZESTRIL) 10 MG tablet TAKE ONE TABLET BY MOUTH EVERY DAY 90 tablet 1    sotalol (BETAPACE) 80 MG tablet TAKE ONE-HALF TABLET BY MOUTH TWICE DAILY (Patient taking differently: Patient states is taking once a day) 180 tablet 1    metFORMIN (GLUCOPHAGE) 500 MG tablet Take 1 tablet by mouth 2 times daily (with meals) (Patient taking differently: Take 500 mg by mouth daily (with breakfast) ) 180 tablet 1    metoprolol succinate (TOPROL XL) 50 MG extended release tablet Take 1 tablet by mouth daily 90 tablet 1     No current facility-administered medications for this visit. No Known Allergies    Vitals:    21 1302   BP: 117/71   Pulse: 90   Resp: 18   Weight: 209 lb 12.8 oz (95.2 kg)   Height: 6' (1.829 m)       Social History     Socioeconomic History    Marital status:      Spouse name: Not on file    Number of children: Not on file    Years of education: Not on file    Highest education level: Not on file   Occupational History    Not on file   Tobacco Use    Smoking status: Former Smoker     Packs/day: 2.00     Years: 50.00     Pack years: 100.00     Types: Cigarettes     Start date: 1970     Quit date: 2014     Years since quittin.6    Smokeless tobacco: Never Used   Vaping Use    Vaping Use: Some days    Substances: Nicotine   Substance and Sexual Activity    Alcohol use: Yes     Comment: rarely    Drug use: No    Sexual activity: Not on file   Other Topics Concern    Not on file   Social History Narrative    Not on file     Social Determinants of Health     Financial Resource Strain:     Difficulty of Paying Living Expenses:    Food Insecurity:     Worried About Running Out of Food in the Last Year:     Ran Out of Food in the Last Year:    Transportation Needs:     Lack of Transportation (Medical):      Lack of Transportation (Non-Medical):    Physical Activity:     Days of Exercise per Week:     Minutes of Exercise per Session:    Stress:     Feeling of Stress :    Social Connections:     Frequency of Communication with Friends and Family:     Frequency of Social Gatherings with Friends and Family:     Attends Gnosticist Services:     Active Member of Clubs or Organizations:     Attends Club or Organization Meetings:     Marital Status:    Intimate Partner Violence:     Fear of Current or Ex-Partner:     Emotionally Abused:     Physically Abused:     Sexually Abused:        Family History   Problem Relation Age of Onset    Heart Disease Mother     Cancer Father         abdominal    Cancer Brother         prostate    Cancer Brother         digestive         SUBJECTIVE: Keri Marks presents to the office today for consult for pre-op evaluation prior to EVAR with Dr. Jorge Troy. Francisco Mahajan and lon patient - I reviewed records and actual cath and echo images. He complains of no new or unstable cardiac symptoms,  and denies chest pain, orthopnea, palpitations, syncope and no ICD discharges. ICD for sustained monomorphic VT coupled with AAD therapy. Patient with hx of PCI to CX and OM in 2014 with good angiographic result on DAPT ever since  Last four LDLs over target        Review of Systems:   Heart: as above   Lungs: as above   Eyes: denies changes in vision or discharge. Ears: denies changes in hearing or pain. Nose: denies epistaxis or masses   Throat: denies sore throat or trouble swallowing. Neuro: denies numbness, tingling, tremors. Skin: denies rashes or itching. : denies hematuria, dysuria   GI: denies vomiting, diarrhea   Psych: denies mood changed, anxiety, depression. all others negative. Physical Exam   /71   Pulse 90   Resp 18   Ht 6' (1.829 m)   Wt 209 lb 12.8 oz (95.2 kg)   BMI 28.45 kg/m²   Constitutional: Oriented to person, place, and time. Obese No distress. Head: Normocephalic and atraumatic. Eyes: EOM are normal. Pupils are equal, round, and reactive to light. Neck: Normal range of motion. Neck supple. No hepatojugular reflux and no JVD present. Carotid bruit is not present. Cardiovascular: Normal rate, regular rhythm, normal heart sounds and intact distal pulses. Exam reveals no gallop and no friction rub. No murmur heard. Pulmonary/Chest: Effort normal and breath sounds normal. No respiratory distress. No wheezes. No rales. Abdominal: Soft.  Bowel sounds are normal. No

## 2021-09-17 ENCOUNTER — HOSPITAL ENCOUNTER (OUTPATIENT)
Dept: PREADMISSION TESTING | Age: 71
Discharge: HOME | End: 2021-09-17
Payer: MEDICARE

## 2021-09-17 VITALS
DIASTOLIC BLOOD PRESSURE: 92 MMHG | OXYGEN SATURATION: 95 % | TEMPERATURE: 97.7 F | SYSTOLIC BLOOD PRESSURE: 142 MMHG | HEART RATE: 69 BPM | BODY MASS INDEX: 28.17 KG/M2 | HEIGHT: 72 IN | WEIGHT: 208 LBS | RESPIRATION RATE: 12 BRPM

## 2021-09-17 DIAGNOSIS — I71.40 ABDOMINAL AORTIC ANEURYSM WITHOUT RUPTURE: ICD-10-CM

## 2021-09-17 DIAGNOSIS — Z01.818 PREOP TESTING: Primary | ICD-10-CM

## 2021-09-17 DIAGNOSIS — Z01.810 PREOPERATIVE CARDIOVASCULAR EXAMINATION: ICD-10-CM

## 2021-09-17 LAB
ABO/RH: NORMAL
ANION GAP SERPL CALCULATED.3IONS-SCNC: 6 MMOL/L (ref 7–16)
ANTIBODY SCREEN: NORMAL
BUN BLDV-MCNC: 10 MG/DL (ref 6–23)
CALCIUM SERPL-MCNC: 9.6 MG/DL (ref 8.6–10.2)
CHLORIDE BLD-SCNC: 98 MMOL/L (ref 98–107)
CO2: 29 MMOL/L (ref 22–29)
CREAT SERPL-MCNC: 1 MG/DL (ref 0.7–1.2)
GFR AFRICAN AMERICAN: >60
GFR NON-AFRICAN AMERICAN: >60 ML/MIN/1.73
GLUCOSE BLD-MCNC: 249 MG/DL (ref 74–99)
HCT VFR BLD CALC: 42 % (ref 37–54)
HEMOGLOBIN: 14.3 G/DL (ref 12.5–16.5)
INR BLD: 1
MCH RBC QN AUTO: 31.1 PG (ref 26–35)
MCHC RBC AUTO-ENTMCNC: 34 % (ref 32–34.5)
MCV RBC AUTO: 91.3 FL (ref 80–99.9)
PDW BLD-RTO: 13.6 FL (ref 11.5–15)
PLATELET # BLD: 244 E9/L (ref 130–450)
PMV BLD AUTO: 9 FL (ref 7–12)
POTASSIUM REFLEX MAGNESIUM: 4.7 MMOL/L (ref 3.5–5)
PROTHROMBIN TIME: 11.1 SEC (ref 9.3–12.4)
RBC # BLD: 4.6 E12/L (ref 3.8–5.8)
SODIUM BLD-SCNC: 133 MMOL/L (ref 132–146)
WBC # BLD: 6.9 E9/L (ref 4.5–11.5)

## 2021-09-17 PROCEDURE — 85027 COMPLETE CBC AUTOMATED: CPT

## 2021-09-17 PROCEDURE — 87081 CULTURE SCREEN ONLY: CPT

## 2021-09-17 PROCEDURE — 36415 COLL VENOUS BLD VENIPUNCTURE: CPT

## 2021-09-17 PROCEDURE — 80048 BASIC METABOLIC PNL TOTAL CA: CPT

## 2021-09-17 PROCEDURE — 86900 BLOOD TYPING SEROLOGIC ABO: CPT

## 2021-09-17 PROCEDURE — 86850 RBC ANTIBODY SCREEN: CPT

## 2021-09-17 PROCEDURE — 86923 COMPATIBILITY TEST ELECTRIC: CPT

## 2021-09-17 PROCEDURE — 86901 BLOOD TYPING SEROLOGIC RH(D): CPT

## 2021-09-17 PROCEDURE — 85610 PROTHROMBIN TIME: CPT

## 2021-09-18 LAB — MRSA CULTURE ONLY: NORMAL

## 2021-09-20 DIAGNOSIS — I10 ESSENTIAL HYPERTENSION: Primary | ICD-10-CM

## 2021-09-21 ENCOUNTER — APPOINTMENT (OUTPATIENT)
Dept: CT IMAGING | Age: 71
End: 2021-09-21
Payer: MEDICARE

## 2021-09-21 ENCOUNTER — HOSPITAL ENCOUNTER (EMERGENCY)
Age: 71
Discharge: HOME | End: 2021-09-21
Attending: EMERGENCY MEDICINE
Payer: MEDICARE

## 2021-09-21 VITALS
DIASTOLIC BLOOD PRESSURE: 86 MMHG | RESPIRATION RATE: 16 BRPM | BODY MASS INDEX: 27.77 KG/M2 | HEART RATE: 72 BPM | SYSTOLIC BLOOD PRESSURE: 141 MMHG | OXYGEN SATURATION: 94 % | WEIGHT: 205 LBS | HEIGHT: 72 IN | TEMPERATURE: 97.6 F

## 2021-09-21 DIAGNOSIS — R73.9 HYPERGLYCEMIA: ICD-10-CM

## 2021-09-21 DIAGNOSIS — C61 PROSTATE CANCER (HCC): Primary | ICD-10-CM

## 2021-09-21 DIAGNOSIS — N39.0 URINARY TRACT INFECTION WITH HEMATURIA, SITE UNSPECIFIED: Primary | ICD-10-CM

## 2021-09-21 DIAGNOSIS — R33.9 URINARY RETENTION: ICD-10-CM

## 2021-09-21 DIAGNOSIS — R31.9 URINARY TRACT INFECTION WITH HEMATURIA, SITE UNSPECIFIED: Primary | ICD-10-CM

## 2021-09-21 DIAGNOSIS — I71.40 ABDOMINAL AORTIC ANEURYSM (AAA) WITHOUT RUPTURE: ICD-10-CM

## 2021-09-21 LAB
ALBUMIN SERPL-MCNC: 3.9 G/DL (ref 3.5–5.2)
ALP BLD-CCNC: 117 U/L (ref 40–129)
ALT SERPL-CCNC: 11 U/L (ref 0–40)
ANION GAP SERPL CALCULATED.3IONS-SCNC: 12 MMOL/L (ref 7–16)
AST SERPL-CCNC: 18 U/L (ref 0–39)
BACTERIA: ABNORMAL /HPF
BASOPHILS ABSOLUTE: 0.11 E9/L (ref 0–0.2)
BASOPHILS RELATIVE PERCENT: 1.7 % (ref 0–2)
BILIRUB SERPL-MCNC: 0.6 MG/DL (ref 0–1.2)
BILIRUBIN URINE: ABNORMAL
BLOOD, URINE: ABNORMAL
BUN BLDV-MCNC: 10 MG/DL (ref 6–23)
CALCIUM SERPL-MCNC: 9.4 MG/DL (ref 8.6–10.2)
CHLORIDE BLD-SCNC: 100 MMOL/L (ref 98–107)
CLARITY: CLEAR
CO2: 21 MMOL/L (ref 22–29)
COLOR: YELLOW
CREAT SERPL-MCNC: 1 MG/DL (ref 0.7–1.2)
EOSINOPHILS ABSOLUTE: 0.27 E9/L (ref 0.05–0.5)
EOSINOPHILS RELATIVE PERCENT: 4.1 % (ref 0–6)
GFR AFRICAN AMERICAN: >60
GFR NON-AFRICAN AMERICAN: >60 ML/MIN/1.73
GLUCOSE BLD-MCNC: 237 MG/DL (ref 74–99)
GLUCOSE URINE: 250 MG/DL
HCT VFR BLD CALC: 40.3 % (ref 37–54)
HEMOGLOBIN: 14.2 G/DL (ref 12.5–16.5)
IMMATURE GRANULOCYTES #: 0.04 E9/L
IMMATURE GRANULOCYTES %: 0.6 % (ref 0–5)
KETONES, URINE: 15 MG/DL
LEUKOCYTE ESTERASE, URINE: ABNORMAL
LYMPHOCYTES ABSOLUTE: 1.19 E9/L (ref 1.5–4)
LYMPHOCYTES RELATIVE PERCENT: 18.3 % (ref 20–42)
MCH RBC QN AUTO: 31.9 PG (ref 26–35)
MCHC RBC AUTO-ENTMCNC: 35.2 % (ref 32–34.5)
MCV RBC AUTO: 90.6 FL (ref 80–99.9)
MONOCYTES ABSOLUTE: 0.59 E9/L (ref 0.1–0.95)
MONOCYTES RELATIVE PERCENT: 9.1 % (ref 2–12)
NEUTROPHILS ABSOLUTE: 4.31 E9/L (ref 1.8–7.3)
NEUTROPHILS RELATIVE PERCENT: 66.2 % (ref 43–80)
NITRITE, URINE: POSITIVE
PDW BLD-RTO: 13.3 FL (ref 11.5–15)
PH UA: 7 (ref 5–9)
PLATELET # BLD: 234 E9/L (ref 130–450)
PMV BLD AUTO: 9.1 FL (ref 7–12)
POTASSIUM SERPL-SCNC: 4.5 MMOL/L (ref 3.5–5)
PROTEIN UA: >=300 MG/DL
RBC # BLD: 4.45 E12/L (ref 3.8–5.8)
RBC UA: >20 /HPF (ref 0–2)
SODIUM BLD-SCNC: 133 MMOL/L (ref 132–146)
SPECIFIC GRAVITY UA: 1.02 (ref 1–1.03)
TOTAL PROTEIN: 7.5 G/DL (ref 6.4–8.3)
UROBILINOGEN, URINE: 4 E.U./DL
WBC # BLD: 6.5 E9/L (ref 4.5–11.5)
WBC UA: ABNORMAL /HPF (ref 0–5)

## 2021-09-21 PROCEDURE — 74176 CT ABD & PELVIS W/O CONTRAST: CPT

## 2021-09-21 PROCEDURE — 99283 EMERGENCY DEPT VISIT LOW MDM: CPT

## 2021-09-21 PROCEDURE — 80053 COMPREHEN METABOLIC PANEL: CPT

## 2021-09-21 PROCEDURE — 36415 COLL VENOUS BLD VENIPUNCTURE: CPT

## 2021-09-21 PROCEDURE — 81001 URINALYSIS AUTO W/SCOPE: CPT

## 2021-09-21 PROCEDURE — 85025 COMPLETE CBC W/AUTO DIFF WBC: CPT

## 2021-09-21 PROCEDURE — 51702 INSERT TEMP BLADDER CATH: CPT

## 2021-09-21 PROCEDURE — 87088 URINE BACTERIA CULTURE: CPT

## 2021-09-21 RX ORDER — CEFDINIR 300 MG/1
300 CAPSULE ORAL 2 TIMES DAILY
Qty: 20 CAPSULE | Refills: 0 | Status: ON HOLD | OUTPATIENT
Start: 2021-09-21 | End: 2021-10-02 | Stop reason: HOSPADM

## 2021-09-21 RX ORDER — HYDROCODONE BITARTRATE AND ACETAMINOPHEN 5; 325 MG/1; MG/1
1 TABLET ORAL EVERY 6 HOURS PRN
Qty: 28 TABLET | Refills: 0 | Status: SHIPPED | OUTPATIENT
Start: 2021-09-21 | End: 2021-09-28

## 2021-09-21 NOTE — ED NOTES
Pt instructed how to empty rodriguez bag and proper placement, pt declined a leg bag     Ledy Hood, RN  09/21/21 4784

## 2021-09-21 NOTE — ED PROVIDER NOTES
HPI:  9/21/21, Time: 12:29 PM EDT         Keri Marks is a 70 y.o. male presenting to the ED for urinary retention, beginning in the past 24 hours. He states he does go but dribbles. He's had hematuria for \"weeks\" and has seen Dr Silverio Hutson recently and just got off some antibiotics for UTI. The complaint has been persistent, moderate in severity, and worsened by nothing. Patient denies fever/chills, sore throat, cough, congestion, chest pain, shortness of breath, edema, headache, visual disturbances, focal paresthesias, focal weakness, abdominal pain, nausea, vomiting, diarrhea, constipation, dysuria, trauma, neck or back pain, rash or other complaints. He is scheduled for a AAA repair this week. ROS:   A complete review of systems was performed and all pertinent positives and negatives are stated within HPI, all other systems reviewed and are negative.      --------------------------------------------- PAST HISTORY ---------------------------------------------  Past Medical History:  has a past medical history of Abdominal aortic aneurysm without rupture [facility] Arthritis, CAD (coronary artery disease), Cancer ([facility] Combined systolic and diastolic heart failure ([facility] COPD (chronic obstructive pulmonary disease) ([facility] Diabetes mellitus ([facility] GERD (gastroesophageal reflux disease), History of placement of internal cardiac defibrillator, Hypertension, Sigmoid diverticulosis, Sinusitis, Tubular adenoma of colon, and Vertigo. Past Surgical History:  has a past surgical history that includes Coronary angioplasty with stent (1/13/14); Umbilical hernia repair; Colonoscopy (8/31/15); Cardiac defibrillator placement; and Hip fracture surgery (Right, 3/14/2020). Social History:  reports that he quit smoking about 7 years ago. His smoking use included cigarettes. He started smoking about 51 years ago. He has a 100.00 pack-year smoking history.  He has never used smokeless tobacco. He reports current alcohol use. He reports that he does not use drugs. Family History: family history includes Cancer in his brother, brother, and father; Heart Disease in his mother. The patients home medications have been reviewed. Allergies: Patient has no known allergies. ----------------------------------------PHYSICAL EXAM--------------------------------------  Constitutional:  Well developed, well nourished, no acute distress, non-toxic appearance   Eyes:  PERRL, conjunctiva normal, EOMI  HENT:  Atraumatic, external ears normal, nose normal, oropharynx moist. Neck- normal range of motion, no nuchal rigidity   Respiratory:  No respiratory distress, normal breath sounds, no rales, no wheezing   Cardiovascular:  Normal rate, normal rhythm, no murmurs, no gallops, no rubs. Radial and DP pulses 2+ bilaterally. GI:  Soft, nondistended, normal bowel sounds, nontender, no organomegaly, no mass, no rebound, no guarding   :  No costovertebral angle tenderness   Musculoskeletal:  No edema, no tenderness, no deformities. Back- no tenderness  Integument:  Well hydrated, no visible rash. Adequate perfusion. Neurologic:  Alert & oriented x 3, CN 2-12 normal,  no focal deficits noted. DTRs 2+ bilateral patellar. Normal gait. Psychiatric:  Speech and behavior appropriate       -------------------------------------------------- RESULTS -------------------------------------------------  I have personally reviewed all laboratory and imaging results for this patient. Results are listed below.      LABS:  Results for orders placed or performed during the hospital encounter of 09/21/21   Urinalysis with Microscopic   Result Value Ref Range    Color, UA Yellow Straw/Yellow    Clarity, UA Clear Clear    Glucose, Ur 250 (A) Negative mg/dL    Bilirubin Urine MODERATE (A) Negative    Ketones, Urine 15 (A) Negative mg/dL    Specific Gravity, UA 1.020 1.005 - 1.030    Blood, Urine LARGE (A) Negative    pH, UA 7.0 5.0 - 9.0 Protein, UA >=300 (A) Negative mg/dL    Urobilinogen, Urine 4.0 (A) <2.0 E.U./dL    Nitrite, Urine POSITIVE (A) Negative    Leukocyte Esterase, Urine MODERATE (A) Negative    WBC, UA 1-3 0 - 5 /HPF    RBC, UA >20 0 - 2 /HPF    Bacteria, UA MANY (A) None Seen /HPF   CBC auto differential   Result Value Ref Range    WBC 6.5 4.5 - 11.5 E9/L    RBC 4.45 3.80 - 5.80 E12/L    Hemoglobin 14.2 12.5 - 16.5 g/dL    Hematocrit 40.3 37.0 - 54.0 %    MCV 90.6 80.0 - 99.9 fL    MCH 31.9 26.0 - 35.0 pg    MCHC 35.2 (H) 32.0 - 34.5 %    RDW 13.3 11.5 - 15.0 fL    Platelets 921 324 - 278 E9/L    MPV 9.1 7.0 - 12.0 fL    Neutrophils % 66.2 43.0 - 80.0 %    Immature Granulocytes % 0.6 0.0 - 5.0 %    Lymphocytes % 18.3 (L) 20.0 - 42.0 %    Monocytes % 9.1 2.0 - 12.0 %    Eosinophils % 4.1 0.0 - 6.0 %    Basophils % 1.7 0.0 - 2.0 %    Neutrophils Absolute 4.31 1.80 - 7.30 E9/L    Immature Granulocytes # 0.04 E9/L    Lymphocytes Absolute 1.19 (L) 1.50 - 4.00 E9/L    Monocytes Absolute 0.59 0.10 - 0.95 E9/L    Eosinophils Absolute 0.27 0.05 - 0.50 E9/L    Basophils Absolute 0.11 0.00 - 0.20 E9/L   Comprehensive Metabolic Panel   Result Value Ref Range    Sodium 133 132 - 146 mmol/L    Potassium 4.5 3.5 - 5.0 mmol/L    Chloride 100 98 - 107 mmol/L    CO2 21 (L) 22 - 29 mmol/L    Anion Gap 12 7 - 16 mmol/L    Glucose 237 (H) 74 - 99 mg/dL    BUN 10 6 - 23 mg/dL    CREATININE 1.0 0.7 - 1.2 mg/dL    GFR Non-African American >60 >=60 mL/min/1.73    GFR African American >60     Calcium 9.4 8.6 - 10.2 mg/dL    Total Protein 7.5 6.4 - 8.3 g/dL    Albumin 3.9 3.5 - 5.2 g/dL    Total Bilirubin 0.6 0.0 - 1.2 mg/dL    Alkaline Phosphatase 117 40 - 129 U/L    ALT 11 0 - 40 U/L    AST 18 0 - 39 U/L       RADIOLOGY:  Interpreted by Radiologist.  CT ABDOMEN PELVIS WO CONTRAST Additional Contrast? None   Final Result   1. Unchanged 6.3 cm infrarenal abdominal aortic aneurysm. Bilateral iliac   artery ectasia.    2.  No acute abdominal pelvic examination is improved      Consultations:   none    Critical Care: none    I, Cherie Greenwood, DO, am the Primary Provider of Record    Counseling: The emergency provider has spoken with the patient and discussed todays results, in addition to providing specific details for the plan of care and counseling regarding the diagnosis and prognosis. Questions are answered at this time and they are agreeable with the plan.    --------------------------- IMPRESSION AND DISPOSITION ---------------------------------    IMPRESSION  1. Urinary tract infection with hematuria, site unspecified    2. Urinary retention    3. Abdominal aortic aneurysm (AAA) without rupture ([facility]    4.  Hyperglycemia        DISPOSITION  Disposition: Discharge to home  Patient condition is stable              Chris Roque DO  09/21/21 0556

## 2021-09-23 LAB — URINE CULTURE, ROUTINE: NORMAL

## 2021-09-23 RX ORDER — CEPHALEXIN 500 MG/1
500 CAPSULE ORAL 3 TIMES DAILY
Qty: 30 CAPSULE | Refills: 0 | Status: SHIPPED | OUTPATIENT
Start: 2021-09-23 | End: 2021-10-03

## 2021-09-23 RX ORDER — OXYBUTYNIN CHLORIDE 5 MG/1
5 TABLET, EXTENDED RELEASE ORAL DAILY
Qty: 90 TABLET | Refills: 1 | Status: ON HOLD
Start: 2021-09-23 | End: 2021-10-01

## 2021-09-24 RX ORDER — OXYBUTYNIN CHLORIDE 5 MG/1
5 TABLET ORAL 3 TIMES DAILY
Qty: 90 TABLET | Refills: 3 | Status: ON HOLD
Start: 2021-09-24 | End: 2021-10-01

## 2021-09-27 ENCOUNTER — HOSPITAL ENCOUNTER (EMERGENCY)
Age: 71
Discharge: HOME | End: 2021-09-27
Attending: EMERGENCY MEDICINE
Payer: MEDICARE

## 2021-09-27 VITALS
WEIGHT: 205 LBS | BODY MASS INDEX: 27.77 KG/M2 | HEIGHT: 72 IN | HEART RATE: 80 BPM | DIASTOLIC BLOOD PRESSURE: 72 MMHG | RESPIRATION RATE: 16 BRPM | SYSTOLIC BLOOD PRESSURE: 130 MMHG | TEMPERATURE: 95.3 F | OXYGEN SATURATION: 100 %

## 2021-09-27 DIAGNOSIS — T83.9XXA PROBLEM WITH FOLEY CATHETER, INITIAL ENCOUNTER (HCC): Primary | ICD-10-CM

## 2021-09-27 PROCEDURE — 51702 INSERT TEMP BLADDER CATH: CPT

## 2021-09-27 PROCEDURE — 99282 EMERGENCY DEPT VISIT SF MDM: CPT

## 2021-09-27 ASSESSMENT — PAIN DESCRIPTION - PAIN TYPE: TYPE: ACUTE PAIN

## 2021-09-27 ASSESSMENT — PAIN SCALES - GENERAL: PAINLEVEL_OUTOF10: 3

## 2021-09-27 ASSESSMENT — PAIN DESCRIPTION - FREQUENCY: FREQUENCY: CONTINUOUS

## 2021-09-27 ASSESSMENT — ENCOUNTER SYMPTOMS
ABDOMINAL PAIN: 0
SHORTNESS OF BREATH: 0
BACK PAIN: 0
COUGH: 0

## 2021-09-27 ASSESSMENT — PAIN DESCRIPTION - DESCRIPTORS: DESCRIPTORS: DISCOMFORT

## 2021-09-27 ASSESSMENT — PAIN DESCRIPTION - LOCATION: LOCATION: PENIS

## 2021-09-27 NOTE — ED PROVIDER NOTES
This is a 30-year-old male with a past medical history of a AAA CHF and diabetes presents to the ED for evaluation of catheter dysfunction. Patient dates that he has a Cassidy catheter that was placed by his urologist Dr. Be Kitchen. Patient states this morning he noticed that the bag was leaking. States that he still having good output coming from the Cassidy itself. Patient remarks that he has no change in urinary output requesting that we remove the Cassidy ourselves. Reported fevers chest pain flank pain or abdominal pain. The history is provided by the patient. No  was used. Review of Systems   Constitutional: Negative for fever. HENT: Negative for congestion. Eyes: Negative for visual disturbance. Respiratory: Negative for cough and shortness of breath. Cardiovascular: Negative for chest pain. Gastrointestinal: Negative for abdominal pain. Endocrine: Negative for polyuria. Genitourinary: Positive for difficulty urinating. Musculoskeletal: Negative for back pain. Allergic/Immunologic: Negative for immunocompromised state. Neurological: Negative for headaches. Hematological: Does not bruise/bleed easily. Psychiatric/Behavioral: Negative for confusion. Physical Exam  Vitals and nursing note reviewed. Constitutional:       General: He is not in acute distress. Appearance: He is well-developed. HENT:      Head: Normocephalic and atraumatic. Mouth/Throat:      Mouth: Mucous membranes are moist.   Eyes:      Extraocular Movements: Extraocular movements intact. Pupils: Pupils are equal, round, and reactive to light. Neck:      Vascular: No JVD. Cardiovascular:      Rate and Rhythm: Normal rate and regular rhythm. Heart sounds: No murmur heard. Pulmonary:      Effort: Pulmonary effort is normal.      Breath sounds: No wheezing, rhonchi or rales. Chest:      Chest wall: No tenderness.    Abdominal:      General: There is no distension. Palpations: Abdomen is soft. Tenderness: There is no abdominal tenderness. There is no guarding or rebound. Hernia: No hernia is present. Genitourinary:     Comments: Rodriguez in place draining yellow colored urine  Musculoskeletal:      Cervical back: Normal range of motion and neck supple. Left lower leg: No edema. Skin:     General: Skin is warm and dry. Capillary Refill: Capillary refill takes less than 2 seconds. Neurological:      General: No focal deficit present. Mental Status: He is alert and oriented to person, place, and time. Cranial Nerves: No cranial nerve deficit. Psychiatric:         Mood and Affect: Mood normal.         Behavior: Behavior normal.          Procedures     MDM  Number of Diagnoses or Management Options  Problem with Rodriguez catheter, initial encounter [facility])  Diagnosis management comments: Patient presented to the ED for evaluation of leaking urine in his rodriguez catheter bag. He had no other complaints and had no abdominal pain or flank pain and patient had stable vital signs. Bag was replaced. Patient has an appointment with Dr. Jr Overton and will defer any management of the rodriguez to his urology team                    --------------------------------------------- PAST HISTORY ---------------------------------------------  Past Medical History:  has a past medical history of Abdominal [facility] Arthritis, CAD (coronary artery disease), Cancer ([facility] Combined systolic and diastolic heart failure ([facility] COPD (chronic obstructive pulmonary disease) ([facility] Diabetes mellitus ([facility] GERD (gastroesophageal reflux disease), History of placement of internal cardiac defibrillator, Hypertension, Sigmoid diverticulosis, Sinusitis, Tubular adenoma of colon, and Vertigo. Past Surgical History:  has a past surgical history that includes Coronary angioplasty with stent (1/13/14);  Umbilical hernia repair; Colonoscopy (8/31/15); Cardiac defibrillator placement; and Hip fracture surgery (Right, 3/14/2020). Social History:  reports that he quit smoking about 7 years ago. His smoking use included cigarettes. He started smoking about 51 years ago. He has a 100.00 pack-year smoking history. He has never used smokeless tobacco. He reports current alcohol use. He reports that he does not use drugs. Family History: family history includes Cancer in his brother, brother, and father; Heart Disease in his mother. The patients home medications have been reviewed. Allergies: Patient has no known allergies. -------------------------------------------------- RESULTS -------------------------------------------------  Labs:  No results found for this visit on 09/27/21. Radiology:  No orders to display       ------------------------- NURSING NOTES AND VITALS REVIEWED ---------------------------  Date / Time Roomed:  9/27/2021 11:56 AM  ED Bed Assignment:  02/02    The nursing notes within the ED encounter and vital signs as below have been reviewed. /72   Pulse 80   Temp 95.3 °F (35.2 °C) (Temporal)   Resp 16   Ht 6' (1.829 m)   Wt 205 lb (93 kg)   SpO2 100%   BMI 27.80 kg/m²   Oxygen Saturation Interpretation: Normal      ------------------------------------------ PROGRESS NOTES ------------------------------------------  12:16 PM EDT  I have spoken with the patient and discussed todays results, in addition to providing specific details for the plan of care and counseling regarding the diagnosis and prognosis. Their questions are answered at this time and they are agreeable with the plan. I discussed at length with them reasons for immediate return here for re evaluation.  They will followup with their PCP.      --------------------------------- ADDITIONAL PROVIDER NOTES ---------------------------------  At this time the patient is without objective evidence of an acute process requiring hospitalization or inpatient

## 2021-09-28 DIAGNOSIS — R31.9 URINARY TRACT INFECTION WITH HEMATURIA, SITE UNSPECIFIED: Primary | ICD-10-CM

## 2021-09-28 DIAGNOSIS — N39.0 URINARY TRACT INFECTION WITH HEMATURIA, SITE UNSPECIFIED: Primary | ICD-10-CM

## 2021-09-29 ENCOUNTER — TELEPHONE (OUTPATIENT)
Dept: VASCULAR SURGERY | Age: 71
End: 2021-09-29

## 2021-09-29 ENCOUNTER — HOSPITAL ENCOUNTER (OUTPATIENT)
Age: 71
Discharge: HOME | DRG: 269 | End: 2021-09-29
Payer: MEDICARE

## 2021-09-29 DIAGNOSIS — R31.9 URINARY TRACT INFECTION WITH HEMATURIA, SITE UNSPECIFIED: ICD-10-CM

## 2021-09-29 DIAGNOSIS — N39.0 URINARY TRACT INFECTION WITH HEMATURIA, SITE UNSPECIFIED: ICD-10-CM

## 2021-09-29 LAB
BACTERIA: ABNORMAL /HPF
BILIRUBIN URINE: NEGATIVE
BLOOD, URINE: ABNORMAL
CLARITY: ABNORMAL
COLOR: ABNORMAL
EPITHELIAL CELLS, UA: ABNORMAL /HPF
GLUCOSE URINE: 500 MG/DL
KETONES, URINE: NEGATIVE MG/DL
LEUKOCYTE ESTERASE, URINE: NEGATIVE
NITRITE, URINE: NEGATIVE
PH UA: 5.5 (ref 5–9)
PROTEIN UA: 100 MG/DL
RBC UA: >20 /HPF (ref 0–2)
SPECIFIC GRAVITY UA: >=1.03 (ref 1–1.03)
UROBILINOGEN, URINE: 0.2 E.U./DL
WBC UA: ABNORMAL /HPF (ref 0–5)

## 2021-09-29 PROCEDURE — 81001 URINALYSIS AUTO W/SCOPE: CPT

## 2021-09-30 ENCOUNTER — ANESTHESIA EVENT (OUTPATIENT)
Dept: OPERATING ROOM | Age: 71
DRG: 269 | End: 2021-09-30
Payer: MEDICARE

## 2021-09-30 NOTE — PROGRESS NOTES
[facility] PRE-ADMISSION TESTING GENERAL INSTRUCTIONS- [facility]-phone number:[phone]    GENERAL INSTRUCTIONS  [x] Antibacterial Soap shower Night before and/or AM of Surgery  [] Ashutosh wipe instruction sheet and wipes given. [x] Nothing by mouth after midnight, including gum, candy, mints, or water. [x] You may brush your teeth, gargle, but do NOT swallow water. []Hibiclens shower  the night before and the morning of surgery. Do not use             Hibiclens on your face or head. []No smoking, chewing tobacco, illegal drugs, or alcohol within 24 hours of your surgery. [] Jewelry, valuables or body piercing's should not be brought to the hospital. All body and/or tongue piercing's must be removed prior to arriving to hospital.  ALL hair pins must be removed. [x] Do not wear makeup, lotions, powders, deodorant. Nail polish as directed by the nurse. [] Arrange transportation with a responsible adult  to and from the hospital. If you do not have a responsible adult  to transport you, you will need to make arrangements with a medical transportation company (i.e. [employer]. A Uber/taxi/bus is not appropriate unless you are accompanied by a responsible adult ). Arrange for someone to be with you for the remainder of the day and for 24 hours after your procedure due to having had anesthesia. Who will be your  for transportation?__________________   Who will be staying with you for 24 hrs after your procedure?__________________  [x] Bring insurance card and photo ID. [x]DO NOT BRING THIS BRACELET FROM PRIOR VISIT. Transfusion Bracelet: Please bring with you to hospital, day of surgery  [] Bring urine specimen day of surgery. Any small container is acceptable. [] Use inhalers the morning of surgery and bring with you to hospital.  [] Bring copy of living will or healthcare power of  papers to be placed in your electronic record.   [] CPAP/BI-PAP: Please bring your machine if you are to spend the night in the hospital.     PARKING INSTRUCTIONS:   [x] Arrival Time:_0500, WEAR A MASK.____________  · [] Parking lot '\"I\"  is located on [facility] [place] (the corner of [facility] and [facility] [place]). To enter, press the button and the gate will lift. A free token will be provided to exit the lot. One car per patient is allowed to park in this lot. All other cars are to park on [address] [place] [address] Street either in the parking garage or the handicap lot. [] To reach the [facility] lobby from [address] [place] [address], upon entering the hospital, take elevator B to the 3rd floor. EDUCATION INSTRUCTIONS:      [] Knee or hip replacement booklet & exercise pamphlets given. [] Kaylie [address] placed in chart. [] Pre-admission Testing educational folder given  [] Incentive Spirometry,coughing & deep breathing exercises reviewed. []Medication information sheet(s)   []Fluoroscopy-Xray used in surgery reviewed with patient. Educational pamphlet placed in chart. []Pain: Post-op pain is normal and to be expected. You will be asked to rate your pain from 0-10(a zero is not acceptable-education is needed). Your post-op pain goal is:  [] Ask your nurse for your pain medication. [] Joint camp offered. [] Joint replacement booklets given. [] Other:___________________________    MEDICATION INSTRUCTIONS:   [x]Bring a complete list of your medications, please write the last time you took the medicine, give this list to the nurse. [x] Take the following medications the morning of surgery with 1-2 ounces of water: METOPROLOL, BRING BETAPACE IF YOU ARE ASKED TO TAKE. (HE NORMALLY TAKES LATER IN THE MORNING.)  [] Stop herbal supplements and vitamins 5 days before your surgery. [x] DO NOT take any diabetic medicine the morning of surgery. Follow instructions for insulin the day before surgery.   [] If you are diabetic and your blood sugar is low or you feel symptomatic, you may drink 1-2 ounces of apple juice or take a glucose tablet. The morning of your procedure, you may call the pre-op area if you have concerns about your blood sugar [phone]. [] Use your inhalers the morning of surgery. Bring your emergency inhaler with you day of surgery. [x] Follow physician instructions regarding any blood thinners you may be taking. HOLD PLAVIX DOS. WHAT TO EXPECT:  [] The day of surgery you will be greeted and checked in by the [employer].  In addition, you will be registered in the [place] by a Patient Access Representative. Please bring your photo ID and insurance card. A nurse will greet you in accordance to the time you are needed in the pre-op area to prepare you for surgery. Please do not be discouraged if you are not greeted in the order you arrive as there are many variables that are involved in patient preparation. Your patience is greatly appreciated as you wait for your nurse. Please bring in items such as: books, magazines, newspapers, electronics, or any other items  to occupy your time in the waiting area. []  Delays may occur with surgery and staff will make a sincere effort to keep you informed of delays. If any delays occur with your procedure, we apologize ahead of time for your inconvenience as we recognize the value of your time.

## 2021-09-30 NOTE — PROGRESS NOTES
I discussed with the Blood bank the expiration for the current specimen. It was suggested a new specimen be obtained pre-op tomorrow. I called the office of Dr. Hassel Simmonds and requested new orders be placed for the above. I explained the patient his early arrival is due to the labs needing redrawn. He agreed. I instructed him not to bring the bracelet from previous visit.

## 2021-10-01 ENCOUNTER — ANESTHESIA (OUTPATIENT)
Dept: OPERATING ROOM | Age: 71
DRG: 269 | End: 2021-10-01
Payer: MEDICARE

## 2021-10-01 ENCOUNTER — HOSPITAL ENCOUNTER (INPATIENT)
Age: 71
LOS: 1 days | Discharge: HOME | DRG: 269 | End: 2021-10-02
Attending: SURGERY | Admitting: SURGERY
Payer: MEDICARE

## 2021-10-01 VITALS — OXYGEN SATURATION: 90 %

## 2021-10-01 DIAGNOSIS — Z01.812 PRE-OPERATIVE LABORATORY EXAMINATION: Primary | ICD-10-CM

## 2021-10-01 DIAGNOSIS — I71.40 AAA (ABDOMINAL AORTIC ANEURYSM) WITHOUT RUPTURE: ICD-10-CM

## 2021-10-01 LAB
ABO/RH: NORMAL
ANION GAP SERPL CALCULATED.3IONS-SCNC: 9 MMOL/L (ref 7–16)
ANTIBODY SCREEN: NORMAL
BUN BLDV-MCNC: 11 MG/DL (ref 6–23)
CALCIUM SERPL-MCNC: 9 MG/DL (ref 8.6–10.2)
CHLORIDE BLD-SCNC: 103 MMOL/L (ref 98–107)
CO2: 27 MMOL/L (ref 22–29)
CREAT SERPL-MCNC: 1 MG/DL (ref 0.7–1.2)
GFR AFRICAN AMERICAN: >60
GFR NON-AFRICAN AMERICAN: >60 ML/MIN/1.73
GLUCOSE BLD-MCNC: 164 MG/DL (ref 74–99)
HCT VFR BLD CALC: 39.5 % (ref 37–54)
HEMOGLOBIN: 13.4 G/DL (ref 12.5–16.5)
INR BLD: 1
MCH RBC QN AUTO: 31.1 PG (ref 26–35)
MCHC RBC AUTO-ENTMCNC: 33.9 % (ref 32–34.5)
MCV RBC AUTO: 91.6 FL (ref 80–99.9)
METER GLUCOSE: 144 MG/DL (ref 74–99)
METER GLUCOSE: 196 MG/DL (ref 74–99)
PDW BLD-RTO: 13.4 FL (ref 11.5–15)
PLATELET # BLD: 259 E9/L (ref 130–450)
PMV BLD AUTO: 9 FL (ref 7–12)
POTASSIUM REFLEX MAGNESIUM: 4 MMOL/L (ref 3.5–5)
PROTHROMBIN TIME: 11.2 SEC (ref 9.3–12.4)
RBC # BLD: 4.31 E12/L (ref 3.8–5.8)
SODIUM BLD-SCNC: 139 MMOL/L (ref 132–146)
WBC # BLD: 6.4 E9/L (ref 4.5–11.5)

## 2021-10-01 PROCEDURE — 85027 COMPLETE CBC AUTOMATED: CPT

## 2021-10-01 PROCEDURE — 6360000002 HC RX W HCPCS: Performed by: NURSE PRACTITIONER

## 2021-10-01 PROCEDURE — C1769 GUIDE WIRE: HCPCS | Performed by: SURGERY

## 2021-10-01 PROCEDURE — 86901 BLOOD TYPING SEROLOGIC RH(D): CPT

## 2021-10-01 PROCEDURE — 04V03DZ RESTRICTION OF ABDOMINAL AORTA WITH INTRALUMINAL DEVICE, PERCUTANEOUS APPROACH: ICD-10-PCS | Performed by: SURGERY

## 2021-10-01 PROCEDURE — 85610 PROTHROMBIN TIME: CPT

## 2021-10-01 PROCEDURE — 2709999900 HC NON-CHARGEABLE SUPPLY: Performed by: SURGERY

## 2021-10-01 PROCEDURE — 3700000000 HC ANESTHESIA ATTENDED CARE: Performed by: SURGERY

## 2021-10-01 PROCEDURE — 82962 GLUCOSE BLOOD TEST: CPT

## 2021-10-01 PROCEDURE — C1713 ANCHOR/SCREW BN/BN,TIS/BN: HCPCS | Performed by: SURGERY

## 2021-10-01 PROCEDURE — C1768 GRAFT, VASCULAR: HCPCS | Performed by: SURGERY

## 2021-10-01 PROCEDURE — 6360000002 HC RX W HCPCS

## 2021-10-01 PROCEDURE — 6360000004 HC RX CONTRAST MEDICATION: Performed by: SURGERY

## 2021-10-01 PROCEDURE — 86850 RBC ANTIBODY SCREEN: CPT

## 2021-10-01 PROCEDURE — 80048 BASIC METABOLIC PNL TOTAL CA: CPT

## 2021-10-01 PROCEDURE — 36415 COLL VENOUS BLD VENIPUNCTURE: CPT

## 2021-10-01 PROCEDURE — 7100000001 HC PACU RECOVERY - ADDTL 15 MIN: Performed by: SURGERY

## 2021-10-01 PROCEDURE — 2580000003 HC RX 258

## 2021-10-01 PROCEDURE — 2500000003 HC RX 250 WO HCPCS: Performed by: NURSE PRACTITIONER

## 2021-10-01 PROCEDURE — 6360000002 HC RX W HCPCS: Performed by: ANESTHESIOLOGY

## 2021-10-01 PROCEDURE — 3600000007 HC SURGERY HYBRID BASE: Performed by: SURGERY

## 2021-10-01 PROCEDURE — 2000000000 HC ICU R&B

## 2021-10-01 PROCEDURE — 86900 BLOOD TYPING SEROLOGIC ABO: CPT

## 2021-10-01 PROCEDURE — C1760 CLOSURE DEV, VASC: HCPCS | Performed by: SURGERY

## 2021-10-01 PROCEDURE — 2580000003 HC RX 258: Performed by: NURSE PRACTITIONER

## 2021-10-01 PROCEDURE — 6360000002 HC RX W HCPCS: Performed by: ANESTHESIOLOGIST ASSISTANT

## 2021-10-01 PROCEDURE — 3700000001 HC ADD 15 MINUTES (ANESTHESIA): Performed by: SURGERY

## 2021-10-01 PROCEDURE — 6360000002 HC RX W HCPCS: Performed by: SURGERY

## 2021-10-01 PROCEDURE — 34706 EVASC RPR A-BIILIAC RPT: CPT | Performed by: SURGERY

## 2021-10-01 PROCEDURE — 6370000000 HC RX 637 (ALT 250 FOR IP): Performed by: NURSE PRACTITIONER

## 2021-10-01 PROCEDURE — 2580000003 HC RX 258: Performed by: SURGERY

## 2021-10-01 PROCEDURE — C1894 INTRO/SHEATH, NON-LASER: HCPCS | Performed by: SURGERY

## 2021-10-01 PROCEDURE — 3600000017 HC SURGERY HYBRID ADDL 15MIN: Performed by: SURGERY

## 2021-10-01 PROCEDURE — 7100000000 HC PACU RECOVERY - FIRST 15 MIN: Performed by: SURGERY

## 2021-10-01 PROCEDURE — C2628 CATHETER, OCCLUSION: HCPCS | Performed by: SURGERY

## 2021-10-01 PROCEDURE — 2500000003 HC RX 250 WO HCPCS

## 2021-10-01 PROCEDURE — 2500000003 HC RX 250 WO HCPCS: Performed by: ANESTHESIOLOGIST ASSISTANT

## 2021-10-01 DEVICE — GRAFT EVAR L10CM DST DIA23MM FEP NIT CONTRALATERAL LEG IL: Type: IMPLANTABLE DEVICE | Site: ILIAC CREST | Status: FUNCTIONAL

## 2021-10-01 DEVICE — GRAFT EVAR L14CM DST DIA27MM FEP NIT CONTRALATERAL LEG IL: Type: IMPLANTABLE DEVICE | Site: ILIAC CREST | Status: FUNCTIONAL

## 2021-10-01 RX ORDER — MEPERIDINE HYDROCHLORIDE 25 MG/ML
12.5 INJECTION INTRAMUSCULAR; INTRAVENOUS; SUBCUTANEOUS EVERY 5 MIN PRN
Status: DISCONTINUED | OUTPATIENT
Start: 2021-10-01 | End: 2021-10-01

## 2021-10-01 RX ORDER — OXYCODONE HYDROCHLORIDE 5 MG/1
5 TABLET ORAL
Status: DISCONTINUED | OUTPATIENT
Start: 2021-10-01 | End: 2021-10-02 | Stop reason: HOSPADM

## 2021-10-01 RX ORDER — HEPARIN SODIUM 10000 [USP'U]/ML
INJECTION, SOLUTION INTRAVENOUS; SUBCUTANEOUS PRN
Status: DISCONTINUED | OUTPATIENT
Start: 2021-10-01 | End: 2021-10-01 | Stop reason: SDUPTHER

## 2021-10-01 RX ORDER — CLOPIDOGREL BISULFATE 75 MG/1
75 TABLET ORAL DAILY
Status: DISCONTINUED | OUTPATIENT
Start: 2021-10-02 | End: 2021-10-02 | Stop reason: HOSPADM

## 2021-10-01 RX ORDER — SODIUM CHLORIDE 0.9 % (FLUSH) 0.9 %
10 SYRINGE (ML) INJECTION EVERY 12 HOURS SCHEDULED
Status: DISCONTINUED | OUTPATIENT
Start: 2021-10-01 | End: 2021-10-02 | Stop reason: HOSPADM

## 2021-10-01 RX ORDER — DEXTROSE MONOHYDRATE 50 MG/ML
100 INJECTION, SOLUTION INTRAVENOUS PRN
Status: DISCONTINUED | OUTPATIENT
Start: 2021-10-01 | End: 2021-10-02 | Stop reason: HOSPADM

## 2021-10-01 RX ORDER — ATORVASTATIN CALCIUM 80 MG/1
80 TABLET, FILM COATED ORAL NIGHTLY
Status: DISCONTINUED | OUTPATIENT
Start: 2021-10-01 | End: 2021-10-02 | Stop reason: HOSPADM

## 2021-10-01 RX ORDER — PROTAMINE SULFATE 10 MG/ML
INJECTION, SOLUTION INTRAVENOUS PRN
Status: DISCONTINUED | OUTPATIENT
Start: 2021-10-01 | End: 2021-10-01 | Stop reason: SDUPTHER

## 2021-10-01 RX ORDER — SODIUM CHLORIDE 9 MG/ML
25 INJECTION, SOLUTION INTRAVENOUS PRN
Status: DISCONTINUED | OUTPATIENT
Start: 2021-10-01 | End: 2021-10-01

## 2021-10-01 RX ORDER — ACETAMINOPHEN 325 MG/1
650 TABLET ORAL EVERY 4 HOURS PRN
Status: DISCONTINUED | OUTPATIENT
Start: 2021-10-01 | End: 2021-10-02 | Stop reason: HOSPADM

## 2021-10-01 RX ORDER — ONDANSETRON 2 MG/ML
4 INJECTION INTRAMUSCULAR; INTRAVENOUS EVERY 6 HOURS PRN
Status: DISCONTINUED | OUTPATIENT
Start: 2021-10-01 | End: 2021-10-02 | Stop reason: HOSPADM

## 2021-10-01 RX ORDER — LABETALOL HYDROCHLORIDE 5 MG/ML
INJECTION, SOLUTION INTRAVENOUS PRN
Status: DISCONTINUED | OUTPATIENT
Start: 2021-10-01 | End: 2021-10-01 | Stop reason: SDUPTHER

## 2021-10-01 RX ORDER — NEOSTIGMINE METHYLSULFATE 1 MG/ML
INJECTION, SOLUTION INTRAVENOUS PRN
Status: DISCONTINUED | OUTPATIENT
Start: 2021-10-01 | End: 2021-10-01 | Stop reason: SDUPTHER

## 2021-10-01 RX ORDER — SODIUM CHLORIDE 9 MG/ML
INJECTION, SOLUTION INTRAVENOUS CONTINUOUS PRN
Status: DISCONTINUED | OUTPATIENT
Start: 2021-10-01 | End: 2021-10-01 | Stop reason: SDUPTHER

## 2021-10-01 RX ORDER — PROPOFOL 10 MG/ML
INJECTION, EMULSION INTRAVENOUS PRN
Status: DISCONTINUED | OUTPATIENT
Start: 2021-10-01 | End: 2021-10-01 | Stop reason: SDUPTHER

## 2021-10-01 RX ORDER — METOPROLOL SUCCINATE 50 MG/1
50 TABLET, EXTENDED RELEASE ORAL DAILY
Status: DISCONTINUED | OUTPATIENT
Start: 2021-10-02 | End: 2021-10-02 | Stop reason: HOSPADM

## 2021-10-01 RX ORDER — SODIUM CHLORIDE 9 MG/ML
25 INJECTION, SOLUTION INTRAVENOUS PRN
Status: DISCONTINUED | OUTPATIENT
Start: 2021-10-01 | End: 2021-10-02 | Stop reason: HOSPADM

## 2021-10-01 RX ORDER — FENTANYL CITRATE 50 UG/ML
INJECTION, SOLUTION INTRAMUSCULAR; INTRAVENOUS PRN
Status: DISCONTINUED | OUTPATIENT
Start: 2021-10-01 | End: 2021-10-01 | Stop reason: SDUPTHER

## 2021-10-01 RX ORDER — PROMETHAZINE HYDROCHLORIDE 25 MG/ML
25 INJECTION, SOLUTION INTRAMUSCULAR; INTRAVENOUS PRN
Status: DISCONTINUED | OUTPATIENT
Start: 2021-10-01 | End: 2021-10-01

## 2021-10-01 RX ORDER — OXYBUTYNIN CHLORIDE 5 MG/1
5 TABLET ORAL 3 TIMES DAILY
Status: DISCONTINUED | OUTPATIENT
Start: 2021-10-02 | End: 2021-10-02 | Stop reason: HOSPADM

## 2021-10-01 RX ORDER — ASPIRIN 81 MG/1
81 TABLET ORAL DAILY
Status: DISCONTINUED | OUTPATIENT
Start: 2021-10-02 | End: 2021-10-02 | Stop reason: HOSPADM

## 2021-10-01 RX ORDER — SODIUM CHLORIDE 0.9 % (FLUSH) 0.9 %
10 SYRINGE (ML) INJECTION EVERY 12 HOURS SCHEDULED
Status: DISCONTINUED | OUTPATIENT
Start: 2021-10-01 | End: 2021-10-01

## 2021-10-01 RX ORDER — SODIUM CHLORIDE 0.9 % (FLUSH) 0.9 %
10 SYRINGE (ML) INJECTION PRN
Status: DISCONTINUED | OUTPATIENT
Start: 2021-10-01 | End: 2021-10-01

## 2021-10-01 RX ORDER — OXYBUTYNIN CHLORIDE 5 MG/1
5 TABLET, EXTENDED RELEASE ORAL DAILY
Status: DISCONTINUED | OUTPATIENT
Start: 2021-10-02 | End: 2021-10-02 | Stop reason: CLARIF

## 2021-10-01 RX ORDER — GLYCOPYRROLATE 1 MG/5 ML
SYRINGE (ML) INTRAVENOUS PRN
Status: DISCONTINUED | OUTPATIENT
Start: 2021-10-01 | End: 2021-10-01 | Stop reason: SDUPTHER

## 2021-10-01 RX ORDER — PHENYLEPHRINE HCL IN 0.9% NACL 1 MG/10 ML
SYRINGE (ML) INTRAVENOUS PRN
Status: DISCONTINUED | OUTPATIENT
Start: 2021-10-01 | End: 2021-10-01 | Stop reason: SDUPTHER

## 2021-10-01 RX ORDER — NICOTINE POLACRILEX 4 MG
15 LOZENGE BUCCAL PRN
Status: DISCONTINUED | OUTPATIENT
Start: 2021-10-01 | End: 2021-10-02 | Stop reason: HOSPADM

## 2021-10-01 RX ORDER — SOTALOL HYDROCHLORIDE 80 MG/1
40 TABLET ORAL DAILY
Status: DISCONTINUED | OUTPATIENT
Start: 2021-10-02 | End: 2021-10-02 | Stop reason: HOSPADM

## 2021-10-01 RX ORDER — SODIUM CHLORIDE 9 MG/ML
INJECTION, SOLUTION INTRAVENOUS CONTINUOUS
Status: DISCONTINUED | OUTPATIENT
Start: 2021-10-01 | End: 2021-10-02 | Stop reason: HOSPADM

## 2021-10-01 RX ORDER — MORPHINE SULFATE 2 MG/ML
2 INJECTION, SOLUTION INTRAMUSCULAR; INTRAVENOUS
Status: DISCONTINUED | OUTPATIENT
Start: 2021-10-01 | End: 2021-10-02 | Stop reason: HOSPADM

## 2021-10-01 RX ORDER — DEXAMETHASONE SODIUM PHOSPHATE 10 MG/ML
INJECTION INTRAMUSCULAR; INTRAVENOUS PRN
Status: DISCONTINUED | OUTPATIENT
Start: 2021-10-01 | End: 2021-10-01 | Stop reason: SDUPTHER

## 2021-10-01 RX ORDER — ROCURONIUM BROMIDE 10 MG/ML
INJECTION, SOLUTION INTRAVENOUS PRN
Status: DISCONTINUED | OUTPATIENT
Start: 2021-10-01 | End: 2021-10-01 | Stop reason: SDUPTHER

## 2021-10-01 RX ORDER — DEXTROSE MONOHYDRATE 25 G/50ML
12.5 INJECTION, SOLUTION INTRAVENOUS PRN
Status: DISCONTINUED | OUTPATIENT
Start: 2021-10-01 | End: 2021-10-02 | Stop reason: HOSPADM

## 2021-10-01 RX ORDER — MORPHINE SULFATE 2 MG/ML
1 INJECTION, SOLUTION INTRAMUSCULAR; INTRAVENOUS
Status: DISCONTINUED | OUTPATIENT
Start: 2021-10-01 | End: 2021-10-02 | Stop reason: HOSPADM

## 2021-10-01 RX ORDER — CEPHALEXIN 500 MG/1
500 CAPSULE ORAL 3 TIMES DAILY
Status: DISCONTINUED | OUTPATIENT
Start: 2021-10-02 | End: 2021-10-02 | Stop reason: HOSPADM

## 2021-10-01 RX ORDER — LISINOPRIL 10 MG/1
10 TABLET ORAL DAILY
Status: DISCONTINUED | OUTPATIENT
Start: 2021-10-02 | End: 2021-10-02 | Stop reason: HOSPADM

## 2021-10-01 RX ORDER — LABETALOL HYDROCHLORIDE 5 MG/ML
5 INJECTION, SOLUTION INTRAVENOUS EVERY 10 MIN PRN
Status: DISCONTINUED | OUTPATIENT
Start: 2021-10-01 | End: 2021-10-01

## 2021-10-01 RX ORDER — SODIUM CHLORIDE 0.9 % (FLUSH) 0.9 %
10 SYRINGE (ML) INJECTION PRN
Status: DISCONTINUED | OUTPATIENT
Start: 2021-10-01 | End: 2021-10-02 | Stop reason: HOSPADM

## 2021-10-01 RX ORDER — ONDANSETRON 2 MG/ML
INJECTION INTRAMUSCULAR; INTRAVENOUS PRN
Status: DISCONTINUED | OUTPATIENT
Start: 2021-10-01 | End: 2021-10-01 | Stop reason: SDUPTHER

## 2021-10-01 RX ADMIN — DEXAMETHASONE SODIUM PHOSPHATE 10 MG: 10 INJECTION INTRAMUSCULAR; INTRAVENOUS at 14:30

## 2021-10-01 RX ADMIN — FENTANYL CITRATE 100 MCG: 50 INJECTION, SOLUTION INTRAMUSCULAR; INTRAVENOUS at 14:30

## 2021-10-01 RX ADMIN — SODIUM CHLORIDE: 9 INJECTION, SOLUTION INTRAVENOUS at 23:27

## 2021-10-01 RX ADMIN — ATORVASTATIN CALCIUM 80 MG: 80 TABLET, FILM COATED ORAL at 20:51

## 2021-10-01 RX ADMIN — PROPOFOL 80 MG: 10 INJECTION, EMULSION INTRAVENOUS at 14:30

## 2021-10-01 RX ADMIN — ROCURONIUM BROMIDE 50 MG: 10 INJECTION, SOLUTION INTRAVENOUS at 14:30

## 2021-10-01 RX ADMIN — HEPARIN SODIUM 10000 UNITS: 10000 INJECTION INTRAVENOUS; SUBCUTANEOUS at 15:06

## 2021-10-01 RX ADMIN — HYDROMORPHONE HYDROCHLORIDE 0.5 MG: 1 INJECTION, SOLUTION INTRAMUSCULAR; INTRAVENOUS; SUBCUTANEOUS at 17:18

## 2021-10-01 RX ADMIN — PROPOFOL 80 MG: 10 INJECTION, EMULSION INTRAVENOUS at 14:42

## 2021-10-01 RX ADMIN — Medication 2000 MG: at 22:58

## 2021-10-01 RX ADMIN — HYDROMORPHONE HYDROCHLORIDE 0.5 MG: 1 INJECTION, SOLUTION INTRAMUSCULAR; INTRAVENOUS; SUBCUTANEOUS at 16:54

## 2021-10-01 RX ADMIN — ONDANSETRON HYDROCHLORIDE 4 MG: 2 INJECTION, SOLUTION INTRAMUSCULAR; INTRAVENOUS at 14:30

## 2021-10-01 RX ADMIN — Medication 100 MCG: at 15:40

## 2021-10-01 RX ADMIN — Medication 2000 MG: at 14:35

## 2021-10-01 RX ADMIN — LABETALOL HYDROCHLORIDE 10 MG: 5 INJECTION INTRAVENOUS at 16:17

## 2021-10-01 RX ADMIN — SODIUM CHLORIDE: 9 INJECTION, SOLUTION INTRAVENOUS at 17:00

## 2021-10-01 RX ADMIN — NICARDIPINE HYDROCHLORIDE 3 MG/HR: 2.5 INJECTION, SOLUTION INTRAVENOUS at 18:08

## 2021-10-01 RX ADMIN — HEPARIN SODIUM 3000 UNITS: 10000 INJECTION INTRAVENOUS; SUBCUTANEOUS at 15:17

## 2021-10-01 RX ADMIN — SODIUM CHLORIDE: 9 INJECTION, SOLUTION INTRAVENOUS at 14:13

## 2021-10-01 RX ADMIN — PROTAMINE SULFATE 50 MG: 10 INJECTION, SOLUTION INTRAVENOUS at 16:26

## 2021-10-01 RX ADMIN — Medication 3 MG: at 16:27

## 2021-10-01 RX ADMIN — ROCURONIUM BROMIDE 10 MG: 10 INJECTION, SOLUTION INTRAVENOUS at 15:17

## 2021-10-01 RX ADMIN — Medication 0.4 MG: at 16:27

## 2021-10-01 RX ADMIN — LABETALOL HYDROCHLORIDE 10 MG: 5 INJECTION INTRAVENOUS at 16:08

## 2021-10-01 RX ADMIN — INSULIN LISPRO 1 UNITS: 100 INJECTION, SOLUTION INTRAVENOUS; SUBCUTANEOUS at 20:52

## 2021-10-01 ASSESSMENT — PULMONARY FUNCTION TESTS
PIF_VALUE: 18
PIF_VALUE: 18
PIF_VALUE: 17
PIF_VALUE: 18
PIF_VALUE: 28
PIF_VALUE: 22
PIF_VALUE: 1
PIF_VALUE: 0
PIF_VALUE: 18
PIF_VALUE: 18
PIF_VALUE: 19
PIF_VALUE: 2
PIF_VALUE: 18
PIF_VALUE: 2
PIF_VALUE: 2
PIF_VALUE: 18
PIF_VALUE: 17
PIF_VALUE: 18
PIF_VALUE: 0
PIF_VALUE: 18
PIF_VALUE: 14
PIF_VALUE: 18
PIF_VALUE: 19
PIF_VALUE: 0
PIF_VALUE: 18
PIF_VALUE: 2
PIF_VALUE: 18
PIF_VALUE: 14
PIF_VALUE: 2
PIF_VALUE: 1
PIF_VALUE: 17
PIF_VALUE: 18
PIF_VALUE: 2
PIF_VALUE: 18
PIF_VALUE: 18
PIF_VALUE: 19
PIF_VALUE: 18
PIF_VALUE: 2
PIF_VALUE: 18
PIF_VALUE: 0
PIF_VALUE: 19
PIF_VALUE: 18
PIF_VALUE: 2
PIF_VALUE: 2
PIF_VALUE: 18
PIF_VALUE: 18
PIF_VALUE: 29
PIF_VALUE: 2
PIF_VALUE: 18
PIF_VALUE: 18
PIF_VALUE: 2
PIF_VALUE: 2
PIF_VALUE: 18
PIF_VALUE: 18
PIF_VALUE: 1
PIF_VALUE: 18
PIF_VALUE: 18
PIF_VALUE: 0
PIF_VALUE: 18
PIF_VALUE: 1
PIF_VALUE: 18
PIF_VALUE: 19
PIF_VALUE: 2
PIF_VALUE: 14
PIF_VALUE: 18
PIF_VALUE: 3
PIF_VALUE: 19
PIF_VALUE: 19
PIF_VALUE: 18
PIF_VALUE: 1
PIF_VALUE: 18
PIF_VALUE: 1
PIF_VALUE: 3
PIF_VALUE: 1
PIF_VALUE: 2
PIF_VALUE: 19
PIF_VALUE: 18
PIF_VALUE: 3
PIF_VALUE: 2
PIF_VALUE: 18
PIF_VALUE: 0
PIF_VALUE: 1
PIF_VALUE: 18
PIF_VALUE: 2
PIF_VALUE: 18
PIF_VALUE: 14
PIF_VALUE: 18
PIF_VALUE: 2
PIF_VALUE: 2
PIF_VALUE: 18
PIF_VALUE: 18
PIF_VALUE: 19
PIF_VALUE: 13
PIF_VALUE: 18
PIF_VALUE: 13
PIF_VALUE: 0
PIF_VALUE: 0
PIF_VALUE: 18
PIF_VALUE: 15
PIF_VALUE: 0
PIF_VALUE: 19
PIF_VALUE: 18

## 2021-10-01 ASSESSMENT — PAIN SCALES - GENERAL
PAINLEVEL_OUTOF10: 8
PAINLEVEL_OUTOF10: 0
PAINLEVEL_OUTOF10: 8
PAINLEVEL_OUTOF10: 0

## 2021-10-01 ASSESSMENT — LIFESTYLE VARIABLES: SMOKING_STATUS: 1

## 2021-10-01 ASSESSMENT — PAIN - FUNCTIONAL ASSESSMENT: PAIN_FUNCTIONAL_ASSESSMENT: 0-10

## 2021-10-01 ASSESSMENT — COPD QUESTIONNAIRES: CAT_SEVERITY: SEVERE

## 2021-10-01 NOTE — OP NOTE
Operative Note      Patient: Luana Herrera  YOB: 1950  MRN: [id number]    DATE OF PROCEDURE: 10/1/2021     SURGEON: Robbie Croft M.D.     ASSISTANT: Carmen Gramajo    PREOPERATIVE DIAGNOSIS: 6.5 cm Abdominal aortic aneurysm    POSTOPERATIVE DIAGNOSIS: Same     OPERATION:   BILATERAL femoral artery access under US guidance and closure using perclose technique  Bilateral transfemoral aortic catheter placement  NEIL using modular stent graft with [place] con formable 36 x 14.5 x 14    Main Body (Left  36 mm x 14.5 mm x 14 cm   Contralateral limb Right  27 mm x 14 cm     ANESTHESIA:  General endotracheal anesthesia    Findings: Large abdominal aortic aneurysm there was an accessory left renal which was off of the aneurysm sac. On the CT scan this came off of the aneurysm where the aneurysm measured approximately 4.5 cm. Thus the accessory renal was sacrificed to achieve seal.      ESTIMATED BLOOD LOSS: less than 50 ml    COMPLICATIONS: None    DESCRIPTION OF PROCEDURE: The patient was identified and the procedure was confirmed. The abdomen, groins and thighs were prepped and draped in the usual sterile fashion. The Bilateral common femoral artery was identified under US, noted to be patent and percutaneously accessed with micropuncture needle. Image documented. The patient was heparinized maintaining an activated clotting time greater than 300 seconds. Wire and than 6 fr sheath placed bilaterally. Proglides placed at 10 and 2 oclock position and predeployed bilaterally. Rubber shods to sutures. 8 fr sheath replaced bilaterally. The main body device (36 mm x 14.5 mm x 14 cm) was inserted through the 18 fr sheath over a advantage glide wire through the left femoral artery and into the abdominal aorta. An arteriogram was obtained to identify the position of the renal arteries, and the main body was uncovered placing the gate to the patient's left.  A repeat arteriogram was obtained to confirm the origin of the renal arteries and then the main body was deployed. The gate was accessed from the right and a second advantage glide wire was advanced into the descending thoracic aorta. A retrograde arteriogram was obtained through the right femoral sheath to identify the origin of internal iliac artery. The contralateral limb (27 mm x 14 mm) was inserted through a 16 fr sheath over the wire and into the gate. The limb was deployed with a 3 cm overlap and deployed properly. A retrograde arteriogram was obtained through the left femoral sheath to identify the origin of the internal iliac artery. The ipsilateral limb was then deployed. A 23 mm Diaz limb was then delivered through the 18 [place] sheath and deployed with good overlap. The [place] balloon was inflated at the proximal and distal seal zones and throughout the limbs. A completion aortogram was obtained, which identified a widely patent stent graft and patent renal arteries bilaterally. Both hypogastric arteries were patent bilaterally. There was no evidence of an endovascular leak. The sheaths were removed over wires without hemodynamic compromise. The perclose was completed bilaterally using the knot pusher. Posterior tibial pulses on the right with a signal in the dorsalis pedis. On the left both DP and PT were palpable. Protamine was given and hemostasis was obtained. The incisions were irrigated with antibiotic saline solution. The incisions were approximated with multiple layers of Vicryl suture and Dermabond was applied to the skin incisions in the operating room. Needle, sponge and instrument counts were reported correct x2. The patient tolerated the procedure and was transferred to the CV-ICU in satisfactory condition. Rande Dandy, MD    CC : Jean-Claude Colunga DO    Specimens:   * No specimens in log *    Implants:  Implant Name Type Inv.  Item Serial No.  Lot No. LRB No. Used Action

## 2021-10-01 NOTE — ANESTHESIA PRE PROCEDURE
Department of Anesthesiology  Preprocedure Note       Name:  Hung Rowe   Age:  70 y.o.  :  1950                                          MRN:  [id number]         Date:  10/1/2021      Surgeon: Beth Wilson):  Tonia Norris MD    Procedure: Procedure(s):  ABDOMINAL AORTIC ANEURYSM REPAIR ENDOVASCULAR    Medications prior to admission:   Prior to Admission medications    Medication Sig Start Date End Date Taking?  Authorizing Provider   cephALEXin (KEFLEX) 500 MG capsule Take 1 capsule by mouth 3 times daily for 10 days 9/23/21 10/3/21 Yes Gloria Colunga, DO   clopidogrel (PLAVIX) 75 MG tablet Take 1 tablet by mouth daily 21  Yes Gloria Colunga, DO   atorvastatin (LIPITOR) 40 MG tablet Take 1 tablet by mouth daily  Patient taking differently: Take 80 mg by mouth daily  21 Yes Gloria Colunga DO   lisinopril (PRINIVIL;ZESTRIL) 10 MG tablet TAKE ONE TABLET BY MOUTH EVERY DAY 21  Yes Gloria Colunga DO   sotalol (BETAPACE) 80 MG tablet TAKE ONE-HALF TABLET BY MOUTH TWICE DAILY  Patient taking differently: Patient states is taking once a day 21  Yes Gloria Colunga DO   metoprolol succinate (TOPROL XL) 50 MG extended release tablet Take 1 tablet by mouth daily 21  Yes Gloria Colunga DO   metFORMIN (GLUCOPHAGE) 500 MG tablet Take 1 tablet by mouth 2 times daily (with meals)  Patient taking differently: Take 500 mg by mouth daily (with breakfast)  21  Yes Toñito Beck, DO   oxybutynin (DITROPAN) 5 MG tablet Take 1 tablet by mouth 3 times daily 21   Toñito Beck DO   oxybutynin (DITROPAN XL) 5 MG extended release tablet Take 1 tablet by mouth daily 21   Gloria Colunga DO   cefdinir (OMNICEF) 300 MG capsule Take 1 capsule by mouth 2 times daily for 10 days 9/21/21 10/1/21  Marco Martins, DO   aspirin EC 81 MG EC tablet Take 81 mg by mouth daily    Historical Provider, MD       Current medications:    Current Facility-Administered Medications   Medication Dose Route Frequency Provider Last Rate Last Admin    sodium chloride flush 0.9 % injection 10 mL  10 mL IntraVENous 2 times per day Vesta Come, APRN - CNP        sodium chloride flush 0.9 % injection 10 mL  10 mL IntraVENous PRN Vesta Come, APRN - CNP        0.9 % sodium chloride infusion  25 mL IntraVENous PRN Vesta Come, APRN - CNP        ceFAZolin (ANCEF) 2000 mg in sterile water 20 mL IV syringe  2,000 mg IntraVENous See Admin Instructions Vesta Come, APRN - CNP           Allergies:  No Known Allergies    Problem List:    Patient Active Problem List   Diagnosis Code    Essential hypertension I10    COPD, very severe ([address] J44.9    Reflux laryngitis J04.0, K21.9    CAD (coronary artery disease) I25.10    Diabetes mellitus type II, uncontrolled ([address] E11.65    Tubular adenoma of colon D12.6    Sigmoid diverticulosis K57.30    Adhesive capsulitis of both shoulders M75.01, M75.02    Type 2 diabetes mellitus without complication, without long-term current use of insulin (HCC) E11.9    Prostate cancer ([address] C61    Mixed hyperlipidemia E78.2    ICD (implantable cardioverter-defibrillator) in place Z95.810    Cerebellar hemorrhage ([address] I61.4    Dilated cardiomyopathy ([address] I42.0    Abdominal aortic aneurysm without rupture ([address] I71.4    AAA (abdominal aortic aneurysm) without rupture ([address] I71.4       Past Medical History:        Diagnosis Date    Abdominal aortic aneurysm without rupture ([address] 9/3/2021    Arthritis     CAD (coronary artery disease)     Cancer ([address] 2017    Colon    Combined systolic and diastolic heart failure ([address] 6/15/13    echo LVEF of 30-35%  stage II diastolic dysfunction    COPD (chronic obstructive pulmonary disease) ([address]     Diabetes mellitus ([address]     GERD (gastroesophageal reflux disease)     History of placement of internal cardiac defibrillator 2014?     [employer] ([place])  Hypertension     Sigmoid diverticulosis 2015    Sinusitis     Tubular adenoma of colon 2015    Vertigo        Past Surgical History:        Procedure Laterality Date    CARDIAC DEFIBRILLATOR PLACEMENT      COLONOSCOPY  8/31/15    with polypectomy (x2) - Dr. Mel Hodgkin  14    3.5/15 Xience in [place] and 3.015 Resolute in th 1st obtuse marginal.    HIP FRACTURE SURGERY Right 3/14/2020    RIGHT HIP OPEN REDUCTION INTERNAL FIXATION NAIL-SYNTHES -- OC 2 performed by Tiana Richey DO at [address]         Social History:    Social History     Tobacco Use    Smoking status: Former Smoker     Packs/day: 2.00     Years: 50.00     Pack years: 100.00     Types: Cigarettes     Start date: 1970     Quit date: 2014     Years since quittin.7    Smokeless tobacco: Never Used   Substance Use Topics    Alcohol use: Yes     Comment: rarely                                Counseling given: Not Answered      Vital Signs (Current):   Vitals:    10/01/21 0545   BP: 135/80   Pulse: 71   Resp: 17   Temp: 36.1 °C (97 °F)   TempSrc: Temporal   SpO2: 98%   Weight: 208 lb (94.3 kg)   Height: 6' (1.829 m)                                              BP Readings from Last 3 Encounters:   10/01/21 135/80   21 130/72   21 (!) 141/86       NPO Status: NPO > 8 hours  BMI:   Wt Readings from Last 3 Encounters:   10/01/21 208 lb (94.3 kg)   21 205 lb (93 kg)   21 205 lb (93 kg)     Body mass index is 28.21 kg/m².     CBC:   Lab Results   Component Value Date    WBC 6.5 2021    RBC 4.45 2021    HGB 14.2 2021    HCT 40.3 2021    MCV 90.6 2021    RDW 13.3 2021     2021       CMP:   Lab Results   Component Value Date     2021    K 4.5 2021    K 4.7 2021     2021    CO2 21 2021    BUN 10 2021    CREATININE 1.0 2021 GFRAA >60 09/21/2021    LABGLOM >60 09/21/2021    GLUCOSE 237 09/21/2021    GLUCOSE 124 03/03/2012    PROT 7.5 09/21/2021    CALCIUM 9.4 09/21/2021    BILITOT 0.6 09/21/2021    ALKPHOS 117 09/21/2021    AST 18 09/21/2021    ALT 11 09/21/2021       POC Tests: No results for input(s): POCGLU, POCNA, POCK, POCCL, POCBUN, POCHEMO, POCHCT in the last 72 hours. Coags:   Lab Results   Component Value Date    PROTIME 11.1 09/17/2021    INR 1.0 09/17/2021    APTT 27.5 09/03/2021       HCG (If Applicable): No results found for: PREGTESTUR, PREGSERUM, HCG, HCGQUANT     ABGs: No results found for: PHART, PO2ART, HWQ1PVG, HEF7ZTI, BEART, X3OODNPP     Type & Screen (If Applicable):  No results found for: LABABO, LABRH    Drug/Infectious Status (If Applicable):  No results found for: HIV, HEPCAB    COVID-19 Screening (If Applicable):   Lab Results   Component Value Date    COVID19 Not Detected 12/15/2020       EKG 9/3/21  Narrative & Impression    Normal sinus rhythm  Minimal voltage criteria for LVH, may be normal variant  Cannot rule out Inferior infarct , age undetermined  ST & T wave abnormality, consider lateral ischemia  Abnormal ECG  When compared with ECG of 03-SEP-2021 13:46,  No significant change was found  Confirmed by Cayden Alvarado ([id number]) on 9/4/2021 7:42:17 AM     ECHO 11/20/14   Summary    Left ventricle mild-moderately dilated. Global hypokinesis is noted to be moderate to severe. Estimated left ventricular ejection fraction is 30-35 %. There is doppler evidence of stage II diastolic dysfunction. Estimated wedge pressure is 16 mmHg. Moderately dilated right ventricle. Right ventricle global systolic function is mildly reduced . Increased E point septal separation noted,suggesting decreased LV cardiac    output    Mild mitral regurgitation is present. Mild tricuspid regurgitation. RVSP is 31 mmHg. Physiologic and/or trace pulmonic regurgitation present.     Technically fair quality study. No comparison study available. Suggest clinical correlation. Anesthesia Evaluation  Patient summary reviewed and Nursing notes reviewed no history of anesthetic complications:   Airway: Mallampati: III  TM distance: >3 FB   Neck ROM: full  Mouth opening: > = 3 FB Dental:          Pulmonary:normal exam  breath sounds clear to auscultation  (+) COPD: severe,  current smoker          Patient did not smoke on day of surgery. ROS comment: No cigarettes, vapes now   Cardiovascular:  Exercise tolerance: poor (<4 METS),   (+) hypertension:, pacemaker (ICD (implantable cardioverter-defibrillator) in place): AICD, CAD:, hyperlipidemia      ECG reviewed  Rhythm: regular  Rate: normal           Beta Blocker:  Dose within 24 Hrs (took 9/30/21)      ROS comment: Dilated cardiomyopathy   Combined systolic and diastolic heart failure   CORONARY ANGIOPLASTY WITH STENT PLACEMENT     Neuro/Psych:                ROS comment: Adhesive capsulitis of both shoulders  Cerebellar hemorrhage  GI/Hepatic/Renal:   (+) GERD:,          ROS comment: Sigmoid diverticulosis. Endo/Other:    (+) DiabetesType II DM, poorly controlled, , blood dyscrasia: anticoagulation therapy:., malignancy/cancer (Prostate cancer, Tubular adenoma of colon). Abdominal:             Vascular:   + PVD, aortic or cerebral, . ROS comment: AAA without rupture. Other Findings:           Anesthesia Plan      general     ASA 3     (18 PIV L hand)  Induction: intravenous. arterial line  MIPS: Postoperative opioids intended and Prophylactic antiemetics administered. Anesthetic plan and risks discussed with patient and sibling. Use of blood products discussed with patient whom consented to blood products. Plan discussed with CRNA and attending. Mari Crespo RN   10/1/2021    Pt seen, examined, chart reviewed, plan discussed.   Bernarda Wade MD  10/1/2021  10:35 AM

## 2021-10-01 NOTE — ANESTHESIA PROCEDURE NOTES
Arterial Line:    An arterial line was placed using ultrasound guidance and surface landmarks, in the pre-op for the following indication(s): continuous blood pressure monitoring and blood sampling needed. A 20 gauge (size), 1 and 3/8 inch (length), Arrow (type) catheter was placed, Seldinger technique not used, into the right radial artery, secured by Tegaderm. Anesthesia type: Local  Local infiltration: Injection    Events:  patient tolerated procedure well with no complications and EBL < 5mL. 10/1/2021 1:35 PM10/1/2021 1:45 PM  Anesthesiologist: Cynthia Benedict MD  Other anesthesia staff: Carlos Tolbert RN  Performed:  Other anesthesia staff   Preanesthetic Checklist  Completed: patient identified, IV checked, site marked, risks and benefits discussed, surgical consent, monitors and equipment checked, pre-op evaluation, timeout performed, anesthesia consent given, oxygen available and patient being monitored

## 2021-10-01 NOTE — H&P
Update History & Physical    The patient's History and Physical of September 8, 2021 (see below) was reviewed with the patient and I examined the patient. There was no change. The surgical site was confirmed by the patient and me. He last took his Plavix yesterday. On exam, he has strong bilateral femoral and PT and left DP pulses, right DP not palpable. But no pain. Both feet are pink. Has baseline bilateral pedal paresthesias. Plan: The risks, benefits, expected outcome, and alternative to the recommended procedure have been discussed with the patient. Patient understands and wants to proceed with the procedure. Electronically signed by Jessica Kirby MD on 10/1/2021 at 6:44 AM    I had a long discussion with the patient regarding the procedure including but not limited to bleeding, infection, arteriovenous nerve injury, myocardial infarction, respiratory failure, anesthetic reaction, pain swelling or tenderness, bowel ischemia, need for conversion, distal embolization, need for long-term surveillance paralysis and/or death. He understands and wishes to proceed. 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 [facility] [place] [address]        Vascular Surgery Outpatient Progress Note             Chief Complaint   Patient presents with    Surgical Consult       AAA         HISTORY OF PRESENT ILLNESS:                 The patient is a 70 y.o. male who returns for follow-up evaluation of AAA. Patient was seen in ED 9/3 for incidentally found 6.2 cm abdominal aortic aneurysm on CT abd/pelv ordered for hematuria. CT showed possible bladder neoplasm that he is seeing urology for. He has a defibrillator in place, follows with EP, Dr. Veronica Miramontes. He has not seen Dr. Sabi Hdez, his cardiologist in a couple of years. He denies any abdominal or back pain.   He denies any prior abdominal surgeries.      Past Medical History:    Past Medical History Diagnosis Date    Abdominal aortic aneurysm without rupture ([facility] 9/3/2021    Arthritis      CAD (coronary artery disease)      Cancer ([facility] 2017     Colon    Combined systolic and diastolic heart failure ([facility] 6/15/13     echo LVEF of 30-35%  stage II diastolic dysfunction    COPD (chronic obstructive pulmonary disease) (HCC)      Diabetes mellitus (HCC)      GERD (gastroesophageal reflux disease)      History of placement of internal cardiac defibrillator 2014?     [employer] ([place])    Hypertension      Sigmoid diverticulosis 8/31/2015    Sinusitis      Tubular adenoma of colon 8/31/2015    Vertigo           Past Surgical History:    Past Surgical History             Procedure Laterality Date    CARDIAC DEFIBRILLATOR PLACEMENT        COLONOSCOPY   8/31/15     with polypectomy (x2) - Dr. Storm Zaldivar   1/13/14     3.5/15 Xience in [place] and 3.0/15 Resolute in th 1st obtuse marginal.    HIP FRACTURE SURGERY Right 3/14/2020     RIGHT HIP OPEN REDUCTION INTERNAL FIXATION NAIL-SYNTHES -- OC 2 performed by Henrik To DO at [address]             Current Medications:   Home Medications           Prior to Admission medications    Medication Sig Start Date End Date Taking?  Authorizing Provider   aspirin EC 81 MG EC tablet Take 81 mg by mouth daily     Yes Historical Provider, MD   cephALEXin (KEFLEX) 500 MG capsule Take 1 capsule by mouth nightly 8/19/21 9/18/21 Yes Teresa Madison Catterlin, DO   clopidogrel (PLAVIX) 75 MG tablet Take 1 tablet by mouth daily 6/23/21   Yes Teresa Madison Catterlin, DO   atorvastatin (LIPITOR) 40 MG tablet Take 1 tablet by mouth daily 6/23/21 12/20/21 Yes Teresa Madison Catterlin, DO   lisinopril (PRINIVIL;ZESTRIL) 10 MG tablet TAKE ONE TABLET BY MOUTH EVERY DAY 6/23/21   Yes Teresa Jenkinsterlin, DO   sotalol (BETAPACE) 80 MG tablet TAKE ONE-HALF TABLET BY MOUTH TWICE DAILY 6/23/21   Yes Teresa Madison Aliceterlin, DO   metoprolol succinate (TOPROL XL) 50 MG extended release tablet Take 1 tablet by mouth daily 21   Yes Jullie Ganser Catterlin, DO   metFORMIN (GLUCOPHAGE) 500 MG tablet Take 1 tablet by mouth 2 times daily (with meals) 21   Yes Jullie Ganser Catterlin, DO   ibuprofen (ADVIL;MOTRIN) 200 MG tablet Take 200 mg by mouth every 6 hours as needed for Pain     Yes Historical Provider, MD   nitroGLYCERIN (NITROSTAT) 0.4 MG SL tablet Place 1 tablet under the tongue every 5 minutes as needed for Chest pain. Patient not taking: Reported on 9/3/2020 1/17/14     Blair Troy DO         Allergies:  Patient has no known allergies.     Social History               Socioeconomic History    Marital status:        Spouse name: Not on file    Number of children: Not on file    Years of education: Not on file    Highest education level: Not on file   Occupational History    Not on file   Tobacco Use    Smoking status: Former Smoker       Packs/day: 2.00       Years: 50.00       Pack years: 100.00       Types: Cigarettes       Start date: 1970       Quit date: 2014       Years since quittin.6    Smokeless tobacco: Never Used   Vaping Use    Vaping Use: Some days    Substances: Nicotine   Substance and Sexual Activity    Alcohol use: Yes       Comment: rarely    Drug use: No    Sexual activity: Not on file   Other Topics Concern    Not on file   Social History Narrative    Not on file      Social Determinants of Health          Financial Resource Strain:     Difficulty of Paying Living Expenses:    Food Insecurity:     Worried About Running Out of Food in the Last Year:     [address] in the Last Year:    Transportation Needs:     Lack of Transportation (Medical):      Lack of Transportation (Non-Medical):    Physical Activity:     Days of Exercise per Week:     Minutes of Exercise per Session:    Stress:     Feeling of Stress :    Social Connections:     Frequency of Communication with Friends and Family:     Frequency of Social Gatherings with Friends and Family:     Attends Protestant Services:     Active Member of Clubs or Organizations:     Attends Club or Organization Meetings:     Marital Status:    Intimate Partner Violence:     Fear of Current or Ex-Partner:     Emotionally Abused:     Physically Abused:     Sexually Abused:             Family History         Family History   Problem Relation Age of Onset    Heart Disease Mother      Cancer Father           abdominal    Cancer Brother           prostate    Cancer Brother           digestive            REVIEW OF SYSTEMS (New symptoms):     Eyes:                                       Blurred vision:             No [x]?/Yes []? Diplopia:                      No [x]?/Yes []? Vision loss:                  No [x]?/Yes []? Ears, nose, throat:                Hearing loss:               No [x]?/Yes []? Vertigo:                        No [x]?/Yes []? Swallowing problem:   No [x]?/Yes []? Nose bleeds:               No [x]?/Yes []? Voice hoarseness:      No [x]?/Yes []? Respiratory:                           Cough:                        No [x]?/Yes []? Pleuritic chest pain:     No [x]?/Yes []? Dyspnea:                     No [x]?/Yes []? Wheezing:                   No [x]?/Yes []? Cardiovascular:                     Angina:                        No [x]?/Yes []? Palpitations:                No [x]?/Yes []? Claudication:               No [x]?/Yes []? Leg swelling:               No [x]?/Yes []? Gastrointestinal:                   Nausea or vomiting:    No [x]?/Yes []? Abdominal pain:          No [x]?/Yes []? Intestinal bleeding:      No [x]?/Yes []? Musculoskeletal:                   Leg pain:                     No [x]?/Yes []? Back pain:                   No [x]?/Yes []? Weakness:                  No [x]?/Yes []? Neurologic:                            Numbness:                  No [x]?/Yes []? Paralysis:                    No [x]?/Yes []? Headaches:                 No [x]?/Yes []? Hematologic, lymphatic:      Anemia:                       No [x]?/Yes []? Bleeding or bruising:   No [x]?/Yes []? Fevers or chills:           No [x]?/Yes []? Endocrine:                             Temp intolerance:       No [x]?/Yes []? Polydipsia, polyuria:    No [x]?/Yes []? Skin:                                       Rash:                           No [x]?/Yes []? Ulcers:                         No [x]?/Yes []? Abnorm pigment:        No [x]?/Yes []? :                                          Frequency/urgency:    No [x]?/Yes []? Hematuria:                  No [x]?/Yes []?                                                   Incontinence:               No [x]?/Yes []?     PHYSICAL EXAM: Vitals:     09/08/21 1127   BP: 126/88      General Appearance: alert and oriented to person, place and time, in no acute distress, well developed and well- nourished  Neurologic: no cranial nerve deficit, speech normal  Head: normocephalic and atraumatic  Eyes: extraocular eye movements intact, conjunctivae normal  ENT: external ear and ear canal normal bilaterally, nose without deformity, no carotid bruits  Pulmonary/Chest: normal air movement, no respiratory distress  Cardiovascular: normal rate, regular rhythm, no murmur  Abdomen: non-distended, + palpable abdominal aorta  Musculoskeletal: no joint deformity or tenderness  Extremities: no leg edema bilaterally  Skin: warm and dry, no rash or erythema     PULSE EXAM      Right      Left   Brachial       Radial       Femoral       Popliteal       Dorsalis Pedis 3 3   Posterior Tibial 3 3   (3=normal, 2=diminished, 1=barely palpable, 4=widened)     RADIOLOGY: SA today         Problem List Items Addressed This Visit      None             Martine Falcon is a 69 yo male who presents with 6.2 cm AAA     - Reviewed CT abdomen and pelv. 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 We discussed that he is a candidate for EVAR. We discussed with patient that we will likely need to cover his bilateral hypogastric arteries with stents when we do the procedure. The risks, benefits, alternatives, and potential complications of the procedure, including the risks of bleeding, infection, injury to surrounding structures, need for additional procedures, and death were explained to the patient. All questions were answered. The patient understands and agrees to proceed with the procedure.      - Before scheduling procedure we discussed that he will need to have cardiac clearance with his Cardiologist Dr. Alicia Bardales.  Patient also requested that he discuss the procedure with his primary care physician  Keanu.      Electronically signed by Jethro Hudson MD on 9/8/2021 at 12:11 PM           No follow-ups on file.        This patient was seen and examined. Agree with above.     At this point he has a 6.2 cm infrarenal abdominal aortic aneurysm. I have reviewed this with the [place] wraps. At this point I think he be amenable to an endovascular stent graft.     I have reviewed risk benefits and alternatives with the patient in the office include but not limited to bleeding, infection, arteriovenous nerve injury, myocardial infarction, respiratory failure, anesthetic reaction, pain swelling or tenderness, bowel ischemia, distal embolization, failure of the procedure need for conversion to open lower extremity limb loss and or death. He understands and wishes to proceed. His cardiologist is Dr. Faiza Boston.   We will see if we can get cardiac evaluation and clearance.     All questions were answered according to the patient satisfaction.     Bonifacio

## 2021-10-02 VITALS
TEMPERATURE: 98.9 F | WEIGHT: 208 LBS | HEART RATE: 77 BPM | BODY MASS INDEX: 28.17 KG/M2 | OXYGEN SATURATION: 95 % | DIASTOLIC BLOOD PRESSURE: 84 MMHG | RESPIRATION RATE: 16 BRPM | SYSTOLIC BLOOD PRESSURE: 143 MMHG | HEIGHT: 72 IN

## 2021-10-02 LAB
ANION GAP SERPL CALCULATED.3IONS-SCNC: 12 MMOL/L (ref 7–16)
BLOOD BANK DISPENSE STATUS: NORMAL
BLOOD BANK PRODUCT CODE: NORMAL
BPU ID: NORMAL
BUN BLDV-MCNC: 14 MG/DL (ref 6–23)
CALCIUM SERPL-MCNC: 8.5 MG/DL (ref 8.6–10.2)
CHLORIDE BLD-SCNC: 103 MMOL/L (ref 98–107)
CO2: 22 MMOL/L (ref 22–29)
CREAT SERPL-MCNC: 0.9 MG/DL (ref 0.7–1.2)
DESCRIPTION BLOOD BANK: NORMAL
GFR AFRICAN AMERICAN: >60
GFR NON-AFRICAN AMERICAN: >60 ML/MIN/1.73
GLUCOSE BLD-MCNC: 194 MG/DL (ref 74–99)
HCT VFR BLD CALC: 34.5 % (ref 37–54)
HEMOGLOBIN: 11.8 G/DL (ref 12.5–16.5)
MCH RBC QN AUTO: 30.8 PG (ref 26–35)
MCHC RBC AUTO-ENTMCNC: 34.2 % (ref 32–34.5)
MCV RBC AUTO: 90.1 FL (ref 80–99.9)
METER GLUCOSE: 171 MG/DL (ref 74–99)
METER GLUCOSE: 302 MG/DL (ref 74–99)
PDW BLD-RTO: 13.8 FL (ref 11.5–15)
PLATELET # BLD: 235 E9/L (ref 130–450)
PMV BLD AUTO: 9.2 FL (ref 7–12)
POTASSIUM SERPL-SCNC: 4.3 MMOL/L (ref 3.5–5)
RBC # BLD: 3.83 E12/L (ref 3.8–5.8)
SODIUM BLD-SCNC: 137 MMOL/L (ref 132–146)
WBC # BLD: 13 E9/L (ref 4.5–11.5)

## 2021-10-02 PROCEDURE — 2580000003 HC RX 258: Performed by: NURSE PRACTITIONER

## 2021-10-02 PROCEDURE — 85027 COMPLETE CBC AUTOMATED: CPT

## 2021-10-02 PROCEDURE — 6370000000 HC RX 637 (ALT 250 FOR IP): Performed by: NURSE PRACTITIONER

## 2021-10-02 PROCEDURE — 36415 COLL VENOUS BLD VENIPUNCTURE: CPT

## 2021-10-02 PROCEDURE — 99024 POSTOP FOLLOW-UP VISIT: CPT | Performed by: SURGERY

## 2021-10-02 PROCEDURE — 2580000003 HC RX 258: Performed by: SURGERY

## 2021-10-02 PROCEDURE — 6370000000 HC RX 637 (ALT 250 FOR IP): Performed by: SURGERY

## 2021-10-02 PROCEDURE — 6360000002 HC RX W HCPCS: Performed by: NURSE PRACTITIONER

## 2021-10-02 PROCEDURE — 82962 GLUCOSE BLOOD TEST: CPT

## 2021-10-02 PROCEDURE — 80048 BASIC METABOLIC PNL TOTAL CA: CPT

## 2021-10-02 RX ORDER — CALCIUM CARBONATE 200(500)MG
500 TABLET,CHEWABLE ORAL 3 TIMES DAILY PRN
Status: DISCONTINUED | OUTPATIENT
Start: 2021-10-02 | End: 2021-10-02 | Stop reason: HOSPADM

## 2021-10-02 RX ORDER — OXYBUTYNIN CHLORIDE 5 MG/1
5 TABLET ORAL 3 TIMES DAILY
Qty: 90 TABLET | Refills: 3 | Status: SHIPPED | OUTPATIENT
Start: 2021-10-02 | End: 2022-01-07

## 2021-10-02 RX ORDER — OXYCODONE HYDROCHLORIDE 5 MG/1
5 TABLET ORAL EVERY 6 HOURS PRN
Qty: 12 TABLET | Refills: 0 | Status: SHIPPED | OUTPATIENT
Start: 2021-10-02 | End: 2021-10-05

## 2021-10-02 RX ORDER — 0.9 % SODIUM CHLORIDE 0.9 %
500 INTRAVENOUS SOLUTION INTRAVENOUS ONCE
Status: COMPLETED | OUTPATIENT
Start: 2021-10-02 | End: 2021-10-02

## 2021-10-02 RX ADMIN — SODIUM CHLORIDE, PRESERVATIVE FREE 10 ML: 5 INJECTION INTRAVENOUS at 10:42

## 2021-10-02 RX ADMIN — LISINOPRIL 10 MG: 10 TABLET ORAL at 10:42

## 2021-10-02 RX ADMIN — CALCIUM CARBONATE (ANTACID) CHEW TAB 500 MG 500 MG: 500 CHEW TAB at 06:19

## 2021-10-02 RX ADMIN — INSULIN LISPRO 1 UNITS: 100 INJECTION, SOLUTION INTRAVENOUS; SUBCUTANEOUS at 08:27

## 2021-10-02 RX ADMIN — CEPHALEXIN 500 MG: 500 CAPSULE ORAL at 10:41

## 2021-10-02 RX ADMIN — METFORMIN HYDROCHLORIDE 500 MG: 500 TABLET ORAL at 10:42

## 2021-10-02 RX ADMIN — SODIUM CHLORIDE 500 ML: 9 INJECTION, SOLUTION INTRAVENOUS at 10:50

## 2021-10-02 RX ADMIN — INSULIN LISPRO 4 UNITS: 100 INJECTION, SOLUTION INTRAVENOUS; SUBCUTANEOUS at 12:01

## 2021-10-02 RX ADMIN — OXYBUTYNIN CHLORIDE 5 MG: 5 TABLET ORAL at 10:41

## 2021-10-02 RX ADMIN — CLOPIDOGREL BISULFATE 75 MG: 75 TABLET ORAL at 10:41

## 2021-10-02 RX ADMIN — Medication 2000 MG: at 06:53

## 2021-10-02 RX ADMIN — SOTALOL HYDROCHLORIDE 40 MG: 80 TABLET ORAL at 10:42

## 2021-10-02 RX ADMIN — ASPIRIN 81 MG: 81 TABLET, COATED ORAL at 10:42

## 2021-10-02 ASSESSMENT — PAIN SCALES - GENERAL
PAINLEVEL_OUTOF10: 0

## 2021-10-02 NOTE — PLAN OF CARE
Problem: Falls - Risk of:  Goal: Will remain free from falls  Description: Will remain free from falls  10/2/2021 1254 by Parth Gaona RN  Outcome: Completed  10/2/2021 0920 by Parth Gaona RN  Outcome: Met This Shift  10/2/2021 0209 by Collin Adams RN  Outcome: Met This Shift  Goal: Absence of physical injury  Description: Absence of physical injury  10/2/2021 1254 by Parth Gaona RN  Outcome: Completed  10/2/2021 0920 by Parth Gaona RN  Outcome: Met This Shift

## 2021-10-02 NOTE — PLAN OF CARE
Problem: Falls - Risk of:  Goal: Will remain free from falls  Description: Will remain free from falls  10/2/2021 0920 by Karissa Muhammad RN  Outcome: Met This Shift  10/2/2021 0209 by Yomi Johnson RN  Outcome: Met This Shift  Goal: Absence of physical injury  Description: Absence of physical injury  Outcome: Met This Shift

## 2021-10-02 NOTE — PLAN OF CARE
Problem: Falls - Risk of:  Goal: Will remain free from falls  Description: Will remain free from falls  Outcome: Met This Shift     Problem: Infection - Surgical Site:  Goal: Will show no infection signs and symptoms  Description: Will show no infection signs and symptoms  Outcome: Met This Shift     Problem: Pain - Acute:  Goal: Pain level will decrease  Description: Pain level will decrease  Outcome: Met This Shift     Problem: Tissue Perfusion - Peripheral, Altered:  Goal: Absence of signs or symptoms of impaired coagulation  Description: Absence of signs or symptoms of impaired coagulation  Outcome: Met This Shift

## 2021-10-02 NOTE — FLOWSHEET NOTE
1240 Given discharge instructions,verbalized understanding of.  0799 Discharged via w/c with RN and family.

## 2021-10-04 ENCOUNTER — CARE COORDINATION (OUTPATIENT)
Dept: CASE MANAGEMENT | Age: 71
End: 2021-10-04

## 2021-10-04 DIAGNOSIS — I71.40 AAA (ABDOMINAL AORTIC ANEURYSM) WITHOUT RUPTURE: Primary | ICD-10-CM

## 2021-10-04 PROCEDURE — 1111F DSCHRG MED/CURRENT MED MERGE: CPT | Performed by: FAMILY MEDICINE

## 2021-10-04 NOTE — CARE COORDINATION
Jessi 45 Transitions Initial Follow Up Call    Call within 2 business days of discharge: Yes    Patient: Indigo Cervantes Patient : 1950   MRN: [id number]  Reason for Admission: AAA w/o rupture  Discharge Date: 10/2/21 RARS: Readmission Risk Score: 19      Last Discharge [facility]       Complaint Diagnosis Description Type Department Provider    10/1/21  Pre-operative laboratory examination . .. Admission (Discharged) [address], [place]        Transitions of Care Initial Call    Was this an external facility discharge? No Discharge Facility: N/A    Challenges to be reviewed by the provider   Additional needs identified to be addressed with provider: No  none             Method of communication with provider : none      Advance Care Planning:   Does patient have an Advance Directive: reviewed and current. Was this a readmission? No  Patient stated reason for admission: aneurysm fixed  Patients top risk factors for readmission: lack of knowledge about disease, level of motivation, medical condition-COPD, DM, and history of prostate cancer and polypharmacy    Care Transition Nurse (CTN) contacted the patient by telephone to perform post hospital discharge assessment. Verified name and  with patient as identifiers. Provided introduction to self, and explanation of the CTN role. CTN reviewed discharge instructions, medical action plan and red flags with patient who verbalized understanding. Patient given an opportunity to ask questions and does not have any further questions or concerns at this time. Were discharge instructions available to patient? Yes. Reviewed appropriate site of care based on symptoms and resources available to patient including: PCP, Specialist, Urgent care clinics, When to call 911 and Condition related references. The patient agrees to contact the PCP office for questions related to their healthcare.      Medication reconciliation was performed with patient, who verbalizes understanding of administration of home medications. Advised obtaining a 90-day supply of all daily and as-needed medications. Covid Risk Education     Educated patient about risk for severe COVID-19 due to risk factors according to CDC guidelines. CTN reviewed discharge instructions, medical action plan and red flag symptoms with the patient who verbalized understanding. Discussed COVID vaccination status: Yes. Education provided on COVID-19 vaccination as appropriate. Discussed exposure protocols and quarantine with CDC Guidelines. Patient was given an opportunity to verbalize any questions and concerns and agrees to contact CTN or health care provider for questions related to their healthcare. Reviewed and educated patient on any new and changed medications related to discharge diagnosis. Was patient discharged with a pulse oximeter? No Discussed and confirmed pulse oximeter discharge instructions and when to notify provider or seek emergency care. CTN provided contact information. Plan for follow-up call in 5-7 days based on severity of symptoms and risk factors. Plan for next call: symptom management-Has post op abdominal pain improved? Non-face-to-face services provided:  Scheduled appointment with PCP-CTN confirmed with patient he will call and schedule follow up with Dr. Neli Porter (PCP) and declines needing CTN assistance. Scheduled appointment with Specialist-CTN confirmed with patient he is scheduled for post op visit with  Dr. Kye Hare (vascular surgery)  Obtained and reviewed discharge summary and/or continuity of care documents  Education of patient/family/caregiver/guardian to support self-management-Discussed DM zone tool and s/s of infection knowing when to call doctor or seek medical attention.      Care Transitions 24 Hour Call    Schedule Follow Up Appointment with PCP: Declined  Do you have any ongoing symptoms?: Yes  Patient-reported 9/13/2022 11:00 AM Bernice Mcclure MD [address] Mayo, JERI

## 2021-10-05 NOTE — ANESTHESIA POSTPROCEDURE EVALUATION
Department of Anesthesiology  Postprocedure Note    Patient: Estefany Dominguez  MRN: [id number]  YOB: 1950  Date of evaluation: 10/5/2021  Time:  6:49 AM     Procedure Summary     Date: 10/01/21 Room / Location: [facility] OR 03 / [facility]    Anesthesia Start: 6106 Anesthesia Stop: 7408    Procedure: ABDOMINAL AORTIC ANEURYSM REPAIR ENDOVASCULAR (N/A Groin) Diagnosis: (ABDOMINAL AORTIC ANEURYSM)    Surgeons: Guzman Paulson MD Responsible Provider: Darek King MD    Anesthesia Type: general ASA Status: 3          Anesthesia Type: general    Arpan Phase I: Arpan Score: 8    Arpan Phase II:      Last vitals: Reviewed and per EMR flowsheets.        Anesthesia Post Evaluation    Patient location during evaluation: PACU  Patient participation: complete - patient participated  Level of consciousness: awake  Airway patency: patent  Nausea & Vomiting: no nausea and no vomiting  Complications: no  Cardiovascular status: hemodynamically stable  Respiratory status: acceptable  Hydration status: stable

## 2021-10-13 ENCOUNTER — OFFICE VISIT (OUTPATIENT)
Dept: FAMILY MEDICINE CLINIC | Age: 71
End: 2021-10-13
Payer: MEDICARE

## 2021-10-13 VITALS
OXYGEN SATURATION: 99 % | HEART RATE: 55 BPM | WEIGHT: 202 LBS | SYSTOLIC BLOOD PRESSURE: 138 MMHG | DIASTOLIC BLOOD PRESSURE: 88 MMHG | TEMPERATURE: 98.6 F | BODY MASS INDEX: 27.36 KG/M2 | RESPIRATION RATE: 20 BRPM | HEIGHT: 72 IN

## 2021-10-13 DIAGNOSIS — I71.40 AAA (ABDOMINAL AORTIC ANEURYSM) WITHOUT RUPTURE: ICD-10-CM

## 2021-10-13 DIAGNOSIS — I42.0 DILATED CARDIOMYOPATHY (HCC): ICD-10-CM

## 2021-10-13 DIAGNOSIS — E11.59 TYPE 2 DIABETES MELLITUS WITH CARDIAC COMPLICATION (HCC): Primary | ICD-10-CM

## 2021-10-13 DIAGNOSIS — E78.5 DYSLIPIDEMIA: ICD-10-CM

## 2021-10-13 DIAGNOSIS — I10 ESSENTIAL HYPERTENSION: ICD-10-CM

## 2021-10-13 DIAGNOSIS — R53.83 FATIGUE, UNSPECIFIED TYPE: ICD-10-CM

## 2021-10-13 DIAGNOSIS — E78.2 MIXED HYPERLIPIDEMIA: ICD-10-CM

## 2021-10-13 DIAGNOSIS — I71.40 ABDOMINAL AORTIC ANEURYSM WITHOUT RUPTURE: ICD-10-CM

## 2021-10-13 PROCEDURE — 1111F DSCHRG MED/CURRENT MED MERGE: CPT | Performed by: FAMILY MEDICINE

## 2021-10-13 PROCEDURE — 99214 OFFICE O/P EST MOD 30 MIN: CPT | Performed by: FAMILY MEDICINE

## 2021-10-13 PROCEDURE — G0008 ADMIN INFLUENZA VIRUS VAC: HCPCS | Performed by: FAMILY MEDICINE

## 2021-10-13 PROCEDURE — 1123F ACP DISCUSS/DSCN MKR DOCD: CPT | Performed by: FAMILY MEDICINE

## 2021-10-13 PROCEDURE — G8484 FLU IMMUNIZE NO ADMIN: HCPCS | Performed by: FAMILY MEDICINE

## 2021-10-13 PROCEDURE — 3017F COLORECTAL CA SCREEN DOC REV: CPT | Performed by: FAMILY MEDICINE

## 2021-10-13 PROCEDURE — 90694 VACC AIIV4 NO PRSRV 0.5ML IM: CPT | Performed by: FAMILY MEDICINE

## 2021-10-13 PROCEDURE — G8417 CALC BMI ABV UP PARAM F/U: HCPCS | Performed by: FAMILY MEDICINE

## 2021-10-13 PROCEDURE — G8427 DOCREV CUR MEDS BY ELIG CLIN: HCPCS | Performed by: FAMILY MEDICINE

## 2021-10-13 PROCEDURE — 2022F DILAT RTA XM EVC RTNOPTHY: CPT | Performed by: FAMILY MEDICINE

## 2021-10-13 PROCEDURE — 4040F PNEUMOC VAC/ADMIN/RCVD: CPT | Performed by: FAMILY MEDICINE

## 2021-10-13 PROCEDURE — 1036F TOBACCO NON-USER: CPT | Performed by: FAMILY MEDICINE

## 2021-10-13 PROCEDURE — 3051F HG A1C>EQUAL 7.0%<8.0%: CPT | Performed by: FAMILY MEDICINE

## 2021-10-13 SDOH — ECONOMIC STABILITY: FOOD INSECURITY: WITHIN THE PAST 12 MONTHS, THE FOOD YOU BOUGHT JUST DIDN'T LAST AND YOU DIDN'T HAVE MONEY TO GET MORE.: NEVER TRUE

## 2021-10-13 SDOH — ECONOMIC STABILITY: FOOD INSECURITY: WITHIN THE PAST 12 MONTHS, YOU WORRIED THAT YOUR FOOD WOULD RUN OUT BEFORE YOU GOT MONEY TO BUY MORE.: NEVER TRUE

## 2021-10-13 ASSESSMENT — ENCOUNTER SYMPTOMS
EYE ITCHING: 0
ABDOMINAL DISTENTION: 0
TROUBLE SWALLOWING: 0
ANAL BLEEDING: 0
ORTHOPNEA: 0
BLURRED VISION: 0
SINUS PRESSURE: 0
STRIDOR: 0
EYE DISCHARGE: 0
EYE PAIN: 0
CHOKING: 0
APNEA: 0
RECTAL PAIN: 0
RESPIRATORY NEGATIVE: 1
BACK PAIN: 0
GASTROINTESTINAL NEGATIVE: 1
CHEST TIGHTNESS: 0
SINUS PAIN: 0
BLOOD IN STOOL: 0
ALLERGIC/IMMUNOLOGIC NEGATIVE: 1
RHINORRHEA: 0
EYE REDNESS: 0
VOICE CHANGE: 0
COUGH: 0
VISUAL CHANGE: 0
COLOR CHANGE: 0
WHEEZING: 0
SORE THROAT: 0
PHOTOPHOBIA: 0
SHORTNESS OF BREATH: 0

## 2021-10-13 ASSESSMENT — SOCIAL DETERMINANTS OF HEALTH (SDOH): HOW HARD IS IT FOR YOU TO PAY FOR THE VERY BASICS LIKE FOOD, HOUSING, MEDICAL CARE, AND HEATING?: NOT HARD AT ALL

## 2021-10-13 NOTE — PROGRESS NOTES
SUBJECTIVE  Kd Banuelos is a 70 y.o. male. HPI/Chief C/O:  Chief Complaint   Patient presents with    Post-Op Check     Pt here to follow up from 10/1 EVAR - feels he is doing well at this time, denies any issues related to surgery    Hematuria     Pt reports that his hematuria has completely stopped, does not have a follow up appt with Urology at this time    Abdominal Pain     Pt still experiencing lower abdominal pain, does have days where he does not have a BM    Hypertension     Pt has not taken his BP meds yet today    Flu Vaccine     Pended     No Known Allergies  The patient is here for a medication list and treatment planning review  We will go over our care planning goals as well as take care of all refills  We will set up labs as well   He is post op AAA and doing well    Hypertension  This is a chronic problem. The current episode started more than 1 year ago. The problem is controlled. Associated symptoms include malaise/fatigue. Pertinent negatives include no anxiety, blurred vision, chest pain, headaches, neck pain, orthopnea, palpitations, peripheral edema, PND, shortness of breath or sweats. Risk factors for coronary artery disease include male gender, smoking/tobacco exposure, stress, diabetes mellitus and dyslipidemia. Past treatments include lifestyle changes, beta blockers and ACE inhibitors. The current treatment provides significant improvement. Compliance problems include diet and exercise. Hypertensive end-organ damage includes CAD/MI (ICD implant), CVA, heart failure, left ventricular hypertrophy and PVD (H/O AAA repair). There is no history of angina, kidney disease or retinopathy. There is no history of chronic renal disease, coarctation of the aorta, hyperaldosteronism, hypercortisolism, hyperparathyroidism, a hypertension causing med, pheochromocytoma, renovascular disease, sleep apnea or a thyroid problem. Diabetes  He presents for his follow-up diabetic visit.  He has type 2 diabetes mellitus. Pertinent negatives for hypoglycemia include no confusion, dizziness, headaches, nervousness/anxiousness, pallor, seizures, speech difficulty, sweats or tremors. Associated symptoms include fatigue. Pertinent negatives for diabetes include no blurred vision, no chest pain, no foot paresthesias, no foot ulcerations, no polydipsia, no polyphagia, no polyuria, no visual change, no weakness and no weight loss. There are no hypoglycemic complications. Diabetic complications include a CVA, heart disease and PVD (H/O AAA repair). Pertinent negatives for diabetic complications include no autonomic neuropathy, nephropathy, peripheral neuropathy or retinopathy. Risk factors for coronary artery disease include hypertension, diabetes mellitus, dyslipidemia, male sex and tobacco exposure. Current diabetic treatment includes diet and oral agent (monotherapy). He is compliant with treatment some of the time. He is following a generally unhealthy diet. An ACE inhibitor/angiotensin II receptor blocker is being taken. ROS:  Review of Systems   Constitutional: Positive for fatigue and malaise/fatigue. Negative for activity change, appetite change, diaphoresis, unexpected weight change and weight loss. HENT: Negative. Negative for congestion, dental problem, drooling, ear discharge, ear pain, hearing loss, mouth sores, nosebleeds, postnasal drip, rhinorrhea, sinus pressure, sinus pain, sneezing, sore throat, tinnitus, trouble swallowing and voice change. Eyes: Negative for blurred vision, photophobia, pain, discharge, redness, itching and visual disturbance. Respiratory: Negative. Negative for apnea, cough, choking, chest tightness, shortness of breath, wheezing and stridor. Cardiovascular: Negative. Negative for chest pain, palpitations, orthopnea, leg swelling and PND. Gastrointestinal: Negative. Negative for abdominal distention, anal bleeding, blood in stool and rectal pain. Endocrine: Negative. Negative for cold intolerance, heat intolerance, polydipsia, polyphagia and polyuria. Genitourinary: Negative for decreased urine volume, difficulty urinating, discharge, enuresis, genital sores, penile pain, penile swelling, scrotal swelling and testicular pain. Musculoskeletal: Negative for back pain, gait problem, joint swelling, neck pain and neck stiffness. Skin: Negative. Negative for color change, pallor, rash and wound. Allergic/Immunologic: Negative. Negative for environmental allergies, food allergies and immunocompromised state. Neurological: Negative. Negative for dizziness, tremors, seizures, syncope, facial asymmetry, speech difficulty, weakness, light-headedness, numbness and headaches. Hematological: Negative for adenopathy. Bruises/bleeds easily. Psychiatric/Behavioral: Negative. Negative for agitation, behavioral problems, confusion, decreased concentration, dysphoric mood, hallucinations, self-injury, sleep disturbance and suicidal ideas. The patient is not nervous/anxious and is not hyperactive. Past Medical/Surgical Hx;  Reviewed with patient      Diagnosis Date    Abdominal aortic aneurysm without rupture ([address] 9/3/2021    Arthritis     CAD (coronary artery disease)     Cancer ([address] 2017    Colon    Combined systolic and diastolic heart failure ([address] 6/15/13    echo LVEF of 30-35%  stage II diastolic dysfunction    COPD (chronic obstructive pulmonary disease) ([address]     Diabetes mellitus (HCC)     GERD (gastroesophageal reflux disease)     History of placement of internal cardiac defibrillator 2014?     [employer] (Miguel Pulido)    Hypertension     Sigmoid diverticulosis 8/31/2015    Sinusitis     Tubular adenoma of colon 8/31/2015    Vertigo      Past Surgical History:   Procedure Laterality Date    ABDOMINAL AORTIC ANEURYSM REPAIR, ENDOVASCULAR N/A 10/1/2021    ABDOMINAL AORTIC ANEURYSM REPAIR ENDOVASCULAR performed by Alida Beltrán, Left eye: No discharge. Extraocular Movements: Extraocular movements intact. Conjunctiva/sclera: Conjunctivae normal.      Pupils: Pupils are equal, round, and reactive to light. Neck:      Thyroid: No thyromegaly. Vascular: No carotid bruit. Trachea: No tracheal deviation. Cardiovascular:      Rate and Rhythm: Normal rate and regular rhythm. Extrasystoles are present. Pulses: Normal pulses. Heart sounds: Normal heart sounds. Heart sounds not distant. No murmur heard. No systolic murmur is present. No diastolic murmur is present. No friction rub. No gallop. Pulmonary:      Effort: Pulmonary effort is normal. No respiratory distress. Breath sounds: Normal breath sounds. No stridor. No wheezing, rhonchi or rales. Chest:      Chest wall: No tenderness. Abdominal:      General: Bowel sounds are normal. There is no distension. Palpations: Abdomen is soft. There is no mass. Tenderness: There is abdominal tenderness (post op). There is no right CVA tenderness, left CVA tenderness, guarding or rebound. Hernia: No hernia is present. Genitourinary:     Penis: Normal. No tenderness. Testes: Normal.      Rectum: Normal.      Comments: H/O prostate cancer  H/O UTI with hematuria  Follows with urology   Musculoskeletal:         General: Tenderness (joint pains) present. No swelling, deformity or signs of injury. Normal range of motion. Cervical back: Normal range of motion and neck supple. No rigidity. No muscular tenderness. Right lower leg: No edema. Left lower leg: No edema. Lymphadenopathy:      Cervical: No cervical adenopathy. Skin:     General: Skin is warm. Coloration: Skin is not jaundiced or pale. Findings: No bruising, erythema, lesion or rash. Neurological:      General: No focal deficit present. Mental Status: He is alert and oriented to person, place, and time.       Cranial Nerves: No cranial nerve deficit. Sensory: No sensory deficit. Motor: No weakness or abnormal muscle tone. Coordination: Coordination normal.      Gait: Gait normal.      Deep Tendon Reflexes: Reflexes are normal and symmetric. Reflexes normal.         ASSESSMENT/PLAN  Chong Adkins was seen today for post-op check, hematuria, abdominal pain, hypertension and flu vaccine. Diagnoses and all orders for this visit:    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 ([facility]  -     Basic Metabolic Panel; Future  -     CBC Auto Differential; Future  Long talk on treatment and prevention  Literature is given       Abdominal aortic aneurysm without rupture (HCC)  -     Basic Metabolic Panel; Future  -     CBC Auto Differential; Future  Long talk on treatment and prevention  Literature is given       AAA (abdominal aortic aneurysm) without rupture (HCC)  -     Basic Metabolic Panel; Future  -     CBC Auto Differential; Future  --post op graft and doing well    Dilated cardiomyopathy ([facility]  -     Basic Metabolic Panel; Future  -     CBC Auto Differential; Future  --patient is instructed on low to moderate sodium ( 2 to 2.5 grams ), daily    Also to increase potassium in the diet to about 3.5 grams daily    Literature is provided       Essential hypertension ---controlled     ---VASCULAR PANEL  A) asa, PLAVIX, aggrenox  B) coumadin, pletal, tzd, STATIN  C) ACE, hctz, folic, ccb  D) cannikinumab, fish oils     ---CARDIAC---PLAVIX, ACE, BETA, STATIN, hctz, ( ccb )    -     Basic Metabolic Panel; Future  -     CBC Auto Differential; Future    Mixed hyperlipidemia  -     Basic Metabolic Panel; Future  -     CBC Auto Differential; Future  -     Lipid Panel; Future  --Mediterranean diet, exercise, weight loss, vitamins    We have a long talk on cholesterol and importance of lowering it       Dyslipidemia  -     Basic Metabolic Panel;  Future  -     CBC Auto Differential; Future    Fatigue, unspecified type  -     Basic Metabolic Panel; Future  -     CBC Auto Differential; Future    Other orders  -     INFLUENZA, QUADV, ADJUVANTED, 65 YRS =, IM, PF, PREFILL SYR, 0.5ML (FLUAD)        Outpatient Encounter Medications as of 10/13/2021   Medication Sig Dispense Refill    clopidogrel (PLAVIX) 75 MG tablet Take 1 tablet by mouth daily 90 tablet 1    atorvastatin (LIPITOR) 40 MG tablet Take 1 tablet by mouth daily (Patient taking differently: Take 80 mg by mouth daily ) 90 tablet 1    lisinopril (PRINIVIL;ZESTRIL) 10 MG tablet TAKE ONE TABLET BY MOUTH EVERY DAY 90 tablet 1    sotalol (BETAPACE) 80 MG tablet TAKE ONE-HALF TABLET BY MOUTH TWICE DAILY (Patient taking differently: Patient states is taking once a day) 180 tablet 1    metoprolol succinate (TOPROL XL) 50 MG extended release tablet Take 1 tablet by mouth daily 90 tablet 1    metFORMIN (GLUCOPHAGE) 500 MG tablet Take 1 tablet by mouth 2 times daily (with meals) (Patient taking differently: Take 500 mg by mouth daily (with breakfast) ) 180 tablet 1    oxybutynin (DITROPAN) 5 MG tablet Take 1 tablet by mouth 3 times daily (Patient not taking: Reported on 10/13/2021) 90 tablet 3    [DISCONTINUED] aspirin EC 81 MG EC tablet Take 81 mg by mouth daily (Patient not taking: Reported on 10/4/2021)       No facility-administered encounter medications on file as of 10/13/2021. No follow-ups on file.         Reviewed recent labs related to Federico's current problems      Discussed importance of regular Health Maintenance follow up  Health Maintenance   Topic    Hepatitis C screen     Diabetic foot exam     Diabetic retinal exam     DTaP/Tdap/Td vaccine (1 - Tdap)    Shingles Vaccine (1 of 2)    Low dose CT lung screening     Pneumococcal 65+ years Vaccine (1 of 1 - PPSV23)    Colon cancer screen colonoscopy     Annual Wellness Visit (AWV)     A1C test (Diabetic or Prediabetic)     Diabetic microalbuminuria test     Lipid screen     PSA counseling     Potassium monitoring     Creatinine monitoring     Flu vaccine     COVID-19 Vaccine     Hepatitis A vaccine     Hib vaccine     Meningococcal (ACWY) vaccine

## 2021-10-14 ENCOUNTER — CARE COORDINATION (OUTPATIENT)
Dept: CASE MANAGEMENT | Age: 71
End: 2021-10-14

## 2021-10-14 NOTE — CARE COORDINATION
Jessi 45 Transitions Follow Up Call    10/14/2021    Patient: Tegan Blount  Patient : 1950   MRN: [id number]  Reason for Admission: AAA w/o rupture  Discharge Date: 10/2/21 RARS: Readmission Risk Score: 19    Care Transitions Follow Up Call    Needs to be reviewed by the provider   Additional needs identified to be addressed with provider: No  none             Method of communication with provider : none      Care Transition Nurse (CTN) contacted the patient by telephone to follow up after admission on 10/2/21. Verified name and  with patient as identifiers. Addressed changes since last contact: none  Discussed follow-up appointments. If no appointment was previously scheduled, appointment scheduling offered: Yes. Is follow up appointment scheduled within 7 days of discharge? No.    Advance Care Planning:   Does patient have an Advance Directive: reviewed and current. CTN reviewed discharge instructions, medical action plan and red flags with patient and discussed any barriers to care and/or understanding of plan of care after discharge. Discussed appropriate site of care based on symptoms and resources available to patient including: PCP, Specialist, Urgent care clinics, When to call 911 and Condition related references. The patient agrees to contact the PCP office for questions related to their healthcare. Patients top risk factors for readmission: level of motivation, medical condition-COPD, DM, History of Prostate Cancer, and Tobacco Abuse and polypharmacy     Plan for follow-up call in 7-10 days based on severity of symptoms and risk factors. Plan for next call: symptom management-HAs abdominal/post-op improved?       Care Transitions Subsequent and Final Call    Subsequent and Final Calls  Do you have any ongoing symptoms?: Yes  Onset of Patient-reported symptoms: Other  Patient-reported symptoms: Pain, Abdominal Pain  Have your medications changed?: No  Do you have any questions related to your medications?: No  Do you currently have any active services?: No  Do you have any needs or concerns that I can assist you with?: No  Identified Barriers: Lack of Motivation, Lack of Education  Care Transitions Interventions  No Identified Needs    Registered Dietician: Declined      Smoking Cessation: Declined    Other Interventions:         Spoke with patient today 10/14/21 for TCM/hospital discharge follow up sub call as he is s/p AAA repair. Patient complains of ongoing abdominal/post-op pain which has improved. Rates pain 5-6/10 in severity on pain scale and admits not taking any Oxycodone. Denies any shortness of breath, chest pain, chest discomfort, nausea, vomiting, diarrhea, chills or fever. States last BM was two days ago and is passing gas. States incision is dry and healing with no issues. CTN reiterated s/s of infection and knowing when to call doctor or seek medical attention. Patient states he quit smoking but admits he continues vaping. CTN advised that vaping is still harmful to lungs and advised of benefits to quit. Patient states he forgot to monitor BG today as he is not always consistent and has no readings to offer saying \" I don't remember\". CTN advised of importance to monitor BG as directed and document readings to keep track and share with doctor which he acknowledges. Discussed COPD/DM zone tools and knowing when to seek medical attention. Confirmed with patient he completed HFU appt with Dr. Julissa Hguhes (PCP) yesterday and is scheduled to follow up with Dr. Redd Eisenberg (vascular surg) on 10/27/21. Denies any other complaints or concerns at this time. CTN will continue to follow. Follow Up  Future Appointments   Date Time Provider Elliot Oseguera   10/27/2021  8:45 AM Juan Luis Marrufo MD [facility]   9/13/2022 11:00 AM Rody Quevedo MD [facility]     CTN provided contact information for future needs.     Duke Linares, JERI

## 2021-10-25 ENCOUNTER — CARE COORDINATION (OUTPATIENT)
Dept: CASE MANAGEMENT | Age: 71
End: 2021-10-25

## 2021-10-25 NOTE — CARE COORDINATION
Jessi 45 Transitions Follow Up Call    10/25/2021    Patient: Samuel Lerma  Patient : 1950   MRN: [id number]  Reason for Admission: AAA r/o rupture and s/p AAA repair  Discharge Date: 10/2/21 RARS: Readmission Risk Score: 23         Spoke with: [address] Transitions Subsequent and Final Call    Subsequent and Final Calls  Do you have any ongoing symptoms?: Yes  Onset of Patient-reported symptoms: In the past 7 days  Patient-reported symptoms: Other, Abdominal Pain  Have your medications changed?: Yes  Patient Reports: Pt states started him back on Cefdinir 300 mg twice daily. Do you have any questions related to your medications?: No  Do you currently have any active services?: No  Do you have any needs or concerns that I can assist you with?: No  Identified Barriers: Lack of Education, Lack of Motivation  Care Transitions Interventions    Registered Dietician: Declined      Smoking Cessation: Declined    Other Interventions:         Spoke with patient today 10/25/21 for TCM/hospital discharge follow up sub call. Patient states he noticed blood in urine that started two days again. Noted patient has history of prostate cancer but not active with any therapy at this time. Patient states he notified Dr. Kristy Singleton (PCP) who ordered him to re-start Cefdinir 300 mg twice daily which he started two days ago. Reinforced importance to take Cefdinir as directed per PCP which patient acknowledges. Advised to watch for diarrhea which is common s/e to ATB therapy. Patient complains of abdominal pain that he describes as \"discomofrt\". Rates pain 4/10 in severity . Denies takign any oxycodone for pain. States incision is dry, open to air and healing. Denies any shortness of breath, chest pain, chest discomfort, nausea, vomiting, chills or fever. Patient states not consistent with checking BG and does not know last BG reading saying he forgot to check it today.  CTN reinforced importance to be consistent with monitoring/documenting BG. Discussed DM zone tool and knowing when to seek medical attention. Denies any other complaints or concerns at this time. CTN will continue to follow.      Follow Up  Future Appointments   Date Time Provider Elliot Ana Rosa   10/27/2021  8:45 AM Alida Beltrán MD [facility]   9/13/2022 11:00 AM Luis Vuong MD [facility]       Denisse Shah, APRN

## 2021-10-26 ENCOUNTER — TELEPHONE (OUTPATIENT)
Dept: VASCULAR SURGERY | Age: 71
End: 2021-10-26

## 2021-10-27 ENCOUNTER — OFFICE VISIT (OUTPATIENT)
Dept: VASCULAR SURGERY | Age: 71
End: 2021-10-27

## 2021-10-27 ENCOUNTER — TELEPHONE (OUTPATIENT)
Dept: VASCULAR SURGERY | Age: 71
End: 2021-10-27

## 2021-10-27 VITALS — BODY MASS INDEX: 26.41 KG/M2 | WEIGHT: 195 LBS | HEIGHT: 72 IN

## 2021-10-27 DIAGNOSIS — I71.40 ABDOMINAL AORTIC ANEURYSM WITHOUT RUPTURE: Primary | ICD-10-CM

## 2021-10-27 DIAGNOSIS — Z98.890 STATUS POST ENDOVASCULAR ANEURYSM REPAIR (EVAR): ICD-10-CM

## 2021-10-27 DIAGNOSIS — Z86.79 STATUS POST ENDOVASCULAR ANEURYSM REPAIR (EVAR): ICD-10-CM

## 2021-10-27 PROCEDURE — 99024 POSTOP FOLLOW-UP VISIT: CPT | Performed by: SURGERY

## 2021-10-27 NOTE — TELEPHONE ENCOUNTER
Notified patient of CTA at [facility] on 11-8-21 at 9:00 am.  REgister at 8:30 am.  Hold Metformin day of CTA and for 2 days after. NPO 4 hours prior.

## 2021-10-27 NOTE — PROGRESS NOTES
Vascular Surgery Outpatient Progress Note      Chief Complaint   Patient presents with    Post-Op Check     AAA       HISTORY OF PRESENT ILLNESS:                The patient is a 70 y.o. male who returns for follow-up evaluation of an abdominal aortic aneurysm repair. Overall he is doing very well. He denies any abdominal pain or discomfort. Denies any lower extremity claudication rest pain or lower extremity wounds. He denies any active chest pain or shortness of breath. He has been treated with antibiotics for a UTI. He has been off antibiotics now for a while and otherwise has been doing well but developed a additional hematuria and is now on antibiotics again. Past Medical History:        Diagnosis Date    Abdominal aortic aneurysm without rupture ([address] 9/3/2021    Arthritis     CAD (coronary artery disease)     Cancer ([address] 2017    Colon    Combined systolic and diastolic heart failure ([address] 6/15/13    echo LVEF of 30-35%  stage II diastolic dysfunction    COPD (chronic obstructive pulmonary disease) [facility] [address]     Diabetes mellitus (HCC)     GERD (gastroesophageal reflux disease)     History of placement of internal cardiac defibrillator 2014?     [employer] ([place])    Hypertension     Sigmoid diverticulosis 8/31/2015    Sinusitis     Tubular adenoma of colon 8/31/2015    Vertigo      Past Surgical History:        Procedure Laterality Date    ABDOMINAL AORTIC ANEURYSM REPAIR, ENDOVASCULAR N/A 10/1/2021    ABDOMINAL AORTIC ANEURYSM REPAIR ENDOVASCULAR performed by Donavan Mcnamara MD at . [address] COLONOSCOPY  8/31/15    with polypectomy (x2) - Dr. Alessandro Kahn  1/13/14    3.5/15 Xience in [place] and 3.0/15 Resolute in th 1st obtuse marginal.    HIP FRACTURE SURGERY Right 3/14/2020    RIGHT HIP OPEN REDUCTION INTERNAL FIXATION NAIL-SYNTHES -- OC 2 performed by Julia Queen DO at [address] HERNIA REPAIR       Current Medications:   Prior to Admission medications    Medication Sig Start Date End Date Taking? Authorizing Provider   oxybutynin (DITROPAN) 5 MG tablet Take 1 tablet by mouth 3 times daily 10/2/21  Yes Camille Bermudez MD   clopidogrel (PLAVIX) 75 MG tablet Take 1 tablet by mouth daily 21  Yes Catalina Colunga DO   atorvastatin (LIPITOR) 40 MG tablet Take 1 tablet by mouth daily  Patient taking differently: Take 80 mg by mouth daily  21 Yes Catalina Norman Catmoi, DO   lisinopril (PRINIVIL;ZESTRIL) 10 MG tablet TAKE ONE TABLET BY MOUTH EVERY DAY 21  Yes Catalina Colunga DO   sotalol (BETAPACE) 80 MG tablet TAKE ONE-HALF TABLET BY MOUTH TWICE DAILY  Patient taking differently: Patient states is taking once a day 21  Yes Catalina Colunga DO   metoprolol succinate (TOPROL XL) 50 MG extended release tablet Take 1 tablet by mouth daily 21  Yes Catalina Colunga DO   metFORMIN (GLUCOPHAGE) 500 MG tablet Take 1 tablet by mouth 2 times daily (with meals)  Patient taking differently: Take 500 mg by mouth daily (with breakfast)  21  Yes Toñito Nieto DO     Allergies:  Patient has no known allergies.     Social History     Socioeconomic History    Marital status:      Spouse name: Not on file    Number of children: Not on file    Years of education: Not on file    Highest education level: Not on file   Occupational History    Not on file   Tobacco Use    Smoking status: Current Every Day Smoker     Packs/day: 2.00     Years: 50.00     Pack years: 100.00     Types: Cigarettes     Start date: 1970     Last attempt to quit: 2014     Years since quittin.7    Smokeless tobacco: Never Used   Vaping Use    Vaping Use: Some days    Substances: Nicotine   Substance and Sexual Activity    Alcohol use: Yes     Comment: rarely    Drug use: No    Sexual activity: Not on file   Other Topics Concern    Not on file Social History Narrative    Not on file     Social Determinants of Health     Financial Resource Strain: Low Risk     Difficulty of Paying Living Expenses: Not hard at all   Food Insecurity: No Food Insecurity    Worried About Running Out of Food in the Last Year: Never true    [address] in the Last Year: Never true   Transportation Needs:     Lack of Transportation (Medical):  Lack of Transportation (Non-Medical):    Physical Activity:     Days of Exercise per Week:     Minutes of Exercise per Session:    Stress:     Feeling of Stress :    Social Connections:     Frequency of Communication with Friends and Family:     Frequency of Social Gatherings with Friends and Family:     Attends [employer] Services:     Active Member of Clubs or Organizations:     Attends Club or Organization Meetings:     Marital Status:    Intimate Partner Violence:     Fear of Current or Ex-Partner:     Emotionally Abused:     Physically Abused:     Sexually Abused:         Family History   Problem Relation Age of Onset    Heart Disease Mother     Cancer Father         abdominal    Cancer Brother         prostate    Cancer Brother         digestive       REVIEW OF SYSTEMS:    Constitutional: Negative for activity change, appetite change, chills, diaphoresis, fatigue, fever and unexpected weight change. : Recent development of some hematuria          PHYSICAL EXAM:  There were no vitals filed for this visit.   General Appearance: alert and oriented to person, place and time, well developed and well- nourished, in no acute distress  Skin: warm and dry, no rash or erythema, puncture incision sites are unremarkable  Head: normocephalic and atraumatic  Eyes: extraocular eye movements intact, conjunctivae normal  ENT: external ear and ear canal normal bilaterally, nose without deformity  Pulmonary/Chest: clear to auscultation bilaterally- no wheezes, rales or rhonchi, normal air movement, no respiratory distress  Cardiovascular: normal rate, regular rhythm, normal S1 and S2, no murmurs, no carotid bruits  Abdomen: soft, non-tender, non-distended, normal bowel sounds, no masses or organomegaly  Musculoskeletal: normal range of motion, no joint swelling, deformity or tenderness  Neurologic: no cranial nerve deficit, gait, coordination and speech normal  Extremities: Lateral palpable brachial radial pulses. Motor and sensation are intact. There is no gross signs of vascular scheme he has nice palpable femoral pulses  With palpable distal pedal pulses. Problem List Items Addressed This Visit     Abdominal aortic aneurysm without rupture ([address] - Primary          #1 status post endovascular aortic aneurysm repair. At this point were to get a CT scan of the abdomen and pelvis with contrast.  If this all looks well he will follow-up in 1 year with a repeat CT scan. I talked with about the need for surveillance he understands all questions were answered according to his satisfaction      No follow-ups on file.

## 2021-11-02 RX ORDER — NITROFURANTOIN 25; 75 MG/1; MG/1
100 CAPSULE ORAL 2 TIMES DAILY
Qty: 20 CAPSULE | Refills: 0 | Status: SHIPPED | OUTPATIENT
Start: 2021-11-02 | End: 2021-11-12

## 2021-11-03 ENCOUNTER — CARE COORDINATION (OUTPATIENT)
Dept: CASE MANAGEMENT | Age: 71
End: 2021-11-03

## 2021-11-03 NOTE — CARE COORDINATION
[facility] Transitions Follow Up Call    11/3/2021    Patient: Indigo Cervantes  Patient : 1950   MRN: [id number]  Reason for Admission: AAA w/o rupture  Discharge Date: 10/2/21 RARS: Readmission Risk Score: 19    Care Transitions Follow Up Call    Needs to be reviewed by the provider   Additional needs identified to be addressed with provider: No  none             Method of communication with provider : none      Care Transition Nurse (CTN) contacted the patient by telephone to follow up after admission on 10/2/21. Verified name and  with patient as identifiers. Addressed changes since last contact: none  Discussed follow-up appointments. If no appointment was previously scheduled, appointment scheduling offered: Yes. Is follow up appointment scheduled within 7 days of discharge? No.    Advance Care Planning:   Does patient have an Advance Directive: reviewed and current. CTN reviewed discharge instructions, medical action plan and red flags with patient and discussed any barriers to care and/or understanding of plan of care after discharge. Discussed appropriate site of care based on symptoms and resources available to patient including: PCP, Specialist, Urgent care clinics, When to call 911 and Condition related references. The patient agrees to contact the PCP office for questions related to their healthcare. Patients top risk factors for readmission: lack of knowledge about disease, level of motivation, medical condition-DM and History of prostate cancer and polypharmacy     Plan for follow-up call in 7-10 days based on severity of symptoms and risk factors.       Care Transitions Subsequent and Final Call    Subsequent and Final Calls  Do you have any ongoing symptoms?: Yes  Onset of Patient-reported symptoms: Other  Patient-reported symptoms: Other  Have your medications changed?: Yes  Patient Reports: CTN confirmd with patient he was started on Macrobid 100 mg twice daily for 10 days per PCP and started on 11/2/21. Do you have any questions related to your medications?: No  Do you currently have any active services?: No  Do you have any needs or concerns that I can assist you with?: No  Identified Barriers: Lack of Education, Lack of Motivation  Care Transitions Interventions    Registered Dietician: Declined      Smoking Cessation: Declined    Other Interventions:         Spoke with patient today 11/3/21 for TCM/hospital discharge follow up sub call for s/p AAA repair. Patient states he continues with blood in urine r/t UTI and was started on Macrobid 100 mg twice daily for 10 days per PCP that he started yesterday. Denies any shortness of breath, chest pain, abdominal pain, back/kideny pain, nausea, vomiting, diarrhea, chills or fever. Advised to take Macrobid as directed until completely finished. Patient states he communicates with PCP through text who monitors him closely. Patient states he does not monitor BG rountinely but admits last BG was 180 two days ago. Patient states he tries to follow low sugar/low carbohydrate diet. Denies CTN offer for dietician referral.     Denies any other issues or complaints at this time. CTN will continue to follow. Follow Up  Future Appointments   Date Time Provider Elliot Oseguera   11/8/2021  9:00 AM [facility] CT 1 SEYZ CT/AUS [facility]   9/13/2022 11:00 AM Roberta Rivera MD [facility]   11/2/2022  8:30 AM Josey Mack MD [facility]     CTN provided contact information for future needs.     JERI Espinoza

## 2021-11-09 DIAGNOSIS — R31.0 GROSS HEMATURIA: Primary | ICD-10-CM

## 2021-11-18 ENCOUNTER — CARE COORDINATION (OUTPATIENT)
Dept: CASE MANAGEMENT | Age: 71
End: 2021-11-18

## 2021-11-19 RX ORDER — DOXYCYCLINE HYCLATE 100 MG
100 TABLET ORAL 2 TIMES DAILY
Qty: 20 TABLET | Refills: 0 | Status: SHIPPED | OUTPATIENT
Start: 2021-11-19 | End: 2021-11-29

## 2021-11-19 RX ORDER — FINASTERIDE 5 MG/1
5 TABLET, FILM COATED ORAL DAILY
Qty: 90 TABLET | Refills: 1 | Status: SHIPPED
Start: 2021-11-19 | End: 2022-01-07

## 2021-11-19 RX ORDER — TAMSULOSIN HYDROCHLORIDE 0.4 MG/1
0.4 CAPSULE ORAL DAILY
Qty: 90 CAPSULE | Refills: 1 | Status: SHIPPED
Start: 2021-11-19 | End: 2022-01-07

## 2021-11-20 ENCOUNTER — HOSPITAL ENCOUNTER (EMERGENCY)
Age: 71
Discharge: HOME | End: 2021-11-20
Attending: EMERGENCY MEDICINE
Payer: MEDICARE

## 2021-11-20 ENCOUNTER — APPOINTMENT (OUTPATIENT)
Dept: CT IMAGING | Age: 71
End: 2021-11-20
Payer: MEDICARE

## 2021-11-20 VITALS
TEMPERATURE: 97.5 F | DIASTOLIC BLOOD PRESSURE: 55 MMHG | SYSTOLIC BLOOD PRESSURE: 100 MMHG | HEART RATE: 113 BPM | OXYGEN SATURATION: 99 % | RESPIRATION RATE: 16 BRPM

## 2021-11-20 DIAGNOSIS — R31.0 GROSS HEMATURIA: ICD-10-CM

## 2021-11-20 DIAGNOSIS — N39.0 ACUTE UTI (URINARY TRACT INFECTION): Primary | ICD-10-CM

## 2021-11-20 LAB
ALBUMIN SERPL-MCNC: 3.9 G/DL (ref 3.5–5.2)
ALP BLD-CCNC: 129 U/L (ref 40–129)
ALT SERPL-CCNC: 16 U/L (ref 0–40)
ANION GAP SERPL CALCULATED.3IONS-SCNC: 12 MMOL/L (ref 7–16)
APTT: 26.5 SEC (ref 24.5–35.1)
AST SERPL-CCNC: 19 U/L (ref 0–39)
BACTERIA: ABNORMAL /HPF
BASOPHILS ABSOLUTE: 0.1 E9/L (ref 0–0.2)
BASOPHILS RELATIVE PERCENT: 1.4 % (ref 0–2)
BILIRUB SERPL-MCNC: 0.5 MG/DL (ref 0–1.2)
BILIRUBIN URINE: ABNORMAL
BLOOD, URINE: ABNORMAL
BUN BLDV-MCNC: 13 MG/DL (ref 6–23)
CALCIUM SERPL-MCNC: 9.9 MG/DL (ref 8.6–10.2)
CHLORIDE BLD-SCNC: 98 MMOL/L (ref 98–107)
CLARITY: ABNORMAL
CO2: 24 MMOL/L (ref 22–29)
COLOR: ABNORMAL
CREAT SERPL-MCNC: 1.1 MG/DL (ref 0.7–1.2)
EOSINOPHILS ABSOLUTE: 0.17 E9/L (ref 0.05–0.5)
EOSINOPHILS RELATIVE PERCENT: 2.3 % (ref 0–6)
GFR AFRICAN AMERICAN: >60
GFR NON-AFRICAN AMERICAN: >60 ML/MIN/1.73
GLUCOSE BLD-MCNC: 169 MG/DL (ref 74–99)
GLUCOSE URINE: 100 MG/DL
HCT VFR BLD CALC: 32.7 % (ref 37–54)
HEMOGLOBIN: 11.1 G/DL (ref 12.5–16.5)
IMMATURE GRANULOCYTES #: 0.03 E9/L
IMMATURE GRANULOCYTES %: 0.4 % (ref 0–5)
INR BLD: 1.1
KETONES, URINE: 15 MG/DL
LEUKOCYTE ESTERASE, URINE: ABNORMAL
LYMPHOCYTES ABSOLUTE: 1.72 E9/L (ref 1.5–4)
LYMPHOCYTES RELATIVE PERCENT: 23.6 % (ref 20–42)
MCH RBC QN AUTO: 31 PG (ref 26–35)
MCHC RBC AUTO-ENTMCNC: 33.9 % (ref 32–34.5)
MCV RBC AUTO: 91.3 FL (ref 80–99.9)
MONOCYTES ABSOLUTE: 0.67 E9/L (ref 0.1–0.95)
MONOCYTES RELATIVE PERCENT: 9.2 % (ref 2–12)
NEUTROPHILS ABSOLUTE: 4.61 E9/L (ref 1.8–7.3)
NEUTROPHILS RELATIVE PERCENT: 63.1 % (ref 43–80)
NITRITE, URINE: POSITIVE
PDW BLD-RTO: 13.5 FL (ref 11.5–15)
PH UA: 7 (ref 5–9)
PLATELET # BLD: 261 E9/L (ref 130–450)
PMV BLD AUTO: 8.5 FL (ref 7–12)
POTASSIUM REFLEX MAGNESIUM: 4.1 MMOL/L (ref 3.5–5)
PROTEIN UA: >=300 MG/DL
PROTHROMBIN TIME: 12.1 SEC (ref 9.3–12.4)
RBC # BLD: 3.58 E12/L (ref 3.8–5.8)
RBC UA: ABNORMAL /HPF (ref 0–2)
SODIUM BLD-SCNC: 134 MMOL/L (ref 132–146)
SPECIFIC GRAVITY UA: 1.01 (ref 1–1.03)
TOTAL PROTEIN: 7.7 G/DL (ref 6.4–8.3)
UROBILINOGEN, URINE: 4 E.U./DL
WBC # BLD: 7.3 E9/L (ref 4.5–11.5)
WBC UA: >20 /HPF (ref 0–5)

## 2021-11-20 PROCEDURE — 85025 COMPLETE CBC W/AUTO DIFF WBC: CPT

## 2021-11-20 PROCEDURE — 85610 PROTHROMBIN TIME: CPT

## 2021-11-20 PROCEDURE — 99283 EMERGENCY DEPT VISIT LOW MDM: CPT

## 2021-11-20 PROCEDURE — 2580000003 HC RX 258: Performed by: STUDENT IN AN ORGANIZED HEALTH CARE EDUCATION/TRAINING PROGRAM

## 2021-11-20 PROCEDURE — 87186 SC STD MICRODIL/AGAR DIL: CPT

## 2021-11-20 PROCEDURE — 80053 COMPREHEN METABOLIC PANEL: CPT

## 2021-11-20 PROCEDURE — 36415 COLL VENOUS BLD VENIPUNCTURE: CPT

## 2021-11-20 PROCEDURE — 85730 THROMBOPLASTIN TIME PARTIAL: CPT

## 2021-11-20 PROCEDURE — 6360000004 HC RX CONTRAST MEDICATION: Performed by: STUDENT IN AN ORGANIZED HEALTH CARE EDUCATION/TRAINING PROGRAM

## 2021-11-20 PROCEDURE — 74177 CT ABD & PELVIS W/CONTRAST: CPT

## 2021-11-20 PROCEDURE — 87088 URINE BACTERIA CULTURE: CPT

## 2021-11-20 PROCEDURE — 81001 URINALYSIS AUTO W/SCOPE: CPT

## 2021-11-20 PROCEDURE — 87077 CULTURE AEROBIC IDENTIFY: CPT

## 2021-11-20 RX ORDER — CEFDINIR 300 MG/1
300 CAPSULE ORAL 2 TIMES DAILY
Qty: 14 CAPSULE | Refills: 0 | Status: SHIPPED | OUTPATIENT
Start: 2021-11-20 | End: 2021-11-27

## 2021-11-20 RX ORDER — SODIUM CHLORIDE 0.9 % (FLUSH) 0.9 %
10 SYRINGE (ML) INJECTION PRN
Status: COMPLETED | OUTPATIENT
Start: 2021-11-20 | End: 2021-11-20

## 2021-11-20 RX ADMIN — IOPAMIDOL 75 ML: 755 INJECTION, SOLUTION INTRAVENOUS at 20:08

## 2021-11-20 RX ADMIN — SODIUM CHLORIDE, PRESERVATIVE FREE 10 ML: 5 INJECTION INTRAVENOUS at 20:08

## 2021-11-20 ASSESSMENT — PAIN DESCRIPTION - PAIN TYPE: TYPE: ACUTE PAIN

## 2021-11-20 ASSESSMENT — PAIN SCALES - GENERAL
PAINLEVEL_OUTOF10: 8
PAINLEVEL_OUTOF10: 0

## 2021-11-20 NOTE — ED PROVIDER NOTES
HPI:  11/20/21,   Time: 5:57 PM SHAMA Dominguez is a 70 y.o. male presenting to the ED for hematuria, beginning 2 months ago. The complaint has been persistent, mild in severity, and worsened by nothing. No abd pain, feels dizzy and lightheaded, concerned anemic. No abd pain. No n/v/d/fever/chills/sweats. S/p aaa repair in October. Saw urology in October, but hasn't followed up    Review of Systems:   Pertinent positives and negatives are stated within HPI, all other systems reviewed and are negative.          --------------------------------------------- PAST HISTORY ---------------------------------------------  Past Medical History:  has a past medical history of Abdominal aortic aneurysm without rupture ([facility] [address] Arthritis, CAD (coronary artery disease), Cancer ([facility] [address] Combined systolic and diastolic heart failure ([facility] [address] COPD (chronic obstructive pulmonary disease) ([facility] [address] Diabetes mellitus ([facility] [address] GERD (gastroesophageal reflux disease), History of placement of internal cardiac defibrillator, Hypertension, Sigmoid diverticulosis, Sinusitis, Tubular adenoma of colon, and Vertigo. Past Surgical History:  has a past surgical history that includes Coronary angioplasty with stent (1/13/14); Umbilical hernia repair; Colonoscopy (8/31/15); Cardiac defibrillator placement; Hip fracture surgery (Right, 3/14/2020); and AAA repair, endovascular (N/A, 10/1/2021). Social History:  reports that he has been smoking cigarettes. He started smoking about 51 years ago. He has a 100.00 pack-year smoking history. He has never used smokeless tobacco. He reports current alcohol use. He reports that he does not use drugs. Family History: family history includes Cancer in his brother, brother, and father; Heart Disease in his mother. The patients home medications have been reviewed. Allergies: Patient has no known allergies.         ---------------------------------------------------PHYSICAL EXAM--------------------------------------    Constitutional/General: Alert and oriented x3, well appearing, non toxic in NAD  Head: Normocephalic and atraumatic  Eyes: PERRL, EOMI, conjunctive normal, sclera non icteric  Mouth: Oropharynx clear, handling secretions, no trismus, no asymmetry of the posterior oropharynx or uvular edema  Neck: Supple, full ROM,   Respiratory: . Not in respiratory distress  Cardiovascular:  2+ distal pulses  Chest: No chest wall tenderness  GI:  Abdomen Soft, Non tender, Non distended. Musculoskeletal: Moves all extremities x 4. Warm and well perfused, no clubbing, cyanosis, or edema. Capillary refill <3 seconds  Integument: skin warm and dry. No rashes. Lymphatic: no lymphadenopathy noted  Neurologic: GCS 15, no focal deficits,   Psychiatric: Normal Affect    -------------------------------------------------- RESULTS -------------------------------------------------  I have personally reviewed all laboratory and imaging results for this patient. Results are listed below.      LABS:  Results for orders placed or performed during the hospital encounter of 11/20/21   CBC Auto Differential   Result Value Ref Range    WBC 7.3 4.5 - 11.5 E9/L    RBC 3.58 (L) 3.80 - 5.80 E12/L    Hemoglobin 11.1 (L) 12.5 - 16.5 g/dL    Hematocrit 32.7 (L) 37.0 - 54.0 %    MCV 91.3 80.0 - 99.9 fL    MCH 31.0 26.0 - 35.0 pg    MCHC 33.9 32.0 - 34.5 %    RDW 13.5 11.5 - 15.0 fL    Platelets 122 447 - 505 E9/L    MPV 8.5 7.0 - 12.0 fL    Neutrophils % 63.1 43.0 - 80.0 %    Immature Granulocytes % 0.4 0.0 - 5.0 %    Lymphocytes % 23.6 20.0 - 42.0 %    Monocytes % 9.2 2.0 - 12.0 %    Eosinophils % 2.3 0.0 - 6.0 %    Basophils % 1.4 0.0 - 2.0 %    Neutrophils Absolute 4.61 1.80 - 7.30 E9/L    Immature Granulocytes # 0.03 E9/L    Lymphocytes Absolute 1.72 1.50 - 4.00 E9/L    Monocytes Absolute 0.67 0.10 - 0.95 E9/L    Eosinophils Absolute 0.17 0.05 - 0.50 E9/L    Basophils Absolute 0.10 0.00 - 0.20 E9/L Comprehensive Metabolic Panel w/ Reflex to MG   Result Value Ref Range    Sodium 134 132 - 146 mmol/L    Potassium reflex Magnesium 4.1 3.5 - 5.0 mmol/L    Chloride 98 98 - 107 mmol/L    CO2 24 22 - 29 mmol/L    Anion Gap 12 7 - 16 mmol/L    Glucose 169 (H) 74 - 99 mg/dL    BUN 13 6 - 23 mg/dL    CREATININE 1.1 0.7 - 1.2 mg/dL    GFR Non-African American >60 >=60 mL/min/1.73    GFR African American >60     Calcium 9.9 8.6 - 10.2 mg/dL    Total Protein 7.7 6.4 - 8.3 g/dL    Albumin 3.9 3.5 - 5.2 g/dL    Total Bilirubin 0.5 0.0 - 1.2 mg/dL    Alkaline Phosphatase 129 40 - 129 U/L    ALT 16 0 - 40 U/L    AST 19 0 - 39 U/L   Protime-INR   Result Value Ref Range    Protime 12.1 9.3 - 12.4 sec    INR 1.1    APTT   Result Value Ref Range    aPTT 26.5 24.5 - 35.1 sec   Urinalysis, reflex to microscopic   Result Value Ref Range    Color, UA RED (A) Straw/Yellow    Clarity, UA TURBID (A) Clear    Glucose, Ur 100 (A) Negative mg/dL    Bilirubin Urine LARGE (A) Negative    Ketones, Urine 15 (A) Negative mg/dL    Specific Gravity, UA 1.010 1.005 - 1.030    Blood, Urine LARGE (A) Negative    pH, UA 7.0 5.0 - 9.0    Protein, UA >=300 (A) Negative mg/dL    Urobilinogen, Urine 4.0 (A) <2.0 E.U./dL    Nitrite, Urine POSITIVE (A) Negative    Leukocyte Esterase, Urine MODERATE (A) Negative   Microscopic Urinalysis   Result Value Ref Range    WBC, UA >20 (A) 0 - 5 /HPF    RBC, UA PACKED 0 - 2 /HPF    Bacteria, UA FEW (A) None Seen /HPF       RADIOLOGY:  Interpreted by Radiologist.  CT ABDOMEN PELVIS W IV CONTRAST Additional Contrast? None   Final Result   Irregular thickening/intraluminal mass at the bladder base. Nonemergent   follow-up with cystoscopy (and tissue sampling if indicated) suggested. Multiple hepatic and bilateral renal cysts. No evidence of obstructive uropathy or acute appendicitis. Colonic diverticulosis with no evidence of diverticulitis.              EKG:  This EKG is signed and interpreted by the DAMIEN    Time:   Rate:   Rhythm:   Interpretation:   Comparison:       ------------------------- NURSING NOTES AND VITALS REVIEWED ---------------------------   The nursing notes within the ED encounter and vital signs as below have been reviewed by myself. BP (!) 100/55   Pulse 113   Temp 97.5 °F (36.4 °C) (Temporal)   Resp 16   SpO2 99%   Oxygen Saturation Interpretation: Normal    The patients available past medical records and past encounters were reviewed. ------------------------------ ED COURSE/MEDICAL DECISION MAKING----------------------  Medications   iopamidol (ISOVUE-370) 76 % injection 75 mL (75 mLs IntraVENous Given 11/20/21 2008)   sodium chloride flush 0.9 % injection 10 mL (10 mLs IntraVENous Given 11/20/21 2008)         ED COURSE:       Medical Decision Making:    Pos ua, non toxic, dc on po abx, pt told of need to see urology to further eval for hematuria/ct findings      This patient's ED course included: a personal history and physicial examination    This patient has remained hemodynamically stable during their ED course. Counseling: The emergency provider has spoken with the patient and discussed todays results, in addition to providing specific details for the plan of care and counseling regarding the diagnosis and prognosis. Questions are answered at this time and they are agreeable with the plan.       --------------------------------- IMPRESSION AND DISPOSITION ---------------------------------    IMPRESSION  1. Acute UTI (urinary tract infection)    2. Gross hematuria        DISPOSITION  Disposition: Discharge to home  Patient condition is stable    NOTE: This report was transcribed using voice recognition software.  Every effort was made to ensure accuracy; however, inadvertent computerized transcription errors may be present        Duran Ibrahim MD  11/20/21 [id number]

## 2021-11-23 LAB
ORGANISM: ABNORMAL
URINE CULTURE, ROUTINE: ABNORMAL

## 2021-12-03 DIAGNOSIS — C61 PROSTATE CANCER (HCC): Primary | ICD-10-CM

## 2021-12-03 DIAGNOSIS — R31.0 GROSS HEMATURIA: ICD-10-CM

## 2021-12-28 DIAGNOSIS — J01.90 ACUTE SINUSITIS, RECURRENCE NOT SPECIFIED, UNSPECIFIED LOCATION: Primary | ICD-10-CM

## 2021-12-28 RX ORDER — METHYLPREDNISOLONE 4 MG/1
TABLET ORAL
Qty: 1 KIT | Refills: 0 | Status: SHIPPED | OUTPATIENT
Start: 2021-12-28 | End: 2022-01-03

## 2021-12-28 RX ORDER — AZITHROMYCIN 250 MG/1
250 TABLET, FILM COATED ORAL SEE ADMIN INSTRUCTIONS
Qty: 6 TABLET | Refills: 0 | Status: SHIPPED | OUTPATIENT
Start: 2021-12-28 | End: 2022-01-02

## 2021-12-28 RX ORDER — MELATONIN
1000 DAILY
Qty: 90 TABLET | Refills: 1 | Status: SHIPPED
Start: 2021-12-28 | End: 2022-02-02 | Stop reason: CLARIF

## 2021-12-28 RX ORDER — MULTIVIT WITH MINERALS/LUTEIN
1000 TABLET ORAL DAILY
Qty: 30 TABLET | Refills: 3 | Status: SHIPPED
Start: 2021-12-28 | End: 2022-02-02 | Stop reason: CLARIF

## 2021-12-28 RX ORDER — GUAIFENESIN 600 MG/1
600 TABLET, EXTENDED RELEASE ORAL 2 TIMES DAILY
Qty: 30 TABLET | Refills: 0 | Status: SHIPPED | OUTPATIENT
Start: 2021-12-28 | End: 2022-01-12

## 2022-01-01 ENCOUNTER — PREP FOR PROCEDURE (OUTPATIENT)
Dept: UROLOGY | Age: 72
End: 2022-01-01

## 2022-01-01 ENCOUNTER — CLINICAL DOCUMENTATION (OUTPATIENT)
Dept: FAMILY MEDICINE CLINIC | Age: 72
End: 2022-01-01

## 2022-01-01 ENCOUNTER — ANTI-COAG VISIT (OUTPATIENT)
Dept: FAMILY MEDICINE CLINIC | Age: 72
End: 2022-01-01

## 2022-01-01 ENCOUNTER — CARE COORDINATION (OUTPATIENT)
Dept: CARE COORDINATION | Age: 72
End: 2022-01-01

## 2022-01-01 LAB
ALBUMIN SERPL-MCNC: 2.4 G/DL (ref 3.5–5.2)
ALP BLD-CCNC: 150 U/L (ref 40–129)
ALT SERPL-CCNC: 19 U/L (ref 0–40)
ANION GAP SERPL CALCULATED.3IONS-SCNC: 10 MMOL/L (ref 7–16)
ANION GAP SERPL CALCULATED.3IONS-SCNC: 11 MMOL/L (ref 7–16)
ANION GAP SERPL CALCULATED.3IONS-SCNC: 11 MMOL/L (ref 7–16)
ANION GAP SERPL CALCULATED.3IONS-SCNC: 13 MMOL/L (ref 7–16)
ANION GAP SERPL CALCULATED.3IONS-SCNC: 14 MMOL/L (ref 7–16)
ANION GAP SERPL CALCULATED.3IONS-SCNC: 6 MMOL/L (ref 7–16)
ANION GAP SERPL CALCULATED.3IONS-SCNC: 7 MMOL/L (ref 7–16)
ANION GAP SERPL CALCULATED.3IONS-SCNC: 9 MMOL/L (ref 7–16)
ANION GAP SERPL CALCULATED.3IONS-SCNC: 9 MMOL/L (ref 7–16)
ANISOCYTOSIS: ABNORMAL
AST SERPL-CCNC: 27 U/L (ref 0–39)
BASOPHILS ABSOLUTE: 0 E9/L (ref 0–0.2)
BASOPHILS ABSOLUTE: 0 E9/L (ref 0–0.2)
BASOPHILS ABSOLUTE: 0.04 E9/L (ref 0–0.2)
BASOPHILS ABSOLUTE: 0.05 E9/L (ref 0–0.2)
BASOPHILS ABSOLUTE: 0.07 E9/L (ref 0–0.2)
BASOPHILS ABSOLUTE: 0.08 E9/L (ref 0–0.2)
BASOPHILS ABSOLUTE: 0.09 E9/L (ref 0–0.2)
BASOPHILS ABSOLUTE: 0.09 E9/L (ref 0–0.2)
BASOPHILS ABSOLUTE: 0.11 E9/L (ref 0–0.2)
BASOPHILS ABSOLUTE: 0.12 E9/L (ref 0–0.2)
BASOPHILS ABSOLUTE: 0.15 E9/L (ref 0–0.2)
BASOPHILS ABSOLUTE: 0.16 E9/L (ref 0–0.2)
BASOPHILS RELATIVE PERCENT: 0.3 % (ref 0–2)
BASOPHILS RELATIVE PERCENT: 0.4 % (ref 0–2)
BASOPHILS RELATIVE PERCENT: 0.5 % (ref 0–2)
BASOPHILS RELATIVE PERCENT: 0.6 % (ref 0–2)
BASOPHILS RELATIVE PERCENT: 0.6 % (ref 0–2)
BASOPHILS RELATIVE PERCENT: 0.7 % (ref 0–2)
BASOPHILS RELATIVE PERCENT: 0.7 % (ref 0–2)
BASOPHILS RELATIVE PERCENT: 0.8 % (ref 0–2)
BASOPHILS RELATIVE PERCENT: 0.9 % (ref 0–2)
BASOPHILS RELATIVE PERCENT: 0.9 % (ref 0–2)
BASOPHILS RELATIVE PERCENT: 1 % (ref 0–2)
BASOPHILS RELATIVE PERCENT: 1.1 % (ref 0–2)
BASOPHILS RELATIVE PERCENT: 1.1 % (ref 0–2)
BILIRUB SERPL-MCNC: <0.2 MG/DL (ref 0–1.2)
BUN BLDV-MCNC: 14 MG/DL (ref 6–23)
BUN BLDV-MCNC: 16 MG/DL (ref 6–23)
BUN BLDV-MCNC: 17 MG/DL (ref 6–23)
BUN BLDV-MCNC: 20 MG/DL (ref 6–23)
BUN BLDV-MCNC: 20 MG/DL (ref 6–23)
BUN BLDV-MCNC: 23 MG/DL (ref 6–23)
BUN BLDV-MCNC: 24 MG/DL (ref 6–23)
BUN BLDV-MCNC: 24 MG/DL (ref 6–23)
BUN BLDV-MCNC: 25 MG/DL (ref 6–23)
BUN BLDV-MCNC: 26 MG/DL (ref 6–23)
BUN BLDV-MCNC: 33 MG/DL (ref 6–23)
BUN BLDV-MCNC: 35 MG/DL (ref 6–23)
BUN BLDV-MCNC: 37 MG/DL (ref 6–23)
BUN BLDV-MCNC: 38 MG/DL (ref 6–23)
BUN BLDV-MCNC: 40 MG/DL (ref 6–23)
BUN BLDV-MCNC: 41 MG/DL (ref 6–23)
BUN BLDV-MCNC: 42 MG/DL (ref 6–23)
BUN BLDV-MCNC: 46 MG/DL (ref 6–23)
BUN BLDV-MCNC: 57 MG/DL (ref 6–23)
BURR CELLS: ABNORMAL
BURR CELLS: ABNORMAL
CALCIUM SERPL-MCNC: 10.1 MG/DL (ref 8.6–10.2)
CALCIUM SERPL-MCNC: 8.8 MG/DL (ref 8.6–10.2)
CALCIUM SERPL-MCNC: 8.8 MG/DL (ref 8.6–10.2)
CALCIUM SERPL-MCNC: 8.9 MG/DL (ref 8.6–10.2)
CALCIUM SERPL-MCNC: 9 MG/DL (ref 8.6–10.2)
CALCIUM SERPL-MCNC: 9 MG/DL (ref 8.6–10.2)
CALCIUM SERPL-MCNC: 9.1 MG/DL (ref 8.6–10.2)
CALCIUM SERPL-MCNC: 9.2 MG/DL (ref 8.6–10.2)
CALCIUM SERPL-MCNC: 9.3 MG/DL (ref 8.6–10.2)
CALCIUM SERPL-MCNC: 9.3 MG/DL (ref 8.6–10.2)
CALCIUM SERPL-MCNC: 9.4 MG/DL (ref 8.6–10.2)
CALCIUM SERPL-MCNC: 9.5 MG/DL (ref 8.6–10.2)
CALCIUM SERPL-MCNC: 9.7 MG/DL (ref 8.6–10.2)
CALCIUM SERPL-MCNC: 9.7 MG/DL (ref 8.6–10.2)
CHLORIDE BLD-SCNC: 100 MMOL/L (ref 98–107)
CHLORIDE BLD-SCNC: 103 MMOL/L (ref 98–107)
CHLORIDE BLD-SCNC: 103 MMOL/L (ref 98–107)
CHLORIDE BLD-SCNC: 106 MMOL/L (ref 98–107)
CHLORIDE BLD-SCNC: 107 MMOL/L (ref 98–107)
CHLORIDE BLD-SCNC: 107 MMOL/L (ref 98–107)
CHLORIDE BLD-SCNC: 109 MMOL/L (ref 98–107)
CHLORIDE BLD-SCNC: 110 MMOL/L (ref 98–107)
CHLORIDE BLD-SCNC: 110 MMOL/L (ref 98–107)
CHLORIDE BLD-SCNC: 113 MMOL/L (ref 98–107)
CHLORIDE BLD-SCNC: 96 MMOL/L (ref 98–107)
CHLORIDE BLD-SCNC: 97 MMOL/L (ref 98–107)
CHLORIDE BLD-SCNC: 97 MMOL/L (ref 98–107)
CHLORIDE BLD-SCNC: 99 MMOL/L (ref 98–107)
CHLORIDE BLD-SCNC: 99 MMOL/L (ref 98–107)
CO2: 18 MMOL/L (ref 22–29)
CO2: 19 MMOL/L (ref 22–29)
CO2: 20 MMOL/L (ref 22–29)
CO2: 21 MMOL/L (ref 22–29)
CO2: 22 MMOL/L (ref 22–29)
CO2: 23 MMOL/L (ref 22–29)
CO2: 23 MMOL/L (ref 22–29)
CO2: 25 MMOL/L (ref 22–29)
CREAT SERPL-MCNC: 0.8 MG/DL (ref 0.7–1.2)
CREAT SERPL-MCNC: 0.9 MG/DL (ref 0.7–1.2)
CREAT SERPL-MCNC: 0.9 MG/DL (ref 0.7–1.2)
CREAT SERPL-MCNC: 1 MG/DL (ref 0.7–1.2)
CREAT SERPL-MCNC: 1.1 MG/DL (ref 0.7–1.2)
CREAT SERPL-MCNC: 1.1 MG/DL (ref 0.7–1.2)
CREAT SERPL-MCNC: 1.2 MG/DL (ref 0.7–1.2)
CREAT SERPL-MCNC: 1.3 MG/DL (ref 0.7–1.2)
CREAT SERPL-MCNC: 1.4 MG/DL (ref 0.7–1.2)
CREAT SERPL-MCNC: 1.5 MG/DL (ref 0.7–1.2)
CREAT SERPL-MCNC: 1.5 MG/DL (ref 0.7–1.2)
CREAT SERPL-MCNC: 1.6 MG/DL (ref 0.7–1.2)
CREAT SERPL-MCNC: 1.8 MG/DL (ref 0.7–1.2)
CREAT SERPL-MCNC: 2 MG/DL (ref 0.7–1.2)
CREAT SERPL-MCNC: 2 MG/DL (ref 0.7–1.2)
CREAT SERPL-MCNC: 2.3 MG/DL (ref 0.7–1.2)
CREAT SERPL-MCNC: 3.3 MG/DL (ref 0.7–1.2)
EOSINOPHILS ABSOLUTE: 0 E9/L (ref 0.05–0.5)
EOSINOPHILS ABSOLUTE: 0 E9/L (ref 0.05–0.5)
EOSINOPHILS ABSOLUTE: 0.02 E9/L (ref 0.05–0.5)
EOSINOPHILS ABSOLUTE: 0.05 E9/L (ref 0.05–0.5)
EOSINOPHILS ABSOLUTE: 0.06 E9/L (ref 0.05–0.5)
EOSINOPHILS ABSOLUTE: 0.07 E9/L (ref 0.05–0.5)
EOSINOPHILS ABSOLUTE: 0.08 E9/L (ref 0.05–0.5)
EOSINOPHILS ABSOLUTE: 0.1 E9/L (ref 0.05–0.5)
EOSINOPHILS ABSOLUTE: 0.19 E9/L (ref 0.05–0.5)
EOSINOPHILS ABSOLUTE: 0.21 E9/L (ref 0.05–0.5)
EOSINOPHILS ABSOLUTE: 0.22 E9/L (ref 0.05–0.5)
EOSINOPHILS ABSOLUTE: 0.31 E9/L (ref 0.05–0.5)
EOSINOPHILS ABSOLUTE: 0.32 E9/L (ref 0.05–0.5)
EOSINOPHILS ABSOLUTE: 0.33 E9/L (ref 0.05–0.5)
EOSINOPHILS ABSOLUTE: 0.34 E9/L (ref 0.05–0.5)
EOSINOPHILS ABSOLUTE: 0.37 E9/L (ref 0.05–0.5)
EOSINOPHILS ABSOLUTE: 0.4 E9/L (ref 0.05–0.5)
EOSINOPHILS ABSOLUTE: 0.42 E9/L (ref 0.05–0.5)
EOSINOPHILS ABSOLUTE: 0.46 E9/L (ref 0.05–0.5)
EOSINOPHILS RELATIVE PERCENT: 0 % (ref 0–6)
EOSINOPHILS RELATIVE PERCENT: 0.1 % (ref 0–6)
EOSINOPHILS RELATIVE PERCENT: 0.1 % (ref 0–6)
EOSINOPHILS RELATIVE PERCENT: 0.3 % (ref 0–6)
EOSINOPHILS RELATIVE PERCENT: 0.3 % (ref 0–6)
EOSINOPHILS RELATIVE PERCENT: 0.5 % (ref 0–6)
EOSINOPHILS RELATIVE PERCENT: 0.6 % (ref 0–6)
EOSINOPHILS RELATIVE PERCENT: 0.8 % (ref 0–6)
EOSINOPHILS RELATIVE PERCENT: 1.2 % (ref 0–6)
EOSINOPHILS RELATIVE PERCENT: 1.3 % (ref 0–6)
EOSINOPHILS RELATIVE PERCENT: 2.1 % (ref 0–6)
EOSINOPHILS RELATIVE PERCENT: 2.4 % (ref 0–6)
EOSINOPHILS RELATIVE PERCENT: 2.6 % (ref 0–6)
EOSINOPHILS RELATIVE PERCENT: 2.6 % (ref 0–6)
EOSINOPHILS RELATIVE PERCENT: 2.8 % (ref 0–6)
EOSINOPHILS RELATIVE PERCENT: 2.9 % (ref 0–6)
EOSINOPHILS RELATIVE PERCENT: 2.9 % (ref 0–6)
EOSINOPHILS RELATIVE PERCENT: 3.1 % (ref 0–6)
EOSINOPHILS RELATIVE PERCENT: 5.4 % (ref 0–6)
GFR SERPL CREATININE-BSD FRML MDRD: 19 ML/MIN/1.73
GFR SERPL CREATININE-BSD FRML MDRD: 29 ML/MIN/1.73
GFR SERPL CREATININE-BSD FRML MDRD: 35 ML/MIN/1.73
GFR SERPL CREATININE-BSD FRML MDRD: 35 ML/MIN/1.73
GFR SERPL CREATININE-BSD FRML MDRD: 39 ML/MIN/1.73
GFR SERPL CREATININE-BSD FRML MDRD: 45 ML/MIN/1.73
GFR SERPL CREATININE-BSD FRML MDRD: 49 ML/MIN/1.73
GFR SERPL CREATININE-BSD FRML MDRD: 49 ML/MIN/1.73
GFR SERPL CREATININE-BSD FRML MDRD: 53 ML/MIN/1.73
GFR SERPL CREATININE-BSD FRML MDRD: 58 ML/MIN/1.73
GFR SERPL CREATININE-BSD FRML MDRD: >60 ML/MIN/1.73
GLUCOSE BLD-MCNC: 100 MG/DL (ref 74–99)
GLUCOSE BLD-MCNC: 100 MG/DL (ref 74–99)
GLUCOSE BLD-MCNC: 115 MG/DL (ref 74–99)
GLUCOSE BLD-MCNC: 120 MG/DL (ref 74–99)
GLUCOSE BLD-MCNC: 132 MG/DL (ref 74–99)
GLUCOSE BLD-MCNC: 135 MG/DL (ref 74–99)
GLUCOSE BLD-MCNC: 136 MG/DL (ref 74–99)
GLUCOSE BLD-MCNC: 137 MG/DL (ref 74–99)
GLUCOSE BLD-MCNC: 148 MG/DL (ref 74–99)
GLUCOSE BLD-MCNC: 151 MG/DL (ref 74–99)
GLUCOSE BLD-MCNC: 163 MG/DL (ref 74–99)
GLUCOSE BLD-MCNC: 173 MG/DL (ref 74–99)
GLUCOSE BLD-MCNC: 70 MG/DL (ref 74–99)
GLUCOSE BLD-MCNC: 70 MG/DL (ref 74–99)
GLUCOSE BLD-MCNC: 77 MG/DL (ref 74–99)
GLUCOSE BLD-MCNC: 81 MG/DL (ref 74–99)
GLUCOSE BLD-MCNC: 90 MG/DL (ref 74–99)
GLUCOSE BLD-MCNC: 93 MG/DL (ref 74–99)
GLUCOSE BLD-MCNC: 95 MG/DL (ref 74–99)
HBA1C MFR BLD: 6 % (ref 4–5.6)
HCT VFR BLD CALC: 28.7 % (ref 37–54)
HCT VFR BLD CALC: 30.8 % (ref 37–54)
HCT VFR BLD CALC: 31.4 % (ref 37–54)
HCT VFR BLD CALC: 31.9 % (ref 37–54)
HCT VFR BLD CALC: 32 % (ref 37–54)
HCT VFR BLD CALC: 32.1 % (ref 37–54)
HCT VFR BLD CALC: 32.3 % (ref 37–54)
HCT VFR BLD CALC: 32.4 % (ref 37–54)
HCT VFR BLD CALC: 32.9 % (ref 37–54)
HCT VFR BLD CALC: 33.2 % (ref 37–54)
HCT VFR BLD CALC: 33.8 % (ref 37–54)
HCT VFR BLD CALC: 33.9 % (ref 37–54)
HCT VFR BLD CALC: 34.6 % (ref 37–54)
HCT VFR BLD CALC: 35.3 % (ref 37–54)
HCT VFR BLD CALC: 35.4 % (ref 37–54)
HCT VFR BLD CALC: 37.1 % (ref 37–54)
HCT VFR BLD CALC: 37.9 % (ref 37–54)
HEMOGLOBIN: 10.1 G/DL (ref 12.5–16.5)
HEMOGLOBIN: 10.2 G/DL (ref 12.5–16.5)
HEMOGLOBIN: 10.2 G/DL (ref 12.5–16.5)
HEMOGLOBIN: 10.3 G/DL (ref 12.5–16.5)
HEMOGLOBIN: 10.4 G/DL (ref 12.5–16.5)
HEMOGLOBIN: 10.5 G/DL (ref 12.5–16.5)
HEMOGLOBIN: 10.7 G/DL (ref 12.5–16.5)
HEMOGLOBIN: 10.7 G/DL (ref 12.5–16.5)
HEMOGLOBIN: 10.9 G/DL (ref 12.5–16.5)
HEMOGLOBIN: 11 G/DL (ref 12.5–16.5)
HEMOGLOBIN: 11.3 G/DL (ref 12.5–16.5)
HEMOGLOBIN: 11.4 G/DL (ref 12.5–16.5)
HEMOGLOBIN: 11.5 G/DL (ref 12.5–16.5)
HEMOGLOBIN: 11.8 G/DL (ref 12.5–16.5)
HEMOGLOBIN: 9.4 G/DL (ref 12.5–16.5)
HEMOGLOBIN: 9.9 G/DL (ref 12.5–16.5)
HEMOGLOBIN: 9.9 G/DL (ref 12.5–16.5)
HYPOCHROMIA: ABNORMAL
HYPOCHROMIA: ABNORMAL
IMMATURE GRANULOCYTES #: 0.08 E9/L
IMMATURE GRANULOCYTES #: 0.09 E9/L
IMMATURE GRANULOCYTES #: 0.1 E9/L
IMMATURE GRANULOCYTES #: 0.13 E9/L
IMMATURE GRANULOCYTES #: 0.14 E9/L
IMMATURE GRANULOCYTES #: 0.15 E9/L
IMMATURE GRANULOCYTES #: 0.18 E9/L
IMMATURE GRANULOCYTES #: 0.18 E9/L
IMMATURE GRANULOCYTES #: 0.19 E9/L
IMMATURE GRANULOCYTES #: 0.2 E9/L
IMMATURE GRANULOCYTES #: 0.22 E9/L
IMMATURE GRANULOCYTES #: 0.22 E9/L
IMMATURE GRANULOCYTES #: 0.23 E9/L
IMMATURE GRANULOCYTES #: 0.26 E9/L
IMMATURE GRANULOCYTES #: 0.26 E9/L
IMMATURE GRANULOCYTES #: 0.27 E9/L
IMMATURE GRANULOCYTES #: 0.28 E9/L
IMMATURE GRANULOCYTES %: 0.5 % (ref 0–5)
IMMATURE GRANULOCYTES %: 0.8 % (ref 0–5)
IMMATURE GRANULOCYTES %: 0.8 % (ref 0–5)
IMMATURE GRANULOCYTES %: 0.9 % (ref 0–5)
IMMATURE GRANULOCYTES %: 1.1 % (ref 0–5)
IMMATURE GRANULOCYTES %: 1.2 % (ref 0–5)
IMMATURE GRANULOCYTES %: 1.2 % (ref 0–5)
IMMATURE GRANULOCYTES %: 1.3 % (ref 0–5)
IMMATURE GRANULOCYTES %: 1.3 % (ref 0–5)
IMMATURE GRANULOCYTES %: 1.5 % (ref 0–5)
IMMATURE GRANULOCYTES %: 1.6 % (ref 0–5)
IMMATURE GRANULOCYTES %: 1.6 % (ref 0–5)
IMMATURE GRANULOCYTES %: 1.7 % (ref 0–5)
IMMATURE GRANULOCYTES %: 1.7 % (ref 0–5)
IMMATURE GRANULOCYTES %: 1.9 % (ref 0–5)
IMMATURE GRANULOCYTES %: 1.9 % (ref 0–5)
IMMATURE GRANULOCYTES %: 2.4 % (ref 0–5)
INR BLD: 1.4
INR BLD: 1.5
INR BLD: 1.6
INR BLD: 1.8
INR BLD: 2.3
INR BLD: 2.7
INR BLD: 6.2
LYMPHOCYTES ABSOLUTE: 0.75 E9/L (ref 1.5–4)
LYMPHOCYTES ABSOLUTE: 0.78 E9/L (ref 1.5–4)
LYMPHOCYTES ABSOLUTE: 0.79 E9/L (ref 1.5–4)
LYMPHOCYTES ABSOLUTE: 0.85 E9/L (ref 1.5–4)
LYMPHOCYTES ABSOLUTE: 0.89 E9/L (ref 1.5–4)
LYMPHOCYTES ABSOLUTE: 0.9 E9/L (ref 1.5–4)
LYMPHOCYTES ABSOLUTE: 0.98 E9/L (ref 1.5–4)
LYMPHOCYTES ABSOLUTE: 0.99 E9/L (ref 1.5–4)
LYMPHOCYTES ABSOLUTE: 1.04 E9/L (ref 1.5–4)
LYMPHOCYTES ABSOLUTE: 1.19 E9/L (ref 1.5–4)
LYMPHOCYTES ABSOLUTE: 1.26 E9/L (ref 1.5–4)
LYMPHOCYTES ABSOLUTE: 1.27 E9/L (ref 1.5–4)
LYMPHOCYTES ABSOLUTE: 1.3 E9/L (ref 1.5–4)
LYMPHOCYTES ABSOLUTE: 1.31 E9/L (ref 1.5–4)
LYMPHOCYTES ABSOLUTE: 1.36 E9/L (ref 1.5–4)
LYMPHOCYTES ABSOLUTE: 1.44 E9/L (ref 1.5–4)
LYMPHOCYTES ABSOLUTE: 1.56 E9/L (ref 1.5–4)
LYMPHOCYTES ABSOLUTE: 1.73 E9/L (ref 1.5–4)
LYMPHOCYTES ABSOLUTE: 1.95 E9/L (ref 1.5–4)
LYMPHOCYTES RELATIVE PERCENT: 11.6 % (ref 20–42)
LYMPHOCYTES RELATIVE PERCENT: 11.7 % (ref 20–42)
LYMPHOCYTES RELATIVE PERCENT: 12 % (ref 20–42)
LYMPHOCYTES RELATIVE PERCENT: 12.4 % (ref 20–42)
LYMPHOCYTES RELATIVE PERCENT: 12.8 % (ref 20–42)
LYMPHOCYTES RELATIVE PERCENT: 3.5 % (ref 20–42)
LYMPHOCYTES RELATIVE PERCENT: 3.6 % (ref 20–42)
LYMPHOCYTES RELATIVE PERCENT: 5.1 % (ref 20–42)
LYMPHOCYTES RELATIVE PERCENT: 6 % (ref 20–42)
LYMPHOCYTES RELATIVE PERCENT: 6.2 % (ref 20–42)
LYMPHOCYTES RELATIVE PERCENT: 6.4 % (ref 20–42)
LYMPHOCYTES RELATIVE PERCENT: 6.7 % (ref 20–42)
LYMPHOCYTES RELATIVE PERCENT: 7.1 % (ref 20–42)
LYMPHOCYTES RELATIVE PERCENT: 7.2 % (ref 20–42)
LYMPHOCYTES RELATIVE PERCENT: 7.4 % (ref 20–42)
LYMPHOCYTES RELATIVE PERCENT: 9.2 % (ref 20–42)
LYMPHOCYTES RELATIVE PERCENT: 9.3 % (ref 20–42)
LYMPHOCYTES RELATIVE PERCENT: 9.7 % (ref 20–42)
LYMPHOCYTES RELATIVE PERCENT: 9.7 % (ref 20–42)
MCH RBC QN AUTO: 28.5 PG (ref 26–35)
MCH RBC QN AUTO: 28.6 PG (ref 26–35)
MCH RBC QN AUTO: 28.9 PG (ref 26–35)
MCH RBC QN AUTO: 29 PG (ref 26–35)
MCH RBC QN AUTO: 29.2 PG (ref 26–35)
MCH RBC QN AUTO: 29.2 PG (ref 26–35)
MCH RBC QN AUTO: 29.3 PG (ref 26–35)
MCH RBC QN AUTO: 29.6 PG (ref 26–35)
MCH RBC QN AUTO: 29.8 PG (ref 26–35)
MCH RBC QN AUTO: 29.9 PG (ref 26–35)
MCH RBC QN AUTO: 30 PG (ref 26–35)
MCH RBC QN AUTO: 30 PG (ref 26–35)
MCH RBC QN AUTO: 30.1 PG (ref 26–35)
MCH RBC QN AUTO: 30.1 PG (ref 26–35)
MCH RBC QN AUTO: 30.2 PG (ref 26–35)
MCH RBC QN AUTO: 30.2 PG (ref 26–35)
MCH RBC QN AUTO: 30.8 PG (ref 26–35)
MCHC RBC AUTO-ENTMCNC: 30.5 % (ref 32–34.5)
MCHC RBC AUTO-ENTMCNC: 31.1 % (ref 32–34.5)
MCHC RBC AUTO-ENTMCNC: 31.2 % (ref 32–34.5)
MCHC RBC AUTO-ENTMCNC: 31.5 % (ref 32–34.5)
MCHC RBC AUTO-ENTMCNC: 31.5 % (ref 32–34.5)
MCHC RBC AUTO-ENTMCNC: 31.6 % (ref 32–34.5)
MCHC RBC AUTO-ENTMCNC: 31.7 % (ref 32–34.5)
MCHC RBC AUTO-ENTMCNC: 31.8 % (ref 32–34.5)
MCHC RBC AUTO-ENTMCNC: 31.9 % (ref 32–34.5)
MCHC RBC AUTO-ENTMCNC: 31.9 % (ref 32–34.5)
MCHC RBC AUTO-ENTMCNC: 32 % (ref 32–34.5)
MCHC RBC AUTO-ENTMCNC: 32.1 % (ref 32–34.5)
MCHC RBC AUTO-ENTMCNC: 32.2 % (ref 32–34.5)
MCHC RBC AUTO-ENTMCNC: 32.3 % (ref 32–34.5)
MCHC RBC AUTO-ENTMCNC: 32.5 % (ref 32–34.5)
MCHC RBC AUTO-ENTMCNC: 32.5 % (ref 32–34.5)
MCHC RBC AUTO-ENTMCNC: 32.7 % (ref 32–34.5)
MCHC RBC AUTO-ENTMCNC: 32.8 % (ref 32–34.5)
MCHC RBC AUTO-ENTMCNC: 33 % (ref 32–34.5)
MCV RBC AUTO: 87.8 FL (ref 80–99.9)
MCV RBC AUTO: 88.3 FL (ref 80–99.9)
MCV RBC AUTO: 88.4 FL (ref 80–99.9)
MCV RBC AUTO: 89.4 FL (ref 80–99.9)
MCV RBC AUTO: 89.9 FL (ref 80–99.9)
MCV RBC AUTO: 92.1 FL (ref 80–99.9)
MCV RBC AUTO: 92.3 FL (ref 80–99.9)
MCV RBC AUTO: 92.9 FL (ref 80–99.9)
MCV RBC AUTO: 92.9 FL (ref 80–99.9)
MCV RBC AUTO: 93.5 FL (ref 80–99.9)
MCV RBC AUTO: 93.6 FL (ref 80–99.9)
MCV RBC AUTO: 93.9 FL (ref 80–99.9)
MCV RBC AUTO: 94.1 FL (ref 80–99.9)
MCV RBC AUTO: 94.3 FL (ref 80–99.9)
MCV RBC AUTO: 94.7 FL (ref 80–99.9)
MCV RBC AUTO: 95.2 FL (ref 80–99.9)
MCV RBC AUTO: 96.1 FL (ref 80–99.9)
MCV RBC AUTO: 96.2 FL (ref 80–99.9)
MCV RBC AUTO: 99.2 FL (ref 80–99.9)
MONOCYTES ABSOLUTE: 0.64 E9/L (ref 0.1–0.95)
MONOCYTES ABSOLUTE: 0.67 E9/L (ref 0.1–0.95)
MONOCYTES ABSOLUTE: 0.68 E9/L (ref 0.1–0.95)
MONOCYTES ABSOLUTE: 0.75 E9/L (ref 0.1–0.95)
MONOCYTES ABSOLUTE: 0.77 E9/L (ref 0.1–0.95)
MONOCYTES ABSOLUTE: 0.78 E9/L (ref 0.1–0.95)
MONOCYTES ABSOLUTE: 0.81 E9/L (ref 0.1–0.95)
MONOCYTES ABSOLUTE: 0.9 E9/L (ref 0.1–0.95)
MONOCYTES ABSOLUTE: 0.97 E9/L (ref 0.1–0.95)
MONOCYTES ABSOLUTE: 0.99 E9/L (ref 0.1–0.95)
MONOCYTES ABSOLUTE: 1.02 E9/L (ref 0.1–0.95)
MONOCYTES ABSOLUTE: 1.14 E9/L (ref 0.1–0.95)
MONOCYTES ABSOLUTE: 1.14 E9/L (ref 0.1–0.95)
MONOCYTES ABSOLUTE: 1.15 E9/L (ref 0.1–0.95)
MONOCYTES ABSOLUTE: 1.18 E9/L (ref 0.1–0.95)
MONOCYTES ABSOLUTE: 1.19 E9/L (ref 0.1–0.95)
MONOCYTES ABSOLUTE: 1.34 E9/L (ref 0.1–0.95)
MONOCYTES ABSOLUTE: 1.35 E9/L (ref 0.1–0.95)
MONOCYTES ABSOLUTE: 2.28 E9/L (ref 0.1–0.95)
MONOCYTES RELATIVE PERCENT: 10.1 % (ref 2–12)
MONOCYTES RELATIVE PERCENT: 14 % (ref 2–12)
MONOCYTES RELATIVE PERCENT: 4.6 % (ref 2–12)
MONOCYTES RELATIVE PERCENT: 4.8 % (ref 2–12)
MONOCYTES RELATIVE PERCENT: 5.1 % (ref 2–12)
MONOCYTES RELATIVE PERCENT: 5.2 % (ref 2–12)
MONOCYTES RELATIVE PERCENT: 5.7 % (ref 2–12)
MONOCYTES RELATIVE PERCENT: 6.3 % (ref 2–12)
MONOCYTES RELATIVE PERCENT: 6.3 % (ref 2–12)
MONOCYTES RELATIVE PERCENT: 6.4 % (ref 2–12)
MONOCYTES RELATIVE PERCENT: 6.5 % (ref 2–12)
MONOCYTES RELATIVE PERCENT: 6.8 % (ref 2–12)
MONOCYTES RELATIVE PERCENT: 6.9 % (ref 2–12)
MONOCYTES RELATIVE PERCENT: 7 % (ref 2–12)
MONOCYTES RELATIVE PERCENT: 7.3 % (ref 2–12)
MONOCYTES RELATIVE PERCENT: 7.8 % (ref 2–12)
MONOCYTES RELATIVE PERCENT: 8 % (ref 2–12)
MONOCYTES RELATIVE PERCENT: 8 % (ref 2–12)
MONOCYTES RELATIVE PERCENT: 8.1 % (ref 2–12)
NEUTROPHILS ABSOLUTE: 10.96 E9/L (ref 1.8–7.3)
NEUTROPHILS ABSOLUTE: 11.18 E9/L (ref 1.8–7.3)
NEUTROPHILS ABSOLUTE: 11.3 E9/L (ref 1.8–7.3)
NEUTROPHILS ABSOLUTE: 11.55 E9/L (ref 1.8–7.3)
NEUTROPHILS ABSOLUTE: 11.55 E9/L (ref 1.8–7.3)
NEUTROPHILS ABSOLUTE: 13.21 E9/L (ref 1.8–7.3)
NEUTROPHILS ABSOLUTE: 13.39 E9/L (ref 1.8–7.3)
NEUTROPHILS ABSOLUTE: 14.29 E9/L (ref 1.8–7.3)
NEUTROPHILS ABSOLUTE: 16.1 E9/L (ref 1.8–7.3)
NEUTROPHILS ABSOLUTE: 17.65 E9/L (ref 1.8–7.3)
NEUTROPHILS ABSOLUTE: 18.21 E9/L (ref 1.8–7.3)
NEUTROPHILS ABSOLUTE: 22.23 E9/L (ref 1.8–7.3)
NEUTROPHILS ABSOLUTE: 28.28 E9/L (ref 1.8–7.3)
NEUTROPHILS ABSOLUTE: 5.31 E9/L (ref 1.8–7.3)
NEUTROPHILS ABSOLUTE: 5.75 E9/L (ref 1.8–7.3)
NEUTROPHILS ABSOLUTE: 6.74 E9/L (ref 1.8–7.3)
NEUTROPHILS ABSOLUTE: 8.18 E9/L (ref 1.8–7.3)
NEUTROPHILS ABSOLUTE: 8.68 E9/L (ref 1.8–7.3)
NEUTROPHILS ABSOLUTE: 8.78 E9/L (ref 1.8–7.3)
NEUTROPHILS RELATIVE PERCENT: 68.2 % (ref 43–80)
NEUTROPHILS RELATIVE PERCENT: 68.9 % (ref 43–80)
NEUTROPHILS RELATIVE PERCENT: 76.1 % (ref 43–80)
NEUTROPHILS RELATIVE PERCENT: 76.8 % (ref 43–80)
NEUTROPHILS RELATIVE PERCENT: 77.3 % (ref 43–80)
NEUTROPHILS RELATIVE PERCENT: 77.8 % (ref 43–80)
NEUTROPHILS RELATIVE PERCENT: 78.1 % (ref 43–80)
NEUTROPHILS RELATIVE PERCENT: 79.6 % (ref 43–80)
NEUTROPHILS RELATIVE PERCENT: 79.7 % (ref 43–80)
NEUTROPHILS RELATIVE PERCENT: 82.5 % (ref 43–80)
NEUTROPHILS RELATIVE PERCENT: 83.5 % (ref 43–80)
NEUTROPHILS RELATIVE PERCENT: 83.5 % (ref 43–80)
NEUTROPHILS RELATIVE PERCENT: 84.5 % (ref 43–80)
NEUTROPHILS RELATIVE PERCENT: 85.1 % (ref 43–80)
NEUTROPHILS RELATIVE PERCENT: 85.2 % (ref 43–80)
NEUTROPHILS RELATIVE PERCENT: 85.9 % (ref 43–80)
NEUTROPHILS RELATIVE PERCENT: 87.1 % (ref 43–80)
NEUTROPHILS RELATIVE PERCENT: 89.9 % (ref 43–80)
NEUTROPHILS RELATIVE PERCENT: 91.3 % (ref 43–80)
ORGANISM: ABNORMAL
ORGANISM: ABNORMAL
OVALOCYTES: ABNORMAL
PDW BLD-RTO: 16.1 FL (ref 11.5–15)
PDW BLD-RTO: 16.3 FL (ref 11.5–15)
PDW BLD-RTO: 16.5 FL (ref 11.5–15)
PDW BLD-RTO: 16.6 FL (ref 11.5–15)
PDW BLD-RTO: 16.9 FL (ref 11.5–15)
PDW BLD-RTO: 16.9 FL (ref 11.5–15)
PDW BLD-RTO: 17 FL (ref 11.5–15)
PDW BLD-RTO: 17.1 FL (ref 11.5–15)
PDW BLD-RTO: 17.2 FL (ref 11.5–15)
PDW BLD-RTO: 17.2 FL (ref 11.5–15)
PDW BLD-RTO: 17.3 FL (ref 11.5–15)
PDW BLD-RTO: 17.5 FL (ref 11.5–15)
PDW BLD-RTO: 17.6 FL (ref 11.5–15)
PDW BLD-RTO: 17.6 FL (ref 11.5–15)
PDW BLD-RTO: 17.7 FL (ref 11.5–15)
PDW BLD-RTO: 17.7 FL (ref 11.5–15)
PDW BLD-RTO: 17.8 FL (ref 11.5–15)
PLATELET # BLD: 225 E9/L (ref 130–450)
PLATELET # BLD: 227 E9/L (ref 130–450)
PLATELET # BLD: 236 E9/L (ref 130–450)
PLATELET # BLD: 267 E9/L (ref 130–450)
PLATELET # BLD: 277 E9/L (ref 130–450)
PLATELET # BLD: 289 E9/L (ref 130–450)
PLATELET # BLD: 290 E9/L (ref 130–450)
PLATELET # BLD: 296 E9/L (ref 130–450)
PLATELET # BLD: 309 E9/L (ref 130–450)
PLATELET # BLD: 313 E9/L (ref 130–450)
PLATELET # BLD: 333 E9/L (ref 130–450)
PLATELET # BLD: 336 E9/L (ref 130–450)
PLATELET # BLD: 346 E9/L (ref 130–450)
PLATELET # BLD: 351 E9/L (ref 130–450)
PLATELET # BLD: 354 E9/L (ref 130–450)
PLATELET # BLD: 402 E9/L (ref 130–450)
PLATELET # BLD: 404 E9/L (ref 130–450)
PLATELET # BLD: 427 E9/L (ref 130–450)
PLATELET # BLD: 454 E9/L (ref 130–450)
PMV BLD AUTO: 9 FL (ref 7–12)
PMV BLD AUTO: 9.1 FL (ref 7–12)
PMV BLD AUTO: 9.2 FL (ref 7–12)
PMV BLD AUTO: 9.2 FL (ref 7–12)
PMV BLD AUTO: 9.3 FL (ref 7–12)
PMV BLD AUTO: 9.4 FL (ref 7–12)
PMV BLD AUTO: 9.5 FL (ref 7–12)
PMV BLD AUTO: 9.6 FL (ref 7–12)
PMV BLD AUTO: 9.6 FL (ref 7–12)
PMV BLD AUTO: 9.7 FL (ref 7–12)
POIKILOCYTES: ABNORMAL
POLYCHROMASIA: ABNORMAL
POTASSIUM SERPL-SCNC: 4.1 MMOL/L (ref 3.5–5)
POTASSIUM SERPL-SCNC: 4.3 MMOL/L (ref 3.5–5)
POTASSIUM SERPL-SCNC: 4.4 MMOL/L (ref 3.5–5)
POTASSIUM SERPL-SCNC: 4.4 MMOL/L (ref 3.5–5)
POTASSIUM SERPL-SCNC: 4.6 MMOL/L (ref 3.5–5)
POTASSIUM SERPL-SCNC: 4.6 MMOL/L (ref 3.5–5)
POTASSIUM SERPL-SCNC: 4.8 MMOL/L (ref 3.5–5)
POTASSIUM SERPL-SCNC: 5 MMOL/L (ref 3.5–5)
POTASSIUM SERPL-SCNC: 5.1 MMOL/L (ref 3.5–5)
POTASSIUM SERPL-SCNC: 5.3 MMOL/L (ref 3.5–5)
POTASSIUM SERPL-SCNC: 5.8 MMOL/L (ref 3.5–5)
POTASSIUM SERPL-SCNC: 6.1 MMOL/L (ref 3.5–5)
POTASSIUM SERPL-SCNC: 6.5 MMOL/L (ref 3.5–5)
PROTHROMBIN TIME: 15.3 SEC (ref 9.3–12.4)
PROTHROMBIN TIME: 15.8 SEC (ref 9.3–12.4)
PROTHROMBIN TIME: 17 SEC (ref 9.3–12.4)
PROTHROMBIN TIME: 19.7 SEC (ref 9.3–12.4)
PROTHROMBIN TIME: 25.3 SEC (ref 9.3–12.4)
PROTHROMBIN TIME: 29.4 SEC (ref 9.3–12.4)
PROTHROMBIN TIME: 66.7 SEC (ref 9.3–12.4)
RBC # BLD: 3.05 E12/L (ref 3.8–5.8)
RBC # BLD: 3.29 E12/L (ref 3.8–5.8)
RBC # BLD: 3.3 E12/L (ref 3.8–5.8)
RBC # BLD: 3.37 E12/L (ref 3.8–5.8)
RBC # BLD: 3.42 E12/L (ref 3.8–5.8)
RBC # BLD: 3.44 E12/L (ref 3.8–5.8)
RBC # BLD: 3.49 E12/L (ref 3.8–5.8)
RBC # BLD: 3.55 E12/L (ref 3.8–5.8)
RBC # BLD: 3.56 E12/L (ref 3.8–5.8)
RBC # BLD: 3.57 E12/L (ref 3.8–5.8)
RBC # BLD: 3.63 E12/L (ref 3.8–5.8)
RBC # BLD: 3.65 E12/L (ref 3.8–5.8)
RBC # BLD: 3.67 E12/L (ref 3.8–5.8)
RBC # BLD: 3.69 E12/L (ref 3.8–5.8)
RBC # BLD: 3.74 E12/L (ref 3.8–5.8)
RBC # BLD: 3.75 E12/L (ref 3.8–5.8)
RBC # BLD: 3.81 E12/L (ref 3.8–5.8)
RBC # BLD: 3.94 E12/L (ref 3.8–5.8)
RBC # BLD: 4 E12/L (ref 3.8–5.8)
SODIUM BLD-SCNC: 130 MMOL/L (ref 132–146)
SODIUM BLD-SCNC: 131 MMOL/L (ref 132–146)
SODIUM BLD-SCNC: 132 MMOL/L (ref 132–146)
SODIUM BLD-SCNC: 132 MMOL/L (ref 132–146)
SODIUM BLD-SCNC: 133 MMOL/L (ref 132–146)
SODIUM BLD-SCNC: 133 MMOL/L (ref 132–146)
SODIUM BLD-SCNC: 134 MMOL/L (ref 132–146)
SODIUM BLD-SCNC: 134 MMOL/L (ref 132–146)
SODIUM BLD-SCNC: 135 MMOL/L (ref 132–146)
SODIUM BLD-SCNC: 137 MMOL/L (ref 132–146)
SODIUM BLD-SCNC: 138 MMOL/L (ref 132–146)
SODIUM BLD-SCNC: 139 MMOL/L (ref 132–146)
SODIUM BLD-SCNC: 141 MMOL/L (ref 132–146)
SODIUM BLD-SCNC: 143 MMOL/L (ref 132–146)
TOTAL PROTEIN: 6.4 G/DL (ref 6.4–8.3)
URINE CULTURE, ROUTINE: ABNORMAL
URINE CULTURE, ROUTINE: ABNORMAL
WBC # BLD: 10.5 E9/L (ref 4.5–11.5)
WBC # BLD: 10.7 E9/L (ref 4.5–11.5)
WBC # BLD: 11.3 E9/L (ref 4.5–11.5)
WBC # BLD: 13.8 E9/L (ref 4.5–11.5)
WBC # BLD: 14.1 E9/L (ref 4.5–11.5)
WBC # BLD: 14.2 E9/L (ref 4.5–11.5)
WBC # BLD: 14.5 E9/L (ref 4.5–11.5)
WBC # BLD: 14.8 E9/L (ref 4.5–11.5)
WBC # BLD: 15.7 E9/L (ref 4.5–11.5)
WBC # BLD: 16 E9/L (ref 4.5–11.5)
WBC # BLD: 16.7 E9/L (ref 4.5–11.5)
WBC # BLD: 18.9 E9/L (ref 4.5–11.5)
WBC # BLD: 19.4 E9/L (ref 4.5–11.5)
WBC # BLD: 21.6 E9/L (ref 4.5–11.5)
WBC # BLD: 24.8 E9/L (ref 4.5–11.5)
WBC # BLD: 32.5 E9/L (ref 4.5–11.5)
WBC # BLD: 7.7 E9/L (ref 4.5–11.5)
WBC # BLD: 8.4 E9/L (ref 4.5–11.5)
WBC # BLD: 8.5 E9/L (ref 4.5–11.5)

## 2022-01-01 ASSESSMENT — ENCOUNTER SYMPTOMS
APNEA: 0
STRIDOR: 0
ABDOMINAL DISTENTION: 0
EYE REDNESS: 0
RESPIRATORY NEGATIVE: 1
SINUS PRESSURE: 0
CHOKING: 0
SORE THROAT: 0
FACIAL SWELLING: 0
TROUBLE SWALLOWING: 0
WHEEZING: 0
NAUSEA: 0
ANAL BLEEDING: 0
ABDOMINAL PAIN: 1
VOMITING: 0
RHINORRHEA: 0
EYE DISCHARGE: 0
COUGH: 0
EYE PAIN: 0
ALLERGIC/IMMUNOLOGIC NEGATIVE: 1
SHORTNESS OF BREATH: 0
SINUS PAIN: 0
CONSTIPATION: 0
BACK PAIN: 0
BLOOD IN STOOL: 0
RECTAL PAIN: 0
PHOTOPHOBIA: 0
CHEST TIGHTNESS: 0
VOICE CHANGE: 0
COLOR CHANGE: 0
EYE ITCHING: 0
DIARRHEA: 0

## 2022-01-05 DIAGNOSIS — C61 PROSTATE CANCER (HCC): ICD-10-CM

## 2022-01-05 DIAGNOSIS — R53.83 FATIGUE, UNSPECIFIED TYPE: ICD-10-CM

## 2022-01-05 DIAGNOSIS — E78.5 DYSLIPIDEMIA: ICD-10-CM

## 2022-01-05 DIAGNOSIS — E11.59 TYPE 2 DIABETES MELLITUS WITH CARDIAC COMPLICATION (HCC): Primary | ICD-10-CM

## 2022-01-06 ENCOUNTER — HOSPITAL ENCOUNTER (OUTPATIENT)
Age: 72
Discharge: HOME | DRG: 308 | End: 2022-01-06
Payer: MEDICARE

## 2022-01-06 DIAGNOSIS — R53.83 FATIGUE, UNSPECIFIED TYPE: ICD-10-CM

## 2022-01-06 DIAGNOSIS — E78.5 DYSLIPIDEMIA: ICD-10-CM

## 2022-01-06 DIAGNOSIS — E11.59 TYPE 2 DIABETES MELLITUS WITH CARDIAC COMPLICATION (HCC): ICD-10-CM

## 2022-01-06 LAB
ALBUMIN SERPL-MCNC: 3.9 G/DL (ref 3.5–5.2)
ALP BLD-CCNC: 128 U/L (ref 40–129)
ALT SERPL-CCNC: 15 U/L (ref 0–40)
ANION GAP SERPL CALCULATED.3IONS-SCNC: 16 MMOL/L (ref 7–16)
AST SERPL-CCNC: 21 U/L (ref 0–39)
BASOPHILS ABSOLUTE: 0.03 E9/L (ref 0–0.2)
BASOPHILS RELATIVE PERCENT: 0.4 % (ref 0–2)
BILIRUB SERPL-MCNC: 0.4 MG/DL (ref 0–1.2)
BUN BLDV-MCNC: 28 MG/DL (ref 6–23)
CALCIUM SERPL-MCNC: 9.7 MG/DL (ref 8.6–10.2)
CHLORIDE BLD-SCNC: 96 MMOL/L (ref 98–107)
CHOLESTEROL, TOTAL: 155 MG/DL (ref 0–199)
CO2: 19 MMOL/L (ref 22–29)
CREAT SERPL-MCNC: 1.3 MG/DL (ref 0.7–1.2)
EOSINOPHILS ABSOLUTE: 0.12 E9/L (ref 0.05–0.5)
EOSINOPHILS RELATIVE PERCENT: 1.7 % (ref 0–6)
GFR AFRICAN AMERICAN: >60
GFR NON-AFRICAN AMERICAN: 54 ML/MIN/1.73
GLUCOSE BLD-MCNC: 289 MG/DL (ref 74–99)
HBA1C MFR BLD: 9.2 % (ref 4–5.6)
HCT VFR BLD CALC: 40.7 % (ref 37–54)
HDLC SERPL-MCNC: 29 MG/DL
HEMOGLOBIN: 13.2 G/DL (ref 12.5–16.5)
IMMATURE GRANULOCYTES #: 0.12 E9/L
IMMATURE GRANULOCYTES %: 1.7 % (ref 0–5)
LDL CHOLESTEROL CALCULATED: 81 MG/DL (ref 0–99)
LYMPHOCYTES ABSOLUTE: 1.84 E9/L (ref 1.5–4)
LYMPHOCYTES RELATIVE PERCENT: 25.8 % (ref 20–42)
MCH RBC QN AUTO: 27.7 PG (ref 26–35)
MCHC RBC AUTO-ENTMCNC: 32.4 % (ref 32–34.5)
MCV RBC AUTO: 85.3 FL (ref 80–99.9)
MONOCYTES ABSOLUTE: 0.58 E9/L (ref 0.1–0.95)
MONOCYTES RELATIVE PERCENT: 8.1 % (ref 2–12)
NEUTROPHILS ABSOLUTE: 4.45 E9/L (ref 1.8–7.3)
NEUTROPHILS RELATIVE PERCENT: 62.3 % (ref 43–80)
PDW BLD-RTO: 13.7 FL (ref 11.5–15)
PLATELET # BLD: 245 E9/L (ref 130–450)
PMV BLD AUTO: 9.3 FL (ref 7–12)
POTASSIUM SERPL-SCNC: 4.4 MMOL/L (ref 3.5–5)
PROSTATE SPECIFIC ANTIGEN: <0.01 NG/ML (ref 0–4)
RBC # BLD: 4.77 E12/L (ref 3.8–5.8)
SODIUM BLD-SCNC: 131 MMOL/L (ref 132–146)
TOTAL PROTEIN: 8.3 G/DL (ref 6.4–8.3)
TRIGL SERPL-MCNC: 224 MG/DL (ref 0–149)
TSH SERPL DL<=0.05 MIU/L-ACNC: 1.71 UIU/ML (ref 0.27–4.2)
VLDLC SERPL CALC-MCNC: 45 MG/DL
WBC # BLD: 7.1 E9/L (ref 4.5–11.5)

## 2022-01-06 PROCEDURE — 84443 ASSAY THYROID STIM HORMONE: CPT

## 2022-01-06 PROCEDURE — 83036 HEMOGLOBIN GLYCOSYLATED A1C: CPT

## 2022-01-06 PROCEDURE — 36415 COLL VENOUS BLD VENIPUNCTURE: CPT

## 2022-01-06 PROCEDURE — 80061 LIPID PANEL: CPT

## 2022-01-06 PROCEDURE — 84153 ASSAY OF PSA TOTAL: CPT

## 2022-01-06 PROCEDURE — 85025 COMPLETE CBC W/AUTO DIFF WBC: CPT

## 2022-01-06 PROCEDURE — 80053 COMPREHEN METABOLIC PANEL: CPT

## 2022-01-07 ENCOUNTER — HOSPITAL ENCOUNTER (INPATIENT)
Age: 72
LOS: 1 days | Discharge: HOME | DRG: 308 | End: 2022-01-08
Attending: EMERGENCY MEDICINE | Admitting: INTERNAL MEDICINE
Payer: MEDICARE

## 2022-01-07 ENCOUNTER — OFFICE VISIT (OUTPATIENT)
Dept: FAMILY MEDICINE CLINIC | Age: 72
End: 2022-01-07
Payer: MEDICARE

## 2022-01-07 ENCOUNTER — APPOINTMENT (OUTPATIENT)
Dept: GENERAL RADIOLOGY | Age: 72
DRG: 308 | End: 2022-01-07
Payer: MEDICARE

## 2022-01-07 VITALS
WEIGHT: 197 LBS | DIASTOLIC BLOOD PRESSURE: 58 MMHG | HEIGHT: 72 IN | BODY MASS INDEX: 26.68 KG/M2 | SYSTOLIC BLOOD PRESSURE: 98 MMHG | RESPIRATION RATE: 22 BRPM | HEART RATE: 64 BPM | TEMPERATURE: 97.2 F

## 2022-01-07 DIAGNOSIS — U07.1 COVID-19: ICD-10-CM

## 2022-01-07 DIAGNOSIS — E78.5 DYSLIPIDEMIA: ICD-10-CM

## 2022-01-07 DIAGNOSIS — R77.8 ELEVATED TROPONIN: ICD-10-CM

## 2022-01-07 DIAGNOSIS — J44.9 COPD, VERY SEVERE (HCC): ICD-10-CM

## 2022-01-07 DIAGNOSIS — C61 PROSTATE CANCER (HCC): ICD-10-CM

## 2022-01-07 DIAGNOSIS — I48.91 NEW ONSET ATRIAL FIBRILLATION (HCC): Primary | ICD-10-CM

## 2022-01-07 DIAGNOSIS — I48.19 PERSISTENT ATRIAL FIBRILLATION (HCC): Primary | ICD-10-CM

## 2022-01-07 DIAGNOSIS — E11.59 TYPE 2 DIABETES MELLITUS WITH CARDIAC COMPLICATION (HCC): ICD-10-CM

## 2022-01-07 DIAGNOSIS — R73.9 HYPERGLYCEMIA: ICD-10-CM

## 2022-01-07 DIAGNOSIS — I50.9 CONGESTIVE HEART FAILURE, UNSPECIFIED HF CHRONICITY, UNSPECIFIED HEART FAILURE TYPE (HCC): ICD-10-CM

## 2022-01-07 DIAGNOSIS — I48.0 PAROXYSMAL ATRIAL FIBRILLATION (HCC): ICD-10-CM

## 2022-01-07 DIAGNOSIS — I71.40 ABDOMINAL AORTIC ANEURYSM WITHOUT RUPTURE: ICD-10-CM

## 2022-01-07 LAB
ANION GAP SERPL CALCULATED.3IONS-SCNC: 13 MMOL/L (ref 7–16)
APTT: 23.9 SEC (ref 24.5–35.1)
BASOPHILS ABSOLUTE: 0.05 E9/L (ref 0–0.2)
BASOPHILS RELATIVE PERCENT: 0.6 % (ref 0–2)
BUN BLDV-MCNC: 27 MG/DL (ref 6–23)
CALCIUM SERPL-MCNC: 8.9 MG/DL (ref 8.6–10.2)
CHLORIDE BLD-SCNC: 100 MMOL/L (ref 98–107)
CO2: 18 MMOL/L (ref 22–29)
CREAT SERPL-MCNC: 1.2 MG/DL (ref 0.7–1.2)
EKG ATRIAL RATE: 375 BPM
EKG Q-T INTERVAL: 332 MS
EKG QRS DURATION: 88 MS
EKG QTC CALCULATION (BAZETT): 455 MS
EKG R AXIS: -7 DEGREES
EKG T AXIS: -139 DEGREES
EKG VENTRICULAR RATE: 113 BPM
EOSINOPHILS ABSOLUTE: 0.06 E9/L (ref 0.05–0.5)
EOSINOPHILS RELATIVE PERCENT: 0.8 % (ref 0–6)
GFR AFRICAN AMERICAN: >60
GFR NON-AFRICAN AMERICAN: 60 ML/MIN/1.73
GLUCOSE BLD-MCNC: 298 MG/DL (ref 74–99)
HCT VFR BLD CALC: 38.7 % (ref 37–54)
HCT VFR BLD CALC: 38.8 % (ref 37–54)
HEMOGLOBIN: 12.1 G/DL (ref 12.5–16.5)
HEMOGLOBIN: 12.5 G/DL (ref 12.5–16.5)
IMMATURE GRANULOCYTES #: 0.11 E9/L
IMMATURE GRANULOCYTES %: 1.4 % (ref 0–5)
INR BLD: 1
LYMPHOCYTES ABSOLUTE: 1.28 E9/L (ref 1.5–4)
LYMPHOCYTES RELATIVE PERCENT: 16.1 % (ref 20–42)
MCH RBC QN AUTO: 27.4 PG (ref 26–35)
MCH RBC QN AUTO: 28 PG (ref 26–35)
MCHC RBC AUTO-ENTMCNC: 31.2 % (ref 32–34.5)
MCHC RBC AUTO-ENTMCNC: 32.3 % (ref 32–34.5)
MCV RBC AUTO: 86.8 FL (ref 80–99.9)
MCV RBC AUTO: 87.8 FL (ref 80–99.9)
METER GLUCOSE: 307 MG/DL (ref 74–99)
MONOCYTES ABSOLUTE: 0.67 E9/L (ref 0.1–0.95)
MONOCYTES RELATIVE PERCENT: 8.4 % (ref 2–12)
NEUTROPHILS ABSOLUTE: 5.76 E9/L (ref 1.8–7.3)
NEUTROPHILS RELATIVE PERCENT: 72.7 % (ref 43–80)
PDW BLD-RTO: 13.9 FL (ref 11.5–15)
PDW BLD-RTO: 13.9 FL (ref 11.5–15)
PLATELET # BLD: 232 E9/L (ref 130–450)
PLATELET # BLD: 240 E9/L (ref 130–450)
PMV BLD AUTO: 9.2 FL (ref 7–12)
PMV BLD AUTO: 9.6 FL (ref 7–12)
POTASSIUM REFLEX MAGNESIUM: 4.2 MMOL/L (ref 3.5–5)
PRO-BNP: 1701 PG/ML (ref 0–125)
PROTHROMBIN TIME: 11 SEC (ref 9.3–12.4)
RBC # BLD: 4.42 E12/L (ref 3.8–5.8)
RBC # BLD: 4.46 E12/L (ref 3.8–5.8)
REASON FOR REJECTION: NORMAL
REJECTED TEST: NORMAL
SARS-COV-2, NAAT: DETECTED
SODIUM BLD-SCNC: 131 MMOL/L (ref 132–146)
TROPONIN, HIGH SENSITIVITY: 22 NG/L (ref 0–11)
TROPONIN, HIGH SENSITIVITY: 30 NG/L (ref 0–11)
WBC # BLD: 6.9 E9/L (ref 4.5–11.5)
WBC # BLD: 7.9 E9/L (ref 4.5–11.5)

## 2022-01-07 PROCEDURE — 3023F SPIROM DOC REV: CPT | Performed by: FAMILY MEDICINE

## 2022-01-07 PROCEDURE — 93005 ELECTROCARDIOGRAM TRACING: CPT | Performed by: STUDENT IN AN ORGANIZED HEALTH CARE EDUCATION/TRAINING PROGRAM

## 2022-01-07 PROCEDURE — 6370000000 HC RX 637 (ALT 250 FOR IP): Performed by: INTERNAL MEDICINE

## 2022-01-07 PROCEDURE — 3046F HEMOGLOBIN A1C LEVEL >9.0%: CPT | Performed by: FAMILY MEDICINE

## 2022-01-07 PROCEDURE — 96374 THER/PROPH/DIAG INJ IV PUSH: CPT

## 2022-01-07 PROCEDURE — 96376 TX/PRO/DX INJ SAME DRUG ADON: CPT

## 2022-01-07 PROCEDURE — 6360000002 HC RX W HCPCS: Performed by: NURSE PRACTITIONER

## 2022-01-07 PROCEDURE — 6370000000 HC RX 637 (ALT 250 FOR IP): Performed by: PHYSICIAN ASSISTANT

## 2022-01-07 PROCEDURE — 83880 ASSAY OF NATRIURETIC PEPTIDE: CPT

## 2022-01-07 PROCEDURE — 3017F COLORECTAL CA SCREEN DOC REV: CPT | Performed by: FAMILY MEDICINE

## 2022-01-07 PROCEDURE — 85027 COMPLETE CBC AUTOMATED: CPT

## 2022-01-07 PROCEDURE — 2140000000 HC CCU INTERMEDIATE R&B

## 2022-01-07 PROCEDURE — 4004F PT TOBACCO SCREEN RCVD TLK: CPT | Performed by: FAMILY MEDICINE

## 2022-01-07 PROCEDURE — 84484 ASSAY OF TROPONIN QUANT: CPT

## 2022-01-07 PROCEDURE — G8427 DOCREV CUR MEDS BY ELIG CLIN: HCPCS | Performed by: FAMILY MEDICINE

## 2022-01-07 PROCEDURE — 85730 THROMBOPLASTIN TIME PARTIAL: CPT

## 2022-01-07 PROCEDURE — APPSS45 APP SPLIT SHARED TIME 31-45 MINUTES: Performed by: NURSE PRACTITIONER

## 2022-01-07 PROCEDURE — 96365 THER/PROPH/DIAG IV INF INIT: CPT

## 2022-01-07 PROCEDURE — 71045 X-RAY EXAM CHEST 1 VIEW: CPT

## 2022-01-07 PROCEDURE — 1123F ACP DISCUSS/DSCN MKR DOCD: CPT | Performed by: FAMILY MEDICINE

## 2022-01-07 PROCEDURE — G8417 CALC BMI ABV UP PARAM F/U: HCPCS | Performed by: FAMILY MEDICINE

## 2022-01-07 PROCEDURE — 4040F PNEUMOC VAC/ADMIN/RCVD: CPT | Performed by: FAMILY MEDICINE

## 2022-01-07 PROCEDURE — 2580000003 HC RX 258: Performed by: PHYSICIAN ASSISTANT

## 2022-01-07 PROCEDURE — 6370000000 HC RX 637 (ALT 250 FOR IP): Performed by: NURSE PRACTITIONER

## 2022-01-07 PROCEDURE — 6360000002 HC RX W HCPCS: Performed by: STUDENT IN AN ORGANIZED HEALTH CARE EDUCATION/TRAINING PROGRAM

## 2022-01-07 PROCEDURE — 82962 GLUCOSE BLOOD TEST: CPT

## 2022-01-07 PROCEDURE — 85610 PROTHROMBIN TIME: CPT

## 2022-01-07 PROCEDURE — 85025 COMPLETE CBC W/AUTO DIFF WBC: CPT

## 2022-01-07 PROCEDURE — 87635 SARS-COV-2 COVID-19 AMP PRB: CPT

## 2022-01-07 PROCEDURE — 36415 COLL VENOUS BLD VENIPUNCTURE: CPT

## 2022-01-07 PROCEDURE — 80048 BASIC METABOLIC PNL TOTAL CA: CPT

## 2022-01-07 PROCEDURE — G8484 FLU IMMUNIZE NO ADMIN: HCPCS | Performed by: FAMILY MEDICINE

## 2022-01-07 PROCEDURE — 2500000003 HC RX 250 WO HCPCS: Performed by: STUDENT IN AN ORGANIZED HEALTH CARE EDUCATION/TRAINING PROGRAM

## 2022-01-07 PROCEDURE — 2022F DILAT RTA XM EVC RTNOPTHY: CPT | Performed by: FAMILY MEDICINE

## 2022-01-07 PROCEDURE — 99223 1ST HOSP IP/OBS HIGH 75: CPT | Performed by: INTERNAL MEDICINE

## 2022-01-07 PROCEDURE — 99284 EMERGENCY DEPT VISIT MOD MDM: CPT

## 2022-01-07 PROCEDURE — 99214 OFFICE O/P EST MOD 30 MIN: CPT | Performed by: FAMILY MEDICINE

## 2022-01-07 RX ORDER — LANOLIN ALCOHOL/MO/W.PET/CERES
3 CREAM (GRAM) TOPICAL NIGHTLY PRN
Status: DISCONTINUED | OUTPATIENT
Start: 2022-01-07 | End: 2022-01-07 | Stop reason: SDUPTHER

## 2022-01-07 RX ORDER — ACETAMINOPHEN 650 MG/1
650 SUPPOSITORY RECTAL EVERY 6 HOURS PRN
Status: DISCONTINUED | OUTPATIENT
Start: 2022-01-07 | End: 2022-01-08 | Stop reason: HOSPADM

## 2022-01-07 RX ORDER — ATORVASTATIN CALCIUM 40 MG/1
40 TABLET, FILM COATED ORAL DAILY
Status: DISCONTINUED | OUTPATIENT
Start: 2022-01-08 | End: 2022-01-08 | Stop reason: HOSPADM

## 2022-01-07 RX ORDER — LISINOPRIL 10 MG/1
10 TABLET ORAL DAILY
Status: DISCONTINUED | OUTPATIENT
Start: 2022-01-08 | End: 2022-01-08 | Stop reason: HOSPADM

## 2022-01-07 RX ORDER — METOPROLOL SUCCINATE 50 MG/1
50 TABLET, EXTENDED RELEASE ORAL DAILY
Qty: 90 TABLET | Refills: 1 | Status: ON HOLD
Start: 2022-01-07 | End: 2022-01-08 | Stop reason: HOSPADM

## 2022-01-07 RX ORDER — POTASSIUM CHLORIDE 7.45 MG/ML
10 INJECTION INTRAVENOUS PRN
Status: DISCONTINUED | OUTPATIENT
Start: 2022-01-07 | End: 2022-01-08 | Stop reason: HOSPADM

## 2022-01-07 RX ORDER — SODIUM CHLORIDE 0.9 % (FLUSH) 0.9 %
5-40 SYRINGE (ML) INJECTION EVERY 12 HOURS SCHEDULED
Status: DISCONTINUED | OUTPATIENT
Start: 2022-01-07 | End: 2022-01-08 | Stop reason: HOSPADM

## 2022-01-07 RX ORDER — DEXTROSE MONOHYDRATE 50 MG/ML
100 INJECTION, SOLUTION INTRAVENOUS PRN
Status: DISCONTINUED | OUTPATIENT
Start: 2022-01-07 | End: 2022-01-08 | Stop reason: HOSPADM

## 2022-01-07 RX ORDER — BUDESONIDE AND FORMOTEROL FUMARATE DIHYDRATE 160; 4.5 UG/1; UG/1
2 AEROSOL RESPIRATORY (INHALATION) 2 TIMES DAILY
Status: DISCONTINUED | OUTPATIENT
Start: 2022-01-07 | End: 2022-01-08 | Stop reason: HOSPADM

## 2022-01-07 RX ORDER — SOTALOL HYDROCHLORIDE 80 MG/1
40 TABLET ORAL 2 TIMES DAILY
Status: DISCONTINUED | OUTPATIENT
Start: 2022-01-07 | End: 2022-01-08 | Stop reason: HOSPADM

## 2022-01-07 RX ORDER — ATORVASTATIN CALCIUM 40 MG/1
40 TABLET, FILM COATED ORAL DAILY
Qty: 90 TABLET | Refills: 1 | Status: SHIPPED
Start: 2022-01-07 | End: 2022-08-05 | Stop reason: SDUPTHER

## 2022-01-07 RX ORDER — LANOLIN ALCOHOL/MO/W.PET/CERES
3 CREAM (GRAM) TOPICAL NIGHTLY PRN
Status: DISCONTINUED | OUTPATIENT
Start: 2022-01-07 | End: 2022-01-08 | Stop reason: HOSPADM

## 2022-01-07 RX ORDER — HEPARIN SODIUM 1000 [USP'U]/ML
4000 INJECTION, SOLUTION INTRAVENOUS; SUBCUTANEOUS ONCE
Status: COMPLETED | OUTPATIENT
Start: 2022-01-07 | End: 2022-01-07

## 2022-01-07 RX ORDER — LISINOPRIL 10 MG/1
TABLET ORAL
Qty: 90 TABLET | Refills: 1 | Status: SHIPPED
Start: 2022-01-07 | End: 2022-04-18 | Stop reason: SDUPTHER

## 2022-01-07 RX ORDER — NICOTINE POLACRILEX 4 MG
15 LOZENGE BUCCAL PRN
Status: DISCONTINUED | OUTPATIENT
Start: 2022-01-07 | End: 2022-01-08 | Stop reason: HOSPADM

## 2022-01-07 RX ORDER — ACETAMINOPHEN 325 MG/1
650 TABLET ORAL EVERY 6 HOURS PRN
Status: DISCONTINUED | OUTPATIENT
Start: 2022-01-07 | End: 2022-01-08 | Stop reason: HOSPADM

## 2022-01-07 RX ORDER — SOTALOL HYDROCHLORIDE 80 MG/1
TABLET ORAL
Qty: 180 TABLET | Refills: 1 | Status: ON HOLD
Start: 2022-01-07 | End: 2022-01-08 | Stop reason: HOSPADM

## 2022-01-07 RX ORDER — POLYETHYLENE GLYCOL 3350 17 G/17G
17 POWDER, FOR SOLUTION ORAL DAILY PRN
Status: DISCONTINUED | OUTPATIENT
Start: 2022-01-07 | End: 2022-01-08 | Stop reason: HOSPADM

## 2022-01-07 RX ORDER — LABETALOL HYDROCHLORIDE 5 MG/ML
10 INJECTION, SOLUTION INTRAVENOUS ONCE
Status: COMPLETED | OUTPATIENT
Start: 2022-01-07 | End: 2022-01-07

## 2022-01-07 RX ORDER — HEPARIN SODIUM 10000 [USP'U]/100ML
5-30 INJECTION, SOLUTION INTRAVENOUS CONTINUOUS
Status: DISCONTINUED | OUTPATIENT
Start: 2022-01-07 | End: 2022-01-07

## 2022-01-07 RX ORDER — SODIUM CHLORIDE 0.9 % (FLUSH) 0.9 %
5-40 SYRINGE (ML) INJECTION PRN
Status: DISCONTINUED | OUTPATIENT
Start: 2022-01-07 | End: 2022-01-08 | Stop reason: HOSPADM

## 2022-01-07 RX ORDER — GUAIFENESIN/DEXTROMETHORPHAN 100-10MG/5
5 SYRUP ORAL EVERY 4 HOURS PRN
Status: DISCONTINUED | OUTPATIENT
Start: 2022-01-07 | End: 2022-01-08 | Stop reason: HOSPADM

## 2022-01-07 RX ORDER — DEXTROSE MONOHYDRATE 25 G/50ML
12.5 INJECTION, SOLUTION INTRAVENOUS PRN
Status: DISCONTINUED | OUTPATIENT
Start: 2022-01-07 | End: 2022-01-08 | Stop reason: HOSPADM

## 2022-01-07 RX ORDER — POTASSIUM CHLORIDE 20 MEQ/1
40 TABLET, EXTENDED RELEASE ORAL PRN
Status: DISCONTINUED | OUTPATIENT
Start: 2022-01-07 | End: 2022-01-08 | Stop reason: HOSPADM

## 2022-01-07 RX ORDER — SODIUM CHLORIDE 9 MG/ML
25 INJECTION, SOLUTION INTRAVENOUS PRN
Status: DISCONTINUED | OUTPATIENT
Start: 2022-01-07 | End: 2022-01-08 | Stop reason: HOSPADM

## 2022-01-07 RX ORDER — MAGNESIUM SULFATE 1 G/100ML
1000 INJECTION INTRAVENOUS PRN
Status: DISCONTINUED | OUTPATIENT
Start: 2022-01-07 | End: 2022-01-08 | Stop reason: HOSPADM

## 2022-01-07 RX ORDER — SOTALOL HYDROCHLORIDE 120 MG/1
120 TABLET ORAL 2 TIMES DAILY
Status: DISCONTINUED | OUTPATIENT
Start: 2022-01-07 | End: 2022-01-07

## 2022-01-07 RX ORDER — HEPARIN SODIUM 1000 [USP'U]/ML
4000 INJECTION, SOLUTION INTRAVENOUS; SUBCUTANEOUS PRN
Status: DISCONTINUED | OUTPATIENT
Start: 2022-01-07 | End: 2022-01-07 | Stop reason: ALTCHOICE

## 2022-01-07 RX ORDER — METOPROLOL SUCCINATE 50 MG/1
50 TABLET, EXTENDED RELEASE ORAL 2 TIMES DAILY
Status: DISCONTINUED | OUTPATIENT
Start: 2022-01-07 | End: 2022-01-08 | Stop reason: HOSPADM

## 2022-01-07 RX ORDER — HEPARIN SODIUM 1000 [USP'U]/ML
30 INJECTION, SOLUTION INTRAVENOUS; SUBCUTANEOUS PRN
Status: DISCONTINUED | OUTPATIENT
Start: 2022-01-07 | End: 2022-01-07 | Stop reason: ALTCHOICE

## 2022-01-07 RX ADMIN — LABETALOL HYDROCHLORIDE 10 MG: 5 INJECTION INTRAVENOUS at 12:11

## 2022-01-07 RX ADMIN — INSULIN LISPRO 2 UNITS: 100 INJECTION, SOLUTION INTRAVENOUS; SUBCUTANEOUS at 23:36

## 2022-01-07 RX ADMIN — INSULIN LISPRO 2 UNITS: 100 INJECTION, SOLUTION INTRAVENOUS; SUBCUTANEOUS at 22:47

## 2022-01-07 RX ADMIN — SOTALOL HYDROCHLORIDE 40 MG: 80 TABLET ORAL at 21:55

## 2022-01-07 RX ADMIN — Medication 11 UNITS/KG/HR: at 12:13

## 2022-01-07 RX ADMIN — METOPROLOL SUCCINATE 50 MG: 50 TABLET, EXTENDED RELEASE ORAL at 21:55

## 2022-01-07 RX ADMIN — Medication 10 ML: at 22:40

## 2022-01-07 RX ADMIN — ENOXAPARIN SODIUM 90 MG: 100 INJECTION SUBCUTANEOUS at 21:54

## 2022-01-07 RX ADMIN — METOPROLOL TARTRATE 25 MG: 25 TABLET, FILM COATED ORAL at 14:41

## 2022-01-07 RX ADMIN — HEPARIN SODIUM 4000 UNITS: 1000 INJECTION INTRAVENOUS; SUBCUTANEOUS at 12:14

## 2022-01-07 ASSESSMENT — ENCOUNTER SYMPTOMS
STRIDOR: 0
VOMITING: 0
ABDOMINAL PAIN: 0
FACIAL SWELLING: 0
SORE THROAT: 0
NAUSEA: 0
RHINORRHEA: 0
EYE ITCHING: 0
SHORTNESS OF BREATH: 1
VOMITING: 0
COUGH: 0
SHORTNESS OF BREATH: 1
WHEEZING: 0
GASTROINTESTINAL NEGATIVE: 1
ALLERGIC/IMMUNOLOGIC NEGATIVE: 1
CHOKING: 0
EYE DISCHARGE: 0
VOICE CHANGE: 0
COLOR CHANGE: 0
PHOTOPHOBIA: 0
NAUSEA: 0
TROUBLE SWALLOWING: 0
ABDOMINAL PAIN: 0
TROUBLE SWALLOWING: 0
ABDOMINAL DISTENTION: 0
SINUS PAIN: 0
CHEST TIGHTNESS: 0
DIARRHEA: 0
SINUS PRESSURE: 0
ORTHOPNEA: 0
EYE PAIN: 0
PHOTOPHOBIA: 0
EYE REDNESS: 0
CONSTIPATION: 0
RHINORRHEA: 0
DIARRHEA: 0
BLOOD IN STOOL: 0
VISUAL CHANGE: 0
BACK PAIN: 0
BLURRED VISION: 0
RECTAL PAIN: 0
VOICE CHANGE: 0
APNEA: 0
ANAL BLEEDING: 0
COUGH: 0

## 2022-01-07 NOTE — ED NOTES
Bed: 11  Expected date: 1/7/22  Expected time:   Means of arrival:   Comments:  jillian Peck RN  01/07/22 1045

## 2022-01-07 NOTE — CONSULTS
Inpatient Cardiology Consultation      Reason for Consult: New onset atrial fibrillation    Consulting Physician: Dr. Venice Irby    Requesting Physician:  Dr. Josue Friedman    Date of Consultation: 1/7/2022    HISTORY OF PRESENT ILLNESS:     The following information is taken from electronic medical records as the patient has an active COVID-19 infection. He does not have his cell phone with him and he is currently in the emergency room. This 27-year-old male is known to [facility] cardiology and is followed by Dr. [place] Alexander. He was last evaluated as an outpatient in our office in September 2021 for risk stratification prior to EVAR. History of HFrEF, dilated cardiomyopathy, with coronary artery disease, sustained monomorphic ventricular tachycardia. His EF is now improved. He was cleared for EVAR as a class I risk. He underwent surgery with a stent graft on October 1, 2021. He presented to the emergency room with new onset atrial fibrillation. He was seen by primary care early this morning and found to be in atrial fibrillation with rapid ventricular response and sent to the emergency room. In the office his blood pressure was 98/58 heart rate was 64 but the EKG shows a tachycardia of 137 bpm while in Dr. Maki Console office. Blood pressure on admission 123/61 with a heart rate of 135 and he was afebrile with no hypoxia on room air. Sodium 131 with a BUN and creatinine of 2 7 and creatinine 1.2 and potassium of 4.2 and serum glucose 289 with a hemoglobin A1c of 9.2 and TSH 1.7, WBCs 7.9 with an H&H of 12.5 and 38.7, troponin 30, BNP 1700, TSH 1.71, positive positive for COVID-19    Chest x-ray showed no acute process. EKG showed atrial fibrillation with rapid ventricular response  Heart rate of 113. Has been placed on a heparin drip. He received Normodyne 10 mg IV around noon. Past medical history  1. Tobacco abuse history and now vaping  2.  Diabetes, no insulin requiring  3. Hypertension  4. Hyperlipidemia  5. Colon \"cancer \", 2015 with polypectomy  6. COPD, severe  7. CHF   8. Sustained monomorphic V. tach while on AAD therapy  9.  dilated cardiomyopathy, ICD in 2014 Dr. Rosemary Habermann with a 2D echocardiogram in 2014 showing an EF of 30 to 35%  10. Coronary artery disease left heart cardiac catheterization January 13, 2014 with a PCI to the circumflex and obtuse marginal in 2014      SELECTIVE CORONARY ARTERIOGRAMS:            A.   Right coronary artery - Preponderant. A large, dominant vessel      with marked ectasia noted throughout. No areas of critical stenosis      noted, however. B. Left coronary:         1. Left main trunk - Normal.         2.   Left anterior descending:  A high bifurcation into a moderate 1st          diagonal branch with mild luminal wall irregularities. The distal          anterior descending artery is markedly attenuated, but without areas          of critical stenosis. 3.   Circumflex: In the 1st obtuse marginal branch, there is an          eccentric 70% stenosis proximally. The distal vessel is of moderate          luminal caliber and widely patent. In the continuation of the          circumflex into the 1st posterolateral marginal branch, there is 65%          to 70% stenosis. III. LEFT VENTRICULAR ANGIOGRAM:            Left ventricular cavity size is upper limits of normal to mildly      enlarged with mild hypokinesis of the _____ inferomedial segment. Estimated left ventricular ejection fraction 50%. There is no mitral      regurgitation seen. CONCLUSIONS:   1. Severely stenotic native coronary atherosclerosis. 2.    Mildly impaired left ventricular systolic function. 3.    Abnormal diastolic dysfunction. 11. 2D echocardiogram 2014, EF 61% and mild concentric left ventricular hypertrophy  12. Prostate cancer status post radiation therapy and on Lupron  13.  Lexiscan stress test September 2018    No evidence of left ventricular myocardial stress-induced   ischemia. 2. Left ventricular ejection fraction estimated at 41%. 3. Dilated left ventricle. 14. Abdominal aortic aneurysm status post endovascular aortic aneurysm repair on October 1, 2021 using a modular stent graft  15. Hematuria   16. Umbilical repair  17. For fracture surgery in 2020 on the right  18. Cystoscopy in October 2021       Medications Prior to admit:  Prior to Admission medications    Medication Sig Start Date End Date Taking?  Authorizing Provider   atorvastatin (LIPITOR) 40 MG tablet Take 1 tablet by mouth daily 1/7/22 7/6/22  [facility],    lisinopril (PRINIVIL;ZESTRIL) 10 MG tablet TAKE ONE TABLET BY MOUTH EVERY DAY 1/7/22   [facility],    metoprolol succinate (TOPROL XL) 50 MG extended release tablet Take 1 tablet by mouth daily 1/7/22   [facility],    metFORMIN (GLUCOPHAGE) 500 MG tablet Take 1 tablet by mouth 2 times daily (with meals) 1/7/22   [facility],    sotalol (BETAPACE) 80 MG tablet TAKE ONE-HALF TABLET BY MOUTH TWICE DAILY 1/7/22   [facility],    guaiFENesin (MUCINEX) 600 MG extended release tablet Take 1 tablet by mouth 2 times daily for 15 days  Patient not taking: Reported on 1/7/2022 12/28/21 1/12/22  Nolan Colunga DO   Ascorbic Acid (VITAMIN C) 1000 MG tablet Take 1 tablet by mouth daily  Patient not taking: Reported on 1/7/2022 12/28/21   Nolan Colunga DO   vitamin D3 (CHOLECALCIFEROL) 25 MCG (1000 UT) TABS tablet Take 1 tablet by mouth daily  Patient not taking: Reported on 1/7/2022 12/28/21   Nolan Colunga DO   zinc 50 MG CAPS Take 100 mg by mouth daily  Patient not taking: Reported on 1/7/2022 12/28/21   Dona Mg DO       Current Medications:    Current Facility-Administered Medications: labetalol (NORMODYNE;TRANDATE) injection 10 mg, 10 mg, IntraVENous, Once  heparin (porcine) injection 4,000 Units, 4,000 Units, IntraVENous, Once  heparin (porcine) injection 4,000 Units, 4,000 Units, IntraVENous, PRN  heparin (porcine) injection 2,720 Units, 30 Units/kg, IntraVENous, PRN  heparin 25,000 units in dextrose 5% 250 mL (premix) infusion, 5-30 Units/kg/hr, IntraVENous, Continuous    Allergies:  Patient has no known allergies. Social History: Positive for tobacco abuse with 100-pack-year history smokes 2 packs a day but no alcohol or illicit drugs and is       Family History:   Family History   Problem Relation Age of Onset    Heart Disease Mother     Cancer Father         abdominal    Cancer Brother         prostate    Cancer Brother         digestive       REVIEW OF SYSTEMS:   unable  ·     PHYSICAL EXAM: Unable  /61   Pulse 135   Temp 97 °F (36.1 °C)   Resp 17   Wt 200 lb (90.7 kg)   SpO2 98%   BMI 27.12 kg/m²     DATA:    ECG / Tele strips:  atrial fibrillation  Diagnostic:    No intake or output data in the 24 hours ending 01/07/22 1214    Labs:   CBC:   Recent Labs     01/06/22  0809 01/07/22  1112   WBC 7.1 7.9   HGB 13.2 12.5   HCT 40.7 38.7    232     BMP:   Recent Labs     01/06/22  0809 01/07/22  1112   * 131*   K 4.4 4.2   CO2 19* 18*   BUN 28* 27*   CREATININE 1.3* 1.2   LABGLOM 54 60   CALCIUM 9.7 8.9     Mag: No results for input(s): MG in the last 72 hours. Phos: No results for input(s): PHOS in the last 72 hours.   TFT:   Lab Results   Component Value Date    TSH 1.710 01/06/2022    T4FREE 1.45 09/16/2014      HgA1c:   Lab Results   Component Value Date    LABA1C 9.2 (H) 01/06/2022     No results found for: EAG  proBNP:   Recent Labs     01/07/22  1112   PROBNP 1,701*     PT/INR:   Recent Labs     01/07/22  1112   PROTIME 11.0   INR 1.0     APTT:  Recent Labs     01/07/22 1112   APTT 23.9*     CARDIAC ENZYMES:  Recent Labs     01/07/22  1112   TROPHS 30*     FASTING LIPID PANEL:  Lab Results   Component Value Date    CHOL 155 01/06/2022    HDL 29 01/06/2022    LDLCALC 81 01/06/2022    TRIG 224 01/06/2022     LIVER PROFILE:  Recent Labs     01/06/22  0809   AST 21   ALT 15   LABALBU 3.9       Electronically signed by JERI Mayer CNP on 1/7/2022 at 12:14 PM

## 2022-01-07 NOTE — PATIENT INSTRUCTIONS

## 2022-01-07 NOTE — PROGRESS NOTES
SUBJECTIVE  Ben Guido is a 70 y.o. male. HPI/Chief C/O:  Chief Complaint   Patient presents with    Diabetes     follow up    Chronic Kidney Disease     follow up   [address] 1/7 labs     No Known Allergies  The patient is here for a medication list and treatment planning review  We will go over our care planning goals as well as take care of all refills  We will set up labs as well   C/O very weak and passing out. Very dizzy     Diabetes  He presents for his follow-up diabetic visit. He has type 2 diabetes mellitus. Hypoglycemia symptoms include dizziness. Pertinent negatives for hypoglycemia include no confusion, headaches, nervousness/anxiousness, pallor, seizures, speech difficulty, sweats or tremors. Associated symptoms include fatigue and weakness. Pertinent negatives for diabetes include no blurred vision, no chest pain, no foot paresthesias, no foot ulcerations, no polydipsia, no polyphagia, no polyuria, no visual change and no weight loss. There are no hypoglycemic complications. Diabetic complications include a CVA, heart disease and PVD (H/O AAA repair). Pertinent negatives for diabetic complications include no autonomic neuropathy, nephropathy, peripheral neuropathy or retinopathy. Risk factors for coronary artery disease include hypertension, diabetes mellitus, dyslipidemia, male sex and tobacco exposure. Current diabetic treatment includes diet and oral agent (monotherapy). He is compliant with treatment some of the time. He is following a generally unhealthy diet. An ACE inhibitor/angiotensin II receptor blocker is being taken. Hypertension  This is a chronic problem. The current episode started more than 1 year ago. The problem is controlled. Associated symptoms include malaise/fatigue and shortness of breath. Pertinent negatives include no anxiety, blurred vision, chest pain, headaches, neck pain, orthopnea, palpitations, peripheral edema, PND or sweats.  Risk factors for coronary artery disease include male gender, smoking/tobacco exposure, stress, diabetes mellitus and dyslipidemia. Past treatments include lifestyle changes, beta blockers and ACE inhibitors. The current treatment provides significant improvement. Compliance problems include diet and exercise. Hypertensive end-organ damage includes CAD/MI (ICD implant, atrial fibrillation), CVA, heart failure, left ventricular hypertrophy and PVD (H/O AAA repair). There is no history of angina, kidney disease or retinopathy. There is no history of chronic renal disease, coarctation of the aorta, hyperaldosteronism, hypercortisolism, hyperparathyroidism, a hypertension causing med, pheochromocytoma, renovascular disease, sleep apnea or a thyroid problem. ROS:  Review of Systems   Constitutional: Positive for fatigue and malaise/fatigue. Negative for activity change, appetite change, chills, diaphoresis, fever, unexpected weight change and weight loss. HENT: Negative. Negative for congestion, dental problem, drooling, ear discharge, ear pain, facial swelling, hearing loss, mouth sores, nosebleeds, postnasal drip, rhinorrhea, sinus pressure, sinus pain, sneezing, sore throat, tinnitus, trouble swallowing and voice change. Eyes: Negative for blurred vision, photophobia, pain, discharge, redness, itching and visual disturbance. Respiratory: Positive for shortness of breath. Negative for apnea, cough, choking, chest tightness, wheezing and stridor. Cardiovascular: Negative. Negative for chest pain, palpitations, orthopnea, leg swelling and PND. Gastrointestinal: Negative. Negative for abdominal distention, abdominal pain, anal bleeding, blood in stool, constipation, diarrhea, nausea, rectal pain and vomiting. Endocrine: Negative. Negative for cold intolerance, heat intolerance, polydipsia, polyphagia and polyuria. Genitourinary: Negative.   Negative for decreased urine volume, difficulty urinating, with polypectomy (x2) - Dr. Salcedo Quiet  14    3.5/15 Xience in Ramus and 3.0/15 Resolute in th 1st obtuse marginal.    HIP FRACTURE SURGERY Right 3/14/2020    RIGHT HIP OPEN REDUCTION INTERNAL FIXATION NAIL-SYNTHES -- OC 2 performed by Amairani Lay DO at [address]         Past Family Hx:  Reviewed with patient      Problem Relation Age of Onset    Heart Disease Mother     Cancer Father         abdominal    Cancer Brother         prostate    Cancer Brother         digestive       Social Hx:  Reviewed with patient  Social History     Tobacco Use    Smoking status: Current Every Day Smoker     Packs/day: 2.00     Years: 50.00     Pack years: 100.00     Types: Cigarettes     Start date: 1970     Last attempt to quit: 2014     Years since quittin.9    Smokeless tobacco: Never Used   Substance Use Topics    Alcohol use: Yes     Comment: rarely       OBJECTIVE  BP (!) 98/58   Pulse 64   Temp 97.2 °F (36.2 °C)   Resp 22   Ht 6' (1.829 m)   Wt 197 lb (89.4 kg)   BMI 26.72 kg/m²     Problem List:  Nat Dsouza does not have any pertinent problems on file. PHYS EX:  Physical Exam  Vitals and nursing note reviewed. Constitutional:       General: He is not in acute distress. Appearance: Normal appearance. He is well-developed. He is not ill-appearing, toxic-appearing or diaphoretic. HENT:      Head: Normocephalic and atraumatic. Right Ear: External ear normal. There is no impacted cerumen. Left Ear: External ear normal. There is no impacted cerumen. Nose: Nose normal. No congestion or rhinorrhea. Mouth/Throat:      Mouth: Mucous membranes are moist.      Pharynx: Oropharynx is clear. No oropharyngeal exudate or posterior oropharyngeal erythema. Eyes:      General: No scleral icterus. Right eye: No discharge. Left eye: No discharge.       Extraocular Movements: Extraocular movements intact. Conjunctiva/sclera: Conjunctivae normal.      Pupils: Pupils are equal, round, and reactive to light. Neck:      Thyroid: No thyromegaly. Vascular: No carotid bruit. Trachea: No tracheal deviation. Cardiovascular:      Rate and Rhythm: Tachycardia present. Rhythm irregularly irregular. No extrasystoles are present. Pulses: Normal pulses. Heart sounds: Heart sounds are distant. No murmur heard. No systolic murmur is present. No diastolic murmur is present. No friction rub. No gallop. No S3 or S4 sounds. Pulmonary:      Effort: Pulmonary effort is normal. No respiratory distress. Breath sounds: Normal breath sounds. No stridor. No wheezing, rhonchi or rales. Chest:      Chest wall: No tenderness. Abdominal:      General: Bowel sounds are normal. There is no distension. Palpations: Abdomen is soft. There is no mass. Tenderness: There is no abdominal tenderness. There is no right CVA tenderness, left CVA tenderness, guarding or rebound. Hernia: No hernia is present. Genitourinary:     Penis: No tenderness. Comments: H/O prostate cancer  H/O UTI with hematuria  Follows with urology   Musculoskeletal:         General: Tenderness (joint pains) present. No swelling, deformity or signs of injury. Normal range of motion. Cervical back: Normal range of motion and neck supple. No rigidity. No muscular tenderness. Right lower leg: No edema. Left lower leg: No edema. Lymphadenopathy:      Cervical: No cervical adenopathy. Skin:     General: Skin is warm. Coloration: Skin is not jaundiced or pale. Findings: No bruising, erythema, lesion or rash. Neurological:      General: No focal deficit present. Mental Status: He is alert and oriented to person, place, and time. Cranial Nerves: No cranial nerve deficit. Sensory: No sensory deficit. Motor: Weakness present. No abnormal muscle tone. Coordination: Coordination abnormal.      Gait: Gait abnormal.      Deep Tendon Reflexes: Reflexes are normal and symmetric. Reflexes normal.         ASSESSMENT/PLAN  Scar Miramontes was seen today for diabetes, chronic kidney disease and discuss labs. Diagnoses and all orders for this visit:    Persistent atrial fibrillation (HCC)  --I AM SENDING HIM TO ER VIA AMBULANCE     ---VASCULAR PANEL  A) asa, plavix, aggrenox  B) coumadin, pletal, tzd, STATIN  C) ace, hctz, folic, ccb  D) cannikinumab, fish oils     ---CARDIAC---asa, ACE, BETA, STATIN, hctz, ( ccb )    Dyslipidemia  -     atorvastatin (LIPITOR) 40 MG tablet; Take 1 tablet by mouth daily  --Mediterranean diet, exercise, weight loss, vitamins    We have a long talk on cholesterol and importance of lowering it       Congestive heart failure, unspecified HF chronicity, unspecified heart failure type (HCC)  -     lisinopril (PRINIVIL;ZESTRIL) 10 MG tablet; TAKE ONE TABLET BY MOUTH EVERY DAY  -     metoprolol succinate (TOPROL XL) 50 MG extended release tablet; Take 1 tablet by mouth daily  -     sotalol (BETAPACE) 80 MG tablet; TAKE ONE-HALF TABLET BY MOUTH TWICE DAILY  --patient is instructed on low to moderate sodium ( 2 to 2.5 grams ), daily    Also to increase potassium in the diet to about 3.5 grams daily    Literature is provided       COPD, very severe ([facility] [address]  --he still Vapes     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 [facility])  Long talk on treatment and prevention  Literature is given       Abdominal aortic aneurysm without rupture ([facility] [address]  --stable on current care planning  -- continue treatment as we are meeting goals   --post op    Prostate cancer (HCC)\--stable on current care planning  -- continue treatment as we are meeting goals   --follows with urology     Paroxysmal atrial fibrillation ([facility] [address]  -     EKG 12 lead; Future  -     EKG 12 lead    Other orders  -     metFORMIN (GLUCOPHAGE) 500 MG tablet;  Take 1 tablet by mouth 2 times daily (with meals)        Outpatient Encounter Medications as of 1/7/2022   Medication Sig Dispense Refill    atorvastatin (LIPITOR) 40 MG tablet Take 1 tablet by mouth daily 90 tablet 1    lisinopril (PRINIVIL;ZESTRIL) 10 MG tablet TAKE ONE TABLET BY MOUTH EVERY DAY 90 tablet 1    metoprolol succinate (TOPROL XL) 50 MG extended release tablet Take 1 tablet by mouth daily 90 tablet 1    metFORMIN (GLUCOPHAGE) 500 MG tablet Take 1 tablet by mouth 2 times daily (with meals) 180 tablet 1    sotalol (BETAPACE) 80 MG tablet TAKE ONE-HALF TABLET BY MOUTH TWICE DAILY 180 tablet 1    guaiFENesin (MUCINEX) 600 MG extended release tablet Take 1 tablet by mouth 2 times daily for 15 days (Patient not taking: Reported on 1/7/2022) 30 tablet 0    Ascorbic Acid (VITAMIN C) 1000 MG tablet Take 1 tablet by mouth daily (Patient not taking: Reported on 1/7/2022) 30 tablet 3    vitamin D3 (CHOLECALCIFEROL) 25 MCG (1000 UT) TABS tablet Take 1 tablet by mouth daily (Patient not taking: Reported on 1/7/2022) 90 tablet 1    zinc 50 MG CAPS Take 100 mg by mouth daily (Patient not taking: Reported on 1/7/2022) 30 capsule 3    [DISCONTINUED] tamsulosin (FLOMAX) 0.4 MG capsule Take 1 capsule by mouth daily (Patient not taking: Reported on 1/7/2022) 90 capsule 1    [DISCONTINUED] finasteride (PROSCAR) 5 MG tablet Take 1 tablet by mouth daily (Patient not taking: Reported on 1/7/2022) 90 tablet 1    [DISCONTINUED] oxybutynin (DITROPAN) 5 MG tablet Take 1 tablet by mouth 3 times daily (Patient not taking: Reported on 1/7/2022) 90 tablet 3    [DISCONTINUED] clopidogrel (PLAVIX) 75 MG tablet Take 1 tablet by mouth daily (Patient not taking: Reported on 1/7/2022) 90 tablet 1    [DISCONTINUED] atorvastatin (LIPITOR) 40 MG tablet Take 1 tablet by mouth daily (Patient taking differently: Take 80 mg by mouth daily ) 90 tablet 1    [DISCONTINUED] lisinopril (PRINIVIL;ZESTRIL) 10 MG tablet TAKE ONE TABLET BY MOUTH EVERY DAY 90 tablet 1    [DISCONTINUED] sotalol (BETAPACE) 80 MG tablet TAKE ONE-HALF TABLET BY MOUTH TWICE DAILY (Patient not taking: Reported on 1/7/2022) 180 tablet 1    [DISCONTINUED] metoprolol succinate (TOPROL XL) 50 MG extended release tablet Take 1 tablet by mouth daily 90 tablet 1    [DISCONTINUED] metFORMIN (GLUCOPHAGE) 500 MG tablet Take 1 tablet by mouth 2 times daily (with meals) (Patient taking differently: Take 500 mg by mouth daily (with breakfast) ) 180 tablet 1     No facility-administered encounter medications on file as of 1/7/2022. No follow-ups on file.         Reviewed recent labs related to Federico's current problems      Discussed importance of regular Health Maintenance follow up  Health Maintenance   Topic    Hepatitis C screen     Diabetic foot exam     Diabetic retinal exam     DTaP/Tdap/Td vaccine (1 - Tdap)    Shingles Vaccine (1 of 2)    Low dose CT lung screening     Pneumococcal 65+ years Vaccine (1 of 1 - PPSV23)    Colon cancer screen colonoscopy     COVID-19 Vaccine (3 - Booster for Morales Peter series)    Depression Screen     Annual Wellness Visit (AWV)     A1C test (Diabetic or Prediabetic)     Diabetic microalbuminuria test     Lipid screen     Potassium monitoring     Creatinine monitoring     PSA counseling     Flu vaccine     Hepatitis A vaccine     Hib vaccine     Meningococcal (ACWY) vaccine

## 2022-01-07 NOTE — ED PROVIDER NOTES
HPI   Patient is a 80-year-old male with past medical history of hypertension, diabetes, CAD, prostate cancer, hyperlipidemia, AAA, dilated cardiomyopathy and status post ICD presenting to the emergency department due to questionable new onset A. Fib. Patient was sent in by Dr. Marika Gutierrez from the office after being found in A. fib with RVR on EKG. Patient reporting generalized weakness over the last 4 days. He denies any active chest pain, nausea, vomiting or diaphoresis. Patient does state that he has some mild shortness of breath. Shortness of breath is not particularly associated with exertion. His symptoms are moderate with no remitting or exacerbating factors noted. Patient has extensive cardiac history but no history of A. Fib. He denies any recent sick contacts or exposure to known positive Covid individuals. Review of Systems   Constitutional: Negative for chills and fever. Generalized weakness. HENT: Negative for congestion, rhinorrhea, trouble swallowing and voice change. Eyes: Negative for photophobia and visual disturbance. Respiratory: Positive for shortness of breath. Negative for cough. Mild shortness of breath. Cardiovascular: Negative for chest pain and palpitations. Gastrointestinal: Negative for abdominal pain, diarrhea, nausea and vomiting. Genitourinary: Negative for dysuria, flank pain and urgency. Musculoskeletal: Negative for arthralgias and myalgias. Skin: Negative for rash and wound. Neurological: Negative for dizziness, syncope, light-headedness and headaches. Psychiatric/Behavioral: Negative for behavioral problems and confusion. Physical Exam  Constitutional:       General: He is not in acute distress. Appearance: Normal appearance. He is not ill-appearing. HENT:      Head: Normocephalic and atraumatic.       Right Ear: External ear normal.      Left Ear: External ear normal.      Nose: Nose normal.      Mouth/Throat: Mouth: Mucous membranes are moist.      Pharynx: Oropharynx is clear. Eyes:      Conjunctiva/sclera: Conjunctivae normal.      Pupils: Pupils are equal, round, and reactive to light. Cardiovascular:      Rate and Rhythm: Tachycardia present. Rhythm irregular. Pulses: Normal pulses. Heart sounds: Normal heart sounds. Pulmonary:      Effort: Pulmonary effort is normal. No respiratory distress. Breath sounds: Normal breath sounds. No wheezing or rales. Abdominal:      General: Abdomen is flat. Palpations: Abdomen is soft. Tenderness: There is no abdominal tenderness. There is no guarding or rebound. Musculoskeletal:         General: Normal range of motion. Cervical back: Normal range of motion and neck supple. Right lower leg: No edema. Left lower leg: No edema. Skin:     General: Skin is warm and dry. Capillary Refill: Capillary refill takes less than 2 seconds. Neurological:      General: No focal deficit present. Mental Status: He is alert and oriented to person, place, and time. Cranial Nerves: No cranial nerve deficit. Coordination: Coordination normal.   Psychiatric:         Mood and Affect: Mood normal.         Behavior: Behavior normal.          Procedures   EKG: This EKG is signed and interpreted by me. Atrial fibrillation with a rapid ventricular response. Ventricular rate 113 bpm.  Normal axis. QTc not prolonged. No evidence of acute STEMI. Ischemic changes with T wave inversions in leads inferiolateral leads. A. fib compared to previous EKG although T wave inversions present in previous EKG on 9/13/2021. MDM   Patient is a 75-year-old male with past medical history of hypertension, diabetes, CAD, prostate cancer, hyperlipidemia, AAA, dilated cardiomyopathy and status post ICD presented to the emergency department due to new onset A. fib with RVR.   Patient was sent in from Dr. Agatha Velázquez office after EKG showed A. fib with RVR.  Apparently, patient was previously on to anticoagulation with aspirin and Plavix for CVA. He recently stopped his AC due to asymptomatic hematuria. Patient was assessed by urology and found to have a negative work-up. Patient has no history of A. fib. Work-up in the emergency department significant for Covid positive. Initial troponin of 30 with a repeat of 22, delta 8. Patient denying any active chest pain. Troponin likely elevated in the setting of demand secondary to A. fib with RVR. EKG in the emergency department showing A. fib with RVR with a ventricular to 113 bpm.  Patient given labetalol 10 mg and started on low-dose heparin. Cardiology consulted. On reevaluation, patient's heart rate improved to the below 100. Patient switched to Lovenox per cardiology request.  Remaining lab work generally unremarkable. No major electrolyte abnormalities. Renal function stable. No anemia or leukocytosis noted. Patient remained hemodynamically stable in the emergency department. [facility] the patient for further work-up and evaluation. Patient agreeable with this plan. ED Course as of 01/08/22 0748   Gladis Zavala Jan 07, 2022   5337 Dr. Eldon Caballero spoke with Dr. Elsy Pedroza who reports that the patient was on aspirin/plavix for CVA and stopped AC due to painless hematuria. [PP]   [id number] Cardiology saw the patient. Dr. German Houston informed that patient was switched to Lovenox.  [PP]   80 spoke with [place] who agreed to admit the patient under Dr. Meng Arndt [PP]      ED Course User Index  [PP] Jocelyn Carroll DO        --------------------------------------------- PAST HISTORY ---------------------------------------------  Past Medical History:  has a past medical history of Abdominal aortic aneurysm without rupture ([facility] Arthritis, CAD (coronary artery disease), Cancer ([facility] [facility] Combined systolic and diastolic heart failure [facility] COPD (chronic obstructive pulmonary disease) ([facility] Diabetes mellitus ([facility] GERD (gastroesophageal reflux disease), History of placement of internal cardiac defibrillator, Hypertension, Sigmoid diverticulosis, Sinusitis, Tubular adenoma of colon, and Vertigo. Past Surgical History:  has a past surgical history that includes Coronary angioplasty with stent (1/13/14); Umbilical hernia repair; Colonoscopy (8/31/15); Cardiac defibrillator placement; Hip fracture surgery (Right, 3/14/2020); and AAA repair, endovascular (N/A, 10/1/2021). Social History:  reports that he has been smoking cigarettes. He started smoking about 52 years ago. He has a 100.00 pack-year smoking history. He has never used smokeless tobacco. He reports current alcohol use. He reports that he does not use drugs. Family History: family history includes Cancer in his brother, brother, and father; Heart Disease in his mother. The patients home medications have been reviewed. Allergies: Patient has no known allergies.     -------------------------------------------------- RESULTS -------------------------------------------------    LABS:  Results for orders placed or performed during the hospital encounter of 01/07/22   COVID-19, Rapid    Specimen: Nasopharyngeal Swab   Result Value Ref Range    SARS-CoV-2, NAAT DETECTED (A) Not Detected   CBC Auto Differential   Result Value Ref Range    WBC 7.9 4.5 - 11.5 E9/L    RBC 4.46 3.80 - 5.80 E12/L    Hemoglobin 12.5 12.5 - 16.5 g/dL    Hematocrit 38.7 37.0 - 54.0 %    MCV 86.8 80.0 - 99.9 fL    MCH 28.0 26.0 - 35.0 pg    MCHC 32.3 32.0 - 34.5 %    RDW 13.9 11.5 - 15.0 fL    Platelets 392 877 - 533 E9/L    MPV 9.2 7.0 - 12.0 fL    Neutrophils % 72.7 43.0 - 80.0 %    Immature Granulocytes % 1.4 0.0 - 5.0 %    Lymphocytes % 16.1 (L) 20.0 - 42.0 %    Monocytes % 8.4 2.0 - 12.0 %    Eosinophils % 0.8 0.0 - 6.0 %    Basophils % 0.6 0.0 - 2.0 %    Neutrophils Absolute 5.76 1.80 - 7.30 E9/L    Immature Granulocytes # 0.11 E9/L    Lymphocytes Absolute 1.28 (L) 1.50 - 4.00 E9/L    Monocytes Absolute 0.67 0.10 - 0.95 E9/L    Eosinophils Absolute 0.06 0.05 - 0.50 E9/L    Basophils Absolute 0.05 0.00 - 0.20 D2/L   Basic Metabolic Panel w/ Reflex to MG   Result Value Ref Range    Sodium 131 (L) 132 - 146 mmol/L    Potassium reflex Magnesium 4.2 3.5 - 5.0 mmol/L    Chloride 100 98 - 107 mmol/L    CO2 18 (L) 22 - 29 mmol/L    Anion Gap 13 7 - 16 mmol/L    Glucose 298 (H) 74 - 99 mg/dL    BUN 27 (H) 6 - 23 mg/dL    CREATININE 1.2 0.7 - 1.2 mg/dL    GFR Non-African American 60 >=60 mL/min/1.73    GFR African American >60     Calcium 8.9 8.6 - 10.2 mg/dL   Troponin   Result Value Ref Range    Troponin, High Sensitivity 30 (H) 0 - 11 ng/L   Brain Natriuretic Peptide   Result Value Ref Range    Pro-BNP 1,701 (H) 0 - 125 pg/mL   APTT   Result Value Ref Range    aPTT 23.9 (L) 24.5 - 35.1 sec   Protime-INR   Result Value Ref Range    Protime 11.0 9.3 - 12.4 sec    INR 1.0    EKG 12 Lead   Result Value Ref Range    Ventricular Rate 113 BPM    Atrial Rate 375 BPM    QRS Duration 88 ms    Q-T Interval 332 ms    QTc Calculation (Bazett) 455 ms    R Axis -7 degrees    T Axis -139 degrees       RADIOLOGY:  XR CHEST PORTABLE   Final Result   No radiographic evidence for acute pulmonary process. ------------------------- NURSING NOTES AND VITALS REVIEWED ---------------------------  Date / Time Roomed:  1/7/2022 10:48 AM  ED Bed Assignment:  11/11    The nursing notes within the ED encounter and vital signs as below have been reviewed.      Patient Vitals for the past 24 hrs:   BP Temp Pulse Resp SpO2 Weight   01/07/22 1049 123/61 97 °F (36.1 °C) 135 17 98 % 200 lb (90.7 kg)       Oxygen Saturation Interpretation: Normal    ------------------------------------------ PROGRESS NOTES ------------------------------------------    Counseling:  I have spoken with the patient and discussed todays results, in addition to providing specific details for the plan of care and counseling regarding the diagnosis and prognosis. Their questions are answered at this time and they are agreeable with the plan of admission.    --------------------------------- ADDITIONAL PROVIDER NOTES ---------------------------------  Consultations:  Time: 1401. Spoke with [place]. Discussed case. They will admit the patient under Dr. Angel Shaw  This patient's ED course included: a personal history and physicial examination, re-evaluation prior to disposition, multiple bedside re-evaluations, IV medications, cardiac monitoring and continuous pulse oximetry    This patient has remained hemodynamically stable during their ED course. Diagnosis:  1. New onset atrial fibrillation ([address]    2. COVID-19    3. Hyperglycemia    4. Elevated troponin        Disposition:  Patient's disposition: Admit to telemetry  Patient's condition is stable.             Breana Elliott DO  Resident  01/08/22 7962

## 2022-01-07 NOTE — ED NOTES
Patient stated \"I don't believe I have COVID\". This RN educated patient that she has not seen any false positives, only false negative tests. Patient asked when we was checked. This RN informed him it was when he first came into the hospital and we swabbed his nose. Patient exclaimed \"This is bullshit. \" The RN educated that the fact that he does have COVID.       Sonal Brar RN  01/07/22 7201

## 2022-01-08 VITALS
SYSTOLIC BLOOD PRESSURE: 114 MMHG | TEMPERATURE: 97.3 F | RESPIRATION RATE: 16 BRPM | BODY MASS INDEX: 27.09 KG/M2 | OXYGEN SATURATION: 97 % | WEIGHT: 200 LBS | DIASTOLIC BLOOD PRESSURE: 77 MMHG | HEIGHT: 72 IN | HEART RATE: 87 BPM

## 2022-01-08 LAB
ANION GAP SERPL CALCULATED.3IONS-SCNC: 12 MMOL/L (ref 7–16)
APTT: 27.8 SEC (ref 24.5–35.1)
APTT: 28.8 SEC (ref 24.5–35.1)
APTT: 30.5 SEC (ref 24.5–35.1)
BASOPHILS ABSOLUTE: 0.04 E9/L (ref 0–0.2)
BASOPHILS RELATIVE PERCENT: 0.5 % (ref 0–2)
BUN BLDV-MCNC: 25 MG/DL (ref 6–23)
C-REACTIVE PROTEIN: 0.3 MG/DL (ref 0–0.4)
C-REACTIVE PROTEIN: 0.3 MG/DL (ref 0–0.4)
CALCIUM SERPL-MCNC: 9 MG/DL (ref 8.6–10.2)
CHLORIDE BLD-SCNC: 102 MMOL/L (ref 98–107)
CO2: 21 MMOL/L (ref 22–29)
CREAT SERPL-MCNC: 1.1 MG/DL (ref 0.7–1.2)
D DIMER: 760 NG/ML DDU
EOSINOPHILS ABSOLUTE: 0.07 E9/L (ref 0.05–0.5)
EOSINOPHILS RELATIVE PERCENT: 0.9 % (ref 0–6)
FERRITIN: 75 NG/ML
FIBRINOGEN: 445 MG/DL (ref 225–540)
GFR AFRICAN AMERICAN: >60
GFR NON-AFRICAN AMERICAN: >60 ML/MIN/1.73
GLUCOSE BLD-MCNC: 222 MG/DL (ref 74–99)
HCT VFR BLD CALC: 35.2 % (ref 37–54)
HEMOGLOBIN: 11.1 G/DL (ref 12.5–16.5)
IMMATURE GRANULOCYTES #: 0.12 E9/L
IMMATURE GRANULOCYTES %: 1.6 % (ref 0–5)
LACTATE DEHYDROGENASE: 209 U/L (ref 135–225)
LACTIC ACID: 1.6 MMOL/L (ref 0.5–2.2)
LYMPHOCYTES ABSOLUTE: 1.58 E9/L (ref 1.5–4)
LYMPHOCYTES RELATIVE PERCENT: 21.2 % (ref 20–42)
MCH RBC QN AUTO: 27.9 PG (ref 26–35)
MCHC RBC AUTO-ENTMCNC: 31.5 % (ref 32–34.5)
MCV RBC AUTO: 88.4 FL (ref 80–99.9)
METER GLUCOSE: 214 MG/DL (ref 74–99)
METER GLUCOSE: 220 MG/DL (ref 74–99)
MONOCYTES ABSOLUTE: 0.83 E9/L (ref 0.1–0.95)
MONOCYTES RELATIVE PERCENT: 11.2 % (ref 2–12)
NEUTROPHILS ABSOLUTE: 4.8 E9/L (ref 1.8–7.3)
NEUTROPHILS RELATIVE PERCENT: 64.6 % (ref 43–80)
PDW BLD-RTO: 14 FL (ref 11.5–15)
PLATELET # BLD: 232 E9/L (ref 130–450)
PMV BLD AUTO: 10 FL (ref 7–12)
POTASSIUM REFLEX MAGNESIUM: 4.7 MMOL/L (ref 3.5–5)
PROCALCITONIN: 0.06 NG/ML (ref 0–0.08)
RBC # BLD: 3.98 E12/L (ref 3.8–5.8)
SODIUM BLD-SCNC: 135 MMOL/L (ref 132–146)
TROPONIN, HIGH SENSITIVITY: 31 NG/L (ref 0–11)
WBC # BLD: 7.4 E9/L (ref 4.5–11.5)

## 2022-01-08 PROCEDURE — 36415 COLL VENOUS BLD VENIPUNCTURE: CPT

## 2022-01-08 PROCEDURE — 82962 GLUCOSE BLOOD TEST: CPT

## 2022-01-08 PROCEDURE — 83615 LACTATE (LD) (LDH) ENZYME: CPT

## 2022-01-08 PROCEDURE — 84484 ASSAY OF TROPONIN QUANT: CPT

## 2022-01-08 PROCEDURE — 2580000003 HC RX 258: Performed by: PHYSICIAN ASSISTANT

## 2022-01-08 PROCEDURE — 85730 THROMBOPLASTIN TIME PARTIAL: CPT

## 2022-01-08 PROCEDURE — 85025 COMPLETE CBC W/AUTO DIFF WBC: CPT

## 2022-01-08 PROCEDURE — 99232 SBSQ HOSP IP/OBS MODERATE 35: CPT | Performed by: INTERNAL MEDICINE

## 2022-01-08 PROCEDURE — 83605 ASSAY OF LACTIC ACID: CPT

## 2022-01-08 PROCEDURE — 84145 PROCALCITONIN (PCT): CPT

## 2022-01-08 PROCEDURE — 6370000000 HC RX 637 (ALT 250 FOR IP): Performed by: INTERNAL MEDICINE

## 2022-01-08 PROCEDURE — 80048 BASIC METABOLIC PNL TOTAL CA: CPT

## 2022-01-08 PROCEDURE — 6360000002 HC RX W HCPCS: Performed by: NURSE PRACTITIONER

## 2022-01-08 PROCEDURE — 82728 ASSAY OF FERRITIN: CPT

## 2022-01-08 PROCEDURE — 93005 ELECTROCARDIOGRAM TRACING: CPT | Performed by: INTERNAL MEDICINE

## 2022-01-08 PROCEDURE — 85378 FIBRIN DEGRADE SEMIQUANT: CPT

## 2022-01-08 PROCEDURE — 86140 C-REACTIVE PROTEIN: CPT

## 2022-01-08 PROCEDURE — 85384 FIBRINOGEN ACTIVITY: CPT

## 2022-01-08 PROCEDURE — 6370000000 HC RX 637 (ALT 250 FOR IP): Performed by: PHYSICIAN ASSISTANT

## 2022-01-08 RX ORDER — GUAIFENESIN/DEXTROMETHORPHAN 100-10MG/5
5 SYRUP ORAL EVERY 4 HOURS PRN
Qty: 120 ML | Refills: 0 | Status: SHIPPED | OUTPATIENT
Start: 2022-01-08 | End: 2022-01-18

## 2022-01-08 RX ORDER — SOTALOL HYDROCHLORIDE 80 MG/1
40 TABLET ORAL 2 TIMES DAILY
Qty: 60 TABLET | Refills: 3 | Status: ON HOLD | OUTPATIENT
Start: 2022-01-08 | End: 2022-05-09 | Stop reason: HOSPADM

## 2022-01-08 RX ORDER — DEXAMETHASONE 6 MG/1
6 TABLET ORAL
Qty: 10 TABLET | Refills: 0 | Status: SHIPPED | OUTPATIENT
Start: 2022-01-08 | End: 2022-01-18

## 2022-01-08 RX ORDER — METOPROLOL SUCCINATE 50 MG/1
50 TABLET, EXTENDED RELEASE ORAL 2 TIMES DAILY
Qty: 30 TABLET | Refills: 3 | Status: ON HOLD | OUTPATIENT
Start: 2022-01-08 | End: 2022-05-09 | Stop reason: HOSPADM

## 2022-01-08 RX ADMIN — LISINOPRIL 10 MG: 10 TABLET ORAL at 08:04

## 2022-01-08 RX ADMIN — ENOXAPARIN SODIUM 90 MG: 100 INJECTION SUBCUTANEOUS at 08:05

## 2022-01-08 RX ADMIN — INSULIN LISPRO 4 UNITS: 100 INJECTION, SOLUTION INTRAVENOUS; SUBCUTANEOUS at 08:00

## 2022-01-08 RX ADMIN — SOTALOL HYDROCHLORIDE 40 MG: 80 TABLET ORAL at 08:04

## 2022-01-08 RX ADMIN — ATORVASTATIN CALCIUM 40 MG: 40 TABLET, FILM COATED ORAL at 08:04

## 2022-01-08 RX ADMIN — METOPROLOL SUCCINATE 50 MG: 50 TABLET, EXTENDED RELEASE ORAL at 08:04

## 2022-01-08 RX ADMIN — Medication 10 ML: at 08:06

## 2022-01-08 RX ADMIN — INSULIN LISPRO 4 UNITS: 100 INJECTION, SOLUTION INTRAVENOUS; SUBCUTANEOUS at 12:00

## 2022-01-08 ASSESSMENT — PAIN SCALES - GENERAL: PAINLEVEL_OUTOF10: 0

## 2022-01-08 NOTE — H&P
Meliton Gross M.D. History and Physical      CHIEF COMPLAINT: Generalized weakness    Reason for Admission: A. fib with RVR    History Obtained From: Patient/EMR    HISTORY OF PRESENT ILLNESS:      The patient is a 70 y.o. male of [place] Rap[place] DO with significant past medical history of prostate cancer, AAA, hyperlipidemia, ICD in place for cardiomyopathy-ejection fraction 30 to 35%-not known when most recent echo was-  who presents with atrial fibrillation with RVR which was picked up at PCP office. Patient also diagnosed to be positive Covid. He complains of generalized weakness and palpitations. Otherwise patient has no complaints. On my evaluation he is resting comfortably on room air          All labs personally reviewed   All imaging personally reviewed     Past Medical History:        Diagnosis Date    Abdominal aortic aneurysm without rupture ([address] 9/3/2021    Arthritis     CAD (coronary artery disease)     Cancer ([address] 2017    Colon    Combined systolic and diastolic heart failure ([address] 6/15/13    echo LVEF of 30-35%  stage II diastolic dysfunction    COPD (chronic obstructive pulmonary disease) ([address]     Diabetes mellitus (HCC)     GERD (gastroesophageal reflux disease)     History of placement of internal cardiac defibrillator 2014?     [employer] ([place])    Hypertension     Sigmoid diverticulosis 8/31/2015    Sinusitis     Tubular adenoma of colon 8/31/2015    Vertigo      Past Surgical History:        Procedure Laterality Date    ABDOMINAL AORTIC ANEURYSM REPAIR, ENDOVASCULAR N/A 10/1/2021    ABDOMINAL AORTIC ANEURYSM REPAIR ENDOVASCULAR performed by Mike Kraus MD at . [address] COLONOSCOPY  8/31/15    with polypectomy (x2) - Dr. Alber Crystal  1/13/14    3.5/15 Xience in [place] and 3.0/15 Resolute in th 1st obtuse marginal.    HIP FRACTURE SURGERY Right 3/14/2020    RIGHT HIP OPEN REDUCTION INTERNAL FIXATION NAIL-SYNTHES -- OC 2 performed by Corazon Sanchez DO at [address]           Medications Prior to Admission:    Medications Prior to Admission: atorvastatin (LIPITOR) 40 MG tablet, Take 1 tablet by mouth daily  lisinopril (PRINIVIL;ZESTRIL) 10 MG tablet, TAKE ONE TABLET BY MOUTH EVERY DAY  metoprolol succinate (TOPROL XL) 50 MG extended release tablet, Take 1 tablet by mouth daily  metFORMIN (GLUCOPHAGE) 500 MG tablet, Take 1 tablet by mouth 2 times daily (with meals) (Patient taking differently: Take 500 mg by mouth daily )  sotalol (BETAPACE) 80 MG tablet, TAKE ONE-HALF TABLET BY MOUTH TWICE DAILY  guaiFENesin (MUCINEX) 600 MG extended release tablet, Take 1 tablet by mouth 2 times daily for 15 days (Patient not taking: Reported on 1/7/2022)  Ascorbic Acid (VITAMIN C) 1000 MG tablet, Take 1 tablet by mouth daily (Patient not taking: Reported on 1/7/2022)  vitamin D3 (CHOLECALCIFEROL) 25 MCG (1000 UT) TABS tablet, Take 1 tablet by mouth daily (Patient not taking: Reported on 1/7/2022)  zinc 50 MG CAPS, Take 100 mg by mouth daily (Patient not taking: Reported on 1/7/2022)    Allergies:  Patient has no known allergies. Social History:   TOBACCO:   reports that he has been smoking cigarettes. He started smoking about 52 years ago. He has a 100.00 pack-year smoking history. He has never used smokeless tobacco.  ETOH:   reports current alcohol use.   MARITAL STATUS:    OCCUPATION:      Family History:       Problem Relation Age of Onset    Heart Disease Mother     Cancer Father         abdominal    Cancer Brother         prostate    Cancer Brother         digestive       REVIEW OF SYSTEMS:    General ROS: positive for  - fatigue and malaise  Hematological and Lymphatic ROS: negative  Endocrine ROS: negative  Respiratory ROS: no cough, shortness of breath, or wheezing  Cardiovascular ROS: no chest pain or dyspnea on exertion  Gastrointestinal ROS: no abdominal pain, change in bowel habits, or black or bloody stools  Genito-Urinary ROS: no dysuria, trouble voiding, or hematuria  Neurological ROS: no TIA or stroke symptoms  negative    Vitals:  /87   Pulse 76   Temp 97.4 °F (36.3 °C) (Oral)   Resp 17   Ht 6' (1.829 m)   Wt 200 lb (90.7 kg)   SpO2 97%   BMI 27.12 kg/m²     PHYSICAL EXAM:  General:  Awake, alert, oriented X 3. Well developed, well nourished, well groomed. No apparent distress. HEENT:  Normocephalic, atraumatic. Pupils equal, round, reactive to light. No scleral icterus. No conjunctival injection. Normal lips, teeth, and gums. No nasal discharge. Neck:  Supple, FROM  Heart:  RRR, no murmurs, gallops, rubs, carotid upstroke normal, no carotid bruits  Lungs:  CTA bilaterally, bilat symmetrical expansion, no wheeze, rales, or rhonchi  Abdomen: Bowel sounds present, soft, nontender, no masses, no organomegaly, no peritoneal signs  Extremities:  No clubbing, cyanosis, or edema  Skin:  Warm and dry, no open lesions or rash  Neuro:  Cranial nerves 2-12 intact, no focal deficits  Vascular: Radial and pedal Pulses 2+  Breast: deferred  Rectal: deferred  Genitalia:  deferred      DATA:     Recent Labs     01/07/22  1112 01/07/22  1534 01/08/22  0514   WBC 7.9 6.9 7.4   HGB 12.5 12.1* 11.1*    240 232     Recent Labs     01/06/22  0809 01/07/22  1112 01/08/22  0514   * 131* 135   K 4.4 4.2 4.7   BUN 28* 27* 25*   CREATININE 1.3* 1.2 1.1     Recent Labs     01/06/22  0809 01/07/22  1112   PROT 8.3  --    INR  --  1.0     Recent Labs     01/06/22  0809   AST 21   ALT 15   ALKPHOS 128   BILITOT 0.4     No results for input(s): BNP in the last 72 hours. No results for input(s): CKTOTAL, CKMB, CKMBINDEX, TROPONINI in the last 72 hours.     ASSESSMENT:      Principal Problem:    New onset atrial fibrillation (HCC)  Active Problems:    Essential hypertension  Resolved Problems:    * No resolved hospital problems.  *        PLAN:    Admit to telemetry for atrial fibrillation with RVR in patient with COVID-19 infection  Resume Betapace twice daily per cardiology  Anticoagulation with Eliquis 5 p.o. twice daily  Rate control with beta-blocker-Toprol-XL 50  Cardiology following    COVID-19 infections-questionable vaccination status  Decadron 6 p.o. daily  Patient currently on room air  No criteria for baricitinib or remdesivir  Follow oxygen levels, check ambulating pulse ox    If no further plans by cardiology and rate is adequately controlled he can be discharged  Echo should be able to get done as outpatient in order to avoid an additional 3-day inpatient stay  Will confirm this with cardiology    DVT prophylaxis  PT OT  Discharge plan    Anabelle Franks MD  1/8/2022  11:38 AM

## 2022-01-08 NOTE — PROGRESS NOTES
Spoke with Ms Justin Malagon about pt medications after completing his admission questions and meds  Also for glucose 307. Pt stated that his Dr. Nancie Matthews told him to stop taking the Sotalol. One dose was cancelled will follow up with Cardiology about their order. Pt now placed on a medium sliding scale.

## 2022-01-08 NOTE — PROGRESS NOTES
Patient resting on RA PO2 levels 97, with ambulation on RA patient's PO2 levels were 99 %, no s.s of distress or shortness of breath.

## 2022-01-08 NOTE — PLAN OF CARE
Problem: Airway Clearance - Ineffective  Goal: Achieve or maintain patent airway  Outcome: Ongoing     Problem: Gas Exchange - Impaired  Goal: Absence of hypoxia  Outcome: Ongoing  Goal: Promote optimal lung function  Outcome: Ongoing     Problem: Breathing Pattern - Ineffective  Goal: Ability to achieve and maintain a regular respiratory rate  Outcome: Ongoing     Problem:  Body Temperature -  Risk of, Imbalanced  Goal: Ability to maintain a body temperature within defined limits  Outcome: Ongoing  Goal: Will regain or maintain usual level of consciousness  Outcome: Ongoing  Goal: Complications related to the disease process, condition or treatment will be avoided or minimized  Outcome: Ongoing     Problem: Isolation Precautions - Risk of Spread of Infection  Goal: Prevent transmission of infection  Outcome: Ongoing     Problem: Nutrition Deficits  Goal: Optimize nutritional status  Outcome: Ongoing     Problem: Risk for Fluid Volume Deficit  Goal: Maintain normal heart rhythm  Outcome: Ongoing  Goal: Maintain absence of muscle cramping  Outcome: Ongoing  Goal: Maintain normal serum potassium, sodium, calcium, phosphorus, and pH  Outcome: Ongoing     Problem: Loneliness or Risk for Loneliness  Goal: Demonstrate positive use of time alone when socialization is not possible  Outcome: Ongoing     Problem: Fatigue  Goal: Verbalize increase energy and improved vitality  Outcome: Ongoing     Problem: Patient Education: Go to Patient Education Activity  Goal: Patient/Family Education  Outcome: Ongoing     Problem: Falls - Risk of:  Goal: Will remain free from falls  Description: Will remain free from falls  Outcome: Ongoing  Goal: Absence of physical injury  Description: Absence of physical injury  Outcome: Ongoing     Problem: Discharge Planning:  Goal: Discharged to appropriate level of care  Description: Discharged to appropriate level of care  Outcome: Ongoing

## 2022-01-08 NOTE — PROGRESS NOTES
INPATIENT CARDIOLOGY FOLLOW-UP    Name: Xenia Mireles    Age: 70 y.o. Date of Admission: 1/7/2022 10:48 AM    Date of Service: 1/8/2022    Chief Complaint: Follow-up for New onset atrial fibrillation with RVR    Interim History:  No new overnight cardiac complaints. Patient was interviewed over the phone, he stable and denies any cardiac symptoms. Currently with no complaints of CP, SOB, palpitations, dizziness, or lightheadedness. AF on telemetry.     Review of Systems:   Cardiac: As per HPI  General: No fever, chills  Pulmonary: As per HPI  HEENT: No visual disturbances, difficult swallowing  GI: No nausea, vomiting  Endocrine: No thyroid disease or DM  Musculoskeletal: VALLEJO x 4, no focal motor deficits  Skin: Intact, no rashes  Neuro/Psych: No headache or seizures    Problem List:  Patient Active Problem List   Diagnosis    Essential hypertension    COPD, very severe ([address]    Reflux laryngitis    CAD (coronary artery disease)    Diabetes mellitus type II, uncontrolled ([address]    Tubular adenoma of colon    Sigmoid diverticulosis    Adhesive capsulitis of both shoulders    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 ([address]    Prostate cancer ([address]    Mixed hyperlipidemia    ICD (implantable cardioverter-defibrillator) in place    Cerebellar hemorrhage ([address]    Dilated cardiomyopathy ([address]    Abdominal aortic aneurysm without rupture ([address]    AAA (abdominal aortic aneurysm) without rupture ([address]    New onset atrial fibrillation (HCC)       Allergies:  No Known Allergies    Current Medications:  Current Facility-Administered Medications   Medication Dose Route Frequency Provider Last Rate Last Admin    enoxaparin (LOVENOX) injection 90 mg  1 mg/kg SubCUTAneous BID Yvonne Pantelis, APRN - CNP   90 mg at 01/08/22 0805    perflutren lipid microspheres (DEFINITY) injection 1.65 mg  1.5 mL IntraVENous ONCE PRN Yvonne Pantelis, APRN - CNP        atorvastatin (LIPITOR) tablet 40 mg  40 mg Oral Daily Eddie Ta, PA   40 mg at 01/08/22 0804    lisinopril (PRINIVIL;ZESTRIL) tablet 10 mg  10 mg Oral Daily Eddie Ta, PA   10 mg at 01/08/22 0804    glucose (GLUTOSE) 40 % oral gel 15 g  15 g Oral PRN GILDARDO Silveira        dextrose 50 % IV solution  12.5 g IntraVENous PRN GILDARDO Silveira        glucagon (rDNA) injection 1 mg  1 mg IntraMUSCular PRN GILDARDO Silveira        dextrose 5 % solution  100 mL/hr IntraVENous PRN GILDARDO Silveira        sodium chloride flush 0.9 % injection 5-40 mL  5-40 mL IntraVENous 2 times per day Eddie Chappell PA   10 mL at 01/08/22 0806    sodium chloride flush 0.9 % injection 5-40 mL  5-40 mL IntraVENous PRN GILDARDO Silveira        0.9 % sodium chloride infusion  25 mL IntraVENous PRN GILDARDO Silveira        polyethylene glycol (GLYCOLAX) packet 17 g  17 g Oral Daily PRN GILDARDO Silveira        acetaminophen (TYLENOL) tablet 650 mg  650 mg Oral Q6H PRN GILDARDO Silveira        Or    acetaminophen (TYLENOL) suppository 650 mg  650 mg Rectal Q6H PRN GILDARDO Silveira        potassium chloride (KLOR-CON M) extended release tablet 40 mEq  40 mEq Oral PRN GILDARDO Silveira        Or    potassium bicarb-citric acid (EFFER-K) effervescent tablet 40 mEq  40 mEq Oral PRN GILDARDO Silveira        Or    potassium chloride 10 mEq/100 mL IVPB (Peripheral Line)  10 mEq IntraVENous PRN GILDARDO Silveira        magnesium sulfate 1000 mg in dextrose 5% 100 mL IVPB  1,000 mg IntraVENous PRN GILDARDO Silveira        melatonin tablet 3 mg  3 mg Oral Nightly PRN GILDARDO Silveira        budesonide-formoterol (SYMBICORT) 160-4.5 MCG/ACT inhaler 2 puff  2 puff Inhalation BID GILDARDO Jimenez        guaiFENesin-dextromethorphan (ROBITUSSIN DM) 100-10 MG/5ML syrup 5 mL  5 mL Oral Q4H PRN GILDARDO Silveira        trimethobenzamide (TIGAN) injection 200 mg  200 mg IntraMUSCular Q6H PRN GILDARDO Silveira        sotalol (BETAPACE) tablet 40 mg  40 mg Oral BID Parveen Powell MD   40 mg at 01/08/22 0804    metoprolol succinate (TOPROL XL) extended release tablet 50 mg  50 mg Oral BID Parveen Powell MD   50 mg at 01/08/22 0804    insulin lispro (HUMALOG) injection vial 0-12 Units  0-12 Units SubCUTAneous TID WC GILDARDO Lindquist   4 Units at 01/08/22 1200    insulin lispro (HUMALOG) injection vial 0-6 Units  0-6 Units SubCUTAneous Nightly GILDARDO Lindquist   2 Units at 01/07/22 2336      dextrose      sodium chloride         Physical Exam:  /77   Pulse 87   Temp 97.3 °F (36.3 °C) (Oral)   Resp 16   Ht 6' (1.829 m)   Wt 200 lb (90.7 kg)   SpO2 97%   BMI 27.12 kg/m²   Wt Readings from Last 3 Encounters:   01/08/22 200 lb (90.7 kg)   01/07/22 197 lb (89.4 kg)   10/27/21 195 lb (88.5 kg)     Patient was not examined due to respiratory isolation from COVID-19 infection. Patient was interviewed over the phone. He denies any cardiac symptoms. Also discussed with the patient's nurse that he does not have any cardiac symptoms not on oxygen. His rate is well controlled. Intake/Output:    Intake/Output Summary (Last 24 hours) at 1/8/2022 1446  Last data filed at 1/8/2022 1200  Gross per 24 hour   Intake 130 ml   Output --   Net 130 ml     I/O this shift:  In: 130 [P.O.:120;  I.V.:10]  Out: -     Laboratory Tests:  Recent Labs     01/06/22  0809 01/07/22  1112 01/08/22  0514   * 131* 135   K 4.4 4.2 4.7   CL 96* 100 102   CO2 19* 18* 21*   BUN 28* 27* 25*   CREATININE 1.3* 1.2 1.1   GLUCOSE 289* 298* 222*   CALCIUM 9.7 8.9 9.0     Lab Results   Component Value Date    MG 1.9 03/16/2020     Recent Labs     01/06/22  0809   ALKPHOS 128   ALT 15   AST 21   PROT 8.3   BILITOT 0.4   LABALBU 3.9     Recent Labs     01/07/22  1112 01/07/22  1534 01/08/22  0514   WBC 7.9 6.9 7.4   RBC 4.46 4.42 3.98   HGB 12.5 12.1* 11.1*   HCT 38.7 38.8 35.2*   MCV 86.8 87.8 88.4   MCH 28.0 27.4 27.9   MCHC 32.3 31.2* 31.5*   RDW 13.9 13.9 14.0    240 232   MPV 9.2 9.6 10.0     Lab Results   Component Value Date    CKTOTAL 42 01/11/2014    CKMB 2.0 01/11/2014    TROPONINI <0.01 03/13/2020    TROPONINI <0.01 01/11/2014    TROPONINI 0.04 06/13/2013     Lab Results   Component Value Date    INR 1.0 01/07/2022    INR 1.1 11/20/2021    INR 1.0 10/01/2021    PROTIME 11.0 01/07/2022    PROTIME 12.1 11/20/2021    PROTIME 11.2 10/01/2021     Lab Results   Component Value Date    TSH 1.710 01/06/2022     Lab Results   Component Value Date    LABA1C 9.2 (H) 01/06/2022     No results found for: EAG  Lab Results   Component Value Date    CHOL 155 01/06/2022    CHOL 164 07/29/2021    CHOL 139 02/12/2021     Lab Results   Component Value Date    TRIG 224 (H) 01/06/2022    TRIG 180 (H) 07/29/2021    TRIG 118 02/12/2021     Lab Results   Component Value Date    HDL 29 01/06/2022    HDL 33 07/29/2021    HDL 38 02/12/2021     Lab Results   Component Value Date    LDLCALC 81 01/06/2022    [address] 07/29/2021    LDLCALC 77 02/12/2021     Lab Results   Component Value Date    LABVLDL 45 01/06/2022    LABVLDL 36 07/29/2021    LABVLDL 24 02/12/2021     No results found for: CHOLHDLRATIO    Cardiac Tests:  Telemetry findings reviewed: Atrial fibrillation with heart rate in the eighties     EKG: Atrial fibrillation with rapid medical response, LVH, ST-T changes suggestive of inferolateral ischemia. Abnormal EKG.    Vitals and labs were reviewed: Blood pressure 114/77 heart rate 97, O2 sats 97 % room air.     Labs-sodium 131, BUN/creatinine 28/1.3>> 27/1.2>> 25/1.1, proBNP 1701, troponin, high-sensitivity 22, LDL 81, HDL 29, liver function normal, A1c 9.2, TSH 1.7 H&H 12.1/38.8> 11.1/35. 2     Chest x-ray NAD        1.  Coronary artery disease left heart cardiac catheterization January 13, 2014 with a PCI to the circumflex and obtuse marginal in 2014        SELECTIVE CORONARY ARTERIOGRAMS:            A.   Right coronary artery - Preponderant.  A large, dominant vessel      with marked ectasia noted throughout.  No areas of critical stenosis      noted, however.         B.   Left coronary:         1.   Left main trunk - Normal.         2.   Left anterior descending:  A high bifurcation into a moderate 1st          diagonal branch with mild luminal wall irregularities.  The distal          anterior descending artery is markedly attenuated, but without areas          of critical stenosis.        2.   Circumflex:  In the 1st obtuse marginal branch, there is an          eccentric 70% stenosis proximally.  The distal vessel is of moderate          luminal caliber and widely patent.  In the continuation of the          circumflex into the 1st posterolateral marginal branch, there is 65%          to 70% stenosis.      III.  LEFT VENTRICULAR ANGIOGRAM:            Left ventricular cavity size is upper limits of normal to mildly      enlarged with mild hypokinesis of the _____ inferomedial segment.      Estimated left ventricular ejection fraction 50%.  There is no mitral      regurgitation seen.      CONCLUSIONS:   1.    Severely stenotic native coronary atherosclerosis. Yenny OrJudeth Factor impaired left ventricular systolic function.   3.    Abnormal diastolic dysfunction.     11. 2D echocardiogram 2014, EF 61% and mild concentric left ventricular hypertrophy  12. Prostate cancer status post radiation therapy and on Lupron  13. Lexiscan stress test September 2018     No evidence of left ventricular myocardial stress-induced   ischemia. 2. Left ventricular ejection fraction estimated at 41%.    3. Dilated left ventricle.         Assessment:  · New onset atrial fibrillation with RVR now, rate controlled  · [id number] score-4  · Hypertension controlled  · Tobacco use disorder  · COVID-19 infection  · History of severe COPD  · History of CAD status post PCI/stents to LCx and Om1-2014  · History of dilated cardiomyopathy  · Heart failure with improved ejection fraction, EF

## 2022-01-08 NOTE — PROGRESS NOTES
Message sent to Solo Bishop NP about conversation with pt and his medication and conversation that he said he had with his DrAlvin Prior to being ;sent to ER about his Sotalol.

## 2022-01-09 ENCOUNTER — HOSPITAL ENCOUNTER (EMERGENCY)
Age: 72
Discharge: HOME | End: 2022-01-10
Attending: STUDENT IN AN ORGANIZED HEALTH CARE EDUCATION/TRAINING PROGRAM
Payer: MEDICARE

## 2022-01-09 ENCOUNTER — APPOINTMENT (OUTPATIENT)
Dept: GENERAL RADIOLOGY | Age: 72
End: 2022-01-09
Payer: MEDICARE

## 2022-01-09 DIAGNOSIS — J18.9 PNEUMONIA DUE TO INFECTIOUS ORGANISM, UNSPECIFIED LATERALITY, UNSPECIFIED PART OF LUNG: ICD-10-CM

## 2022-01-09 DIAGNOSIS — Z95.810 AICD (AUTOMATIC CARDIOVERTER/DEFIBRILLATOR) PRESENT: Primary | ICD-10-CM

## 2022-01-09 LAB
BASOPHILS ABSOLUTE: 0.03 E9/L (ref 0–0.2)
BASOPHILS RELATIVE PERCENT: 0.4 % (ref 0–2)
EOSINOPHILS ABSOLUTE: 0.1 E9/L (ref 0.05–0.5)
EOSINOPHILS RELATIVE PERCENT: 1.2 % (ref 0–6)
HCT VFR BLD CALC: 34.5 % (ref 37–54)
HEMOGLOBIN: 11.1 G/DL (ref 12.5–16.5)
IMMATURE GRANULOCYTES #: 0.1 E9/L
IMMATURE GRANULOCYTES %: 1.2 % (ref 0–5)
LYMPHOCYTES ABSOLUTE: 0.99 E9/L (ref 1.5–4)
LYMPHOCYTES RELATIVE PERCENT: 12.2 % (ref 20–42)
MCH RBC QN AUTO: 27.8 PG (ref 26–35)
MCHC RBC AUTO-ENTMCNC: 32.2 % (ref 32–34.5)
MCV RBC AUTO: 86.3 FL (ref 80–99.9)
MONOCYTES ABSOLUTE: 0.78 E9/L (ref 0.1–0.95)
MONOCYTES RELATIVE PERCENT: 9.6 % (ref 2–12)
NEUTROPHILS ABSOLUTE: 6.13 E9/L (ref 1.8–7.3)
NEUTROPHILS RELATIVE PERCENT: 75.4 % (ref 43–80)
PDW BLD-RTO: 13.9 FL (ref 11.5–15)
PLATELET # BLD: 246 E9/L (ref 130–450)
PMV BLD AUTO: 9 FL (ref 7–12)
RBC # BLD: 4 E12/L (ref 3.8–5.8)
WBC # BLD: 8.1 E9/L (ref 4.5–11.5)

## 2022-01-09 PROCEDURE — 83880 ASSAY OF NATRIURETIC PEPTIDE: CPT

## 2022-01-09 PROCEDURE — 85025 COMPLETE CBC W/AUTO DIFF WBC: CPT

## 2022-01-09 PROCEDURE — 84484 ASSAY OF TROPONIN QUANT: CPT

## 2022-01-09 PROCEDURE — 93005 ELECTROCARDIOGRAM TRACING: CPT | Performed by: STUDENT IN AN ORGANIZED HEALTH CARE EDUCATION/TRAINING PROGRAM

## 2022-01-09 PROCEDURE — 83735 ASSAY OF MAGNESIUM: CPT

## 2022-01-09 PROCEDURE — 99285 EMERGENCY DEPT VISIT HI MDM: CPT

## 2022-01-09 PROCEDURE — 80053 COMPREHEN METABOLIC PANEL: CPT

## 2022-01-09 ASSESSMENT — ENCOUNTER SYMPTOMS
ABDOMINAL PAIN: 0
COUGH: 0
EYE PAIN: 0
EYE REDNESS: 0
DIARRHEA: 0
EYE DISCHARGE: 0
SORE THROAT: 0
SHORTNESS OF BREATH: 0
VOMITING: 0
BACK PAIN: 0
WHEEZING: 0
NAUSEA: 0
SINUS PRESSURE: 0

## 2022-01-10 ENCOUNTER — APPOINTMENT (OUTPATIENT)
Dept: GENERAL RADIOLOGY | Age: 72
End: 2022-01-10
Payer: MEDICARE

## 2022-01-10 VITALS
RESPIRATION RATE: 16 BRPM | WEIGHT: 199 LBS | OXYGEN SATURATION: 98 % | SYSTOLIC BLOOD PRESSURE: 138 MMHG | HEART RATE: 86 BPM | BODY MASS INDEX: 26.95 KG/M2 | DIASTOLIC BLOOD PRESSURE: 80 MMHG | HEIGHT: 72 IN | TEMPERATURE: 97.5 F

## 2022-01-10 LAB
ALBUMIN SERPL-MCNC: 3.4 G/DL (ref 3.5–5.2)
ALP BLD-CCNC: 156 U/L (ref 40–129)
ALT SERPL-CCNC: 13 U/L (ref 0–40)
ANION GAP SERPL CALCULATED.3IONS-SCNC: 14 MMOL/L (ref 7–16)
AST SERPL-CCNC: 13 U/L (ref 0–39)
BILIRUB SERPL-MCNC: 0.2 MG/DL (ref 0–1.2)
BUN BLDV-MCNC: 16 MG/DL (ref 6–23)
CALCIUM SERPL-MCNC: 9.4 MG/DL (ref 8.6–10.2)
CHLORIDE BLD-SCNC: 98 MMOL/L (ref 98–107)
CO2: 23 MMOL/L (ref 22–29)
CREAT SERPL-MCNC: 1 MG/DL (ref 0.7–1.2)
EKG ATRIAL RATE: 88 BPM
EKG ATRIAL RATE: 90 BPM
EKG P AXIS: 29 DEGREES
EKG P-R INTERVAL: 138 MS
EKG Q-T INTERVAL: 328 MS
EKG Q-T INTERVAL: 380 MS
EKG QRS DURATION: 84 MS
EKG QRS DURATION: 88 MS
EKG QTC CALCULATION (BAZETT): 401 MS
EKG QTC CALCULATION (BAZETT): 452 MS
EKG R AXIS: -11 DEGREES
EKG R AXIS: -8 DEGREES
EKG T AXIS: -141 DEGREES
EKG T AXIS: -149 DEGREES
EKG VENTRICULAR RATE: 85 BPM
EKG VENTRICULAR RATE: 90 BPM
GFR AFRICAN AMERICAN: >60
GFR NON-AFRICAN AMERICAN: >60 ML/MIN/1.73
GLUCOSE BLD-MCNC: 391 MG/DL (ref 74–99)
MAGNESIUM: 1.7 MG/DL (ref 1.6–2.6)
POTASSIUM REFLEX MAGNESIUM: 4.2 MMOL/L (ref 3.5–5)
PRO-BNP: 2562 PG/ML (ref 0–125)
SODIUM BLD-SCNC: 135 MMOL/L (ref 132–146)
TOTAL PROTEIN: 7.2 G/DL (ref 6.4–8.3)
TROPONIN, HIGH SENSITIVITY: 17 NG/L (ref 0–11)
TROPONIN, HIGH SENSITIVITY: 18 NG/L (ref 0–11)
TROPONIN, HIGH SENSITIVITY: 18 NG/L (ref 0–11)

## 2022-01-10 PROCEDURE — 71045 X-RAY EXAM CHEST 1 VIEW: CPT

## 2022-01-10 PROCEDURE — 6370000000 HC RX 637 (ALT 250 FOR IP): Performed by: STUDENT IN AN ORGANIZED HEALTH CARE EDUCATION/TRAINING PROGRAM

## 2022-01-10 PROCEDURE — 93010 ELECTROCARDIOGRAM REPORT: CPT | Performed by: INTERNAL MEDICINE

## 2022-01-10 PROCEDURE — 84484 ASSAY OF TROPONIN QUANT: CPT

## 2022-01-10 RX ORDER — DOXYCYCLINE HYCLATE 100 MG/1
100 CAPSULE ORAL ONCE
Status: COMPLETED | OUTPATIENT
Start: 2022-01-10 | End: 2022-01-10

## 2022-01-10 RX ORDER — CEFDINIR 300 MG/1
300 CAPSULE ORAL ONCE
Status: COMPLETED | OUTPATIENT
Start: 2022-01-10 | End: 2022-01-10

## 2022-01-10 RX ORDER — DOXYCYCLINE HYCLATE 100 MG
100 TABLET ORAL 2 TIMES DAILY
Qty: 20 TABLET | Refills: 0 | Status: SHIPPED | OUTPATIENT
Start: 2022-01-10 | End: 2022-01-20

## 2022-01-10 RX ORDER — CEFDINIR 300 MG/1
300 CAPSULE ORAL 2 TIMES DAILY
Qty: 14 CAPSULE | Refills: 0 | Status: SHIPPED | OUTPATIENT
Start: 2022-01-10 | End: 2022-01-17

## 2022-01-10 RX ADMIN — DOXYCYCLINE 100 MG: 100 CAPSULE ORAL at 03:18

## 2022-01-10 RX ADMIN — CEFDINIR 300 MG: 300 CAPSULE ORAL at 03:17

## 2022-01-10 NOTE — ED PROVIDER NOTES
Chief Complaint   Patient presents with    AICD Problem     states his defib fired approx 1 hour pta while walking to the kitchen.  states he has had it for approx 10 years and this was the first time it fired. denies chest pain. Patient is a 60-year-old male with a history of [employer] defibrillator presents today as his AICD did shocked him once earlier this evening. States he was walking in the kitchen and felt a sharp stabbing pain in the left side of his chest.  There was only one episode. Has not had an episode similar to this before in the past.  His defibrillator was placed 10 years ago. He does follow-up with the EP. He states he was discharged in the hospital yesterday, he was evaluated and noted to have new onset atrial fibrillation for which she was started on Eliquis. Also noted to have COVID several days ago. He has not had any other symptoms since then. Denies chest pain, shortness of breath, fevers or chills. No cyanosis dizziness. Denies nausea, vomiting, diarrhea, lightheadedness or syncope. The history is provided by the patient. No  was used. Review of Systems   Constitutional: Negative for chills and fever. HENT: Negative for ear pain, sinus pressure and sore throat. Eyes: Negative for pain, discharge and redness. Respiratory: Negative for cough, shortness of breath and wheezing. Cardiovascular: Negative for chest pain. Gastrointestinal: Negative for abdominal pain, diarrhea, nausea and vomiting. Genitourinary: Negative for dysuria and frequency. Musculoskeletal: Negative for arthralgias and back pain. Skin: Negative for rash and wound. Neurological: Negative for weakness and headaches. Hematological: Negative for adenopathy. All other systems reviewed and are negative. Physical Exam  Vitals and nursing note reviewed. Constitutional:       Appearance: He is well-developed.    HENT:      Head: Normocephalic and atraumatic. Eyes:      Pupils: Pupils are equal, round, and reactive to light. Cardiovascular:      Rate and Rhythm: Normal rate and regular rhythm. Heart sounds: Normal heart sounds. No murmur heard. Pulmonary:      Effort: Pulmonary effort is normal. No respiratory distress. Breath sounds: Normal breath sounds. No wheezing or rales. Abdominal:      General: Bowel sounds are normal.      Palpations: Abdomen is soft. Tenderness: There is no abdominal tenderness. There is no guarding or rebound. Musculoskeletal:      Cervical back: Normal range of motion and neck supple. Skin:     General: Skin is warm and dry. Neurological:      Mental Status: He is alert and oriented to person, place, and time. Cranial Nerves: No cranial nerve deficit.       Coordination: Coordination normal.          Procedures     Labs Reviewed   CBC WITH AUTO DIFFERENTIAL - Abnormal; Notable for the following components:       Result Value    Hemoglobin 11.1 (*)     Hematocrit 34.5 (*)     Lymphocytes % 12.2 (*)     Lymphocytes Absolute 0.99 (*)     All other components within normal limits   COMPREHENSIVE METABOLIC PANEL W/ REFLEX TO MG FOR LOW K - Abnormal; Notable for the following components:    Glucose 391 (*)     Albumin 3.4 (*)     Alkaline Phosphatase 156 (*)     All other components within normal limits   TROPONIN - Abnormal; Notable for the following components:    Troponin, High Sensitivity 17 (*)     All other components within normal limits   BRAIN NATRIURETIC PEPTIDE - Abnormal; Notable for the following components:    Pro-BNP 2,562 (*)     All other components within normal limits   TROPONIN - Abnormal; Notable for the following components:    Troponin, High Sensitivity 18 (*)     All other components within normal limits   TROPONIN - Abnormal; Notable for the following components:    Troponin, High Sensitivity 18 (*)     All other components within normal limits   MAGNESIUM     XR CHEST PORTABLE   Final Result   Patchy opacities concerning for early infiltrates. Continued follow-up   recommended. EKG #1:   I personally interpreted this EKG  Time:  2321    Rate: 90  Rhythm: Sinus. Interpretation: Sinus rhythm, normal axis, no ST elevation. Similar to previous      MDM  Number of Diagnoses or Management Options  AICD (automatic cardioverter/defibrillator) present  Pneumonia due to infectious organism, unspecified laterality, unspecified part of lung  Diagnosis management comments: Patient is a 66-year-old male presents today as his AICD did defibrillate once. On physical exam heart and lungs clear to auscultation. EKG shows no ischemic changes. Troponin of 18, delta 18. Electrolytes within normal limits. Chest x-ray does show concern for early infiltrates. Per chart review he was seen in the hospital recently with new onset atrial fibrillation. Pacemaker was defibrillated, he did have one firing yesterday evening for AF-VF. He has not had subsequent defibrillation. With benign work-up, patient will be discharged home he is appropriate for outpatient care. Return precautions given.   Vitals are stable for discharge home       Amount and/or Complexity of Data Reviewed  Clinical lab tests: reviewed  Tests in the radiology section of CPT®: reviewed  Tests in the medicine section of CPT®: reviewed                --------------------------------------------- PAST HISTORY ---------------------------------------------  Past Medical History:  has a past medical history of Abdominal aortic aneurysm without rupture ([facility] Arthritis, CAD (coronary artery disease), Cancer ([facility] Combined systolic and diastolic heart failure ([facility] [facility] COPD (chronic obstructive pulmonary disease) ([facility] [facility] Diabetes mellitus ([facility] [facility] GERD (gastroesophageal reflux disease), History of placement of internal cardiac defibrillator, Hypertension, Sigmoid diverticulosis, Sinusitis, Tubular adenoma of colon, and Vertigo. Past Surgical History:  has a past surgical history that includes Coronary angioplasty with stent (1/13/14); Umbilical hernia repair; Colonoscopy (8/31/15); Cardiac defibrillator placement; Hip fracture surgery (Right, 3/14/2020); and AAA repair, endovascular (N/A, 10/1/2021). Social History:  reports that he quit smoking about 8 years ago. His smoking use included cigarettes. He started smoking about 52 years ago. He has a 100.00 pack-year smoking history. He has never used smokeless tobacco. He reports current alcohol use. He reports that he does not use drugs. Family History: family history includes Cancer in his brother, brother, and father; Heart Disease in his mother. The patients home medications have been reviewed. Allergies: Patient has no known allergies.     -------------------------------------------------- RESULTS -------------------------------------------------  Labs:  Results for orders placed or performed during the hospital encounter of 01/09/22   CBC Auto Differential   Result Value Ref Range    WBC 8.1 4.5 - 11.5 E9/L    RBC 4.00 3.80 - 5.80 E12/L    Hemoglobin 11.1 (L) 12.5 - 16.5 g/dL    Hematocrit 34.5 (L) 37.0 - 54.0 %    MCV 86.3 80.0 - 99.9 fL    MCH 27.8 26.0 - 35.0 pg    MCHC 32.2 32.0 - 34.5 %    RDW 13.9 11.5 - 15.0 fL    Platelets 813 039 - 535 E9/L    MPV 9.0 7.0 - 12.0 fL    Neutrophils % 75.4 43.0 - 80.0 %    Immature Granulocytes % 1.2 0.0 - 5.0 %    Lymphocytes % 12.2 (L) 20.0 - 42.0 %    Monocytes % 9.6 2.0 - 12.0 %    Eosinophils % 1.2 0.0 - 6.0 %    Basophils % 0.4 0.0 - 2.0 %    Neutrophils Absolute 6.13 1.80 - 7.30 E9/L    Immature Granulocytes # 0.10 E9/L    Lymphocytes Absolute 0.99 (L) 1.50 - 4.00 E9/L    Monocytes Absolute 0.78 0.10 - 0.95 E9/L    Eosinophils Absolute 0.10 0.05 - 0.50 E9/L    Basophils Absolute 0.03 0.00 - 0.20 E9/L   Comprehensive Metabolic Panel w/ Reflex to MG   Result Value Ref Range    Sodium 135 132 - 146 mmol/L    Potassium reflex Magnesium 4.2 3.5 - 5.0 mmol/L    Chloride 98 98 - 107 mmol/L    CO2 23 22 - 29 mmol/L    Anion Gap 14 7 - 16 mmol/L    Glucose 391 (H) 74 - 99 mg/dL    BUN 16 6 - 23 mg/dL    CREATININE 1.0 0.7 - 1.2 mg/dL    GFR Non-African American >60 >=60 mL/min/1.73    GFR African American >60     Calcium 9.4 8.6 - 10.2 mg/dL    Total Protein 7.2 6.4 - 8.3 g/dL    Albumin 3.4 (L) 3.5 - 5.2 g/dL    Total Bilirubin 0.2 0.0 - 1.2 mg/dL    Alkaline Phosphatase 156 (H) 40 - 129 U/L    ALT 13 0 - 40 U/L    AST 13 0 - 39 U/L   Magnesium   Result Value Ref Range    Magnesium 1.7 1.6 - 2.6 mg/dL   Troponin   Result Value Ref Range    Troponin, High Sensitivity 17 (H) 0 - 11 ng/L   Brain Natriuretic Peptide   Result Value Ref Range    Pro-BNP 2,562 (H) 0 - 125 pg/mL   Troponin   Result Value Ref Range    Troponin, High Sensitivity 18 (H) 0 - 11 ng/L   Troponin   Result Value Ref Range    Troponin, High Sensitivity 18 (H) 0 - 11 ng/L   EKG 12 Lead   Result Value Ref Range    Ventricular Rate 90 BPM    Atrial Rate 90 BPM    P-R Interval 138 ms    QRS Duration 84 ms    Q-T Interval 328 ms    QTc Calculation (Bazett) 401 ms    P Axis 29 degrees    R Axis -8 degrees    T Axis -149 degrees       Radiology:  XR CHEST PORTABLE   Final Result   Patchy opacities concerning for early infiltrates. Continued follow-up   recommended. ------------------------- NURSING NOTES AND VITALS REVIEWED ---------------------------  Date / Time Roomed:  1/9/2022 11:16 PM  ED Bed Assignment:  [id number]/17B-17    The nursing notes within the ED encounter and vital signs as below have been reviewed.    BP (!) 132/95   Pulse 80   Temp 97.5 °F (36.4 °C)   Resp 16   Ht 6' (1.829 m)   Wt 199 lb (90.3 kg)   SpO2 94%   BMI 26.99 kg/m²   Oxygen Saturation Interpretation: Normal      ------------------------------------------ PROGRESS NOTES ------------------------------------------  I have spoken with the patient and discussed todays results, in addition to providing specific details for the plan of care and counseling regarding the diagnosis and prognosis. Their questions are answered at this time and they are agreeable with the plan. I discussed at length with them reasons for immediate return here for re evaluation. They will followup with primary care by calling their office tomorrow. --------------------------------- ADDITIONAL PROVIDER NOTES ---------------------------------  At this time the patient is without objective evidence of an acute process requiring hospitalization or inpatient management. They have remained hemodynamically stable throughout their entire ED visit and are stable for discharge with outpatient follow-up. The plan has been discussed in detail and they are aware of the specific conditions for emergent return, as well as the importance of follow-up. New Prescriptions    CEFDINIR (OMNICEF) 300 MG CAPSULE    Take 1 capsule by mouth 2 times daily for 7 days    DOXYCYCLINE HYCLATE (VIBRA-TABS) 100 MG TABLET    Take 1 tablet by mouth 2 times daily for 10 days       Diagnosis:  1. AICD (automatic cardioverter/defibrillator) present    2. Pneumonia due to infectious organism, unspecified laterality, unspecified part of lung        Disposition:  Patient's disposition: Discharge to home  Patient's condition is stable.             Alisa Bo DO  Resident  01/10/22 0060

## 2022-01-11 ENCOUNTER — TELEPHONE (OUTPATIENT)
Dept: OTHER | Facility: CLINIC | Age: 72
End: 2022-01-11

## 2022-01-11 ENCOUNTER — CARE COORDINATION (OUTPATIENT)
Dept: CASE MANAGEMENT | Age: 72
End: 2022-01-11

## 2022-01-11 ENCOUNTER — TELEPHONE (OUTPATIENT)
Dept: CARDIOLOGY CLINIC | Age: 72
End: 2022-01-11

## 2022-01-11 DIAGNOSIS — I48.91 NEW ONSET ATRIAL FIBRILLATION (HCC): Primary | ICD-10-CM

## 2022-01-11 PROCEDURE — 1111F DSCHRG MED/CURRENT MED MERGE: CPT | Performed by: FAMILY MEDICINE

## 2022-01-11 NOTE — CARE COORDINATION
[address] Transitions Initial Follow Up Call    Call within 2 business days of discharge: Yes    Patient: Xenia Mireles Patient : 1950   MRN: [id number]  Reason for Admission: New onset a-fib  Discharge Date: 1/10/22 RARS: Readmission Risk Score: 10.9 ( )      Last Discharge [facility]       Complaint Diagnosis Description Type Department Provider    22 AICD Problem AICD (automatic cardioverter/defibrillator) present . .. ED (DISCHARGE) ASHUTOSH Carcamo MD        Patient contacted regarding COVID-19 diagnosis. Discussed COVID-19 related testing which was available at this time. Test results were positive. Patient informed of results, if available? Yes.~Pt previously aware of +COVID-19 results (+22)      Care Transition Nurse contacted the patient by telephone to perform post discharge assessment. Call within 2 business days of discharge: Yes. Provided introduction to self, and explanation of the CTN/ACM role, and reason for call due to risk factors for infection and/or exposure to COVID-19. Symptoms reviewed with patient who verbalized the following symptoms: no new symptoms and no worsening symptoms. Pt discharged from [facility] on 22 with dx new onset A-fib RVR and +COVID-19. Pt states that he is doing \"fine\" today and offers no complaints. Pt denies any sob, cough, wheezing, chest congestion, CP, dizziness, lightheadedness, or palpitations. Reviewed medication list in full with pt. 1111F entered. Pt states that he did not  the Eliquis because the rx was over $600. Pt did not notify his doctor or his cardiologist. CTN attempted to call [facility] to update, however, CTN only reached  of Yenny at Dr. Amandeep grajeda. CTN left a detailed VM regarding pt needing a 1 mon f/u per Dr. Amador Case recommendation and that the pt was unable to afford the Eliquis rx. CTN requested that their office f/u with the pt.  CTN left message with CTN's contact information if needing to return the call. Noted pt was in ED on 1/9/22 d/t AICD firing x1. Pt was prescribed Cefdinir and Doxycycline for pna. Pt has not yet went to his pharmacy to  these rxs. Pt has not picked up his rx from his hospital discharge. CTN strongly encouraged pt to check with his pharmacy for new medications and reviewed which medications were new. Pt voiced understanding and states that he is going tomorrow to get these. Pt voiced no needs/concerns at this time. CTN processed with pt to be alert for a phone call from Dr. Amandeep Sierra office. CTN strongly encouraged the pt that if he did not hear back from their office by tomorrow morning to call the office to f/u. Pt voiced understanding. Pt agreeable to continued outreaches from this CTN. Due to no new or worsening symptoms encounter was not routed to provider for escalation. Discussed follow-up appointments. If no appointment was previously scheduled, appointment scheduling offered: No.~Pt already scheduled with pcp for a HFU on 1/17/22. [facility] follow up appointment(s):   Future Appointments   Date Time Provider Elliot Oseguera   1/17/2022  2:00 PM [facility] Bustillo, [address] [facility]   9/13/2022 11:00 AM Roxy Marcos MD [address] [place] [address]   11/2/2022  8:30 AM Olesya Burk MD [facility]       Non-face-to-face services provided:  Scheduled appointment with PCP-see above  Scheduled appointment with Specialist-f/u with Dr. Mike Stroud (card) 1 mon.  CTN called [facility] and left a VM with clinical staff requesting they f/u with the pt to scheduled  Obtained and reviewed discharge summary and/or continuity of care documents  Reviewed and followed up on pending diagnostic tests and treatments-Per chart review, pt advised to have CBC drawn on Monday, 1/17/22  Assessment and support for treatment adherence and medication management-1111F entered, as MH pcp     Advance Care Planning:   Does patient have an Advance Directive:  health care decision maker information reviewed. Educated patient about risk for severe COVID-19 due to risk factors according to CDC guidelines. CTN reviewed discharge instructions, medical action plan and red flag symptoms with the patient who verbalized understanding. Discussed exposure protocols and quarantine with CDC Guidelines. Patient was given an opportunity to verbalize any questions and concerns and agrees to contact CTN or health care provider for questions related to their healthcare. Reviewed and educated patient on any new and changed medications related to discharge diagnosis     Was patient discharged with a pulse oximeter? No       CTN provided contact information. Plan for follow-up call in 7-10 days based on severity of symptoms and risk factors.

## 2022-01-11 NOTE — TELEPHONE ENCOUNTER
Pt returned call to nurse access regarding ED follow up. Nurse Access contacted Dr. Colt Burt office to schedule ED follow up appt.  Pt scheduled for Monday 1-17 @2:00pm.

## 2022-01-11 NOTE — TELEPHONE ENCOUNTER
Patient was seen over the weekend by Dr. Vilma Valdez and his Eliquis is 600.00 and he cannot afford this.   Please advise

## 2022-01-12 NOTE — TELEPHONE ENCOUNTER
His note say that as he did not realize this was a patient of yours   I checked with them yesterday on this.

## 2022-01-12 NOTE — TELEPHONE ENCOUNTER
Mandi Dear  If he cannot take eliquis please call his PCP and ask if they will start  him on coumdadin for AF  If they wont, refer to [address]  Ov next weeek with me

## 2022-01-12 NOTE — TELEPHONE ENCOUNTER
Patient notified and instructed on recommendation for Coumadin and Eliquis. He will contact the family physician today to get started.

## 2022-01-13 ENCOUNTER — TELEPHONE (OUTPATIENT)
Dept: FAMILY MEDICINE CLINIC | Age: 72
End: 2022-01-13

## 2022-01-13 DIAGNOSIS — I48.0 PAROXYSMAL ATRIAL FIBRILLATION (HCC): Primary | ICD-10-CM

## 2022-01-13 RX ORDER — WARFARIN SODIUM 5 MG/1
5 TABLET ORAL DAILY
Qty: 90 TABLET | Refills: 0 | Status: SHIPPED
Start: 2022-01-13 | End: 2022-04-18 | Stop reason: SDUPTHER

## 2022-01-13 NOTE — TELEPHONE ENCOUNTER
This MA received call from Dr. Annie Rodríguez stating pt must have Coumadin 5mg sent to pharmacy ASAP. Pt to take 5mg QD, first dose STAT. This MA spoke with pt who states that he just spoke with Dr. Marika Gutierrez as well. Pt amendable to start medication and will go  ASAP. Pt scheduled for HFU Monday 1/17 and will need INR at that time. Anticoagulation enrollment complete.        Electronically signed by Henry Flynn MA on 1/13/22 at 2:40 PM EST

## 2022-01-17 NOTE — DISCHARGE SUMMARY
Physician Discharge Summary     Patient ID:  Talha Silva  [id number]  36 y.o.  1950    Admit date: 1/7/2022    Discharge date and time: 1/8/2022    Patient admitted less than 48 hours  Please see H&P    Signed:  Anabelle Franks MD  1/17/2022  12:26 AM

## 2022-01-18 ENCOUNTER — OFFICE VISIT (OUTPATIENT)
Dept: FAMILY MEDICINE CLINIC | Age: 72
End: 2022-01-18
Payer: MEDICARE

## 2022-01-18 ENCOUNTER — ANTI-COAG VISIT (OUTPATIENT)
Dept: FAMILY MEDICINE CLINIC | Age: 72
End: 2022-01-18

## 2022-01-18 VITALS
HEIGHT: 72 IN | BODY MASS INDEX: 26.82 KG/M2 | OXYGEN SATURATION: 90 % | WEIGHT: 198 LBS | SYSTOLIC BLOOD PRESSURE: 136 MMHG | TEMPERATURE: 98.2 F | RESPIRATION RATE: 18 BRPM | HEART RATE: 64 BPM | DIASTOLIC BLOOD PRESSURE: 82 MMHG

## 2022-01-18 DIAGNOSIS — U07.1 PNEUMONIA DUE TO COVID-19 VIRUS: Primary | ICD-10-CM

## 2022-01-18 DIAGNOSIS — E11.59 TYPE 2 DIABETES MELLITUS WITH CARDIAC COMPLICATION (HCC): ICD-10-CM

## 2022-01-18 DIAGNOSIS — J12.82 PNEUMONIA DUE TO COVID-19 VIRUS: Primary | ICD-10-CM

## 2022-01-18 DIAGNOSIS — I48.0 PAF (PAROXYSMAL ATRIAL FIBRILLATION) (HCC): ICD-10-CM

## 2022-01-18 DIAGNOSIS — I10 ESSENTIAL HYPERTENSION: ICD-10-CM

## 2022-01-18 LAB
INTERNATIONAL NORMALIZATION RATIO, POC: 2
PROTHROMBIN TIME, POC: 23.9

## 2022-01-18 PROCEDURE — 99495 TRANSJ CARE MGMT MOD F2F 14D: CPT | Performed by: FAMILY MEDICINE

## 2022-01-18 PROCEDURE — 85610 PROTHROMBIN TIME: CPT | Performed by: FAMILY MEDICINE

## 2022-01-18 PROCEDURE — 1111F DSCHRG MED/CURRENT MED MERGE: CPT | Performed by: FAMILY MEDICINE

## 2022-01-18 RX ORDER — WARFARIN SODIUM 1 MG/1
1 TABLET ORAL DAILY
Qty: 90 TABLET | Refills: 1 | Status: SHIPPED
Start: 2022-01-18 | End: 2022-05-02 | Stop reason: SDUPTHER

## 2022-01-18 RX ORDER — WARFARIN SODIUM 5 MG/1
5 TABLET ORAL DAILY
Qty: 90 TABLET | Refills: 1 | Status: SHIPPED
Start: 2022-01-18 | End: 2022-04-18

## 2022-01-18 RX ORDER — DILTIAZEM HYDROCHLORIDE 60 MG/1
2 TABLET, FILM COATED ORAL 2 TIMES DAILY
Qty: 10.2 G | Refills: 3
Start: 2022-01-18 | End: 2022-01-18 | Stop reason: SDUPTHER

## 2022-01-18 RX ORDER — DILTIAZEM HYDROCHLORIDE 60 MG/1
2 TABLET, FILM COATED ORAL 2 TIMES DAILY
Qty: 10.2 G | Refills: 3 | Status: SHIPPED
Start: 2022-01-18 | End: 2022-02-02 | Stop reason: CLARIF

## 2022-01-18 NOTE — PROGRESS NOTES
This MA spoke with pt. Pt advised of Coumadin instructions and next INR date.  Pt scheduled for INR 1/25/2022 at 8:00am.    Electronically signed by Sarahi Bradley MA on 1/18/22 at 2:27 PM EST

## 2022-01-18 NOTE — PROGRESS NOTES
Post-Discharge Transitional Care Management Services or Hospital Follow Up      Malika Currie   YOB: 1950    Date of Office Visit:  1/18/2022  Date of Hospital Admission: 1/9/22  Date of Hospital Discharge: 1/10/22  Risk of hospital readmission (high >=14%. Medium >=10%) :Readmission Risk Score: 10.9 ( )      Care management risk score Rising risk (score 2-5) and Complex Care (Scores >=6): 12     Non face to face  following discharge, date last encounter closed (first attempt may have been earlier): 1/11/2022  3:28 PM    Call initiated 2 business days of discharge: Yes    Patient Active Problem List   Diagnosis    Essential hypertension    COPD, very severe ([address]    Reflux laryngitis    CAD (coronary artery disease)    Diabetes mellitus type II, uncontrolled ([address]    Tubular adenoma of colon    Sigmoid diverticulosis    Adhesive capsulitis of both shoulders    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 If you have any questions, ask your nurse or doctor.             START taking these medications    Symbicort 80-4.5 MCG/ACT Aero  Generic drug: budesonide-formoterol  Inhale 2 puffs into the lungs 2 times daily  Started by: Maverick Elliott, DO        CHANGE how you take these medications    metFORMIN 500 MG tablet  Commonly known as: GLUCOPHAGE  Take 1 tablet by mouth 2 times daily (with meals)  What changed: when to take this        CONTINUE taking these medications atorvastatin 40 MG tablet  Commonly known as: LIPITOR  Take 1 tablet by mouth daily     dexamethasone 6 MG tablet  Commonly known as: Decadron  Take 1 tablet by mouth daily (with breakfast) for 10 days     doxycycline hyclate 100 MG tablet  Commonly known as: VIBRA-TABS  Take 1 tablet by mouth 2 times daily for 10 days     guaiFENesin-dextromethorphan 100-10 MG/5ML syrup  Commonly known as: ROBITUSSIN DM  Take 5 mLs by mouth every 4 hours as needed for Cough     lisinopril 10 MG tablet  Commonly known as: PRINIVIL;ZESTRIL  TAKE ONE TABLET BY MOUTH EVERY DAY     metoprolol succinate 50 MG extended release tablet  Commonly known as: TOPROL XL  Take 1 tablet by mouth 2 times daily     sotalol 80 MG tablet  Commonly known as: BETAPACE  Take 0.5 tablets by mouth 2 times daily     vitamin C 1000 MG tablet  Take 1 tablet by mouth daily     vitamin D3 25 MCG (1000 UT) Tabs tablet  Commonly known as: CHOLECALCIFEROL  Take 1 tablet by mouth daily     warfarin 5 MG tablet  Commonly known as: Coumadin  Take 1 tablet by mouth daily     zinc 50 MG Caps  Take 100 mg by mouth daily           Where to Get Your Medications      Information about where to get these medications is not yet available    Ask your nurse or doctor about these medications  · Symbicort 80-4.5 MCG/ACT Aero           Medications marked \"taking\" at this time  Outpatient Medications Marked as Taking for the 1/18/22 encounter (Office Visit) with Dorinda Colunga, DO   Medication Sig Dispense Refill    SYMBICORT 80-4.5 MCG/ACT AERO Inhale 2 puffs into the lungs 2 times daily 10.2 g 3    warfarin (COUMADIN) 5 MG tablet Take 1 tablet by mouth daily 90 tablet 0    doxycycline hyclate (VIBRA-TABS) 100 MG tablet Take 1 tablet by mouth 2 times daily for 10 days 20 tablet 0    guaiFENesin-dextromethorphan (ROBITUSSIN DM) 100-10 MG/5ML syrup Take 5 mLs by mouth every 4 hours as needed for Cough 120 mL 0    sotalol (BETAPACE) 80 MG tablet Take 0.5 tablets by mouth 2 times daily 60 tablet 3    metoprolol succinate (TOPROL XL) 50 MG extended release tablet Take 1 tablet by mouth 2 times daily 30 tablet 3    dexamethasone (DECADRON) 6 MG tablet Take 1 tablet by mouth daily (with breakfast) for 10 days 10 tablet 0    atorvastatin (LIPITOR) 40 MG tablet Take 1 tablet by mouth daily 90 tablet 1    lisinopril (PRINIVIL;ZESTRIL) 10 MG tablet TAKE ONE TABLET BY MOUTH EVERY DAY 90 tablet 1    Ascorbic Acid (VITAMIN C) 1000 MG tablet Take 1 tablet by mouth daily 30 tablet 3    vitamin D3 (CHOLECALCIFEROL) 25 MCG (1000 UT) TABS tablet Take 1 tablet by mouth daily 90 tablet 1    zinc 50 MG CAPS Take 100 mg by mouth daily 30 capsule 3        Medications patient taking as of now reconciled against medications ordered at time of hospital discharge: Yes    Chief Complaint   Patient presents with    Follow-Up from Hospital     Pt was in ER  1/7 for covid -19    Coagulation Disorder     inr pended and done       History of Present illness - Follow up of Hospital diagnosis(es): covid pneumonia, atrial fibrillation with RVR    Inpatient course: Discharge summary reviewed- see chart. Interval history/Current status: he is weak but feels much better     A comprehensive review of systems was negative except for what was noted in the HPI. Vitals:    01/18/22 0941   BP: 136/82   Pulse: 64   Resp: 18   Temp: 98.2 °F (36.8 °C)   SpO2: 90%   Weight: 198 lb (89.8 kg)   Height: 6' (1.829 m)     Body mass index is 26.85 kg/m².    Wt Readings from Last 3 Encounters:   01/18/22 198 lb (89.8 kg)   01/09/22 199 lb (90.3 kg)   01/08/22 200 lb (90.7 kg)     BP Readings from Last 3 Encounters:   01/18/22 136/82   01/10/22 138/80   01/08/22 114/77        Physical Exam:  General Appearance: alert and oriented to person, place and time, well-developed and well-nourished, in no acute distress  Skin: warm and dry, no rash or erythema  Head: normocephalic and atraumatic  Eyes: pupils equal, round, and reactive to light, extraocular eye movements intact, conjunctivae normal  ENT: tympanic membrane, external ear and ear canal normal bilaterally, oropharynx clear and moist with normal mucous membranes  Neck: neck supple and non tender without mass, no thyromegaly or thyroid nodules, no cervical lymphadenopathy   Pulmonary/Chest: clear to auscultation bilaterally- no wheezes, rales or rhonchi, normal air movement, no respiratory distress  Cardiovascular: normal rate, normal S1 and S2, no gallops, intact distal pulses, no carotid bruits and he has some extrasystole today but no A FIB   Abdomen: soft, non-tender, non-distended, normal bowel sounds, no masses or organomegaly  Genitourinary: genitals normal without hernia or inguinal adenopathy, he has no hematuria today but this has been a past issue  Extremities: no cyanosis, no clubbing and he feels weak   Musculoskeletal: multiple joint pains and poor gate   Neurologic: poor and weak gait     Assessment/Plan:  1. Pneumonia due to COVID-19 virus  Take tylenol every 6 hours as needed for temperature, aches and pain  Hydrate with 40 to 50 oz of fluids  I have sent medication in for you  Please keep in touch with me and let me know how you are doing  If you get worse, call as soon as possible or seek immediate medical attention     - POCT INR  - XR CHEST (2 VW); Future    2. 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 PAF (paroxysmal atrial fibrillation) ([facility])    ---VASCULAR PANEL  A) asa, plavix, aggrenox  B) COUMADIN, pletal, tzd, STATIN  C) ACE, hctz, folic, ccb  D) cannikinumab, fish oils     ---CARDIAC---COUMADIN, ACE, BETA, STATIN, hctz, ( ccb )      4.  Essential hypertension ---controlled   --patient is instructed on low to moderate sodium ( 2 to 2.5 grams ), daily    Also to increase potassium in the diet to about 3.5 grams daily    Literature is provided             Medical Decision Making: high complexity

## 2022-01-20 ENCOUNTER — CARE COORDINATION (OUTPATIENT)
Dept: CASE MANAGEMENT | Age: 72
End: 2022-01-20

## 2022-01-20 NOTE — CARE COORDINATION
COVID Follow Up Call    Challenges to be reviewed by the provider   Additional needs identified to be addressed with provider: No  none                 Encounter was not routed to provider for escalation. Method of communication with provider:  none. Contacted the patient by telephone to follow up after hospital visit. Status: improved. Pt reports doing \"well\" this week. Pt denies any sob, cough, wheezing, or chest congestion. Pt reports to still feeling weak post COVID. Pt states that he did complete his HFU appt with his pcp on 1/18/22. Pt was started on Coumadin 5 mg daily (started on 1/11/22) for dx A-fib, as pt could not afford copay for Eliquis. Pt to return to pcp's office on 1/25/22 for INR check. Pt currently taking Coumadin 5 mg daily. Pt denies any CP, dizziness/lightheadedness, or palpitations. Pt was newly dx with Afib on last hospital admission. Pt will f/u with Dr. Rosenda Antony on 2/2/22 and he is aware. Pt did  rx for Cefdinir and Doxycycline from his last ED visit where he was dx with pna. Pt states he is just about finished with the antibiotics. Pt voiced no needs/concerns today. Pt agreeable to final outreach from CTN today. CTN to sign off today. Interventions to address identified needs: Scheduled appointment with PCP-pt completed his HFU with pcp on 1/18/22  Scheduled appointment with Specialist-Pt to f/u with Dr. Rosenda Antony (cardiology) on 2/2/22~pt is aware  Reviewed and followed up on pending diagnostic tests and treatments-Pt to have INR check on 1/25/22 at his pcp's office~pt is aware  Assessment and support for treatment adherence and medication management-Pt was recently started on Coumadin 5 mg daily, as pt unable to afford Eliquis.  ~Confirmed pt is taking    [facility] follow up appointment(s):   Future Appointments   Date Time Provider Elliot Oseguera   1/25/2022  8:00 AM SCHEDULE, [facility]   2/2/2022 10:00 AM Johnna Linda MD [facility]   9/13/2022 11:00 AM Mason Edmonds MD [facility]   11/2/2022  8:30 AM Riaz Brewer MD [facility]     Follow up appointment completed? Yes. Provided contact information for future needs. No further follow-up call indicated based on severity of symptoms and risk factors.       Donnell Garrido RN

## 2022-01-21 DIAGNOSIS — R35.0 BENIGN PROSTATIC HYPERPLASIA WITH URINARY FREQUENCY: Primary | ICD-10-CM

## 2022-01-21 DIAGNOSIS — N40.1 BENIGN PROSTATIC HYPERPLASIA WITH URINARY FREQUENCY: Primary | ICD-10-CM

## 2022-01-21 RX ORDER — TAMSULOSIN HYDROCHLORIDE 0.4 MG/1
0.4 CAPSULE ORAL DAILY
Qty: 90 CAPSULE | Refills: 1 | Status: SHIPPED
Start: 2022-01-21 | End: 2022-05-02 | Stop reason: SDUPTHER

## 2022-01-21 RX ORDER — CEPHALEXIN 500 MG/1
500 CAPSULE ORAL 2 TIMES DAILY
Qty: 14 CAPSULE | Refills: 0 | Status: SHIPPED | OUTPATIENT
Start: 2022-01-21 | End: 2022-01-28

## 2022-01-21 RX ORDER — FINASTERIDE 5 MG/1
5 TABLET, FILM COATED ORAL DAILY
Qty: 90 TABLET | Refills: 1 | Status: SHIPPED
Start: 2022-01-21 | End: 2022-05-17

## 2022-01-24 ENCOUNTER — NURSE ONLY (OUTPATIENT)
Dept: FAMILY MEDICINE CLINIC | Age: 72
End: 2022-01-24
Payer: MEDICARE

## 2022-01-24 ENCOUNTER — ANTI-COAG VISIT (OUTPATIENT)
Dept: FAMILY MEDICINE CLINIC | Age: 72
End: 2022-01-24

## 2022-01-24 DIAGNOSIS — I48.0 PAF (PAROXYSMAL ATRIAL FIBRILLATION) (HCC): Primary | ICD-10-CM

## 2022-01-24 LAB
INTERNATIONAL NORMALIZATION RATIO, POC: 1.7
PROTHROMBIN TIME, POC: 20.7

## 2022-01-24 PROCEDURE — 85610 PROTHROMBIN TIME: CPT | Performed by: FAMILY MEDICINE

## 2022-01-25 ENCOUNTER — TELEPHONE (OUTPATIENT)
Dept: FAMILY MEDICINE CLINIC | Age: 72
End: 2022-01-25

## 2022-01-25 DIAGNOSIS — I10 ESSENTIAL HYPERTENSION: ICD-10-CM

## 2022-01-25 DIAGNOSIS — U07.1 PNEUMONIA DUE TO COVID-19 VIRUS: Primary | ICD-10-CM

## 2022-01-25 DIAGNOSIS — J12.82 PNEUMONIA DUE TO COVID-19 VIRUS: Primary | ICD-10-CM

## 2022-01-25 DIAGNOSIS — E11.59 TYPE 2 DIABETES MELLITUS WITH CARDIAC COMPLICATION (HCC): ICD-10-CM

## 2022-01-25 DIAGNOSIS — I48.0 PAF (PAROXYSMAL ATRIAL FIBRILLATION) (HCC): ICD-10-CM

## 2022-01-25 NOTE — TELEPHONE ENCOUNTER
Referral placed to [facility].       Electronically signed by Kd Fountain MA on 1/25/22 at 1:31 PM EST

## 2022-01-25 NOTE — TELEPHONE ENCOUNTER
----- Message from Skip Swain DO sent at 1/25/2022  9:11 AM EST -----  Can we set up home PT for him for ambulation, leg strength

## 2022-01-31 ENCOUNTER — ANTI-COAG VISIT (OUTPATIENT)
Dept: FAMILY MEDICINE CLINIC | Age: 72
End: 2022-01-31

## 2022-02-02 ENCOUNTER — OFFICE VISIT (OUTPATIENT)
Dept: CARDIOLOGY CLINIC | Age: 72
End: 2022-02-02
Payer: MEDICARE

## 2022-02-02 VITALS
BODY MASS INDEX: 27.06 KG/M2 | HEART RATE: 108 BPM | HEIGHT: 72 IN | DIASTOLIC BLOOD PRESSURE: 81 MMHG | RESPIRATION RATE: 16 BRPM | WEIGHT: 199.8 LBS | SYSTOLIC BLOOD PRESSURE: 129 MMHG

## 2022-02-02 DIAGNOSIS — I48.0 PAF (PAROXYSMAL ATRIAL FIBRILLATION) (HCC): ICD-10-CM

## 2022-02-02 DIAGNOSIS — I25.10 CORONARY ARTERY DISEASE INVOLVING NATIVE HEART WITHOUT ANGINA PECTORIS, UNSPECIFIED VESSEL OR LESION TYPE: Primary | ICD-10-CM

## 2022-02-02 PROBLEM — U07.1 COVID: Status: ACTIVE | Noted: 2022-02-02

## 2022-02-02 PROBLEM — I71.40 ABDOMINAL AORTIC ANEURYSM WITHOUT RUPTURE: Status: RESOLVED | Noted: 2021-09-03 | Resolved: 2022-02-02

## 2022-02-02 PROCEDURE — 3017F COLORECTAL CA SCREEN DOC REV: CPT | Performed by: INTERNAL MEDICINE

## 2022-02-02 PROCEDURE — 1111F DSCHRG MED/CURRENT MED MERGE: CPT | Performed by: INTERNAL MEDICINE

## 2022-02-02 PROCEDURE — G8417 CALC BMI ABV UP PARAM F/U: HCPCS | Performed by: INTERNAL MEDICINE

## 2022-02-02 PROCEDURE — 4040F PNEUMOC VAC/ADMIN/RCVD: CPT | Performed by: INTERNAL MEDICINE

## 2022-02-02 PROCEDURE — G8427 DOCREV CUR MEDS BY ELIG CLIN: HCPCS | Performed by: INTERNAL MEDICINE

## 2022-02-02 PROCEDURE — 1123F ACP DISCUSS/DSCN MKR DOCD: CPT | Performed by: INTERNAL MEDICINE

## 2022-02-02 PROCEDURE — 1036F TOBACCO NON-USER: CPT | Performed by: INTERNAL MEDICINE

## 2022-02-02 PROCEDURE — G8484 FLU IMMUNIZE NO ADMIN: HCPCS | Performed by: INTERNAL MEDICINE

## 2022-02-02 PROCEDURE — 93000 ELECTROCARDIOGRAM COMPLETE: CPT | Performed by: INTERNAL MEDICINE

## 2022-02-02 PROCEDURE — 99214 OFFICE O/P EST MOD 30 MIN: CPT | Performed by: INTERNAL MEDICINE

## 2022-02-02 NOTE — PROGRESS NOTES
Chief Complaint   Patient presents with    Atrial Fibrillation    Coronary Artery Disease       Patient Active Problem List    Diagnosis Date Noted    Essential hypertension 03/03/2012     Priority: Medium    COVID 02/02/2022    PAF (paroxysmal atrial fibrillation) ([address] [address] 01/07/2022     Overview Note:     CHADSVASC = 4        AAA (abdominal aortic aneurysm) without rupture ([facility] [address] 10/01/2021     Overview Note:     A. EVAR 10/2021      Cerebellar hemorrhage ([facility] [address] [facility] 03/14/2020    Dilated cardiomyopathy ([facility] [address] 03/14/2020     Overview Note:     A. Echo 2012: EF 30-35%  B. 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  PCI to OM1 and mid CX 2014 [facility])      COPD, very severe ([address] 06/14/2013       Current Outpatient Medications   Medication Sig Dispense Refill    tamsulosin (FLOMAX) 0.4 MG capsule Take 1 capsule by mouth daily 90 capsule 1    finasteride (PROSCAR) 5 MG tablet Take 1 tablet by mouth daily 90 tablet 1    warfarin (COUMADIN) 5 MG tablet Take 1 tablet by mouth daily 90 tablet 1    warfarin (COUMADIN) 1 MG tablet Take 1 tablet by mouth daily 90 tablet 1    warfarin (COUMADIN) 5 MG tablet Take 1 tablet by mouth daily 90 tablet 0    sotalol (BETAPACE) 80 MG tablet Take 0.5 tablets by mouth 2 times daily 60 tablet 3    metoprolol succinate (TOPROL XL) 50 MG extended release tablet Take 1 tablet by mouth 2 times daily 30 tablet 3    atorvastatin (LIPITOR) 40 MG tablet Take 1 tablet by mouth daily 90 tablet 1    lisinopril (PRINIVIL;ZESTRIL) 10 MG tablet TAKE ONE TABLET BY MOUTH EVERY DAY 90 tablet 1    metFORMIN (GLUCOPHAGE) 500 MG tablet Take 1 tablet by mouth 2 times daily (with meals) (Patient taking differently: Take 500 mg by mouth daily ) 180 tablet 1     No current facility-administered medications for this visit. No Known Allergies    Vitals:    22 1034   BP: 129/81   Pulse: 108   Resp: 16   Weight: 199 lb 12.8 oz (90.6 kg)   Height: 6' (1.829 m)       Social History     Socioeconomic History    Marital status:      Spouse name: Not on file    Number of children: Not on file    Years of education: Not on file    Highest education level: Not on file   Occupational History    Not on file   Tobacco Use    Smoking status: Former Smoker     Packs/day: 2.00     Years: 50.00     Pack years: 100.00     Types: Cigarettes     Start date: 1970     Quit date: 2014     Years since quittin.0    Smokeless tobacco: Never Used   Vaping Use    Vaping Use: Every day    Substances: Nicotine    Devices: Pre-filled or refillable cartridge   Substance and Sexual Activity    Alcohol use: Yes     Comment: rarely    Drug use: No    Sexual activity: Not on file   Other Topics Concern    Not on file   Social History Narrative    Not on file     Social Determinants of Health     Financial Resource Strain: Low Risk     Difficulty of Paying Living Expenses: Not hard at all   Food Insecurity: No Food Insecurity    Worried About [address] in the Last Year: Never true    [address] in the Last Year: Never true   Transportation Needs:     Lack of Transportation (Medical): Not on file    Lack of Transportation (Non-Medical):  Not on file   Physical Activity:     Days of Exercise per Week: Not on file    Minutes of Exercise per Session: Not on file   Stress:     Feeling of Stress : Not on file   Social Connections:     Frequency of Communication with Friends and Family: Not on file    Frequency of Social Gatherings with Friends and Family: Not on file    Attends Caodaism Services: Not on file    Active Member of Clubs or Organizations: Not on file    Attends Club or Organization Meetings: Not on file    Marital Status: Not on file   Intimate Partner Violence:     Fear of Current or Ex-Partner: Not on file    Emotionally Abused: Not on file    Physically Abused: Not on file    Sexually Abused: Not on file   Housing Stability:     Unable to Pay for Housing in the Last Year: Not on file    Number of [place] in the Last Year: Not on file    Unstable Housing in the Last Year: Not on file       Family History   Problem Relation Age of Onset    Heart Disease Mother     Cancer Father         abdominal    Cancer Brother         prostate    Cancer Brother         digestive         SUBJECTIVE: Mireya Chapman presents to the office today for hfu after recent admission for COVID. Developed AF with RVR - his EP doc was not consulted ( dr Guerrero Zuleta), only January Alexandra. His low dose  AAD betapace was not increased for some reason     He complains of no new or unstable cardiac symptoms,  and denies chest pain, orthopnea, palpitations, syncope and no ICD discharges. (ICD for sustained monomorphic VT coupled with AAD therapy). Review of Systems:   Heart: as above   Lungs: as above   Eyes: denies changes in vision or discharge. Ears: denies changes in hearing or pain. Nose: denies epistaxis or masses   Throat: denies sore throat or trouble swallowing. Neuro: denies numbness, tingling, tremors. Skin: denies rashes or itching. : denies hematuria, dysuria   GI: denies vomiting, diarrhea   Psych: denies mood changed, anxiety, depression. all others negative. Physical Exam   /81   Pulse 108   Resp 16   Ht 6' (1.829 m)   Wt 199 lb 12.8 oz (90.6 kg)   BMI 27.10 kg/m²   Constitutional: Oriented to person, place, and time. Obese No distress. Head: Normocephalic and atraumatic. Eyes: EOM are normal. Pupils are equal, round, and reactive to light. Neck: Normal range of motion. Neck supple.  No hepatojugular reflux and no JVD present. Carotid bruit is not present. Cardiovascular: Normal rate, regular rhythm, normal heart sounds and intact distal pulses. Exam reveals no gallop and no friction rub. No murmur heard. Pulmonary/Chest: Effort normal and breath sounds normal. No respiratory distress. No wheezes. No rales. Abdominal: Soft. Bowel sounds are normal. No distension and no mass. No tenderness. No rebound and no guarding. Musculoskeletal: Normal range of motion. No edema and no tenderness. Neurological: Alert and oriented to person, place, and time. Skin: Skin is warm and dry. No rash noted. Not diaphoretic. No erythema. Psychiatric: Normal mood and affect.  Behavior is normal.     EKG:  Sinus tachycardia with PACs, nonspecific ST and T waves changes, NST    ASSESSMENT AND PLAN:  Patient Active Problem List   Diagnosis    Essential hypertension    COPD, very severe         CAD (coronary artery disease)    Diabetes mellitus type II                Type 2 diabetes mellitus without complication, without long-term current use of insulin         Mixed hyperlipidemia    ICD (implantable cardioverter-defibrillator) in place        Dilated cardiomyopathy     Abdominal aortic aneurysm without rupture      Debilitated elderly gentleman with multiple cardiac and non cardiac issues  Recent COVID admission with provocation of AF - his EP physician was not consulted and our cardiology team did not adjust AAD  Apparently has had OP conversion to sinus    Raise atorvastatin to 80 mg q PM      OV one year      Rhona Rodriguez M.D  [facility]

## 2022-02-07 ENCOUNTER — ANTI-COAG VISIT (OUTPATIENT)
Dept: FAMILY MEDICINE CLINIC | Age: 72
End: 2022-02-07

## 2022-02-11 NOTE — PROGRESS NOTES
Patient instructed to continue current dose of coumadin, and to recheck on Monday 2/14/22. Appointment scheduled.

## 2022-02-14 ENCOUNTER — ANTI-COAG VISIT (OUTPATIENT)
Dept: FAMILY MEDICINE CLINIC | Age: 72
End: 2022-02-14

## 2022-02-28 ENCOUNTER — NURSE ONLY (OUTPATIENT)
Dept: FAMILY MEDICINE CLINIC | Age: 72
End: 2022-02-28
Payer: MEDICARE

## 2022-02-28 DIAGNOSIS — I48.0 PAF (PAROXYSMAL ATRIAL FIBRILLATION) (HCC): Primary | ICD-10-CM

## 2022-02-28 LAB
INTERNATIONAL NORMALIZATION RATIO, POC: 1.8
PROTHROMBIN TIME, POC: 21.2

## 2022-02-28 PROCEDURE — 85610 PROTHROMBIN TIME: CPT | Performed by: FAMILY MEDICINE

## 2022-02-28 NOTE — PROGRESS NOTES
This MA attempted to reach pt. No answer. Voicemail not set up, unable to leave message.     Electronically signed by Ana Maria Randall MA on 2/28/22 at 10:55 AM EST

## 2022-03-14 ENCOUNTER — NURSE ONLY (OUTPATIENT)
Dept: FAMILY MEDICINE CLINIC | Age: 72
End: 2022-03-14
Payer: MEDICARE

## 2022-03-14 DIAGNOSIS — I48.0 PAF (PAROXYSMAL ATRIAL FIBRILLATION) (HCC): Primary | ICD-10-CM

## 2022-03-14 LAB
INTERNATIONAL NORMALIZATION RATIO, POC: 3.7
PROTHROMBIN TIME, POC: 44.9

## 2022-03-14 PROCEDURE — 85610 PROTHROMBIN TIME: CPT | Performed by: FAMILY MEDICINE

## 2022-03-14 NOTE — PROGRESS NOTES
This MA spoke with pt - pt advised of coumadin dose instructions and recheck date. Pt scheduled for recheck 3/16.       Electronically signed by Kd Fountain MA on 3/14/22 at 2:01 PM EDT

## 2022-03-16 ENCOUNTER — ANTI-COAG VISIT (OUTPATIENT)
Dept: FAMILY MEDICINE CLINIC | Age: 72
End: 2022-03-16

## 2022-03-16 NOTE — PROGRESS NOTES
This MA spoke with pt. Pt advised of coumadin instructions and recheck date.  Pt scheduled for recheck Friday 3/18 at 8:15am.      Electronically signed by Damaso Miller on 3/16/22 at 3:38 PM EDT

## 2022-03-18 ENCOUNTER — NURSE ONLY (OUTPATIENT)
Dept: FAMILY MEDICINE CLINIC | Age: 72
End: 2022-03-18
Payer: MEDICARE

## 2022-03-18 DIAGNOSIS — I48.0 PAF (PAROXYSMAL ATRIAL FIBRILLATION) (HCC): Primary | ICD-10-CM

## 2022-03-18 LAB
INTERNATIONAL NORMALIZATION RATIO, POC: 1.9
PROTHROMBIN TIME, POC: 22.3

## 2022-03-18 PROCEDURE — 85610 PROTHROMBIN TIME: CPT | Performed by: FAMILY MEDICINE

## 2022-03-18 NOTE — PROGRESS NOTES
Patient instructed to continue taking 5mg coumadin daily, scheduled for recheck in 1 week on 3/25/2022.

## 2022-03-25 ENCOUNTER — ANTI-COAG VISIT (OUTPATIENT)
Dept: FAMILY MEDICINE CLINIC | Age: 72
End: 2022-03-25

## 2022-03-30 NOTE — PROGRESS NOTES
This MA attempted to reach pt. No answer. This MA left message for pt requesting return call to discuss results.     Electronically signed by Laura Cast MA on 3/30/22 at 4:16 PM EDT

## 2022-04-08 ENCOUNTER — CARE COORDINATION (OUTPATIENT)
Dept: CARE COORDINATION | Age: 72
End: 2022-04-08

## 2022-04-08 SDOH — HEALTH STABILITY: PHYSICAL HEALTH: ON AVERAGE, HOW MANY MINUTES DO YOU ENGAGE IN EXERCISE AT THIS LEVEL?: 10 MIN

## 2022-04-08 SDOH — HEALTH STABILITY: PHYSICAL HEALTH: ON AVERAGE, HOW MANY DAYS PER WEEK DO YOU ENGAGE IN MODERATE TO STRENUOUS EXERCISE (LIKE A BRISK WALK)?: 6 DAYS

## 2022-04-08 SDOH — ECONOMIC STABILITY: HOUSING INSECURITY
IN THE LAST 12 MONTHS, WAS THERE A TIME WHEN YOU DID NOT HAVE A STEADY PLACE TO SLEEP OR SLEPT IN A SHELTER (INCLUDING NOW)?: NO

## 2022-04-08 SDOH — ECONOMIC STABILITY: INCOME INSECURITY: IN THE LAST 12 MONTHS, WAS THERE A TIME WHEN YOU WERE NOT ABLE TO PAY THE MORTGAGE OR RENT ON TIME?: NO

## 2022-04-08 SDOH — ECONOMIC STABILITY: TRANSPORTATION INSECURITY
IN THE PAST 12 MONTHS, HAS LACK OF TRANSPORTATION KEPT YOU FROM MEETINGS, WORK, OR FROM GETTING THINGS NEEDED FOR DAILY LIVING?: NO

## 2022-04-08 SDOH — ECONOMIC STABILITY: HOUSING INSECURITY: IN THE LAST 12 MONTHS, HOW MANY PLACES HAVE YOU LIVED?: 1

## 2022-04-08 SDOH — ECONOMIC STABILITY: TRANSPORTATION INSECURITY
IN THE PAST 12 MONTHS, HAS THE LACK OF TRANSPORTATION KEPT YOU FROM MEDICAL APPOINTMENTS OR FROM GETTING MEDICATIONS?: NO

## 2022-04-08 ASSESSMENT — SOCIAL DETERMINANTS OF HEALTH (SDOH)
HOW OFTEN DO YOU ATTEND CHURCH OR RELIGIOUS SERVICES?: NEVER
IN A TYPICAL WEEK, HOW MANY TIMES DO YOU TALK ON THE PHONE WITH FAMILY, FRIENDS, OR NEIGHBORS?: MORE THAN THREE TIMES A WEEK
HOW OFTEN DO YOU ATTENT MEETINGS OF THE CLUB OR ORGANIZATION YOU BELONG TO?: NEVER
DO YOU BELONG TO ANY CLUBS OR ORGANIZATIONS SUCH AS CHURCH GROUPS UNIONS, FRATERNAL OR ATHLETIC GROUPS, OR SCHOOL GROUPS?: NO
HOW OFTEN DO YOU GET TOGETHER WITH FRIENDS OR RELATIVES?: MORE THAN THREE TIMES A WEEK

## 2022-04-08 ASSESSMENT — LIFESTYLE VARIABLES
HOW MANY STANDARD DRINKS CONTAINING ALCOHOL DO YOU HAVE ON A TYPICAL DAY: 1 OR 2
HOW OFTEN DO YOU HAVE A DRINK CONTAINING ALCOHOL: MONTHLY OR LESS

## 2022-04-08 ASSESSMENT — ENCOUNTER SYMPTOMS: DYSPNEA ASSOCIATED WITH: EXERTION

## 2022-04-08 NOTE — LETTER
4/8/2022    [facility] [address] [facility]    Dear Michael Reyes,    I enjoyed speaking with you and wanted to send some additional information. Martha Colunga,  and I will work together to ensure your needs are met and help you achieve your health goals. We are committed to walk with you on this journey and look forward to working with you. Please feel free to contact me with any questions or concerns.   I am available by phone    In good health,     Edgar Muniz MSN, RN, [address] [place] [address] Manager  Cell: [phone]

## 2022-04-08 NOTE — CARE COORDINATION
Ambulatory Care Coordination Note  4/8/2022  CM Risk Score: 12  Charlson 10 Year Mortality Risk Score: 100%     ACC: Hannah Lynn, RN    Summary Note: Explained the Care Coordination Program to patient. Verbalized understanding and is agreeable to service. Denies s/s of Hypo/Hyperglycemia. Discussed DM Zone Tool and verbalizes understanding ACM will send information via [address]. FBS:  166  Pt exercises as tolerated  Watches diet by avoiding concentrated sweets and carbs    Denies s/s of COPD Exacerbation. Discussed the Zone Tool and verbalizes understanding. Pt states he has questioned why this diagnosis shows in his chart. Denies s/s CHF Exacerbation. Discussed Zone Tool and verbalizes understanding ACM will send information. Today's weight: 199#  Pt encouraged to weigh self daily and ACM reviewed the importance of doing so. Discussed bleeding precautions as pt is on Coumadin. Also discussed fall precautions. ACM to send more information  Pt is concerned he doesn't have an appt set for his next INR. States typically it is done Monday at 8:15am.  ACM sent message to Lead Clinical, Denis Villarreal, to follow up with pt. FOLLOW-UP PLAN:    Discuss:   -Did pt receive information via US mail?  -DM Management/Zone Tool  -CHF Management/Zone Tool  -Exercise Status  -Current Weight  -Current Blood Sugar  Falls/Near Falls? -Appointment Reminders  -Needs appointment scheduled with PCP  -Material sent this encounter: Welcome Letter  Diabetic Education  CHF Education  Fall Precautions  Bleeding Precautions  HTN Education  CVA Education  Where to go for Care information  -Referral: Declined  -Appointment Scheduled:  Did pt get appt scheduled for INR on Monday?     Next Anticipated Outreach by [address] Team:  1 Week    Also discussed low sodium diet        Ambulatory Care Coordination Assessment    Care Coordination Protocol  Program Enrollment: Complex Care  Referral from Primary Care Provider: No  Week 1 - Initial Assessment     Do you have all of your prescriptions and are they filled?: Yes  Barriers to medication adherence: None  Are you able to afford your medications?: Yes  How often do you have trouble taking your medications the way you have been told to take them?: I always take them as prescribed. Do you have Home O2 Therapy?: No      Ability to seek help/take action for Emergent Urgent situations i.e. fire, crime, inclement weather or health crisis. : Independent  Ability to ambulate to restroom: Independent  Ability handle personal hygeine needs (bathing/dressing/grooming): Independent  Ability to manage Medications: Independent  Ability to prepare Food Preparation: Independent  Ability to maintain home (clean home, laundry): Independent  Ability to drive and/or has transportation: Independent  Ability to do shopping: Independent  Ability to manage finances:  Independent  Is patient able to live independently?: Yes     Current Housing: Private Residence        Per the Fall Risk Screening, did the patient have 2 or more falls or 1 fall with injury in the past year?: No     Frequent urination at night?: No  Do you use rails/bars?: Yes  Do you have a non-slip tub mat?: Yes     Are you experiencing loss of meaning?: No  Are you experiencing loss of hope and peace?: No     Thinking about your patient's physical health needs, are there any symptoms or problems (risk indicators) you are unsure about that require further investigation?: Mild vague physical symptoms or problems; but do not impact on daily life or are not of concern to patient   Are the patients physical health problems impacting on their mental well-being?: Mild impact on mental well-being e.g. \"\"feeling fed-up\"\", \"\"reduced enjoyment\"\"   Are there any problems with your patients lifestyle behaviors (alcohol, drugs, diet, exercise) that are impacting on physical or mental well-being?: Some mild concern of potential negative impact on well-being Do you have any other concerns about your patients mental well-being? How would you rate their severity and impact on the patient?: Mild problems - don't interfere with function   How would you rate their home environment in terms of safety and stability (including domestic violence, insecure housing, neighbor harassment)?: Safe, stable, but with some inconsistency   How do daily activities impact on the patient's well-being? (include current or anticipated unemployment, work, caregiving, access to transportation or other): Some general dissatisfaction but no concern   How would you rate their social network (family, work, friends)?: Adequate participation with social networks   How would you rate their financial resources (including ability to afford all required medical care)?: Financially secure, some resource challenges   How wells does the patient now understand their health and well-being (symptoms, signs or risk factors) and what they need to do to manage their health?: Reasonable to good understanding and already engages in managing health or is willing to undertake better management   How well do you think your patient can engage in healthcare discussions? (Barriers include language, deafness, aphasia, alcohol or drug problems, learning difficulties, concentration): Adequate communication, with or without minor barriers   Do other services need to be involved to help this patient?: Other care/services in place and adequate   Are current services involved with this patient well-coordinated? (Include coordination with other services you are now recommendation): Required care/services in place and adequately coordinated   Suggested Interventions and Community Resources  Fall Risk Prevention:  In Process Home Health Services: Completed   Other Services or Interventions: Bleeding precautions   Medication Assistance Program: Declined   Medi Set or Pill Pack: Declined   Pharmacist: Declined   Registered Dietician: Declined   Social Work: Declined   Transportation Services: Declined   Zone Management Tools: In Process         Set up/Review Goals, Set up/Review an Education Plan, Schedule an appointment with the patient's PCP              Prior to Admission medications    Medication Sig Start Date End Date Taking? Authorizing Provider   tamsulosin (FLOMAX) 0.4 MG capsule Take 1 capsule by mouth daily 1/21/22   Flaca Dawson, DO   finasteride (PROSCAR) 5 MG tablet Take 1 tablet by mouth daily 1/21/22   Flaca Dawson, DO   warfarin (COUMADIN) 5 MG tablet Take 1 tablet by mouth daily 1/18/22   Flaca Dawson, DO   warfarin (COUMADIN) 1 MG tablet Take 1 tablet by mouth daily 1/18/22   Flaca Dawson, DO   warfarin (COUMADIN) 5 MG tablet Take 1 tablet by mouth daily 1/13/22 4/13/22  Tanvi Colunga, DO   sotalol (BETAPACE) 80 MG tablet Take 0.5 tablets by mouth 2 times daily 1/8/22   Haley Aragon MD   metoprolol succinate (TOPROL XL) 50 MG extended release tablet Take 1 tablet by mouth 2 times daily 1/8/22   Haley Aragon MD   atorvastatin (LIPITOR) 40 MG tablet Take 1 tablet by mouth daily 1/7/22 7/6/22  Toñito Colunga, DO   lisinopril (PRINIVIL;ZESTRIL) 10 MG tablet TAKE ONE TABLET BY MOUTH EVERY DAY 1/7/22   Toñito Colunga, DO   metFORMIN (GLUCOPHAGE) 500 MG tablet Take 1 tablet by mouth 2 times daily (with meals)  Patient taking differently: Take 500 mg by mouth daily  1/7/22   Flaca Dawson, DO       Future Appointments   Date Time Provider Elliot Oseguera   11/2/2022  8:30 AM Ishmael Cervantes MD [facility]   2/2/2023 10:00 AM Gisele Perry MD [address] [facility]     ,   Diabetes Assessment    Medic Alert ID: No  Meal Planning: Avoidance of concentrated sweets, Carb counting   How often do you test your blood sugar?: Daily   Do you have barriers with adherence to non-pharmacologic self-management interventions?  (Nutrition/Exercise/Self-Monitoring): No   Have you ever had to go to the ED for symptoms of low blood sugar?: No       No patient-reported symptoms   Do you have hyperglycemia symptoms?: No   Do you have hypoglycemia symptoms?: No   Last Blood Sugar Value: 166   Blood Sugar Monitoring Regimen: Morning Fasting      ,   Congestive Heart Failure Assessment    Are you currently restricting fluids?: No Restriction  Do you understand a low sodium diet?: Yes  Do you understand how to read food labels?: Yes  How many restaurant meals do you eat per week?: 0  Do you salt your food before tasting it?: No     No patient-reported symptoms      Symptoms:  None: Yes      Patient-reported weight (lb): 199  Salt intake watch compared to last visit: stable     ,   COPD Assessment    Does the patient understand envrionmental exposure?: Yes  Is the patient able to verbalize Rescue vs. Long Acting medications?: No  Does the patient have a nebulizer?: No  Does the patient use a space with inhaled medications?: No     No patient-reported symptoms         Symptoms:  None: Yes      Symptom course: stable  Breathlessness: exertion  Change in chronic cough?: No/At Baseline  Change in sputum?: No/At Baseline  Self Monitoring - SaO2: No      and   General Assessment    Do you have any symptoms that are causing concern?: No

## 2022-04-11 ENCOUNTER — NURSE ONLY (OUTPATIENT)
Dept: FAMILY MEDICINE CLINIC | Age: 72
End: 2022-04-11
Payer: MEDICARE

## 2022-04-11 VITALS
SYSTOLIC BLOOD PRESSURE: 132 MMHG | HEART RATE: 93 BPM | OXYGEN SATURATION: 94 % | TEMPERATURE: 98 F | HEIGHT: 72 IN | DIASTOLIC BLOOD PRESSURE: 78 MMHG | WEIGHT: 193 LBS | BODY MASS INDEX: 26.14 KG/M2 | RESPIRATION RATE: 18 BRPM

## 2022-04-11 DIAGNOSIS — C61 PROSTATE CANCER (HCC): Primary | ICD-10-CM

## 2022-04-11 DIAGNOSIS — E11.59 TYPE 2 DIABETES MELLITUS WITH CARDIAC COMPLICATION (HCC): ICD-10-CM

## 2022-04-11 DIAGNOSIS — R53.83 FATIGUE, UNSPECIFIED TYPE: ICD-10-CM

## 2022-04-11 DIAGNOSIS — C61 PROSTATE CANCER (HCC): ICD-10-CM

## 2022-04-11 DIAGNOSIS — E78.2 MIXED HYPERLIPIDEMIA: ICD-10-CM

## 2022-04-11 DIAGNOSIS — I48.0 PAF (PAROXYSMAL ATRIAL FIBRILLATION) (HCC): Primary | ICD-10-CM

## 2022-04-11 LAB
ALBUMIN SERPL-MCNC: 3.6 G/DL (ref 3.5–5.2)
ALP BLD-CCNC: 175 U/L (ref 40–129)
ALT SERPL-CCNC: 18 U/L (ref 0–40)
ANION GAP SERPL CALCULATED.3IONS-SCNC: 15 MMOL/L (ref 7–16)
AST SERPL-CCNC: 29 U/L (ref 0–39)
BASOPHILS ABSOLUTE: 0.11 E9/L (ref 0–0.2)
BASOPHILS RELATIVE PERCENT: 1.2 % (ref 0–2)
BILIRUB SERPL-MCNC: 0.3 MG/DL (ref 0–1.2)
BUN BLDV-MCNC: 21 MG/DL (ref 6–23)
CALCIUM SERPL-MCNC: 9.4 MG/DL (ref 8.6–10.2)
CHLORIDE BLD-SCNC: 101 MMOL/L (ref 98–107)
CHOLESTEROL, TOTAL: 132 MG/DL (ref 0–199)
CO2: 22 MMOL/L (ref 22–29)
CREAT SERPL-MCNC: 1.4 MG/DL (ref 0.7–1.2)
EOSINOPHILS ABSOLUTE: 0.75 E9/L (ref 0.05–0.5)
EOSINOPHILS RELATIVE PERCENT: 8.2 % (ref 0–6)
GFR AFRICAN AMERICAN: >60
GFR NON-AFRICAN AMERICAN: 50 ML/MIN/1.73
GLUCOSE BLD-MCNC: 232 MG/DL (ref 74–99)
HBA1C MFR BLD: 10.1 % (ref 4–5.6)
HCT VFR BLD CALC: 36 % (ref 37–54)
HDLC SERPL-MCNC: 27 MG/DL
HEMOGLOBIN: 10.9 G/DL (ref 12.5–16.5)
IMMATURE GRANULOCYTES #: 0.08 E9/L
IMMATURE GRANULOCYTES %: 0.9 % (ref 0–5)
INTERNATIONAL NORMALIZATION RATIO, POC: 2.3
LDL CHOLESTEROL CALCULATED: 83 MG/DL (ref 0–99)
LYMPHOCYTES ABSOLUTE: 1.71 E9/L (ref 1.5–4)
LYMPHOCYTES RELATIVE PERCENT: 18.6 % (ref 20–42)
MCH RBC QN AUTO: 25.6 PG (ref 26–35)
MCHC RBC AUTO-ENTMCNC: 30.3 % (ref 32–34.5)
MCV RBC AUTO: 84.7 FL (ref 80–99.9)
MONOCYTES ABSOLUTE: 0.91 E9/L (ref 0.1–0.95)
MONOCYTES RELATIVE PERCENT: 9.9 % (ref 2–12)
NEUTROPHILS ABSOLUTE: 5.64 E9/L (ref 1.8–7.3)
NEUTROPHILS RELATIVE PERCENT: 61.2 % (ref 43–80)
PDW BLD-RTO: 17.4 FL (ref 11.5–15)
PLATELET # BLD: 336 E9/L (ref 130–450)
PMV BLD AUTO: 9.9 FL (ref 7–12)
POTASSIUM SERPL-SCNC: 5.2 MMOL/L (ref 3.5–5)
PROSTATE SPECIFIC ANTIGEN: <0.03 NG/ML (ref 0–4)
PROTHROMBIN TIME, POC: NORMAL
RBC # BLD: 4.25 E12/L (ref 3.8–5.8)
SODIUM BLD-SCNC: 138 MMOL/L (ref 132–146)
TOTAL PROTEIN: 7.8 G/DL (ref 6.4–8.3)
TRIGL SERPL-MCNC: 112 MG/DL (ref 0–149)
TSH SERPL DL<=0.05 MIU/L-ACNC: 1.7 UIU/ML (ref 0.27–4.2)
URIC ACID, SERUM: 6.7 MG/DL (ref 3.4–7)
VLDLC SERPL CALC-MCNC: 22 MG/DL
WBC # BLD: 9.2 E9/L (ref 4.5–11.5)

## 2022-04-11 PROCEDURE — 85610 PROTHROMBIN TIME: CPT | Performed by: FAMILY MEDICINE

## 2022-04-11 ASSESSMENT — PATIENT HEALTH QUESTIONNAIRE - PHQ9
SUM OF ALL RESPONSES TO PHQ QUESTIONS 1-9: 0
1. LITTLE INTEREST OR PLEASURE IN DOING THINGS: 0
SUM OF ALL RESPONSES TO PHQ QUESTIONS 1-9: 0
2. FEELING DOWN, DEPRESSED OR HOPELESS: 0
SUM OF ALL RESPONSES TO PHQ9 QUESTIONS 1 & 2: 0

## 2022-04-11 ASSESSMENT — LIFESTYLE VARIABLES
HOW OFTEN DO YOU HAVE A DRINK CONTAINING ALCOHOL: NEVER
HOW MANY STANDARD DRINKS CONTAINING ALCOHOL DO YOU HAVE ON A TYPICAL DAY: 1 OR 2

## 2022-04-12 DIAGNOSIS — E11.59 TYPE 2 DIABETES MELLITUS WITH CARDIAC COMPLICATION (HCC): Primary | ICD-10-CM

## 2022-04-12 RX ORDER — ORAL SEMAGLUTIDE 3 MG/1
3 TABLET ORAL DAILY
Qty: 90 TABLET | Refills: 1 | Status: SHIPPED
Start: 2022-04-12 | End: 2022-04-18

## 2022-04-12 NOTE — RESULT ENCOUNTER NOTE
His A1C is 10 and to high  This could be why he is dizzy and weak as well  I want to send in Rybelsus that will help his sugar and be good for his kidneys too

## 2022-04-15 ENCOUNTER — CARE COORDINATION (OUTPATIENT)
Dept: CARE COORDINATION | Age: 72
End: 2022-04-15

## 2022-04-15 ASSESSMENT — ENCOUNTER SYMPTOMS: DYSPNEA ASSOCIATED WITH: MINIMAL EXERTION

## 2022-04-15 NOTE — CARE COORDINATION
Ambulatory Care Coordination Note  4/15/2022  CM Risk Score: 12  Charlson 10 Year Mortality Risk Score: 100%     ACC: Edgar Muniz RN    Summary Note: Denies s/s of Hypo/Hyperglycemia. Discussed DM Zone Tool and verbalizes understanding. FBS:  Has not checked in a few days  Pt's last A1C was 10.1 on 4/11/22, and PCP prescribed Rybelsus. He has not picked it up. Pt states that medication is $800/month and he can't afford it. States Ozempic is even more expensive. Pt states his income on Social Security is $1700/month. Pt is agreeable to see if there is any help Prescription Assistance for the Rybelsus. ACM to refer to Prescription Assistance Program.  Pt has appt with PCP 4/18/22. Denies s/s of COPD Exacerbation. Discussed the Zone Tool and verbalizes understanding. Denies s/s CHF Exacerbation. Discussed Zone Tool and verbalizes understanding. Pt states he does feel a bit SOB, but attributes that to today being day 3 of constipation. Pt encouraged to drink water and be active. States being more active is out of the question, as pt is unsure about his bowels. He states he has stool softeners, but has not used them. States he will. FOLLOW-UP PLAN:    Discuss:   -Still constipated? PAP referral sent  -DM Management/Zone Tool  -CHF Management/Zone Tool  -COPD Management/Zone Tool  -Exercise Status  Falls/Near Falls? -OV AVS Reinforcement  -Appointment Reminders    Next Anticipated Outreach by [address] Team:  1 Week            Care Coordination Interventions    Program Enrollment: Complex Care  Referral from Primary Care Provider: No  Suggested Interventions and Community Resources  Fall Risk Prevention: In Process (Comment: Fall precautions)  Home Health Services: Completed  Medication Assistance Program: [address] or Pill Pack: Declined  Pharmacist: Declined  Registered Dietician: Declined  Social Work: Declined  Transportation Support: Declined  Zone Management Tools:  In Process (Comment: DM, COPD)  Other Services or Interventions: Bleeding precautions         Goals Addressed                 This Visit's Progress     Conditions and Symptoms   On track     I will schedule office visits, as directed by my provider. I will keep my appointment or reschedule if I have to cancel. I will notify my provider of any barriers to my plan of care. I will follow my Zone Management tool to seek urgent or emergent care. I will notify my provider of any symptoms that indicate a worsening of my condition. Barriers: lack of support and lack of education  Plan for overcoming my barriers: Work with care team  Confidence: 10/10  Anticipated Goal Completion Date: 7/8/22         Medication Management   On track     I will take my medication as directed. I will notify my provider of any problems with medications, like adverse effects or side effects. I will notify my provider/Care Coordinator if I am unable to afford my medications. I will notify my provider for advice before I stop taking any of my medication. Barriers: lack of support and lack of education  Plan for overcoming my barriers: Work with care team and family. Follow plan of care. Ask questions, voice concerns  Confidence: 10/10  Anticipated Goal Completion Date: 8/8/22            Prior to Admission medications    Medication Sig Start Date End Date Taking?  Authorizing Provider   Semaglutide (RYBELSUS) 3 MG TABS Take 3 mg by mouth daily  Patient not taking: Reported on 4/15/2022 4/12/22   Letitia Colunga, DO   tamsulosin [facility]) 0.4 MG capsule Take 1 capsule by mouth daily 1/21/22   Lisagene Tallmadge, DO   finasteride (PROSCAR) 5 MG tablet Take 1 tablet by mouth daily 1/21/22   Lugene Felt, DO   warfarin (COUMADIN) 5 MG tablet Take 1 tablet by mouth daily 1/18/22   Lugene Felt, DO   warfarin (COUMADIN) 1 MG tablet Take 1 tablet by mouth daily 1/18/22   Rhenda Asher Colunga, DO   warfarin (COUMADIN) 5 MG tablet Take 1 tablet by mouth daily 1/13/22 4/13/22  Ihsan Colunga,    sotalol (BETAPACE) 80 MG tablet Take 0.5 tablets by mouth 2 times daily 1/8/22   Mike Barlow MD   metoprolol succinate (TOPROL XL) 50 MG extended release tablet Take 1 tablet by mouth 2 times daily 1/8/22   Mike Barlow MD   atorvastatin (LIPITOR) 40 MG tablet Take 1 tablet by mouth daily 1/7/22 7/6/22  Toñito Colunga DO   lisinopril (PRINIVIL;ZESTRIL) 10 MG tablet TAKE ONE TABLET BY MOUTH EVERY DAY 1/7/22   Toñito Colunga, DO   metFORMIN (GLUCOPHAGE) 500 MG tablet Take 1 tablet by mouth 2 times daily (with meals)  Patient taking differently: Take 500 mg by mouth daily  1/7/22   Darion Nava, DO       Future Appointments   Date Time Provider Elliot Oseguera   4/18/2022  9:15 AM Ihsan Bustillo DO [facility]   11/2/2022  8:30 AM Hilda House MD VASC/MED [facility]   2/2/2023 10:00 AM Joel Soto MD [facility]     ,   Diabetes Assessment    Medic Alert ID: No  Meal Planning: Avoidance of concentrated sweets, Carb counting   How often do you test your blood sugar?: Daily   Do you have barriers with adherence to non-pharmacologic self-management interventions?  (Nutrition/Exercise/Self-Monitoring): No   Have you ever had to go to the ED for symptoms of low blood sugar?: No          ,   Congestive Heart Failure Assessment    Are you currently restricting fluids?: No Restriction  Do you understand a low sodium diet?: Yes  Do you understand how to read food labels?: Yes  How many restaurant meals do you eat per week?: 0  Do you salt your food before tasting it?: No     No patient-reported symptoms      Symptoms:  None: Yes      Symptom course: stable      and   COPD Assessment    Does the patient understand envrionmental exposure?: Yes  Is the patient able to verbalize Rescue vs. Long Acting medications?: No  Does the patient have a nebulizer?: No  Does the patient use a space with inhaled

## 2022-04-18 ENCOUNTER — OFFICE VISIT (OUTPATIENT)
Dept: FAMILY MEDICINE CLINIC | Age: 72
End: 2022-04-18
Payer: MEDICARE

## 2022-04-18 VITALS
DIASTOLIC BLOOD PRESSURE: 76 MMHG | BODY MASS INDEX: 27.09 KG/M2 | TEMPERATURE: 98 F | OXYGEN SATURATION: 96 % | HEIGHT: 72 IN | HEART RATE: 89 BPM | WEIGHT: 200 LBS | SYSTOLIC BLOOD PRESSURE: 122 MMHG

## 2022-04-18 DIAGNOSIS — Z00.00 ENCOUNTER FOR MEDICARE ANNUAL WELLNESS EXAM: ICD-10-CM

## 2022-04-18 DIAGNOSIS — Z00.00 MEDICARE ANNUAL WELLNESS VISIT, SUBSEQUENT: Primary | ICD-10-CM

## 2022-04-18 DIAGNOSIS — I50.9 CONGESTIVE HEART FAILURE, UNSPECIFIED HF CHRONICITY, UNSPECIFIED HEART FAILURE TYPE (HCC): ICD-10-CM

## 2022-04-18 DIAGNOSIS — I48.0 PAROXYSMAL ATRIAL FIBRILLATION (HCC): ICD-10-CM

## 2022-04-18 PROCEDURE — 4040F PNEUMOC VAC/ADMIN/RCVD: CPT | Performed by: FAMILY MEDICINE

## 2022-04-18 PROCEDURE — 3017F COLORECTAL CA SCREEN DOC REV: CPT | Performed by: FAMILY MEDICINE

## 2022-04-18 PROCEDURE — G0439 PPPS, SUBSEQ VISIT: HCPCS | Performed by: FAMILY MEDICINE

## 2022-04-18 PROCEDURE — 1123F ACP DISCUSS/DSCN MKR DOCD: CPT | Performed by: FAMILY MEDICINE

## 2022-04-18 RX ORDER — LISINOPRIL 10 MG/1
TABLET ORAL
Qty: 90 TABLET | Refills: 1 | Status: ON HOLD
Start: 2022-04-18 | End: 2022-05-09 | Stop reason: HOSPADM

## 2022-04-18 RX ORDER — WARFARIN SODIUM 5 MG/1
5 TABLET ORAL DAILY
Qty: 90 TABLET | Refills: 1 | Status: ON HOLD
Start: 2022-04-18 | End: 2022-05-09

## 2022-04-18 ASSESSMENT — PATIENT HEALTH QUESTIONNAIRE - PHQ9
2. FEELING DOWN, DEPRESSED OR HOPELESS: 0
SUM OF ALL RESPONSES TO PHQ QUESTIONS 1-9: 1
SUM OF ALL RESPONSES TO PHQ9 QUESTIONS 1 & 2: 1
1. LITTLE INTEREST OR PLEASURE IN DOING THINGS: 1
SUM OF ALL RESPONSES TO PHQ QUESTIONS 1-9: 1

## 2022-04-18 ASSESSMENT — LIFESTYLE VARIABLES: HOW OFTEN DO YOU HAVE A DRINK CONTAINING ALCOHOL: MONTHLY OR LESS

## 2022-04-18 NOTE — PATIENT INSTRUCTIONS
Personalized Preventive Plan for Jose May - 4/18/2022  Medicare offers a range of preventive health benefits. Some of the tests and screenings are paid in full while other may be subject to a deductible, co-insurance, and/or copay. Some of these benefits include a comprehensive review of your medical history including lifestyle, illnesses that may run in your family, and various assessments and screenings as appropriate. After reviewing your medical record and screening and assessments performed today your provider may have ordered immunizations, labs, imaging, and/or referrals for you. A list of these orders (if applicable) as well as your Preventive Care list are included within your After Visit Summary for your review. Other Preventive Recommendations:    · A preventive eye exam performed by an eye specialist is recommended every 1-2 years to screen for glaucoma; cataracts, macular degeneration, and other eye disorders. · A preventive dental visit is recommended every 6 months. · Try to get at least 150 minutes of exercise per week or 10,000 steps per day on a pedometer . · Order or download the FREE \"Exercise & Physical Activity: Your Everyday Guide\" from [employer]. Call 6-630.715.5705 or search [employer] online. · You need 9166-0046 mg of calcium and 9568-6226 IU of vitamin D per day. It is possible to meet your calcium requirement with diet alone, but a vitamin D supplement is usually necessary to meet this goal.  · When exposed to the sun, use a sunscreen that protects against both UVA and UVB radiation with an SPF of 30 or greater. Reapply every 2 to 3 hours or after sweating, drying off with a towel, or swimming. · Always wear a seat belt when traveling in a car. Always wear a helmet when riding a bicycle or motorcycle.

## 2022-04-18 NOTE — PROGRESS NOTES
Medicare Annual Wellness Visit    Alessandra Mendosa is here for Medicare AWV (annual wellness visit)    Assessment & Plan   Encounter for Medicare annual wellness exam    ---VASCULAR PANEL  A) asa, plavix, aggrenox  B) COUMADIN,  pletal, tzd, STATIN  C) ACE, , hctz, folic, ccb  D) cannikinumab, fish oils     ---CARDIAC---COUMADIN, ACE, BETA, STATIN, hctz, ( ccb )    A) ACE  or arb  B) LIPITOR 40  or crestor ( 20 to 40 )  C) glp-1 or sglt 2     Congestive heart failure, unspecified HF chronicity, unspecified heart failure type (HCC)  -     lisinopril (PRINIVIL;ZESTRIL) 10 MG tablet; TAKE ONE TABLET BY MOUTH EVERY DAY, Disp-90 tablet, R-1Normal  Paroxysmal atrial fibrillation (HCC)  -     warfarin (COUMADIN) 5 MG tablet; Take 1 tablet by mouth daily, Disp-90 tablet, R-1Normal      Recommendations for Preventive Services Due: see orders and patient instructions/AVS.  Recommended screening schedule for the next 5-10 years is provided to the patient in written form: see Patient Instructions/AVS.     No follow-ups on file. Subjective   The following acute and/or chronic problems were also addressed today:    Patient's complete Health Risk Assessment and screening values have been reviewed and are found in Flowsheets. The following problems were reviewed today and where indicated follow up appointments were made and/or referrals ordered.     Positive Risk Factor Screenings with Interventions:    Fall Risk:  Do you feel unsteady or are you worried about falling? : (!) yes  2 or more falls in past year?: no  Fall with injury in past year?: no     Fall Risk Interventions:    · Home safety tips provided      Tobacco Use:     Tobacco Use: Medium Risk    Smoking Tobacco Use: Former Smoker    Smokeless Tobacco Use: Never Used     E-Cigarettes/Vaping Use     Questions Responses    E-Cigarette/Vaping Use Current Every Day User    Start Date     Passive Exposure     Quit Date     Counseling Given     Comments Substance Abuse - Tobacco Interventions:  tobacco cessation tips and resources provided         General Health and ACP:  General  In general, how would you say your health is?: Fair  In the past 7 days, have you experienced any of the following: New or Increased Pain, New or Increased Fatigue, Loneliness, Social Isolation, Stress or Anger?: (!) Yes  Select all that apply: (!) New or Increased Pain  Do you get the social and emotional support that you need?: Yes  Do you have a Living Will?: (!) No    Advance Directives     Power of  Living Will ACP-Advance Directive ACP-Power of     Not on File Filed on 09/11/14 Filed Not on File      General Health Risk Interventions:  · he is slowly getting his strength back and walking better     Health Habits/Nutrition:     Physical Activity: Insufficiently Active    Days of Exercise per Week: 2 days    Minutes of Exercise per Session: 10 min     Have you lost any weight without trying in the past 3 months?: No  Body mass index: (!) 27.12  Have you seen the dentist within the past year?: (!) No    Health Habits/Nutrition Interventions:  · no acute issues    Hearing/Vision:  Do you or your family notice any trouble with your hearing that hasn't been managed with hearing aids?: No  Do you have difficulty driving, watching TV, or doing any of your daily activities because of your eyesight?: No  Have you had an eye exam within the past year?: (!) No  No exam data present    Hearing/Vision Interventions:  · no acute issues    Safety:  Do you have working smoke detectors?: Yes  Do you have any tripping hazards - loose or unsecured carpets or rugs?: No  Do you have any tripping hazards - clutter in doorways, halls, or stairs?: No  Do you have either shower bars, grab bars, non-slip mats or non-slip surfaces in your shower or bathtub?: Yes  Do all of your stairways have a railing or banister?: Yes  Do you always fasten your seatbelt when you are in a car?: (!) No    Safety Interventions:  · Home safety tips provided    ADLs:  In the past 7 days, did you need help from others to take care of any of the following: Laundry, housekeeping, banking/finances, shopping, telephone use, food preparation, transportation, or taking medications?: (!) Yes  Select all that apply: (!) Transportation    ADL Interventions:  · Patient declines any further evaluation/treatment for this issue          Objective   Vitals:    04/18/22 0912   BP: 122/76   Pulse: 89   Temp: 98 °F (36.7 °C)   SpO2: 96%   Weight: 200 lb (90.7 kg)   Height: 6' (1.829 m)      Body mass index is 27.12 kg/m². General Appearance: alert and oriented to person, place and time, well-developed and well-nourished, in no acute distress  Skin: warm and dry, no rash or erythema  Head: normocephalic and atraumatic  Eyes: pupils equal, round, and reactive to light, extraocular eye movements intact, conjunctivae normal  ENT: tympanic membrane, external ear and ear canal normal bilaterally, oropharynx clear and moist with normal mucous membranes  Neck: neck supple and non tender without mass, no thyromegaly or thyroid nodules, no cervical lymphadenopathy   Pulmonary/Chest: clear to auscultation bilaterally- no wheezes, rales or rhonchi, normal air movement, no respiratory distress  Cardiovascular: normal rate, regular rhythm, normal S1 and S2, no murmurs, no gallops, intact distal pulses and no carotid bruits  Abdomen: soft, non-tender, non-distended, normal bowel sounds, no masses or organomegaly  Genitourinary: genitals normal without hernia or inguinal adenopathy, H/O prostate cancer   Extremities: no cyanosis and no clubbing  Musculoskeletal: very weak and needs a cane for ambulation   Neurologic: very weak of gait          No Known Allergies  Prior to Visit Medications    Medication Sig Taking?  Authorizing Provider   lisinopril (PRINIVIL;ZESTRIL) 10 MG tablet TAKE ONE TABLET BY MOUTH EVERY DAY Yes Aurea Bustillo DO   warfarin (COUMADIN) 5 MG tablet Take 1 tablet by mouth daily Yes Toñito Colunga,    tamsulosin (FLOMAX) 0.4 MG capsule Take 1 capsule by mouth daily Yes Toñito Colunga DO   sotalol (BETAPACE) 80 MG tablet Take 0.5 tablets by mouth 2 times daily Yes Fransisco Lyles MD   metoprolol succinate (TOPROL XL) 50 MG extended release tablet Take 1 tablet by mouth 2 times daily Yes Fransisco Lyles MD   atorvastatin (LIPITOR) 40 MG tablet Take 1 tablet by mouth daily Yes Earl Colunga DO   metFORMIN (GLUCOPHAGE) 500 MG tablet Take 1 tablet by mouth 2 times daily (with meals)  Patient taking differently: Take 500 mg by mouth daily  Yes Earl Colunga DO   finasteride (PROSCAR) 5 MG tablet Take 1 tablet by mouth daily  Patient not taking: Reported on 4/18/2022  Pema Lopez DO   warfarin (COUMADIN) 1 MG tablet Take 1 tablet by mouth daily  Patient not taking: Reported on 4/18/2022  Pema Lopez DO       [facility] (Including outside providers/suppliers regularly involved in providing care):   Patient Care Team:  Pema Lopez DO as PCP - General (Family Medicine)  Pema Lopez DO as PCP - [facility]  Pita Tan RN as Ambulatory Care Manager    Reviewed and updated this visit:  Tobacco  Allergies  Meds  Problems  Med Hx  Surg Hx  Soc Hx  Fam Hx

## 2022-04-22 ENCOUNTER — CARE COORDINATION (OUTPATIENT)
Dept: CARE COORDINATION | Age: 72
End: 2022-04-22

## 2022-04-22 NOTE — CARE COORDINATION
Left HIPAA compliant message for patient to return call to [phone]. Re: Follow up:   Discuss:   -Still constipated? PAP referral sent  -DM Management/Zone Tool  -CHF Management/Zone Tool  -COPD Management/Zone Tool  -Exercise Status  Falls/Near Falls?   -OV AVS Reinforcement  -Appointment Reminders     Next Anticipated Outreach by [facility] Care Team:  1 Week

## 2022-04-25 ENCOUNTER — NURSE ONLY (OUTPATIENT)
Dept: FAMILY MEDICINE CLINIC | Age: 72
End: 2022-04-25
Payer: MEDICARE

## 2022-04-25 DIAGNOSIS — I48.0 PAF (PAROXYSMAL ATRIAL FIBRILLATION) (HCC): Primary | ICD-10-CM

## 2022-04-25 LAB
INTERNATIONAL NORMALIZATION RATIO, POC: 1.6
PROTHROMBIN TIME, POC: 19.8

## 2022-04-25 PROCEDURE — 85610 PROTHROMBIN TIME: CPT | Performed by: FAMILY MEDICINE

## 2022-04-25 NOTE — PROGRESS NOTES
PT/INR done per Dr. Carson Andujar. Keanu's orders.     Electronically signed by Darrian Rodriguez MA on 4/25/22 at 8:19 AM EDT

## 2022-04-25 NOTE — PROGRESS NOTES
Pt aware of new coumadin dose as Dr. Tarah Magana gave instructions. Pt scheduled for recheck in 1 week.     Electronically signed by Kathe Morin MA on 4/25/22 at 2:53 PM EDT

## 2022-04-27 ENCOUNTER — CARE COORDINATION (OUTPATIENT)
Dept: CARE COORDINATION | Age: 72
End: 2022-04-27

## 2022-04-27 NOTE — CARE COORDINATION
Email from Ermelinda Navarro, Prescription Assistance Program:    Hipolito Allen I review his med list I do not see anything we can assist him with at this time. Thank you. \"

## 2022-05-02 ENCOUNTER — NURSE ONLY (OUTPATIENT)
Dept: FAMILY MEDICINE CLINIC | Age: 72
End: 2022-05-02
Payer: MEDICARE

## 2022-05-02 DIAGNOSIS — I48.0 PAF (PAROXYSMAL ATRIAL FIBRILLATION) (HCC): Primary | ICD-10-CM

## 2022-05-02 DIAGNOSIS — R35.0 BENIGN PROSTATIC HYPERPLASIA WITH URINARY FREQUENCY: ICD-10-CM

## 2022-05-02 DIAGNOSIS — N40.1 BENIGN PROSTATIC HYPERPLASIA WITH URINARY FREQUENCY: ICD-10-CM

## 2022-05-02 LAB
INTERNATIONAL NORMALIZATION RATIO, POC: 2.2
PROTHROMBIN TIME, POC: 25.8

## 2022-05-02 PROCEDURE — 85610 PROTHROMBIN TIME: CPT | Performed by: FAMILY MEDICINE

## 2022-05-02 NOTE — PROGRESS NOTES
This MA spoke with pt. Pt advised of new Coumadin dose and recheck date.  Pt scheduled for recheck INR 5/9 at 8:15am.    Electronically signed by Laurel Ma MA on 5/2/22 at 9:25 AM EDT

## 2022-05-02 NOTE — PROGRESS NOTES
PT/INR done per Dr. Inocencio Temple. Catterlin's orders.     Electronically signed by Navjot Medina MA on 5/2/22 at 8:08 AM EDT

## 2022-05-04 RX ORDER — TAMSULOSIN HYDROCHLORIDE 0.4 MG/1
0.4 CAPSULE ORAL DAILY
Qty: 90 CAPSULE | Refills: 1 | Status: SHIPPED
Start: 2022-05-04 | End: 2022-07-26

## 2022-05-04 RX ORDER — WARFARIN SODIUM 1 MG/1
1 TABLET ORAL DAILY
Qty: 90 TABLET | Refills: 1 | Status: ON HOLD
Start: 2022-05-04 | End: 2022-05-09

## 2022-05-05 ENCOUNTER — HOSPITAL ENCOUNTER (INPATIENT)
Age: 72
LOS: 5 days | Discharge: HOME | DRG: 690 | End: 2022-05-10
Attending: EMERGENCY MEDICINE
Payer: MEDICARE

## 2022-05-05 ENCOUNTER — CARE COORDINATION (OUTPATIENT)
Dept: CARE COORDINATION | Age: 72
End: 2022-05-05

## 2022-05-05 DIAGNOSIS — R33.9 URINARY RETENTION: Primary | ICD-10-CM

## 2022-05-05 DIAGNOSIS — N30.01 ACUTE CYSTITIS WITH HEMATURIA: ICD-10-CM

## 2022-05-05 PROBLEM — R31.9 HEMATURIA: Status: ACTIVE | Noted: 2022-01-01

## 2022-05-05 PROBLEM — R79.89 ELEVATED SERUM CREATININE: Status: ACTIVE | Noted: 2022-01-01

## 2022-05-05 PROBLEM — D64.9 ANEMIA: Status: ACTIVE | Noted: 2022-01-01

## 2022-05-05 LAB
ALBUMIN SERPL-MCNC: 3.6 G/DL (ref 3.5–5.2)
ALP BLD-CCNC: 175 U/L (ref 40–129)
ALT SERPL-CCNC: 16 U/L (ref 0–40)
ANION GAP SERPL CALCULATED.3IONS-SCNC: 9 MMOL/L (ref 7–16)
AST SERPL-CCNC: 16 U/L (ref 0–39)
BACTERIA: ABNORMAL /HPF
BASOPHILS ABSOLUTE: 0.09 E9/L (ref 0–0.2)
BASOPHILS RELATIVE PERCENT: 0.9 % (ref 0–2)
BILIRUB SERPL-MCNC: 0.2 MG/DL (ref 0–1.2)
BILIRUBIN URINE: ABNORMAL
BLOOD, URINE: ABNORMAL
BUN BLDV-MCNC: 21 MG/DL (ref 6–23)
CALCIUM SERPL-MCNC: 9.4 MG/DL (ref 8.6–10.2)
CHLORIDE BLD-SCNC: 96 MMOL/L (ref 98–107)
CLARITY: ABNORMAL
CO2: 25 MMOL/L (ref 22–29)
COLOR: ABNORMAL
CREAT SERPL-MCNC: 1.4 MG/DL (ref 0.7–1.2)
EOSINOPHILS ABSOLUTE: 0.37 E9/L (ref 0.05–0.5)
EOSINOPHILS RELATIVE PERCENT: 3.9 % (ref 0–6)
GFR AFRICAN AMERICAN: >60
GFR NON-AFRICAN AMERICAN: 50 ML/MIN/1.73
GLUCOSE BLD-MCNC: 342 MG/DL (ref 74–99)
GLUCOSE URINE: >=1000 MG/DL
HCT VFR BLD CALC: 31.6 % (ref 37–54)
HEMOGLOBIN: 9.7 G/DL (ref 12.5–16.5)
IMMATURE GRANULOCYTES #: 0.07 E9/L
IMMATURE GRANULOCYTES %: 0.7 % (ref 0–5)
INR BLD: 2
KETONES, URINE: ABNORMAL MG/DL
LEUKOCYTE ESTERASE, URINE: ABNORMAL
LYMPHOCYTES ABSOLUTE: 1.13 E9/L (ref 1.5–4)
LYMPHOCYTES RELATIVE PERCENT: 11.9 % (ref 20–42)
MCH RBC QN AUTO: 25.9 PG (ref 26–35)
MCHC RBC AUTO-ENTMCNC: 30.7 % (ref 32–34.5)
MCV RBC AUTO: 84.3 FL (ref 80–99.9)
MONOCYTES ABSOLUTE: 0.84 E9/L (ref 0.1–0.95)
MONOCYTES RELATIVE PERCENT: 8.9 % (ref 2–12)
NEUTROPHILS ABSOLUTE: 6.98 E9/L (ref 1.8–7.3)
NEUTROPHILS RELATIVE PERCENT: 73.7 % (ref 43–80)
NITRITE, URINE: POSITIVE
PDW BLD-RTO: 16 FL (ref 11.5–15)
PH UA: 6.5 (ref 5–9)
PLATELET # BLD: 329 E9/L (ref 130–450)
PMV BLD AUTO: 9.1 FL (ref 7–12)
POTASSIUM SERPL-SCNC: 5.1 MMOL/L (ref 3.5–5)
PROTEIN UA: >=300 MG/DL
PROTHROMBIN TIME: 21.4 SEC (ref 9.3–12.4)
RBC # BLD: 3.75 E12/L (ref 3.8–5.8)
RBC UA: >20 /HPF (ref 0–2)
SODIUM BLD-SCNC: 130 MMOL/L (ref 132–146)
SPECIFIC GRAVITY UA: 1.02 (ref 1–1.03)
TOTAL PROTEIN: 7.9 G/DL (ref 6.4–8.3)
UROBILINOGEN, URINE: 2 E.U./DL
WBC # BLD: 9.5 E9/L (ref 4.5–11.5)
WBC UA: ABNORMAL /HPF (ref 0–5)

## 2022-05-05 PROCEDURE — 85610 PROTHROMBIN TIME: CPT

## 2022-05-05 PROCEDURE — 87077 CULTURE AEROBIC IDENTIFY: CPT

## 2022-05-05 PROCEDURE — 6360000002 HC RX W HCPCS: Performed by: EMERGENCY MEDICINE

## 2022-05-05 PROCEDURE — 2580000003 HC RX 258: Performed by: EMERGENCY MEDICINE

## 2022-05-05 PROCEDURE — 87040 BLOOD CULTURE FOR BACTERIA: CPT

## 2022-05-05 PROCEDURE — 87088 URINE BACTERIA CULTURE: CPT

## 2022-05-05 PROCEDURE — 80053 COMPREHEN METABOLIC PANEL: CPT

## 2022-05-05 PROCEDURE — 85025 COMPLETE CBC W/AUTO DIFF WBC: CPT

## 2022-05-05 PROCEDURE — 87186 SC STD MICRODIL/AGAR DIL: CPT

## 2022-05-05 PROCEDURE — 99285 EMERGENCY DEPT VISIT HI MDM: CPT

## 2022-05-05 PROCEDURE — 2580000003 HC RX 258: Performed by: PHYSICIAN ASSISTANT

## 2022-05-05 PROCEDURE — 2140000000 HC CCU INTERMEDIATE R&B

## 2022-05-05 PROCEDURE — 81001 URINALYSIS AUTO W/SCOPE: CPT

## 2022-05-05 PROCEDURE — 6360000002 HC RX W HCPCS: Performed by: NURSE PRACTITIONER

## 2022-05-05 PROCEDURE — 51700 IRRIGATION OF BLADDER: CPT

## 2022-05-05 RX ORDER — INSULIN LISPRO 100 [IU]/ML
0-12 INJECTION, SOLUTION INTRAVENOUS; SUBCUTANEOUS
Status: DISCONTINUED | OUTPATIENT
Start: 2022-05-06 | End: 2022-05-10 | Stop reason: HOSPADM

## 2022-05-05 RX ORDER — NICOTINE POLACRILEX 4 MG
15 LOZENGE BUCCAL PRN
Status: DISCONTINUED | OUTPATIENT
Start: 2022-05-05 | End: 2022-05-10 | Stop reason: HOSPADM

## 2022-05-05 RX ORDER — BISACODYL 10 MG
10 SUPPOSITORY, RECTAL RECTAL DAILY PRN
Status: DISCONTINUED | OUTPATIENT
Start: 2022-05-05 | End: 2022-05-10 | Stop reason: HOSPADM

## 2022-05-05 RX ORDER — SOTALOL HYDROCHLORIDE 80 MG/1
40 TABLET ORAL 2 TIMES DAILY
Status: DISCONTINUED | OUTPATIENT
Start: 2022-05-05 | End: 2022-05-08

## 2022-05-05 RX ORDER — SODIUM CHLORIDE 9 MG/ML
INJECTION, SOLUTION INTRAVENOUS PRN
Status: DISCONTINUED | OUTPATIENT
Start: 2022-05-05 | End: 2022-05-10 | Stop reason: HOSPADM

## 2022-05-05 RX ORDER — SODIUM CHLORIDE 0.9 % (FLUSH) 0.9 %
5-40 SYRINGE (ML) INJECTION PRN
Status: DISCONTINUED | OUTPATIENT
Start: 2022-05-05 | End: 2022-05-10 | Stop reason: HOSPADM

## 2022-05-05 RX ORDER — 0.9 % SODIUM CHLORIDE 0.9 %
500 INTRAVENOUS SOLUTION INTRAVENOUS ONCE
Status: COMPLETED | OUTPATIENT
Start: 2022-05-05 | End: 2022-05-05

## 2022-05-05 RX ORDER — CEFDINIR 300 MG/1
300 CAPSULE ORAL ONCE
Status: DISCONTINUED | OUTPATIENT
Start: 2022-05-05 | End: 2022-05-05

## 2022-05-05 RX ORDER — TAMSULOSIN HYDROCHLORIDE 0.4 MG/1
0.4 CAPSULE ORAL DAILY
Status: DISCONTINUED | OUTPATIENT
Start: 2022-05-06 | End: 2022-05-10 | Stop reason: HOSPADM

## 2022-05-05 RX ORDER — MORPHINE SULFATE 2 MG/ML
1 INJECTION, SOLUTION INTRAMUSCULAR; INTRAVENOUS ONCE
Status: COMPLETED | OUTPATIENT
Start: 2022-05-05 | End: 2022-05-05

## 2022-05-05 RX ORDER — METOPROLOL SUCCINATE 50 MG/1
50 TABLET, EXTENDED RELEASE ORAL 2 TIMES DAILY
Status: DISCONTINUED | OUTPATIENT
Start: 2022-05-05 | End: 2022-05-06

## 2022-05-05 RX ORDER — SODIUM CHLORIDE 0.9 % (FLUSH) 0.9 %
5-40 SYRINGE (ML) INJECTION EVERY 12 HOURS SCHEDULED
Status: DISCONTINUED | OUTPATIENT
Start: 2022-05-05 | End: 2022-05-10 | Stop reason: HOSPADM

## 2022-05-05 RX ORDER — DEXTROSE MONOHYDRATE 50 MG/ML
100 INJECTION, SOLUTION INTRAVENOUS PRN
Status: DISCONTINUED | OUTPATIENT
Start: 2022-05-05 | End: 2022-05-10 | Stop reason: HOSPADM

## 2022-05-05 RX ORDER — SODIUM CHLORIDE 9 MG/ML
INJECTION, SOLUTION INTRAVENOUS CONTINUOUS
Status: DISCONTINUED | OUTPATIENT
Start: 2022-05-05 | End: 2022-05-10 | Stop reason: HOSPADM

## 2022-05-05 RX ORDER — ATORVASTATIN CALCIUM 40 MG/1
40 TABLET, FILM COATED ORAL NIGHTLY
Status: DISCONTINUED | OUTPATIENT
Start: 2022-05-06 | End: 2022-05-10 | Stop reason: HOSPADM

## 2022-05-05 RX ORDER — INSULIN LISPRO 100 [IU]/ML
0-6 INJECTION, SOLUTION INTRAVENOUS; SUBCUTANEOUS NIGHTLY
Status: DISCONTINUED | OUTPATIENT
Start: 2022-05-05 | End: 2022-05-10 | Stop reason: HOSPADM

## 2022-05-05 RX ORDER — DEXTROSE MONOHYDRATE 25 G/50ML
12.5 INJECTION, SOLUTION INTRAVENOUS PRN
Status: DISCONTINUED | OUTPATIENT
Start: 2022-05-05 | End: 2022-05-10 | Stop reason: HOSPADM

## 2022-05-05 RX ADMIN — SODIUM CHLORIDE 500 ML: 9 INJECTION, SOLUTION INTRAVENOUS at 15:10

## 2022-05-05 RX ADMIN — MORPHINE SULFATE 1 MG: 2 INJECTION, SOLUTION INTRAMUSCULAR; INTRAVENOUS at 23:07

## 2022-05-05 RX ADMIN — WATER 1000 MG: 1 INJECTION INTRAMUSCULAR; INTRAVENOUS; SUBCUTANEOUS at 22:06

## 2022-05-05 RX ADMIN — SODIUM CHLORIDE: 9 INJECTION, SOLUTION INTRAVENOUS at 18:47

## 2022-05-05 ASSESSMENT — ENCOUNTER SYMPTOMS
ABDOMINAL PAIN: 1
SHORTNESS OF BREATH: 0
BACK PAIN: 0
COUGH: 0

## 2022-05-05 ASSESSMENT — PAIN SCALES - GENERAL
PAINLEVEL_OUTOF10: 7
PAINLEVEL_OUTOF10: 9

## 2022-05-05 ASSESSMENT — PAIN DESCRIPTION - LOCATION
LOCATION: PENIS
LOCATION: ABDOMEN

## 2022-05-05 ASSESSMENT — PAIN - FUNCTIONAL ASSESSMENT: PAIN_FUNCTIONAL_ASSESSMENT: 0-10

## 2022-05-05 ASSESSMENT — PAIN DESCRIPTION - DESCRIPTORS: DESCRIPTORS: BURNING;PRESSURE

## 2022-05-05 NOTE — ED NOTES
Department of Emergency Medicine  FIRST PROVIDER TRIAGE NOTE             Independent MLP           5/5/22  2:56 PM EDT    Date of Encounter: 5/5/22   MRN: [id number]      HPI: Carlie Alanis is a 70 y.o. male who presents to the ED for No chief complaint on file. Patient is a 51-year-old that is presenting for urinary retention. Patient states she is \"going a little bit in his diaper but has not had a full urinary stream since yesterday. \"  Patient states this is happened before. Patient's last surgery was 6 months ago patient sees Dr. Karie Su. Patient states he denies any fever or chills but but has been getting a little bit dizzy. Patient states he has noticed some blood in his urine    ROS: Negative for cp, sob, back pain, fever, cough, vomiting or diarrhea. PE: Gen Appearance/Constitutional: alert  HEENT: NC/NT. PERRLA,  Airway patent. Neck: supple     Initial Plan of Care: All treatment areas with department are currently occupied. Plan to order/Initiate the following while awaiting opening in ED: labs and Cassidy.   Initiate Treatment-Testing, Proceed toTreatment Area When Bed Available for ED Attending/MLP to Continue Care    Electronically signed by Devi Edwards PA-C   DD: 5/5/22         Devi Edwards PA-C  05/05/22 2078

## 2022-05-05 NOTE — Clinical Note
Discharge Plan[de-identified] Other/[facility])   Telemetry/Cardiac Monitoring Required?: No

## 2022-05-05 NOTE — CARE COORDINATION
Left HIPAA compliant message for patient to return call to [phone].   Re: Follow up: DM, Falls?, COPD, , Review POC

## 2022-05-06 LAB
ANION GAP SERPL CALCULATED.3IONS-SCNC: 11 MMOL/L (ref 7–16)
ANION GAP SERPL CALCULATED.3IONS-SCNC: 8 MMOL/L (ref 7–16)
BASOPHILS ABSOLUTE: 0.09 E9/L (ref 0–0.2)
BASOPHILS RELATIVE PERCENT: 0.8 % (ref 0–2)
BUN BLDV-MCNC: 15 MG/DL (ref 6–23)
BUN BLDV-MCNC: 16 MG/DL (ref 6–23)
CALCIUM SERPL-MCNC: 8.6 MG/DL (ref 8.6–10.2)
CALCIUM SERPL-MCNC: 8.7 MG/DL (ref 8.6–10.2)
CHLORIDE BLD-SCNC: 100 MMOL/L (ref 98–107)
CHLORIDE BLD-SCNC: 100 MMOL/L (ref 98–107)
CO2: 22 MMOL/L (ref 22–29)
CO2: 23 MMOL/L (ref 22–29)
CREAT SERPL-MCNC: 1.2 MG/DL (ref 0.7–1.2)
CREAT SERPL-MCNC: 1.2 MG/DL (ref 0.7–1.2)
EOSINOPHILS ABSOLUTE: 0.57 E9/L (ref 0.05–0.5)
EOSINOPHILS RELATIVE PERCENT: 5.3 % (ref 0–6)
GFR AFRICAN AMERICAN: >60
GFR AFRICAN AMERICAN: >60
GFR NON-AFRICAN AMERICAN: 60 ML/MIN/1.73
GFR NON-AFRICAN AMERICAN: 60 ML/MIN/1.73
GLUCOSE BLD-MCNC: 197 MG/DL (ref 74–99)
GLUCOSE BLD-MCNC: 200 MG/DL (ref 74–99)
HBA1C MFR BLD: 9.6 % (ref 4–5.6)
HCT VFR BLD CALC: 26.2 % (ref 37–54)
HCT VFR BLD CALC: 27.4 % (ref 37–54)
HEMOGLOBIN: 8.2 G/DL (ref 12.5–16.5)
HEMOGLOBIN: 8.4 G/DL (ref 12.5–16.5)
IMMATURE GRANULOCYTES #: 0.06 E9/L
IMMATURE GRANULOCYTES %: 0.6 % (ref 0–5)
LYMPHOCYTES ABSOLUTE: 1.53 E9/L (ref 1.5–4)
LYMPHOCYTES RELATIVE PERCENT: 14.3 % (ref 20–42)
MAGNESIUM: 1.7 MG/DL (ref 1.6–2.6)
MCH RBC QN AUTO: 25.9 PG (ref 26–35)
MCHC RBC AUTO-ENTMCNC: 30.7 % (ref 32–34.5)
MCV RBC AUTO: 84.6 FL (ref 80–99.9)
METER GLUCOSE: 179 MG/DL (ref 74–99)
METER GLUCOSE: 210 MG/DL (ref 74–99)
METER GLUCOSE: 216 MG/DL (ref 74–99)
METER GLUCOSE: 221 MG/DL (ref 74–99)
METER GLUCOSE: 233 MG/DL (ref 74–99)
METER GLUCOSE: 235 MG/DL (ref 74–99)
MONOCYTES ABSOLUTE: 1.13 E9/L (ref 0.1–0.95)
MONOCYTES RELATIVE PERCENT: 10.6 % (ref 2–12)
NEUTROPHILS ABSOLUTE: 7.3 E9/L (ref 1.8–7.3)
NEUTROPHILS RELATIVE PERCENT: 68.4 % (ref 43–80)
PDW BLD-RTO: 16.3 FL (ref 11.5–15)
PLATELET # BLD: 306 E9/L (ref 130–450)
PMV BLD AUTO: 9.3 FL (ref 7–12)
POTASSIUM REFLEX MAGNESIUM: 4 MMOL/L (ref 3.5–5)
POTASSIUM SERPL-SCNC: 4.1 MMOL/L (ref 3.5–5)
PROSTATE SPECIFIC ANTIGEN: <0.03 NG/ML (ref 0–4)
RBC # BLD: 3.24 E12/L (ref 3.8–5.8)
SODIUM BLD-SCNC: 131 MMOL/L (ref 132–146)
SODIUM BLD-SCNC: 133 MMOL/L (ref 132–146)
WBC # BLD: 10.7 E9/L (ref 4.5–11.5)

## 2022-05-06 PROCEDURE — 2580000003 HC RX 258: Performed by: NURSE PRACTITIONER

## 2022-05-06 PROCEDURE — 85014 HEMATOCRIT: CPT

## 2022-05-06 PROCEDURE — 85018 HEMOGLOBIN: CPT

## 2022-05-06 PROCEDURE — 6360000002 HC RX W HCPCS: Performed by: NURSE PRACTITIONER

## 2022-05-06 PROCEDURE — 2140000000 HC CCU INTERMEDIATE R&B

## 2022-05-06 PROCEDURE — 83036 HEMOGLOBIN GLYCOSYLATED A1C: CPT

## 2022-05-06 PROCEDURE — 82962 GLUCOSE BLOOD TEST: CPT

## 2022-05-06 PROCEDURE — 36415 COLL VENOUS BLD VENIPUNCTURE: CPT

## 2022-05-06 PROCEDURE — 97530 THERAPEUTIC ACTIVITIES: CPT

## 2022-05-06 PROCEDURE — 84153 ASSAY OF PSA TOTAL: CPT

## 2022-05-06 PROCEDURE — 83735 ASSAY OF MAGNESIUM: CPT

## 2022-05-06 PROCEDURE — 6370000000 HC RX 637 (ALT 250 FOR IP): Performed by: NURSE PRACTITIONER

## 2022-05-06 PROCEDURE — 97165 OT EVAL LOW COMPLEX 30 MIN: CPT

## 2022-05-06 PROCEDURE — 97161 PT EVAL LOW COMPLEX 20 MIN: CPT

## 2022-05-06 PROCEDURE — 80048 BASIC METABOLIC PNL TOTAL CA: CPT

## 2022-05-06 PROCEDURE — 85025 COMPLETE CBC W/AUTO DIFF WBC: CPT

## 2022-05-06 PROCEDURE — 2580000003 HC RX 258: Performed by: EMERGENCY MEDICINE

## 2022-05-06 PROCEDURE — 51700 IRRIGATION OF BLADDER: CPT

## 2022-05-06 PROCEDURE — 97535 SELF CARE MNGMENT TRAINING: CPT

## 2022-05-06 RX ORDER — ATROPA BELLADONNA AND OPIUM 16.2; 6 MG/1; MG/1
60 SUPPOSITORY RECTAL EVERY 8 HOURS PRN
Status: DISCONTINUED | OUTPATIENT
Start: 2022-05-06 | End: 2022-05-10 | Stop reason: HOSPADM

## 2022-05-06 RX ORDER — ZINC GLUCONATE 50 MG
50 TABLET ORAL DAILY
COMMUNITY
End: 2022-07-05

## 2022-05-06 RX ORDER — VITAMIN B COMPLEX
1000 TABLET ORAL DAILY
COMMUNITY
End: 2022-07-05

## 2022-05-06 RX ORDER — MORPHINE SULFATE 2 MG/ML
1 INJECTION, SOLUTION INTRAMUSCULAR; INTRAVENOUS ONCE
Status: COMPLETED | OUTPATIENT
Start: 2022-05-06 | End: 2022-05-06

## 2022-05-06 RX ORDER — METOPROLOL SUCCINATE 50 MG/1
50 TABLET, EXTENDED RELEASE ORAL DAILY
Status: DISCONTINUED | OUTPATIENT
Start: 2022-05-07 | End: 2022-05-10 | Stop reason: HOSPADM

## 2022-05-06 RX ORDER — ASCORBIC ACID 500 MG
500 TABLET ORAL DAILY
COMMUNITY
End: 2022-07-05

## 2022-05-06 RX ADMIN — SODIUM CHLORIDE, PRESERVATIVE FREE 10 ML: 5 INJECTION INTRAVENOUS at 04:47

## 2022-05-06 RX ADMIN — SOTALOL HYDROCHLORIDE 40 MG: 80 TABLET ORAL at 20:48

## 2022-05-06 RX ADMIN — INSULIN LISPRO 4 UNITS: 100 INJECTION, SOLUTION INTRAVENOUS; SUBCUTANEOUS at 17:00

## 2022-05-06 RX ADMIN — SODIUM CHLORIDE: 9 INJECTION, SOLUTION INTRAVENOUS at 21:00

## 2022-05-06 RX ADMIN — INSULIN LISPRO 2 UNITS: 100 INJECTION, SOLUTION INTRAVENOUS; SUBCUTANEOUS at 21:04

## 2022-05-06 RX ADMIN — INSULIN LISPRO 2 UNITS: 100 INJECTION, SOLUTION INTRAVENOUS; SUBCUTANEOUS at 04:44

## 2022-05-06 RX ADMIN — MORPHINE SULFATE 1 MG: 2 INJECTION, SOLUTION INTRAMUSCULAR; INTRAVENOUS at 02:34

## 2022-05-06 RX ADMIN — SOTALOL HYDROCHLORIDE 40 MG: 80 TABLET ORAL at 06:23

## 2022-05-06 RX ADMIN — ATORVASTATIN CALCIUM 40 MG: 40 TABLET, FILM COATED ORAL at 20:50

## 2022-05-06 RX ADMIN — SODIUM CHLORIDE, PRESERVATIVE FREE 10 ML: 5 INJECTION INTRAVENOUS at 10:00

## 2022-05-06 RX ADMIN — WATER 1000 MG: 1 INJECTION INTRAMUSCULAR; INTRAVENOUS; SUBCUTANEOUS at 20:48

## 2022-05-06 RX ADMIN — SODIUM CHLORIDE, PRESERVATIVE FREE 10 ML: 5 INJECTION INTRAVENOUS at 04:44

## 2022-05-06 RX ADMIN — TAMSULOSIN HYDROCHLORIDE 0.4 MG: 0.4 CAPSULE ORAL at 10:00

## 2022-05-06 ASSESSMENT — PAIN SCALES - GENERAL
PAINLEVEL_OUTOF10: 6
PAINLEVEL_OUTOF10: 1
PAINLEVEL_OUTOF10: 2
PAINLEVEL_OUTOF10: 2

## 2022-05-06 ASSESSMENT — PAIN DESCRIPTION - ONSET: ONSET: ON-GOING

## 2022-05-06 ASSESSMENT — PAIN - FUNCTIONAL ASSESSMENT: PAIN_FUNCTIONAL_ASSESSMENT: PREVENTS OR INTERFERES SOME ACTIVE ACTIVITIES AND ADLS

## 2022-05-06 ASSESSMENT — PAIN DESCRIPTION - LOCATION
LOCATION: PENIS
LOCATION: PENIS

## 2022-05-06 ASSESSMENT — PAIN DESCRIPTION - FREQUENCY: FREQUENCY: CONTINUOUS

## 2022-05-06 NOTE — ED NOTES
3-way rodriguez, irrigation tubing, drainage bag requested from central supply.       Donya Matson RN  05/05/22 5495

## 2022-05-06 NOTE — CONSULTS
5/6/2022 8:35 AM  Service: Urology  Group: [facility] ([facility])    Jamie Ruiz  [id number]     Chief Complaint:    Urinary retention, hematuria    History of Present Illness: The patient is a 70 y.o. male patient who presented to the hospital with complaints of urinary retention and gross hematuria. He is on Coumadin. He had a Cassidy cath inserted in the emergency department for unknown immediate output. Currently his Cassidy catheter is draining yellow urine. He denies any pain or discomfort at this time. He is known to our practice and is a patient of Dr. Theresa Webster [facility]. He was actually supposed to be seen in the office yesterday but was in the ER. He has a history of prostate cancer s/p biopsy in 2017 (GS 3+4=7, 3+3=6, 5/12 cores) and he completed XRT in 2018. PSA remains stable. Past Medical History:   Diagnosis Date    Abdominal aortic aneurysm without rupture ([address] 9/3/2021    Arthritis     CAD (coronary artery disease)     Cancer ([address] 2017    Colon    Combined systolic and diastolic heart failure ([address] 6/15/13    echo LVEF of 30-35%  stage II diastolic dysfunction    COPD (chronic obstructive pulmonary disease) ([address]     Diabetes mellitus (HCC)     GERD (gastroesophageal reflux disease)     History of placement of internal cardiac defibrillator 2014?     [employer] ([place])    Hypertension     Sigmoid diverticulosis 8/31/2015    Sinusitis     Tubular adenoma of colon 8/31/2015    Vertigo          Past Surgical History:   Procedure Laterality Date    ABDOMINAL AORTIC ANEURYSM REPAIR, ENDOVASCULAR N/A 10/1/2021    ABDOMINAL AORTIC ANEURYSM REPAIR ENDOVASCULAR performed by Hector Corral MD at . [address] COLONOSCOPY  8/31/15    with polypectomy (x2) - Dr. Sparks Herrin  1/13/14    3.5/15 Xience in [place] and 3.0/15 Resolute in th 1st obtuse marginal.    HIP FRACTURE SURGERY Right 3/14/2020    RIGHT HIP OPEN REDUCTION INTERNAL FIXATION NAIL-SYNTHES -- OC 2 performed by Vana Harada, DO at [address]         Medications Prior to Admission:    Medications Prior to Admission: Ascorbic Acid (VITAMIN C) 250 MG tablet, Take 500 mg by mouth daily  Vitamin D (CHOLECALCIFEROL) 25 MCG (1000 UT) TABS tablet, Take 1,000 Units by mouth daily  zinc gluconate 50 MG tablet, Take 50 mg by mouth daily  warfarin (COUMADIN) 1 MG tablet, Take 1 tablet by mouth daily (Patient taking differently: Take 4 mg by mouth Mon Tues Thursday Friday)  tamsulosin (FLOMAX) 0.4 MG capsule, Take 1 capsule by mouth daily  lisinopril (PRINIVIL;ZESTRIL) 10 MG tablet, TAKE ONE TABLET BY MOUTH EVERY DAY  warfarin (COUMADIN) 5 MG tablet, Take 1 tablet by mouth daily (Patient taking differently: Take 5 mg by mouth three times a week Sat sun and wednesday)  finasteride (PROSCAR) 5 MG tablet, Take 1 tablet by mouth daily (Patient not taking: Reported on 4/18/2022)  sotalol (BETAPACE) 80 MG tablet, Take 0.5 tablets by mouth 2 times daily  metoprolol succinate (TOPROL XL) 50 MG extended release tablet, Take 1 tablet by mouth 2 times daily (Patient taking differently: Take 50 mg by mouth daily )  atorvastatin (LIPITOR) 40 MG tablet, Take 1 tablet by mouth daily  metFORMIN (GLUCOPHAGE) 500 MG tablet, Take 1 tablet by mouth 2 times daily (with meals) (Patient taking differently: Take 500 mg by mouth daily (with breakfast) )    Allergies:    Patient has no known allergies. Social History:    reports that he quit smoking about 8 years ago. His smoking use included cigarettes. He started smoking about 52 years ago. He has a 100.00 pack-year smoking history. He has never used smokeless tobacco. He reports current alcohol use. He reports that he does not use drugs.     Family History:   Non-contributory to this Urological problem  family history includes Cancer in his brother, brother, and father; Heart Disease in his mother. Review of Systems:  Constitutional: No fever or chills   Respiratory: negative for cough and hemoptysis  Cardiovascular: negative for chest pain and dyspnea  Gastrointestinal: negative for abdominal pain, diarrhea, nausea and vomiting   Derm: negative for rash and skin lesion(s)  Neurological: negative for seizures and tremors  Musculoskeletal: Negative    Psychiatric: Negative   : As above in the HPI, otherwise negative  All other reviews are negative    Physical Exam:     Vitals:  BP (!) 153/88   Pulse 98   Temp 98.1 °F (36.7 °C) (Oral)   Resp 18   Ht 5' 11\" (1.803 m)   Wt 195 lb (88.5 kg) Comment: bed scale not working unable to stand due to gu irrigation  SpO2 96%   BMI 27.20 kg/m²     General:  Awake, alert, oriented X 3. No apparent distress. HEENT:  Normocephalic, atraumatic. Lungs:  Respirations symmetric and non-labored. Abdomen:  soft, nontender, no masses  Extremities:  No clubbing, cyanosis, or edema  Skin:  Warm and dry, no open lesions or rashes  Neuro: There are no motor or sensory deficits in the 4 quadrant extremities   Rectal: deferred  Genitourinary: Cassidy catheter draining yellow urine with CBI at slow rate    Labs:     Recent Labs     05/05/22  1502 05/06/22  0513   WBC 9.5 10.7   RBC 3.75* 3.24*   HGB 9.7* 8.4*   HCT 31.6* 27.4*   MCV 84.3 84.6   MCH 25.9* 25.9*   MCHC 30.7* 30.7*   RDW 16.0* 16.3*    306   MPV 9.1 9.3         Recent Labs     05/05/22  1502 05/06/22  0513   CREATININE 1.4* 1.2       Lab Results   Component Value Date    PSA <0.03 04/11/2022    PSA <0.01 01/06/2022    PSA 0.04 09/13/2021       Assessment/plan:  Gross hematuria  PCa s/p XRT 2018   UTI    His Cassidy catheter was hand irrigated without any clot retrieval.  His urine remained clear yellow and clear.   We will stop the CBI    Creatinine stable  UA reviewed  Urine culture pending  Continue antibiotics per primary  Stop the CBI  Irrigate the Cassidy catheter every 6 hours and as needed  B&O suppositories as needed bladder spasms  If urine remains clear in the morning we can do a voiding trial  We will follow          Electronically signed by JERI Caceres CNP on 5/6/2022 at 8:35 AM   I interviewed and examined the patient and agree with the above note and plan by the nurse practitioner because of his previous radiation therapy at some point he will need cystoscopy to be sure there is no malignancy once his irrigant is clear he could give him a trial of voiding and see if his retention was indeed on the basis of clot retention or if he has underlying obstructive problems such as bladder outlet obstruction or a hypotonic bladder  We will get an updated PSA on him  No plans for intervention at this time if cystoscopy is done that could be done in the office as an outpatient

## 2022-05-06 NOTE — CONSULTS
Today's Date: 5/6/2022  Patient Name: Jamie Alonso  Date of admission: 5/5/2022  5:00 PM  Patient's age: 70 y.o., 1950  Admission Dx: Urinary retention [R33.9]  Hematuria [R31.9]  Acute cystitis with hematuria [N30.01]    Reason for Consult:  Ventricular Tachycardia    Requesting Physician: No admitting provider for patient encounter. CHIEF COMPLAINT: Urinary retention    History Obtained From:  patient    HISTORY OF PRESENT ILLNESS:      The patient is a 70 y.o.  male who is admitted to the hospital for acute urinary retention. He is known to me however for congestive heart failure diagnosed originally in 2012/13. Presentation to the hospital with sustained monomorphic VT in 2013  Coronary artery disease. S/p circumflex stent  Recurrent VT leading to electrophysiology testing and ICD implantation in 2014  Patient was placed on sotalol therapy 120 mg twice a day at that time. He did not return to the office again after 2016. When he did  return for his first follow-up in 2020 his dose of sotalol has been reduced to 80 mg daily. He has subsequently been seen in the office about twice a year and has not had any episodes of recurrent ventricular tachycardia    Hospitalization earlier this year with atrial fibrillation when his dose of sotalol was kept unchanged. I was not asked to see him during that hospitalization. He now returns with acute urinary retention and was noted to have 1 episode of nonsustained VT on his current medical therapy. He denies any other cardiac symptoms. No chest pain or shortness of breath at rest or on exertion.   No symptoms of paroxysmal nocturnal dyspnea, orthopnea or leg edema  No syncope or near syncope and no recent ICD discharges    Past Medical History:   has a past medical history of Abdominal aortic aneurysm without rupture ([address] Arthritis, CAD (coronary artery disease), Cancer ([address] Combined systolic and diastolic heart failure ([address] COPD (chronic obstructive pulmonary disease) ([facility] Diabetes mellitus ([facility] GERD (gastroesophageal reflux disease), History of placement of internal cardiac defibrillator, Hypertension, Sigmoid diverticulosis, Sinusitis, Tubular adenoma of colon, and Vertigo. Past Surgical History:   has a past surgical history that includes Coronary angioplasty with stent (1/13/14); Umbilical hernia repair; Colonoscopy (8/31/15); Cardiac defibrillator placement; Hip fracture surgery (Right, 3/14/2020); and AAA repair, endovascular (N/A, 10/1/2021). Home Medications:    Prior to Admission medications    Medication Sig Start Date End Date Taking?  Authorizing Provider   Ascorbic Acid (VITAMIN C) 250 MG tablet Take 500 mg by mouth daily   Yes Historical Provider, MD   Vitamin D (CHOLECALCIFEROL) 25 MCG (1000 UT) TABS tablet Take 1,000 Units by mouth daily   Yes Historical Provider, MD   zinc gluconate 50 MG tablet Take 50 mg by mouth daily   Yes Historical Provider, MD   warfarin (COUMADIN) 1 MG tablet Take 1 tablet by mouth daily  Patient taking differently: Take 4 mg by mouth Mon Tues Thursday Friday 5/4/22   Toñito Flores DO   tamsulosin [facility]) 0.4 MG capsule Take 1 capsule by mouth daily 5/4/22   Toñito Colunga DO   lisinopril (PRINIVIL;ZESTRIL) 10 MG tablet TAKE ONE TABLET BY MOUTH EVERY DAY 4/18/22   Toñito Flores DO   warfarin (COUMADIN) 5 MG tablet Take 1 tablet by mouth daily  Patient taking differently: Take 5 mg by mouth three times a week Sat sun and wednesday 4/18/22 7/17/22  Lizet Colunga DO   finasteride (PROSCAR) 5 MG tablet Take 1 tablet by mouth daily  Patient not taking: Reported on 4/18/2022 1/21/22   Lizet Colunga DO   sotalol (BETAPACE) 80 MG tablet Take 0.5 tablets by mouth 2 times daily 1/8/22   Rozina Reyes MD   metoprolol succinate (TOPROL XL) 50 MG extended release tablet Take 1 tablet by mouth 2 times daily  Patient taking differently: Take 50 mg by mouth daily  1/8/22   Hemant Duckworth MD   atorvastatin (LIPITOR) 40 MG tablet Take 1 tablet by mouth daily 1/7/22 7/6/22  Yolanda Colunga DO   metFORMIN (GLUCOPHAGE) 500 MG tablet Take 1 tablet by mouth 2 times daily (with meals)  Patient taking differently: Take 500 mg by mouth daily (with breakfast)  1/7/22   Toñito Putnam DO       Allergies:  Patient has no known allergies. Social History:   reports that he quit smoking about 8 years ago. His smoking use included cigarettes. He started smoking about 52 years ago. He has a 100.00 pack-year smoking history. He has never used smokeless tobacco. He reports current alcohol use. He reports that he does not use drugs. Family History: family history includes Cancer in his brother, brother, and father; Heart Disease in his mother. No h/o sudden cardiac death. No for premature CAD    REVIEW OF SYSTEMS:    Constitutional: there has been no unanticipated weight loss. There's been No change in energy level, No change in activity level. Eyes: No visual changes or diplopia. No scleral icterus. ENT: No Headaches, hearing loss or vertigo. No mouth sores or sore throat. Cardiovascular: No chest pain, No dyspnea on exertion, No palpitations or No loss of consciousness. No cough, hemoptysis, No pleuritic pain, or phlebitis. Respiratory: No cough or wheezing, no sputum production. No hematemesis. Gastrointestinal: No abdominal pain, appetite loss, blood in stools. No change in bowel or bladder habits. Genitourinary: No dysuria, trouble voiding, or hematuria. Musculoskeletal:  No gait disturbance, No weakness or joint complaints. Integumentary: No rash or pruritis. Neurological: No headache, diplopia, change in muscle strength, numbness or tingling. No change in gait, balance, coordination, mood, affect, memory, mentation, behavior. Psychiatric: No anxiety, or depression. Endocrine: No temperature intolerance.  No excessive thirst, fluid intake, or urination. No tremor. Hematologic/Lymphatic: No abnormal bruising or bleeding, blood clots or swollen lymph nodes. Allergic/Immunologic: No nasal congestion or hives. PHYSICAL EXAM:      /82   Pulse 80   Temp 98.8 °F (37.1 °C) (Oral)   Resp 18   Ht 5' 11\" (1.803 m)   Wt 195 lb (88.5 kg) Comment: bed scale not working unable to stand due to gu irrigation  SpO2 93%   BMI 27.20 kg/m²    Constitutional and General Appearance: alert, cooperative, no distress and appears stated age  HEENT: PERRL, no cervical lymphadenopathy. No masses palpable. Normal oral mucosa  Respiratory:  Normal excursion and expansion without use of accessory muscles  Resp Auscultation: Good respiratory effort. No for increased work of breathing. On auscultation: clear to auscultation bilaterally  Cardiovascular: The apical impulse is laterally displaced  Heart tones are  normal. regular S1 and S2.  Jugular venous pulsation Normal  The carotid upstroke is normal in amplitude and contour without delay or bruit  Peripheral pulses are symmetrical and full  Abdomen:  No masses or tenderness  Bowel sounds present  Extremities:   No Cyanosis or Clubbing   Lower extremity edema: No  Skin: Warm and dry  Neurological:  Alert and oriented. Moves all extremities well  No abnormalities of mood, affect, memory, mentation, or behavior are noted    DATA:    Diagnostics:    EKG: Pending. ECHO: ordered, but not yet obtained. Ejection fraction: n/a  Stress Test: reviewed. FINDINGS: Mervin Fail is a normal distribution of left ventricular   myocardial activity on both the stress and rest SPECT myocardial   perfusion images.  Left ventricle appears dilated.       Gated study shows dyskinetic left ventricular inferior wall motion. Otherwise, normal left ventricular wall motion and myocardial   thickening during systole.         Left ventricular end systolic volume estimated at 85 ml and   end-diastolic volume estimated at 144 ml.  Resting left ventricular   ejection fraction over 41%.           Impression   1.   No evidence of left ventricular myocardial stress-induced   ischemia. 2. Left ventricular ejection fraction estimated at 41%. 3. Dilated left ventricle.           Cardiac Angiography: not obtained. Labs:   CBC:   Recent Labs     05/05/22  1502 05/06/22  0513   WBC 9.5 10.7   HGB 9.7* 8.4*   HCT 31.6* 27.4*    306     BMP:   Recent Labs     05/05/22  1502 05/06/22  0513   * 133  131*   K 5.1* 4.1  4.0   CO2 25 22  23   BUN 21 15  16   CREATININE 1.4* 1.2  1.2   LABGLOM 50 60  60   GLUCOSE 342* 200*  197*     BNP: No results for input(s): BNP in the last 72 hours. PT/INR:   Recent Labs     05/05/22  1801   PROTIME 21.4*   INR 2.0     APTT:No results for input(s): APTT in the last 72 hours. CARDIAC ENZYMES:No results for input(s): CKTOTAL, CKMB, CKMBINDEX, TROPONINI in the last 72 hours. FASTING LIPID PANEL:  Lab Results   Component Value Date    HDL 27 04/11/2022    LDLCALC 83 04/11/2022    TRIG 112 04/11/2022     LIVER PROFILE:  Recent Labs     05/05/22  1502   AST 16   ALT 16   LABALBU 3.6       IMPRESSION:     Patient Active Problem List   Diagnosis    Diabetes mellitus ([facility]      Essential hypertension      COPD, very severe ([facility]      CAD (coronary artery disease)      Prostate cancer ([facility]      ICD (implantable cardioverter-defibrillator) in place--placed in 2014 for sustained monomorphic VT. Patient was also placed on sotalol 120 mg twice a day. Patient was lost to follow-up after 2016 until 2020.  Dilated cardiomyopathy (HCC)--no recent cardiac work-up      PAF (paroxysmal atrial fibrillation) (HCC)--noted during his recent hospitalization with COVID      COVID      Hematuria    Urinary retention this admission accompanied by elevation of serum creatinine. Ventricular tachycardia, nonsustained noted during this hospitalization.       RECOMMENDATIONS:    Obtain baseline EKG  Echocardiography  Increase dose of sotalol if possible  Further recommendations will depend on his progress in the hospital      Discussed with patient and Nurse.     Glennon Burkitt, MD, [facility]

## 2022-05-06 NOTE — PROGRESS NOTES
Physical Therapy  Physical Therapy Initial Assessment     Name: Philippe Shah  : 1950  MRN: [id number]      Date of Service: 2022    Evaluating PT:  Horacio Solano PT, DPT [id number]     Room #:  9097/7215-W  Diagnosis:  Urinary retention [R33.9]  Hematuria [R31.9]  Acute cystitis with hematuria [N30.01]  Reason for admission: urinary retention  Precautions:  Falls, catheter irrigation  Procedure/Surgery:  None this admission  Equipment Recommendations:  Return to use of cane     SUBJECTIVE:  Pt lives with brother in a 1 story home with 3 NATALIE 1 rail. Pt ambulated with hurry cane PTA. OBJECTIVE:   Initial Evaluation  Date:  Treatment   Short Term/ Long Term   Goals   AM-PAC 6 Clicks 94/10     Was pt agreeable to Eval/treatment? Yes      Does pt have pain?  Denies      Bed Mobility  Rolling: NT  Supine to sit: NT  Sit to supine: NT  Scooting: NT  Independent    Transfers Sit to stand: SBA   Stand to sit: SBA  Stand pivot: SBA hurrycane   Independent    Ambulation    90 feet with SBA with hurrycane    >200 feet with AAD Mod I    Stair negotiation: ascended and descended  NT  3 steps with 1 rail Mod I      LE ROM: WFL    LE Strength:   knee ext: 4+/5  ankle DF: 4+/5    Balance:   Sitting static: Independent  Sitting dynamic: Independent  Standing static: SBA  Standing dynamic: SBA cane    -Pt is A & O x 3  -Sensation:  Pt denies numbness and tingling to extremities  -Edema:  unremarkable     Therapeutic Exercises:  Functional activity     Patient education  Pt educated on safety, sequencing of transfers, and role of PT    Patient response to education:   Pt verbalized understanding Pt demonstrated skill Pt requires further education in this area   Yes  Yes  Yes      ASSESSMENT:  Conditions Requiring Skilled Therapeutic Intervention:  []Decreased strength     []Decreased ROM  [x]Decreased functional mobility  [x]Decreased balance   [x]Decreased endurance   []Decreased posture  []Decreased sensation  []Decreased coordination   []Decreased vision  [x]Decreased safety awareness   []Increased pain       Comments:  Pt received sitting in bathroom and agreeable to PT session   Pt able to stand from commode and chair without hands on assistance. He is mildly unsteady with standing and requires support from cane when ambulating. His ambulation speed is slow and his endurance is limited. He additionally was placing his open hand on the IV pole for additional support at times. Encouraged pt to attempt using a walker but he refused and stated he's tried before and they don't work for him. Returned to room to end session   Pt with all needs met and call light in reach. Pt would benefit from continued PT POC to address functional deficits described above. Treatment:  Patient practiced and was instructed in the following treatment:     Therapeutic activity  o Patient education provided continuously throughout session for sequencing, safety maintenance, and improving any deficits found during the evaluation. o STS and pivot transfer training - pt educated on proper hand and foot placement, safety and sequencing, and use of proper technique to safely complete sit<>stand and pivot transfers with hands on assistance to complete task safely   o Gait training- pt was given verbal and tactile cues to facilitate improved balance and endurance during ambulation as well as provided with physical assistance. Pt's/ family goals   1. Return home     Patient and or family understand(s) diagnosis, prognosis, and plan of care. yes    Prognosis is good for reaching above PT goals.     PHYSICAL THERAPY PLAN OF CARE:    PT POC is established based on physician order and patient diagnosis     Referring provider/PT Order:  05/05/22 2330   PT eval and treat (PT/OT CONSULT PANEL) ONE TIME Complete Discontinue      Diagnosis:  Urinary retention [R33.9]  Hematuria [R31.9]  Acute cystitis with hematuria [N30.01]  Specific instructions for next treatment:  Progress transfers and ambulation distance     Current Treatment Recommendations:   [] Strengthening to improve independence with functional mobility   [] ROM to improve independence with functional mobility   [x] Balance Training to improve static/dynamic balance and to reduce fall risk  [x] Endurance Training to improve activity tolerance during functional mobility   [x] Transfer Training to improve safety and independence with all functional transfers   [x] Gait Training to improve gait mechanics, endurance and asses need for appropriate assistive device  [x] Stair Training in preparation for safe discharge home and/or into the community   [x] Positioning to prevent skin breakdown and contractures  [x] Safety and Education Training   [] Patient/Caregiver Education   [] HEP  [] Other     PT long term treatment goals are located in above grid    Frequency of treatments: 2-5x/week x 1-2 weeks. Time in  0835  Time out  0855    Total Treatment Time  8 minutes     Evaluation Time includes thorough review of current medical information, gathering information on past medical history/social history and prior level of function, completion of standardized testing/informal observation of tasks, assessment of data and education on plan of care and goals.     CPT codes:  [x] Low Complexity PT evaluation 21081  [] Moderate Complexity PT evaluation 57353  [] High Complexity PT evaluation 42135  [] PT Re-evaluation 58761  [] Gait training 77998 - minutes  [] Manual therapy 72596 - minutes  [x] Therapeutic activities 97541 8 minutes  [] Therapeutic exercises 18158 - minutes  [] Neuromuscular reeducation 80861 - minutes     Jade Izquierdo, PT, DPT  [id number]

## 2022-05-06 NOTE — ED NOTES
Continuous bladder irrigation started. Pink tinged clear urine now draining. Patient tolerating well at this time.       April Escobedo RN  05/05/22 8210

## 2022-05-06 NOTE — PROGRESS NOTES
OCCUPATIONAL THERAPY INITIAL EVALUATION    [email or web address] [id number] [address]      Date:2022                                                  Patient Name: Javon Guzman  MRN: [id number]  : 1950  Room: [address] [place] [address]    Evaluating OT: TU Lawrence, OTR/L [id number]  Referring Provider:JERI Breaux CNP  Specific Provider Orders: OT eval and treat   Recommended Adaptive Equipment: none     Diagnosis: urinary retention   Surgery: none   Pertinent Medical History: CAD, prostate CA, COPD, DM, HTN, AAA, CHF   Precautions:  Fall Risk    Assessment of current deficits   [x] Functional mobility  [x]ADLs  [x] Strength               [x]Cognition   [x] Functional transfers   [x] IADLs         [x] Safety Awareness   [x]Endurance   [] Fine Coordination              [x] Balance      [] Vision/perception   [x]Sensation    []Gross Motor Coordination  [] ROM  [] Delirium                   [] Motor Control     OT PLAN OF CARE   OT POC based on physician orders, patient diagnosis and results of clinical assessment    Frequency/Duration: 2-3 days/wk for 2 weeks PRN   Specific OT Treatment to include:   * Instruction/training on adapted ADL techniques and AE recommendations to increase functional independence within precautions       * Training on energy conservation strategies, correct breathing pattern and techniques to improve independence/tolerance for self-care routine  * Functional transfer/mobility training/DME recommendations for increased independence, safety, and fall prevention  * Patient/Family education to increase follow through with safety techniques and functional independence  * Recommendation of environmental modifications for increased safety with functional transfers/mobility and ADLs  * Therapeutic exercise to improve motor endurance, ROM, and functional strength for ADLs/functional transfers  * Therapeutic activities to facilitate/challenge dynamic balance, stand tolerance for increased safety and independence with ADLs  * Neuro-muscular re-education: facilitation of righting/equilibrium reactions, midline orientation, scapular stability/mobility, normalization of muscle tone, and facilitation of volitional active controled movement    Home Living: Pt lives with brother in a 2 story home; bed/bath on main level, laundry in basement   Bathroom setup: tub shower unit   Equipment owned: shower chair  Prior Level of Function: IND with ADLs/IADLs; using hurrycane for functional mobility   Driving: yes    Pain Level: 0/10  Cognition: A&O: 3/4; Follows 1 step directions, with repetition and increased time   Memory:  good    Sequencing:  good    Problem solving:  fair    Judgement/safety:  fair     Functional Assessment:  AM-PAC Daily Activity Raw Score: 19/24   Initial Eval Status  Date: 5/6/22 Treatment Status  Date: STGs=LTGs  Time Frame: 10-14 days   Feeding IND       Grooming SBA (standing for hand hygiene)   IND   UB Dressing SBA   IND   LB Dressing Min A (pt donned L sock, required assist with R sock)  SBA    Bathing Min A (simulated)  SBA    Toileting SBA  IND   Bed Mobility  Log roll: Min A  Supine to sit: Min A   Sit to supine: NT   Log roll IND  Supine to sit: IND   Sit to supine: IND   Functional Transfers Sit to stand:SBA   Stand to sit:SBA  Commode: SBA  IND   Functional Mobility SBA (using hurrycane, to/from bathroom)  IND   Balance Sitting: SBA  Standing: SBA     Activity Tolerance fair     Visual/  Perceptual Glasses: yes              UE ROM: RUE:  WFL  LUE:  WFL  Strength: RUE: grossly 4-/5 LUE: grossly 4-/5   Strength: B WFL  Fine Motor Coordination:  WFL     Hearing: WFL  Sensation:  c/o numbness/tingling in B feet  Tone:  WFL  Edema: none noted                            Comments:Cleared by RN to see pt. Upon arrival, patient supine in bed and agreeable to OT session.  At end of session, patient sitting in chair with call light and phone within reach, all lines and tubes intact. Pt would benefit from continued OT to increase functional independence and quality of life. Treatment: Pt required vc's and physical assist for proper technique/safety with hand placement/body mechanics/posture for bed mobility/ADLs/functional tranfers/mobility/cane management. Pt required vc's for sequencing/initiation of ADLs/functional transfers. Pt able to  sit EOB ~10 mins to increase core strength/balance/activity tolerance for ease with ADLs. Pt required increased time to complete ADLs/functional transfers due to management of multiple lines. Pt appeared to have tolerated session well and appears motivated/cooperative/pleasant . Pt instructed on use of call light for assistance and fall prevention. Pt demo'ing fair understanding of education provided. Continue to educate. Eval Complexity: Low    Rehab Potential: Good for established goals, pt. assisted in establishment of goals. LTG: maximize independence with ADLs to return to PLOF    Patient instructed on diagnosis, prognosis/goals and plan of care. Demonstrated fair understanding. [] Malnutrition indicators have been identified and nursing has been notified to ensure a dietitian consult is ordered. Evaluation time includes thorough review of current medical information, gathering information on past medical & social history & PLOF, completion of standardized testing, informal observation of tasks, consultation with other medical professions/disciplines, assessment of data & development of POC/goals.      Time In: 0815       Time Out: 0840     Total treatment time: 15       Treatment Charges: Mins Units   OT Eval Low 33874 X    OT Eval Medium 91854     OT Eval High 41678     OT Re-Eval 95226     Ther Ex  14938       Manual Therapy 64802       Thera Activities 61655       ADL/Home Mgt 96057 15 1   Neuro Re-ed 52507       Group Therapy        Orthotic manage/training  92462       Non-Billable Time Thad Gusman, [facility], OTR/L [id number]

## 2022-05-06 NOTE — ED NOTES
Irrigated patient's rodriguez. Patient having pain with irrigation. Irrigated 180 ml's and returned 140. Urine light pink in color, then changed back to dark red after irrigation with some clots. Resident notified.       Quiana Mayo RN  05/05/22 2053

## 2022-05-06 NOTE — PLAN OF CARE
Problem: ABCDS Injury Assessment  Goal: Absence of physical injury  Outcome: Progressing     Problem: Genitourinary - Adult  Goal: Absence of urinary retention  Outcome: Not Progressing     Problem: Infection - Adult  Goal: Absence of infection at discharge  Outcome: Progressing     Problem: Infection - Adult  Goal: Absence of fever/infection during anticipated neutropenic period  Outcome: Progressing     Problem: Infection - Adult  Goal: Absence of infection during hospitalization  Outcome: Progressing

## 2022-05-06 NOTE — ED PROVIDER NOTES
70-year-old male with history of prostate cancer status postradiation presenting for urinary retention. Symptoms have been present for 2 days and have been moderate in severity. No exacerbating or relieving factors. Patient states he has been unable to urinate since yesterday morning. He complains of lower abdominal discomfort. He states he has a history of this and has a history of passing blood clots in his urine. He states last time this happened was approximately 1 year ago. He is on Coumadin. He denies any fevers, chills, chest pain, or shortness of breath. Review of Systems   Constitutional: Negative for chills and fever. HENT: Negative for congestion. Eyes: Negative for visual disturbance. Respiratory: Negative for cough and shortness of breath. Cardiovascular: Negative for chest pain. Gastrointestinal: Positive for abdominal pain. Genitourinary: Positive for difficulty urinating and hematuria. Musculoskeletal: Negative for back pain. Skin: Negative for rash and wound. Neurological: Negative for dizziness and headaches. All other systems reviewed and are negative. Physical Exam  Constitutional:       General: He is not in acute distress. Appearance: Normal appearance. HENT:      Head: Normocephalic and atraumatic. Right Ear: External ear normal.      Left Ear: External ear normal.      Nose: Nose normal.   Eyes:      Extraocular Movements: Extraocular movements intact. Conjunctiva/sclera: Conjunctivae normal.      Pupils: Pupils are equal, round, and reactive to light. Cardiovascular:      Rate and Rhythm: Normal rate and regular rhythm. Pulmonary:      Effort: Pulmonary effort is normal.      Breath sounds: Normal breath sounds. Abdominal:      General: There is no distension. Palpations: Abdomen is soft. Tenderness: There is no abdominal tenderness.    Genitourinary:     Comments: Moderate amount of clotted blood in briefs  Musculoskeletal:         General: No swelling or deformity. Skin:     General: Skin is warm and dry. Neurological:      General: No focal deficit present. Mental Status: He is alert. Psychiatric:         Mood and Affect: Mood normal.         Behavior: Behavior normal.          Procedures     MDM  Number of Diagnoses or Management Options  Acute cystitis with hematuria  Urinary retention  Diagnosis management comments: 66-year-old male presenting for urinary retention x2 days. Urinary catheter placed with expression of gross hematuria. Bladder was irrigated, however urine remained bloody. UA suggestive of UTI. Labs otherwise unremarkable. INR 2.0. Due to persistent hematuria despite irrigation, decision was made to admit patient for continuous bladder irrigation and urology evaluation. Patient was given dose of Omnicef in the ED and was admitted to medicine in stable condition.                  --------------------------------------------- PAST HISTORY ---------------------------------------------  Past Medical History:  has a past medical history of Abdominal aortic aneurysm without rupture ([facility] Arthritis, CAD (coronary artery disease), Cancer ([facility] Combined systolic and diastolic heart failure ([facility] COPD (chronic obstructive pulmonary disease) ([facility] Diabetes mellitus ([facility] GERD (gastroesophageal reflux disease), History of placement of internal cardiac defibrillator, Hypertension, Sigmoid diverticulosis, Sinusitis, Tubular adenoma of colon, and Vertigo. Past Surgical History:  has a past surgical history that includes Coronary angioplasty with stent (1/13/14); Umbilical hernia repair; Colonoscopy (8/31/15); Cardiac defibrillator placement; Hip fracture surgery (Right, 3/14/2020); and AAA repair, endovascular (N/A, 10/1/2021). Social History:  reports that he quit smoking about 8 years ago. His smoking use included cigarettes. He started smoking about 52 years ago.  He has a 100.00 pack-year smoking history. He has never used smokeless tobacco. He reports current alcohol use. He reports that he does not use drugs. Family History: family history includes Cancer in his brother, brother, and father; Heart Disease in his mother. The patients home medications have been reviewed. Allergies: Patient has no known allergies.     -------------------------------------------------- RESULTS -------------------------------------------------    LABS:  Results for orders placed or performed during the hospital encounter of 05/05/22   Urinalysis with Microscopic   Result Value Ref Range    Color, UA BLOODY Straw/Yellow    Clarity, UA TURBID (A) Clear    Glucose, Ur >=1000 (A) Negative mg/dL    Bilirubin Urine MODERATE (A) Negative    Ketones, Urine TRACE (A) Negative mg/dL    Specific Gravity, UA 1.025 1.005 - 1.030    Blood, Urine LARGE (A) Negative    pH, UA 6.5 5.0 - 9.0    Protein, UA >=300 (A) Negative mg/dL    Urobilinogen, Urine 2.0 (A) <2.0 E.U./dL    Nitrite, Urine POSITIVE (A) Negative    Leukocyte Esterase, Urine MODERATE (A) Negative    WBC, UA 5-10 (A) 0 - 5 /HPF    RBC, UA >20 0 - 2 /HPF    Bacteria, UA FEW (A) None Seen /HPF   CBC with Auto Differential   Result Value Ref Range    WBC 9.5 4.5 - 11.5 E9/L    RBC 3.75 (L) 3.80 - 5.80 E12/L    Hemoglobin 9.7 (L) 12.5 - 16.5 g/dL    Hematocrit 31.6 (L) 37.0 - 54.0 %    MCV 84.3 80.0 - 99.9 fL    MCH 25.9 (L) 26.0 - 35.0 pg    MCHC 30.7 (L) 32.0 - 34.5 %    RDW 16.0 (H) 11.5 - 15.0 fL    Platelets 530 651 - 606 E9/L    MPV 9.1 7.0 - 12.0 fL    Neutrophils % 73.7 43.0 - 80.0 %    Immature Granulocytes % 0.7 0.0 - 5.0 %    Lymphocytes % 11.9 (L) 20.0 - 42.0 %    Monocytes % 8.9 2.0 - 12.0 %    Eosinophils % 3.9 0.0 - 6.0 %    Basophils % 0.9 0.0 - 2.0 %    Neutrophils Absolute 6.98 1.80 - 7.30 E9/L    Immature Granulocytes # 0.07 E9/L    Lymphocytes Absolute 1.13 (L) 1.50 - 4.00 E9/L    Monocytes Absolute 0.84 0.10 - 0.95 E9/L Eosinophils Absolute 0.37 0.05 - 0.50 E9/L    Basophils Absolute 0.09 0.00 - 0.20 E9/L   Comprehensive Metabolic Panel   Result Value Ref Range    Sodium 130 (L) 132 - 146 mmol/L    Potassium 5.1 (H) 3.5 - 5.0 mmol/L    Chloride 96 (L) 98 - 107 mmol/L    CO2 25 22 - 29 mmol/L    Anion Gap 9 7 - 16 mmol/L    Glucose 342 (H) 74 - 99 mg/dL    BUN 21 6 - 23 mg/dL    CREATININE 1.4 (H) 0.7 - 1.2 mg/dL    GFR Non-African American 50 >=60 mL/min/1.73    GFR African American >60     Calcium 9.4 8.6 - 10.2 mg/dL    Total Protein 7.9 6.4 - 8.3 g/dL    Albumin 3.6 3.5 - 5.2 g/dL    Total Bilirubin 0.2 0.0 - 1.2 mg/dL    Alkaline Phosphatase 175 (H) 40 - 129 U/L    ALT 16 0 - 40 U/L    AST 16 0 - 39 U/L   Protime-INR   Result Value Ref Range    Protime 21.4 (H) 9.3 - 12.4 sec    INR 2.0        RADIOLOGY:  No orders to display     ------------------------- NURSING NOTES AND VITALS REVIEWED ---------------------------  Date / Time Roomed:  5/5/2022  5:00 PM  ED Bed Assignment:  38/38    The nursing notes within the ED encounter and vital signs as below have been reviewed. Patient Vitals for the past 24 hrs:   BP Temp Temp src Pulse Resp SpO2 Height Weight   05/05/22 2200 -- -- -- 93 17 96 % -- --   05/05/22 2104 (!) 157/100 -- -- 114 22 95 % -- --   05/05/22 1725 (!) 149/80 -- -- 95 19 91 % -- --   05/05/22 1624 104/62 -- -- 98 -- -- -- --   05/05/22 1457 (!) 80/53 -- -- 108 18 98 % 6' (1.829 m) 195 lb (88.5 kg)   05/05/22 1440 -- 98 °F (36.7 °C) Oral 108 -- 99 % -- --       Oxygen Saturation Interpretation: Normal    ------------------------------------------ PROGRESS NOTES ------------------------------------------  Counseling:  I have spoken with the patient and discussed todays results, in addition to providing specific details for the plan of care and counseling regarding the diagnosis and prognosis.   Their questions are answered at this time and they are agreeable with the plan of admission.    --------------------------------- ADDITIONAL PROVIDER NOTES ---------------------------------    This patient's ED course included: a personal history and physicial examination, re-evaluation prior to disposition and multiple bedside re-evaluations    This patient has remained hemodynamically stable during their ED course. Diagnosis:  1. Urinary retention    2. Hematuria, unspecified type        Disposition:  Patient's disposition: Admit to med/surg floor  Patient's condition is stable.             Michelle Walsh MD  Resident  05/05/22 8672       Michelle Walsh MD  Resident  05/05/22 0096

## 2022-05-06 NOTE — CARE COORDINATION
Care Coordination: met with pt at the bedside to discuss transition of care upon discharge. Lives at home with brother, follows with Dr Yuliet Pulido. Uses [employer]. He drives, but usually brother takes him around. Lives in a one story home, no difficulty with steps or ambulating and uses cane at times. Otx score 19/24. Ptx is pending. He had a hx of hhc but does not recall agency. If hhc is needed, he has no preference of Agency but again, he is uncertain if even needed at this point. No hx of dme or queenie. Brother will  upon discharge    Darshan Prasad      The Plan for Transition of Care is related to the following treatment goals: dc plans and hhc    The Patient  was provided with a choice of provider and agrees   with the discharge plan. [x] Yes [] No    Freedom of choice list was provided with basic dialogue that supports the patient's individualized plan of care/goals, treatment preferences and shares the quality data associated with the providers.  [x] Yes [] No

## 2022-05-06 NOTE — PROGRESS NOTES
This RN cannot complete med list review because patient does not know his home medications from memory. He does state he only takes Toprol daily not BID. He also has an ICD and states he is known to Dr. Ho Pastor. Dr. Nessa Burns was notified by voice mail message of Toprol daily dosing and that patient  had 19 beats of V tach shortly after admission to [facility]. He was asymptomatic.  Nicola Cruz RN 5/6/2022

## 2022-05-07 LAB
ANION GAP SERPL CALCULATED.3IONS-SCNC: 9 MMOL/L (ref 7–16)
BUN BLDV-MCNC: 15 MG/DL (ref 6–23)
CALCIUM SERPL-MCNC: 8.5 MG/DL (ref 8.6–10.2)
CHLORIDE BLD-SCNC: 103 MMOL/L (ref 98–107)
CO2: 21 MMOL/L (ref 22–29)
CREAT SERPL-MCNC: 1.2 MG/DL (ref 0.7–1.2)
GFR AFRICAN AMERICAN: >60
GFR NON-AFRICAN AMERICAN: 60 ML/MIN/1.73
GLUCOSE BLD-MCNC: 185 MG/DL (ref 74–99)
HCT VFR BLD CALC: 25.8 % (ref 37–54)
HCT VFR BLD CALC: 26.4 % (ref 37–54)
HCT VFR BLD CALC: 27.3 % (ref 37–54)
HEMOGLOBIN: 8.1 G/DL (ref 12.5–16.5)
HEMOGLOBIN: 8.3 G/DL (ref 12.5–16.5)
HEMOGLOBIN: 8.4 G/DL (ref 12.5–16.5)
METER GLUCOSE: 188 MG/DL (ref 74–99)
METER GLUCOSE: 206 MG/DL (ref 74–99)
METER GLUCOSE: 231 MG/DL (ref 74–99)
METER GLUCOSE: 238 MG/DL (ref 74–99)
ORGANISM: ABNORMAL
POTASSIUM SERPL-SCNC: 4.2 MMOL/L (ref 3.5–5)
SODIUM BLD-SCNC: 133 MMOL/L (ref 132–146)
URINE CULTURE, ROUTINE: ABNORMAL

## 2022-05-07 PROCEDURE — 85014 HEMATOCRIT: CPT

## 2022-05-07 PROCEDURE — 2140000000 HC CCU INTERMEDIATE R&B

## 2022-05-07 PROCEDURE — 2580000003 HC RX 258: Performed by: NURSE PRACTITIONER

## 2022-05-07 PROCEDURE — 6370000000 HC RX 637 (ALT 250 FOR IP): Performed by: INTERNAL MEDICINE

## 2022-05-07 PROCEDURE — 36415 COLL VENOUS BLD VENIPUNCTURE: CPT

## 2022-05-07 PROCEDURE — 85018 HEMOGLOBIN: CPT

## 2022-05-07 PROCEDURE — 82962 GLUCOSE BLOOD TEST: CPT

## 2022-05-07 PROCEDURE — 6360000002 HC RX W HCPCS: Performed by: NURSE PRACTITIONER

## 2022-05-07 PROCEDURE — 80048 BASIC METABOLIC PNL TOTAL CA: CPT

## 2022-05-07 PROCEDURE — 6370000000 HC RX 637 (ALT 250 FOR IP): Performed by: NURSE PRACTITIONER

## 2022-05-07 RX ADMIN — INSULIN LISPRO 2 UNITS: 100 INJECTION, SOLUTION INTRAVENOUS; SUBCUTANEOUS at 20:03

## 2022-05-07 RX ADMIN — METOPROLOL SUCCINATE 50 MG: 50 TABLET, EXTENDED RELEASE ORAL at 09:31

## 2022-05-07 RX ADMIN — ATORVASTATIN CALCIUM 40 MG: 40 TABLET, FILM COATED ORAL at 19:55

## 2022-05-07 RX ADMIN — INSULIN LISPRO 4 UNITS: 100 INJECTION, SOLUTION INTRAVENOUS; SUBCUTANEOUS at 16:56

## 2022-05-07 RX ADMIN — WATER 1000 MG: 1 INJECTION INTRAMUSCULAR; INTRAVENOUS; SUBCUTANEOUS at 19:54

## 2022-05-07 RX ADMIN — SODIUM CHLORIDE, PRESERVATIVE FREE 10 ML: 5 INJECTION INTRAVENOUS at 09:31

## 2022-05-07 RX ADMIN — INSULIN LISPRO 2 UNITS: 100 INJECTION, SOLUTION INTRAVENOUS; SUBCUTANEOUS at 06:36

## 2022-05-07 RX ADMIN — INSULIN LISPRO 4 UNITS: 100 INJECTION, SOLUTION INTRAVENOUS; SUBCUTANEOUS at 12:37

## 2022-05-07 RX ADMIN — TAMSULOSIN HYDROCHLORIDE 0.4 MG: 0.4 CAPSULE ORAL at 09:31

## 2022-05-07 RX ADMIN — SOTALOL HYDROCHLORIDE 40 MG: 80 TABLET ORAL at 09:31

## 2022-05-07 RX ADMIN — SOTALOL HYDROCHLORIDE 40 MG: 80 TABLET ORAL at 19:54

## 2022-05-07 ASSESSMENT — PAIN SCALES - GENERAL
PAINLEVEL_OUTOF10: 0
PAINLEVEL_OUTOF10: 0

## 2022-05-07 NOTE — PROGRESS NOTES
5/7/2022 9:19 AM  Service: Urology  Group: [facility] (Matthieu/Marga/Fartun)    Lorena Zavala  [id number]    Subjective:    No new complaints   urine yellow  No fevers     Review of Systems  Constitutional: No fever or chills   Respiratory: negative for cough and hemoptysis  Cardiovascular: negative for chest pain and dyspnea  Gastrointestinal: negative for abdominal pain, diarrhea, nausea and vomiting   : See above  Derm: negative for rash and skin lesion(s)  Neurological: negative for seizures and tremors  Musculoskeletal: Negative    Psychiatric: Negative   All other reviews are negative      Scheduled Meds:   metoprolol succinate  50 mg Oral Daily    sodium chloride flush  5-40 mL IntraVENous 2 times per day    atorvastatin  40 mg Oral Nightly    insulin lispro  0-12 Units SubCUTAneous TID WC    insulin lispro  0-6 Units SubCUTAneous Nightly    sotalol  40 mg Oral BID    tamsulosin  0.4 mg Oral Daily    cefTRIAXone (ROCEPHIN) IV  1,000 mg IntraVENous Q24H       Objective:  Vitals:    05/07/22 0745   BP: 134/72   Pulse: 82   Resp: 20   Temp: 98.2 °F (36.8 °C)   SpO2: 95%         Allergies: Patient has no known allergies. General Appearance: alert and oriented to person, place and time and in no acute distress  Skin: no rash or erythema  Head: normocephalic and atraumatic  Pulmonary/Chest: normal air movement, no respiratory distress  Abdomen: soft, non-tender, non-distended  Genitourinary:  Cassidy draining yellow urine   Extremities: no cyanosis, clubbing or edema         Labs:     Recent Labs     05/07/22  0550      K 4.2      CO2 21*   BUN 15   CREATININE 1.2   GLUCOSE 185*   CALCIUM 8.5*       Lab Results   Component Value Date    HGB 8.4 05/07/2022    HCT 27.3 05/07/2022       Lab Results   Component Value Date    PSA <0.03 05/06/2022    PSA <0.03 04/11/2022    PSA <0.01 01/06/2022         Assessment/Plan:  Gross hematuria  PCa s/p XRT 2018   UTI    Urine culture, +Enterococcus Antibiotics per primary   PSA remains stable   Cont the rodriguez catheter   This will be left indwelling on discharge  He will follow up in our office for a cystoscopy   No further  interventions are planned at this time  Please call with any further questions or concerns  Thank you for allowing us to participate in his care       Cris Hansen, [address] [place] [address]  Urology

## 2022-05-07 NOTE — PROGRESS NOTES
Hospitalist Progress Note      PCP: Daphnie Grijalva DO    Date of Admission: 5/5/2022        Hospital Course:  **70 y.o. male presented with URINARY RETENTION, STATES HE HAS PROSTATE CANCER AND IS POST RADIATION. BEGAN TO HAVE BLOOD IN URINE AND PAIN WITH PASSING CLOTS, NO FEVER, NO CHILLS,  * urine now lam, not sara blood like on admission        Subjective: *feels better          Medications:  Reviewed    Infusion Medications    sodium chloride 100 mL/hr at 05/06/22 2100    sodium chloride      dextrose       Scheduled Medications    metoprolol succinate  50 mg Oral Daily    sodium chloride flush  5-40 mL IntraVENous 2 times per day    atorvastatin  40 mg Oral Nightly    insulin lispro  0-12 Units SubCUTAneous TID WC    insulin lispro  0-6 Units SubCUTAneous Nightly    sotalol  40 mg Oral BID    tamsulosin  0.4 mg Oral Daily    cefTRIAXone (ROCEPHIN) IV  1,000 mg IntraVENous Q24H     PRN Meds: opium-belladonna, perflutren lipid microspheres, sodium chloride flush, sodium chloride, bisacodyl, trimethobenzamide, glucose, dextrose, glucagon (rDNA), dextrose      Intake/Output Summary (Last 24 hours) at 5/7/2022 1358  Last data filed at 5/7/2022 0745  Gross per 24 hour   Intake 820 ml   Output 1675 ml   Net -855 ml       Exam:    /72   Pulse 82   Temp 98.2 °F (36.8 °C) (Temporal)   Resp 20   Ht 5' 11\" (1.803 m)   Wt 194 lb (88 kg)   SpO2 95%   BMI 27.06 kg/m²       General appearance:  No apparent distress, . HEENT:  Normal cephalic, atraumatic without obvious deformity. Neck: Supple, with full range of motion. . Trachea midline. Respiratory:  Normal respiratory effort. Clear to auscultation,   Cardiovascular:  Regular rate and rhythm   Abdomen: Soft, non-tender, non-distended with normal bowel sounds. Musculoskeletal:  No clubbing, cyanosis or edema bilaterally. Skin: Skin color, texture, turgor normal.  No rashes or lesions. Neurologic:  .  Cranial nerves: II-XII intact, grossly non-focal.  Psychiatric:  Alert and oriented, thought content appropriate, normal insight                Labs:   Recent Labs     05/05/22  1502 05/05/22  1502 05/06/22  0513 05/06/22  2258 05/07/22  0550   WBC 9.5  --  10.7  --   --    HGB 9.7*   < > 8.4* 8.2* 8.4*   HCT 31.6*   < > 27.4* 26.2* 27.3*     --  306  --   --     < > = values in this interval not displayed. Recent Labs     05/05/22  1502 05/06/22  0513 05/07/22  0550   * 133  131* 133   K 5.1* 4.1  4.0 4.2   CL 96* 100  100 103   CO2 25 22  23 21*   BUN 21 15  16 15   CREATININE 1.4* 1.2  1.2 1.2   CALCIUM 9.4 8.7  8.6 8.5*     Recent Labs     05/05/22  1502   AST 16   ALT 16   BILITOT 0.2   ALKPHOS 175*     Recent Labs     05/05/22  1801   INR 2.0     No results for input(s): Hernan Seed in the last 72 hours. Recent Labs     05/05/22  1502   AST 16   ALT 16   BILITOT 0.2   ALKPHOS 175*     No results for input(s): LACTA in the last 72 hours.   Lab Results   Component Value Date    LABURIC 6.7 04/11/2022     No results found for: AMMONIA    Assessment:    Active Hospital Problems    Diagnosis Date Noted    Hematuria [R31.9] 05/05/2022     Priority: Medium    Urinary retention [R33.9] 05/05/2022     Priority: Medium    Elevated serum creatinine [R79.89] 05/05/2022     Priority: Medium    Anemia [D64.9] 05/05/2022     Priority: Medium    Essential hypertension [I10] 03/03/2012     Priority: Medium    Diabetes [facility] [E11.9] 03/03/2012     Priority: Medium    PAF (paroxysmal atrial fibrillation) ([facility] [I48.0] 01/07/2022    ICD (implantable cardioverter-defibrillator) in place [Z95.810] 08/31/2018    Mixed hyperlipidemia [E78.2] 12/01/2017    Prostate cancer ([facility] Taylor Bustillos 11/29/2017    CAD (coronary artery disease) [I25.10] 02/03/2014     Dilated cardiomyopathy  NON SUSTAINED VT  H/O ICD        PLAN:  EP CONSULT   FLOMAX   SOTALOL   TOPROL   ROCEPHIN   cont bladder irrigations     DVT Prophylaxis: *SCD Diet: ADULT DIET;  Regular; 5 carb choices (75 gm/meal)  Code Status: Full Code     PT/OT Eval Status: *ORDERED     Dispo - *HOME       Electronically signed by Lavern Jimenez DO on 5/7/2022 at 1:58 PM [facility]

## 2022-05-07 NOTE — H&P
Hospital Medicine History & Physical      PCP: Kristen Buck DO    Date of Admission: 5/5/2022    Date of Service: . MAY 6, 2022    Chief Complaint:  URINARY RETENTION      History Of Present Illness:     70 y.o. male presented with URINARY RETENTION, STATES HE HAS PROSTATE CANCER AND IS POST RADIATION. BEGAN TO HAVE BLOOD IN URINE AND PAIN WITH PASSING CLOTS, NO FEVER, NO CHILLS,      Past Medical History:          Diagnosis Date    Abdominal aortic aneurysm without rupture ([address] 9/3/2021    Arthritis     CAD (coronary artery disease)     Cancer ([address] 2017    Colon    Combined systolic and diastolic heart failure ([address] 6/15/13    echo LVEF of 30-35%  stage II diastolic dysfunction    [facility]     Diabetes mellitus (HCC)     GERD (gastroesophageal reflux disease)     History of placement of internal cardiac defibrillator 2014? [employer] [facility]    Hypertension     Sigmoid diverticulosis 8/31/2015    Sinusitis     Tubular adenoma of colon 8/31/2015    Vertigo        Past Surgical History:          Procedure Laterality Date    ABDOMINAL AORTIC ANEURYSM REPAIR, ENDOVASCULAR N/A 10/1/2021    ABDOMINAL AORTIC ANEURYSM REPAIR ENDOVASCULAR performed by Jennifer German MD at . [address] COLONOSCOPY  8/31/15    with polypectomy (x2) - Dr. Jefferson Listen  1/13/14    3.5/15 Xience in Ram[place] and 3.0/15 Resolute in th 1st obtuse marginal.    HIP FRACTURE SURGERY Right 3/14/2020    RIGHT HIP OPEN REDUCTION INTERNAL FIXATION NAIL-SYNTHES -- OC 2 performed by Vana Harada, DO at [address]         Medications Prior to Admission:      Prior to Admission medications    Medication Sig Start Date End Date Taking?  Authorizing Provider Ascorbic Acid (VITAMIN C) 250 MG tablet Take 500 mg by mouth daily   Yes Historical Provider, MD   Vitamin D (CHOLECALCIFEROL) 25 MCG (1000 UT) TABS tablet Take 1,000 Units by mouth daily   Yes Historical Provider, MD   zinc gluconate 50 MG tablet Take 50 mg by mouth daily   Yes Historical Provider, MD   warfarin (COUMADIN) 1 MG tablet Take 1 tablet by mouth daily  Patient taking differently: Take 4 mg by mouth Mon Tues Thursday Friday 5/4/22   Toñito Santos DO   tamsulosin [facility]) 0.4 MG capsule Take 1 capsule by mouth daily 5/4/22   Toñito Colunga DO   lisinopril (PRINIVIL;ZESTRIL) 10 MG tablet TAKE ONE TABLET BY MOUTH EVERY DAY 4/18/22   Toñito Santos DO   warfarin (COUMADIN) 5 MG tablet Take 1 tablet by mouth daily  Patient taking differently: Take 5 mg by mouth three times a week Sat sun and wednesday 4/18/22 7/17/22  Nathen Colunga DO   finasteride (PROSCAR) 5 MG tablet Take 1 tablet by mouth daily  Patient not taking: Reported on 4/18/2022 1/21/22   Nathen Colunga DO   sotalol (BETAPACE) 80 MG tablet Take 0.5 tablets by mouth 2 times daily 1/8/22   Lety Roberts MD   metoprolol succinate (TOPROL XL) 50 MG extended release tablet Take 1 tablet by mouth 2 times daily  Patient taking differently: Take 50 mg by mouth daily  1/8/22   Lety Roberts MD   atorvastatin (LIPITOR) 40 MG tablet Take 1 tablet by mouth daily 1/7/22 7/6/22  Nathen Colunga DO   metFORMIN (GLUCOPHAGE) 500 MG tablet Take 1 tablet by mouth 2 times daily (with meals)  Patient taking differently: Take 500 mg by mouth daily (with breakfast)  1/7/22   Toñito Santos DO       Allergies:  Patient has no known allergies. Social History:      The patient currently lives WITH HIS BROTHER    TOBACCO:   reports that he quit smoking about 8 years ago. His smoking use included cigarettes. He started smoking about 52 years ago. He has a 100.00 pack-year smoking history.  He has never used smokeless tobacco.  ETOH:   reports current alcohol use. Family History:       Reviewed in detail and negative for DM, CAD, Cancer, CVA. Positive as follows:        Problem Relation Age of Onset    Heart Disease Mother     Cancer Father         abdominal    Cancer Brother         prostate    Cancer Brother         digestive       REVIEW OF SYSTEMS:   Pertinent positives as noted in the HPI. All other systems reviewed and negative. PHYSICAL EXAM:    /78   Pulse 71   Temp 98.4 °F (36.9 °C) (Oral)   Resp 20   Ht 5' 11\" (1.803 m)   Wt 195 lb (88.5 kg) Comment: bed scale not working unable to stand due to gu irrigation  SpO2 98%   BMI 27.20 kg/m²     General appearance:  No apparent distress, appears stated age and cooperative. HEENT:  Normal cephalic, atraumatic without obvious deformity. Pupils equal, round, and reactive to light. Extra ocular muscles intact. Conjunctivae/corneas clear. Neck: Supple, with full range of motion. No jugular venous distention. Trachea midline. Respiratory:  Normal respiratory effort. Clear to auscultation, bilaterally without Rales/Wheezes/Rhonchi. Cardiovascular:  Regular rate and rhythm   Abdomen: Soft, non-tender, non-distended with normal bowel sounds. Musculoskeletal:  No clubbing, cyanosis or edema bilaterally. Full range of motion without deformity. Skin: Skin color, texture, turgor normal.  No rashes or lesions. Neurologic:  Neurovascularly intact without any focal sensory/motor deficits.  Cranial nerves: II-XII intact, grossly non-focal.  Psychiatric:  Alert and oriented, thought content appropriate, normal insight        Labs:     Recent Labs     05/05/22  1502 05/06/22  0513   WBC 9.5 10.7   HGB 9.7* 8.4*   HCT 31.6* 27.4*    306     Recent Labs     05/05/22  1502 05/06/22  0513   * 133  131*   K 5.1* 4.1  4.0   CL 96* 100  100   CO2 25 22  23   BUN 21 15  16   CREATININE 1.4* 1.2  1.2   CALCIUM 9.4 8.7  8.6     Recent Labs 05/05/22  1502   AST 16   ALT 16   BILITOT 0.2   ALKPHOS 175*     Recent Labs     05/05/22  1801   INR 2.0     No results for input(s): Dasha Abi in the last 72 hours. Urinalysis:      Lab Results   Component Value Date    NITRU POSITIVE 05/05/2022    WBCUA 5-10 05/05/2022    BACTERIA FEW 05/05/2022    RBCUA >20 05/05/2022    RBCUA 0-1 01/12/2014    BLOODU LARGE 05/05/2022    SPECGRAV 1.025 05/05/2022    GLUCOSEU >=1000 05/05/2022       Radiology:     CXR: I have reviewed the CXR with the following interpretation: *    No orders to display       ASSESSMENT:    P[facility] Problems    Diagnosis Date Noted    Hematuria [R31.9] 05/05/2022     Priority: Medium    Urinary retention [R33.9] 05/05/2022     Priority: Medium    Elevated serum creatinine [R79.89] 05/05/2022     Priority: Medium    Anemia [D64.9] 05/05/2022     Priority: Medium    Essential hypertension [I10] 03/03/2012     Priority: Medium    Diabetes mellitus ([facility] [E11.9] 03/03/2012     Priority: Medium    PA[facility]oxysmal [facility] fibrillation) [facility] [I48.0] 01/07/2022    ICD (implantable cardioverter-defibrillator) in place [Z95.810] 08/31/2018    Mixed hyperlipidemia [E78.2] 12/01/2017    Prostate cancer ([facility] Abby Berg 11/29/2017    CAD (coronary artery disease) [I25.10] 02/03/2014   NON SUSTAINED VT  H/O ICD      PLAN:  EP CONSULT   FLOMAX   SOTALOL   TOPROL   ROCEPHIN      DVT Prophylaxis: *SCD   Diet: ADULT DIET; Regular; 5 carb choices (75 gm/meal)  Code Status: Full Code    PT/OT Eval Status: *ORDERED    Dispo - *HOME    Electronically signed by Jessie Valles DO on 5/6/2022 at 8:51 PM FACOI       Thank you Toñito Garcia DO for the opportunity to be involved in this patient's care.  If you have any questions or concerns please feel free to contact me at [phone]

## 2022-05-08 LAB
ANION GAP SERPL CALCULATED.3IONS-SCNC: 13 MMOL/L (ref 7–16)
BUN BLDV-MCNC: 14 MG/DL (ref 6–23)
CALCIUM SERPL-MCNC: 8.5 MG/DL (ref 8.6–10.2)
CHLORIDE BLD-SCNC: 103 MMOL/L (ref 98–107)
CO2: 20 MMOL/L (ref 22–29)
CREAT SERPL-MCNC: 1.2 MG/DL (ref 0.7–1.2)
GFR AFRICAN AMERICAN: >60
GFR NON-AFRICAN AMERICAN: 60 ML/MIN/1.73
GLUCOSE BLD-MCNC: 146 MG/DL (ref 74–99)
METER GLUCOSE: 147 MG/DL (ref 74–99)
METER GLUCOSE: 188 MG/DL (ref 74–99)
METER GLUCOSE: 233 MG/DL (ref 74–99)
METER GLUCOSE: 240 MG/DL (ref 74–99)
POTASSIUM SERPL-SCNC: 3.8 MMOL/L (ref 3.5–5)
SODIUM BLD-SCNC: 136 MMOL/L (ref 132–146)

## 2022-05-08 PROCEDURE — 2140000000 HC CCU INTERMEDIATE R&B

## 2022-05-08 PROCEDURE — 36415 COLL VENOUS BLD VENIPUNCTURE: CPT

## 2022-05-08 PROCEDURE — 6370000000 HC RX 637 (ALT 250 FOR IP): Performed by: NURSE PRACTITIONER

## 2022-05-08 PROCEDURE — 6360000002 HC RX W HCPCS: Performed by: NURSE PRACTITIONER

## 2022-05-08 PROCEDURE — 2580000003 HC RX 258: Performed by: NURSE PRACTITIONER

## 2022-05-08 PROCEDURE — 82962 GLUCOSE BLOOD TEST: CPT

## 2022-05-08 PROCEDURE — 6370000000 HC RX 637 (ALT 250 FOR IP): Performed by: INTERNAL MEDICINE

## 2022-05-08 PROCEDURE — 80048 BASIC METABOLIC PNL TOTAL CA: CPT

## 2022-05-08 RX ORDER — SOTALOL HYDROCHLORIDE 80 MG/1
80 TABLET ORAL 2 TIMES DAILY
Status: DISCONTINUED | OUTPATIENT
Start: 2022-05-08 | End: 2022-05-10 | Stop reason: HOSPADM

## 2022-05-08 RX ADMIN — INSULIN LISPRO 2 UNITS: 100 INJECTION, SOLUTION INTRAVENOUS; SUBCUTANEOUS at 11:41

## 2022-05-08 RX ADMIN — SODIUM CHLORIDE, PRESERVATIVE FREE 10 ML: 5 INJECTION INTRAVENOUS at 08:22

## 2022-05-08 RX ADMIN — SOTALOL HYDROCHLORIDE 40 MG: 80 TABLET ORAL at 08:21

## 2022-05-08 RX ADMIN — INSULIN LISPRO 2 UNITS: 100 INJECTION, SOLUTION INTRAVENOUS; SUBCUTANEOUS at 06:29

## 2022-05-08 RX ADMIN — TAMSULOSIN HYDROCHLORIDE 0.4 MG: 0.4 CAPSULE ORAL at 08:21

## 2022-05-08 RX ADMIN — INSULIN LISPRO 2 UNITS: 100 INJECTION, SOLUTION INTRAVENOUS; SUBCUTANEOUS at 23:29

## 2022-05-08 RX ADMIN — INSULIN LISPRO 4 UNITS: 100 INJECTION, SOLUTION INTRAVENOUS; SUBCUTANEOUS at 16:51

## 2022-05-08 RX ADMIN — ATORVASTATIN CALCIUM 40 MG: 40 TABLET, FILM COATED ORAL at 22:30

## 2022-05-08 RX ADMIN — METOPROLOL SUCCINATE 50 MG: 50 TABLET, EXTENDED RELEASE ORAL at 08:21

## 2022-05-08 RX ADMIN — WATER 1000 MG: 1 INJECTION INTRAMUSCULAR; INTRAVENOUS; SUBCUTANEOUS at 22:31

## 2022-05-08 RX ADMIN — SOTALOL HYDROCHLORIDE 80 MG: 80 TABLET ORAL at 22:30

## 2022-05-08 ASSESSMENT — PAIN SCALES - GENERAL
PAINLEVEL_OUTOF10: 0
PAINLEVEL_OUTOF10: 0

## 2022-05-08 NOTE — PROGRESS NOTES
Hospitalist Progress Note      PCP: Sherin Huddleston DO    Date of Admission: 5/5/2022        Hospital Course:  *70 y. o. male presented with URINARY RETENTION, STATES HE HAS PROSTATE CANCER AND IS POST RADIATION. BEGAN TO HAVE BLOOD IN URINE AND PAIN WITH PASSING CLOTS, NO FEVER, NO CHILLS,  * urine now lam, not sara blood like on admission   **        Subjective: **WANTS TO GO HOME          Medications:  Reviewed    Infusion Medications    sodium chloride 100 mL/hr at 05/06/22 2100    sodium chloride      dextrose       Scheduled Medications    metoprolol succinate  50 mg Oral Daily    sodium chloride flush  5-40 mL IntraVENous 2 times per day    atorvastatin  40 mg Oral Nightly    insulin lispro  0-12 Units SubCUTAneous TID WC    insulin lispro  0-6 Units SubCUTAneous Nightly    sotalol  40 mg Oral BID    tamsulosin  0.4 mg Oral Daily    cefTRIAXone (ROCEPHIN) IV  1,000 mg IntraVENous Q24H     PRN Meds: opium-belladonna, perflutren lipid microspheres, sodium chloride flush, sodium chloride, bisacodyl, trimethobenzamide, glucose, dextrose, glucagon (rDNA), dextrose      Intake/Output Summary (Last 24 hours) at 5/8/2022 1324  Last data filed at 5/8/2022 1100  Gross per 24 hour   Intake 620 ml   Output 1475 ml   Net -855 ml       Exam:    /85   Pulse 70   Temp 98.2 °F (36.8 °C) (Oral)   Resp 20   Ht 5' 11\" (1.803 m)   Wt 194 lb (88 kg)   SpO2 95%   BMI 27.06 kg/m²       General appearance:  No apparent distress, . HEENT:  Normal cephalic,   Neck: Supple, with full range of motion. . Trachea midline. Respiratory:  Normal respiratory effort. Clear to auscultation,   Cardiovascular:  Regular rate and rhythm   Abdomen: Soft, non-tender, non-distended with normal bowel sounds. Musculoskeletal:  No clubbing, cyanosis or edema bilaterally.    Skin: Skin color, texture, turgor normal.  No rashes or lesions.   Neurologic:  . Cranial nerves: II-XII intact, grossly non-focal.  Psychiatric:  Alert and oriented, thought content appropriate, normal insight                Labs:   Recent Labs     05/05/22  1502 05/05/22  1502 05/06/22  0513 05/06/22  2258 05/07/22  0550 05/07/22  1521 05/07/22  2251   WBC 9.5  --  10.7  --   --   --   --    HGB 9.7*   < > 8.4*   < > 8.4* 8.3* 8.1*   HCT 31.6*   < > 27.4*   < > 27.3* 26.4* 25.8*     --  306  --   --   --   --     < > = values in this interval not displayed. Recent Labs     05/06/22  0513 05/07/22  0550 05/08/22  0550     131* 133 136   K 4.1  4.0 4.2 3.8     100 103 103   CO2 22  23 21* 20*   BUN 15  16 15 14   CREATININE 1.2  1.2 1.2 1.2   CALCIUM 8.7  8.6 8.5* 8.5*     Recent Labs     05/05/22  1502   AST 16   ALT 16   BILITOT 0.2   ALKPHOS 175*     Recent Labs     05/05/22  1801   INR 2.0     No results for input(s): Rahul Miller in the last 72 hours. Recent Labs     05/05/22  1502   AST 16   ALT 16   BILITOT 0.2   ALKPHOS 175*     No results for input(s): LACTA in the last 72 hours.   Lab Results   Component Value Date    LABURIC 6.7 04/11/2022     No results found for: AMMONIA    Assessment:    Active Hospital Problems    Diagnosis Date Noted    Hematuria [R31.9] 05/05/2022     Priority: Medium    Urinary retention [R33.9] 05/05/2022     Priority: Medium    Elevated serum creatinine [R79.89] 05/05/2022     Priority: Medium    Anemia [D64.9] 05/05/2022     Priority: Medium    Essential hypertension [I10] 03/03/2012     Priority: Medium    Diabetes mellitus ([facility] [E11.9] 03/03/2012     Priority: Medium    PA[facility]oxysmal [facility] fibrillation) [facility] [I48.0] 01/07/2022    ICD (implantable cardioverter-defibrillator) in place [Z95.810] 08/31/2018    Mixed hyperlipidemia [E78.2] 12/01/2017    Prostate cancer ([facility] Zenaida Kumar 11/29/2017    CAD (coronary artery disease) [I25.10] 02/03/2014   Dilated cardiomyopathy  NON SUSTAINED VT  H/O ICD        PLAN:  EP CONSULT   FLOMAX   SOTALOL   TOPROL   ROCEPHIN   cont bladder irrigations     DVT Prophylaxis: *SCD   Diet: ADULT DIET;  Regular; 5 carb choices (75 gm/meal)  Code Status: Full Code     PT/OT Eval Status: *ORDERED     Dispo - *HOME     Electronically signed by Pat Keyes DO on 5/8/2022 at 1:24 PM [place]

## 2022-05-08 NOTE — PROGRESS NOTES
Electrophysiology progress note         Date:  5/8/2022  Patient: Bernie Turk  Admission:  5/5/2022  5:00 PM  Admit DX: Urinary retention [R33.9]  Hematuria [R31.9]  Acute cystitis with hematuria [N30.01]  Age:  70 y.o., 1950     LOS: 3 days     Reason for evaluation:   ischemic heart disease, cardiomyopathy and ventricular tachycardia  Dual-chamber ICD in situ  Paroxysmal atrial fibrillation      SUBJECTIVE:    The patient was seen and examined. Notes and labs reviewed. There were no complications over night. Patient's cardiac review of systems: negative. The patient is generally feeling gradually improving. OBJECTIVE:    Telemetry: Sinus rhythm  /85   Pulse 70   Temp 98.2 °F (36.8 °C) (Oral)   Resp 20   Ht 5' 11\" (1.803 m)   Wt 194 lb (88 kg)   SpO2 95%   BMI 27.06 kg/m²     Intake/Output Summary (Last 24 hours) at 5/8/2022 1505  Last data filed at 5/8/2022 1434  Gross per 24 hour   Intake 640 ml   Output 1325 ml   Net -685 ml       EXAM:   CONSTITUTIONAL:  awake, alert, cooperative, no apparent distress, and appears stated age. HEENT: Normal jugular venous pulsations,  Head is atraumatic, normocephalic. Eyes and oral mucosa are normal.  LUNGS: Good respiratory effort. No for increased work of breathing. On auscultation: clear to auscultation bilaterally  CARDIOVASCULAR:   regular rate and rhythm, normal S1 and S2, no S3   ABDOMEN: Soft, nontender, nondistended. SKIN: Warm and dry. EXTREMITIES: No lower extremity edema. Motor movement grossly intact. No cyanosis or clubbing.     Current Inpatient Medications:   metoprolol succinate  50 mg Oral Daily    sodium chloride flush  5-40 mL IntraVENous 2 times per day    atorvastatin  40 mg Oral Nightly    insulin lispro  0-12 Units SubCUTAneous TID WC    insulin lispro  0-6 Units SubCUTAneous Nightly    sotalol  40 mg Oral BID    tamsulosin  0.4 mg Oral Daily    cefTRIAXone (ROCEPHIN) IV  1,000 mg IntraVENous Q24H       IV Infusions (if any):   sodium chloride 100 mL/hr at 05/06/22 2100    sodium chloride      dextrose         Diagnostics:    EKG: normal sinus rhythm,  msec  ECHO: ordered, but not yet obtained. Stress Test: not obtained. Cardiac Angiography: not obtained. Labs:   CBC:   Recent Labs     05/06/22  0513 05/06/22  2258 05/07/22  1521 05/07/22  2251   WBC 10.7  --   --   --    HGB 8.4*   < > 8.3* 8.1*   HCT 27.4*   < > 26.4* 25.8*     --   --   --     < > = values in this interval not displayed. BMP:   Recent Labs     05/07/22  0550 05/08/22  0550    136   K 4.2 3.8   CO2 21* 20*   BUN 15 14   CREATININE 1.2 1.2   LABGLOM 60 60   GLUCOSE 185* 146*     BNP: No results for input(s): BNP in the last 72 hours. PT/INR:   Recent Labs     05/05/22  1801   PROTIME 21.4*   INR 2.0     APTT:No results for input(s): APTT in the last 72 hours. CARDIAC ENZYMES:No results for input(s): CKTOTAL, CKMB, CKMBINDEX, TROPONINI in the last 72 hours. FASTING LIPID PANEL:  Lab Results   Component Value Date    HDL 27 04/11/2022    LDLCALC 83 04/11/2022    TRIG 112 04/11/2022     LIVER PROFILE:No results for input(s): AST, ALT, LABALBU in the last 72 hours. ASSESSMENT:    Patient Active Problem List   Diagnosis    Diabetes mellitus ([address]      Essential hypertension      COPD, very severe ([address]      CAD (coronary artery disease)      ICD (implantable cardioverter-defibrillator) in place      Dilated cardiomyopathy ([address]      AAA (abdominal aortic aneurysm) without rupture ([address]      PAF (paroxysmal atrial fibrillation) ([address]      COVID 19 infection earlier this year complicated by paroxysmal atrial fibrillation      Hematuria--patient off systemic anticoagulation at present    Urinary retention    Elevated serum creatinine   Ventricular tachycardia on initial presentation.   The patient has been on sotalol therapy for many years  Echocardiography requested 2 days to assess LVEF but not completed yet    PLAN:    Increase sotalol to 80 mg twice a day  Continue Toprol  Monitor renal function and EKG      Please see orders. Discussed with patient and nursing.     Sven Molina MD, [facility]

## 2022-05-09 LAB
ANION GAP SERPL CALCULATED.3IONS-SCNC: 10 MMOL/L (ref 7–16)
BUN BLDV-MCNC: 15 MG/DL (ref 6–23)
CALCIUM SERPL-MCNC: 8.5 MG/DL (ref 8.6–10.2)
CHLORIDE BLD-SCNC: 102 MMOL/L (ref 98–107)
CO2: 22 MMOL/L (ref 22–29)
CREAT SERPL-MCNC: 1.2 MG/DL (ref 0.7–1.2)
GFR AFRICAN AMERICAN: >60
GFR NON-AFRICAN AMERICAN: 60 ML/MIN/1.73
GLUCOSE BLD-MCNC: 151 MG/DL (ref 74–99)
HCT VFR BLD CALC: 27.7 % (ref 37–54)
HEMOGLOBIN: 8.5 G/DL (ref 12.5–16.5)
METER GLUCOSE: 162 MG/DL (ref 74–99)
METER GLUCOSE: 242 MG/DL (ref 74–99)
METER GLUCOSE: 282 MG/DL (ref 74–99)
METER GLUCOSE: 312 MG/DL (ref 74–99)
POTASSIUM SERPL-SCNC: 3.8 MMOL/L (ref 3.5–5)
SODIUM BLD-SCNC: 134 MMOL/L (ref 132–146)

## 2022-05-09 PROCEDURE — 2580000003 HC RX 258: Performed by: INTERNAL MEDICINE

## 2022-05-09 PROCEDURE — 93005 ELECTROCARDIOGRAM TRACING: CPT | Performed by: INTERNAL MEDICINE

## 2022-05-09 PROCEDURE — 2580000003 HC RX 258: Performed by: NURSE PRACTITIONER

## 2022-05-09 PROCEDURE — 80048 BASIC METABOLIC PNL TOTAL CA: CPT

## 2022-05-09 PROCEDURE — 85014 HEMATOCRIT: CPT

## 2022-05-09 PROCEDURE — 6370000000 HC RX 637 (ALT 250 FOR IP): Performed by: INTERNAL MEDICINE

## 2022-05-09 PROCEDURE — 85018 HEMOGLOBIN: CPT

## 2022-05-09 PROCEDURE — 2140000000 HC CCU INTERMEDIATE R&B

## 2022-05-09 PROCEDURE — 6370000000 HC RX 637 (ALT 250 FOR IP): Performed by: NURSE PRACTITIONER

## 2022-05-09 PROCEDURE — 36415 COLL VENOUS BLD VENIPUNCTURE: CPT

## 2022-05-09 PROCEDURE — 82962 GLUCOSE BLOOD TEST: CPT

## 2022-05-09 RX ORDER — WARFARIN SODIUM 1 MG/1
4 TABLET ORAL SEE ADMIN INSTRUCTIONS
COMMUNITY
End: 2022-07-26

## 2022-05-09 RX ORDER — WARFARIN SODIUM 5 MG/1
5 TABLET ORAL SEE ADMIN INSTRUCTIONS
COMMUNITY
End: 2022-07-26

## 2022-05-09 RX ORDER — METOPROLOL SUCCINATE 50 MG/1
50 TABLET, EXTENDED RELEASE ORAL DAILY
Qty: 30 TABLET | Refills: 3 | Status: SHIPPED | OUTPATIENT
Start: 2022-05-09 | End: 2022-08-05 | Stop reason: SDUPTHER

## 2022-05-09 RX ORDER — POTASSIUM CHLORIDE 20 MEQ/1
40 TABLET, EXTENDED RELEASE ORAL ONCE
Status: DISCONTINUED | OUTPATIENT
Start: 2022-05-09 | End: 2022-05-10 | Stop reason: HOSPADM

## 2022-05-09 RX ORDER — ATROPA BELLADONNA AND OPIUM 16.2; 6 MG/1; MG/1
60 SUPPOSITORY RECTAL EVERY 8 HOURS PRN
Qty: 3 SUPPOSITORY | Refills: 0 | Status: SHIPPED | OUTPATIENT
Start: 2022-05-09 | End: 2022-05-13

## 2022-05-09 RX ORDER — LANOLIN ALCOHOL/MO/W.PET/CERES
400 CREAM (GRAM) TOPICAL DAILY
Status: DISCONTINUED | OUTPATIENT
Start: 2022-05-09 | End: 2022-05-10 | Stop reason: HOSPADM

## 2022-05-09 RX ORDER — ATROPA BELLADONNA AND OPIUM 16.2; 6 MG/1; MG/1
60 SUPPOSITORY RECTAL EVERY 8 HOURS PRN
Qty: 5 SUPPOSITORY | Refills: 0 | Status: SHIPPED | OUTPATIENT
Start: 2022-05-09 | End: 2022-05-09

## 2022-05-09 RX ORDER — SOTALOL HYDROCHLORIDE 80 MG/1
80 TABLET ORAL 2 TIMES DAILY
Qty: 60 TABLET | Refills: 3 | Status: SHIPPED | OUTPATIENT
Start: 2022-05-09 | End: 2022-08-05 | Stop reason: SDUPTHER

## 2022-05-09 RX ADMIN — SODIUM CHLORIDE, PRESERVATIVE FREE 10 ML: 5 INJECTION INTRAVENOUS at 20:33

## 2022-05-09 RX ADMIN — INSULIN LISPRO 2 UNITS: 100 INJECTION, SOLUTION INTRAVENOUS; SUBCUTANEOUS at 07:14

## 2022-05-09 RX ADMIN — SODIUM CHLORIDE: 9 INJECTION, SOLUTION INTRAVENOUS at 18:10

## 2022-05-09 RX ADMIN — SOTALOL HYDROCHLORIDE 80 MG: 80 TABLET ORAL at 08:53

## 2022-05-09 RX ADMIN — SOTALOL HYDROCHLORIDE 80 MG: 80 TABLET ORAL at 20:33

## 2022-05-09 RX ADMIN — INSULIN LISPRO 3 UNITS: 100 INJECTION, SOLUTION INTRAVENOUS; SUBCUTANEOUS at 20:37

## 2022-05-09 RX ADMIN — INSULIN LISPRO 4 UNITS: 100 INJECTION, SOLUTION INTRAVENOUS; SUBCUTANEOUS at 12:00

## 2022-05-09 RX ADMIN — INSULIN LISPRO 8 UNITS: 100 INJECTION, SOLUTION INTRAVENOUS; SUBCUTANEOUS at 18:08

## 2022-05-09 RX ADMIN — METOPROLOL SUCCINATE 50 MG: 50 TABLET, EXTENDED RELEASE ORAL at 08:53

## 2022-05-09 RX ADMIN — ATORVASTATIN CALCIUM 40 MG: 40 TABLET, FILM COATED ORAL at 20:33

## 2022-05-09 RX ADMIN — TAMSULOSIN HYDROCHLORIDE 0.4 MG: 0.4 CAPSULE ORAL at 08:53

## 2022-05-09 ASSESSMENT — PAIN SCALES - GENERAL: PAINLEVEL_OUTOF10: 0

## 2022-05-09 NOTE — PROGRESS NOTES
Electrophysiology progress note         Date:  5/9/2022  Patient: Aure Torres  Admission:  5/5/2022  5:00 PM  Admit DX: Urinary retention [R33.9]  Hematuria [R31.9]  Acute cystitis with hematuria [N30.01]  Age:  70 y.o., 1950     LOS: 4 days     Reason for evaluation:   Ventricular tachycardia, paroxysmal atrial fibrillation, ICD in situ      SUBJECTIVE:    The patient was seen and examined. Notes and labs reviewed. Patient's cardiac review of systems: negative. The patient is generally feeling unchanged. OBJECTIVE:    Telemetry: Sinus rhythm  /82   Pulse 71   Temp 98.2 °F (36.8 °C) (Oral)   Resp 18   Ht 5' 11\" (1.803 m)   Wt 194 lb (88 kg)   SpO2 96%   BMI 27.06 kg/m²     Intake/Output Summary (Last 24 hours) at 5/9/2022 1645  Last data filed at 5/9/2022 1200  Gross per 24 hour   Intake 740 ml   Output --   Net 740 ml       EXAM:   CONSTITUTIONAL:  awake, alert, cooperative, no apparent distress, and appears stated age. HEENT: Normal jugular venous pulsations,  Head is atraumatic, normocephalic. Eyes and oral mucosa are normal.  LUNGS: Good respiratory effort. No for increased work of breathing. On auscultation: clear to auscultation bilaterally  CARDIOVASCULAR:   regular rate and rhythm, normal S1 and S2, no S3   ABDOMEN: Soft, nontender, nondistended. Bandar Rathke SKIN: Warm and dry. EXTREMITIES: No lower extremity edema. Motor movement grossly intact. No cyanosis or clubbing.     Current Inpatient Medications:   magnesium oxide  400 mg Oral Daily    potassium chloride  40 mEq Oral Once    sotalol  80 mg Oral BID    metoprolol succinate  50 mg Oral Daily    sodium chloride flush  5-40 mL IntraVENous 2 times per day    atorvastatin  40 mg Oral Nightly    insulin lispro  0-12 Units SubCUTAneous TID WC    insulin lispro  0-6 Units SubCUTAneous Nightly    tamsulosin  0.4 mg Oral Daily       IV Infusions (if any):   sodium chloride 50 mL/hr at 05/08/22 1632    sodium chloride      dextrose         Diagnostics:    EKG: normal sinus rhythm, QTc 480 msec  ECHO: reviewed. Stress Test: not obtained. Cardiac Angiography: not obtained. Labs:   CBC:   Recent Labs     05/07/22  2251 05/09/22  1023   HGB 8.1* 8.5*   HCT 25.8* 27.7*     BMP:   Recent Labs     05/08/22  0550 05/09/22  0544    134   K 3.8 3.8   CO2 20* 22   BUN 14 15   CREATININE 1.2 1.2   LABGLOM 60 60   GLUCOSE 146* 151*     BNP: No results for input(s): BNP in the last 72 hours. PT/INR: No results for input(s): PROTIME, INR in the last 72 hours. APTT:No results for input(s): APTT in the last 72 hours. CARDIAC ENZYMES:No results for input(s): CKTOTAL, CKMB, CKMBINDEX, TROPONINI in the last 72 hours. FASTING LIPID PANEL:  Lab Results   Component Value Date    HDL 27 04/11/2022    LDLCALC 83 04/11/2022    TRIG 112 04/11/2022     LIVER PROFILE:No results for input(s): AST, ALT, LABALBU in the last 72 hours. ASSESSMENT:    Patient Active Problem List   Diagnosis    Diabetes mellitus ([facility]      Essential hypertension      COPD, very severe ([address]      CAD (coronary artery disease)      ICD (implantable cardioverter-defibrillator) in place      Dilated cardiomyopathy ([address]      PAF (paroxysmal atrial fibrillation) ([facility]      COVID-- approximately 4 to 5 months ago      Hematuria    Urinary retention   Ventricular tachycardia, nonsustained. Dose of sotalol increased to 80 mg twice a day with appropriate QT prolongation on EKG    PLAN:    EKG in a.m. Monitor electrolytes and renal function  Will review echocardiography  Okay for discharge tomorrow if okay with all      Please see orders. Discussed with patient and nursing.     Tamiko Shi MD,FACC

## 2022-05-09 NOTE — PROGRESS NOTES
CLINICAL PHARMACY NOTE: MEDS TO BEDS    Total # of Prescriptions Filled: 2   The following medications were delivered to the patient:  · Metoprolol ER 50mg  · Sotalol 80 mg    Additional Documentation:  Delivered meds to patient @1:00

## 2022-05-09 NOTE — CARE COORDINATION
Discharge order noted. Met with patient and family at bedside to follow-up regarding any discharge needs. Patient and family expressed concern regarding the patient's catheter and what is going on with him medically. They report not receiving any answers, patient has had frequent admissions for the same problem and no one is fixing the issue. They report no previous home care or teaching for the catheter and are interested. Would like home care services set up, no preference on agency. Perfect serve message sent to urology; call transferred to urology department. Spoke to  and informed her of the patient/family's concerns and home care orders needed. Home care orders to be faxed to the unit. Will make home care referral once orders received. Someone from urology department will come down to see patient and family.     Kiran Galan, MSW, LSW [phone]

## 2022-05-09 NOTE — PROGRESS NOTES
5/9/2022 3:01 PM  Service: Urology  Group: [facility] (Matthieu/Marga/Fartun)    Elta Code  [id number]    Subjective:    Family at bedside  Patient apparently had gross hematuria beginning yesterday  Rodriguez with tea colored urine in bag     Review of Systems  Constitutional: No fever or chills   Respiratory: negative for cough and hemoptysis  Cardiovascular: negative for chest pain and dyspnea  Gastrointestinal: negative for abdominal pain, diarrhea, nausea and vomiting   : See above  Derm: negative for rash and skin lesion(s)  Neurological: negative for seizures and tremors  Musculoskeletal: Negative    Psychiatric: Negative   All other reviews are negative      Scheduled Meds:   sotalol  80 mg Oral BID    metoprolol succinate  50 mg Oral Daily    sodium chloride flush  5-40 mL IntraVENous 2 times per day    atorvastatin  40 mg Oral Nightly    insulin lispro  0-12 Units SubCUTAneous TID WC    insulin lispro  0-6 Units SubCUTAneous Nightly    tamsulosin  0.4 mg Oral Daily    cefTRIAXone (ROCEPHIN) IV  1,000 mg IntraVENous Q24H       Objective:  Vitals:    05/08/22 2215   BP: (!) 146/78   Pulse: 74   Resp: 22   Temp: 98.2 °F (36.8 °C)   SpO2: 95%         Allergies: Patient has no known allergies.     General Appearance: alert and oriented to person, place and time and in no acute distress  Skin: no rash or erythema  Head: normocephalic and atraumatic  Pulmonary/Chest: normal air movement, no respiratory distress  Abdomen: soft, non-tender, non-distended  Genitourinary: rodriguez with tea colored urine in bag   Extremities: no cyanosis, clubbing or edema         Labs:     Recent Labs     05/09/22  0544      K 3.8      CO2 22   BUN 15   CREATININE 1.2   GLUCOSE 151*   CALCIUM 8.5*       Lab Results   Component Value Date    HGB 8.5 05/09/2022    HCT 27.7 05/09/2022       Lab Results   Component Value Date    PSA <0.03 05/06/2022    PSA <0.03 04/11/2022    PSA <0.01 01/06/2022 Assessment/Plan:  Gross hematuria  PCa s/p XRT 2018   UTI     Urine culture, +Enterococcus   Antibiotics per primary, on Rocephin   Cont the rodriguez   Irrigate the rodriguez every 2 hours and PRN gross hematuria   B&O suppositories PRN bladder spasms   PSA remains stable   Recommend continued manual irrigation today and observation  If remains clear will consider voiding trial in am     Sheree Truong, APRN - CNP   [facility]

## 2022-05-10 VITALS
OXYGEN SATURATION: 97 % | HEART RATE: 69 BPM | SYSTOLIC BLOOD PRESSURE: 155 MMHG | RESPIRATION RATE: 20 BRPM | TEMPERATURE: 98.1 F | BODY MASS INDEX: 27.29 KG/M2 | HEIGHT: 71 IN | WEIGHT: 194.9 LBS | DIASTOLIC BLOOD PRESSURE: 85 MMHG

## 2022-05-10 LAB
ANION GAP SERPL CALCULATED.3IONS-SCNC: 12 MMOL/L (ref 7–16)
BLOOD CULTURE, ROUTINE: NORMAL
BUN BLDV-MCNC: 14 MG/DL (ref 6–23)
CALCIUM SERPL-MCNC: 8.5 MG/DL (ref 8.6–10.2)
CHLORIDE BLD-SCNC: 105 MMOL/L (ref 98–107)
CO2: 22 MMOL/L (ref 22–29)
CREAT SERPL-MCNC: 1.3 MG/DL (ref 0.7–1.2)
CULTURE, BLOOD 2: NORMAL
EKG ATRIAL RATE: 86 BPM
EKG ATRIAL RATE: 94 BPM
EKG P AXIS: 20 DEGREES
EKG P AXIS: 34 DEGREES
EKG P-R INTERVAL: 140 MS
EKG P-R INTERVAL: 144 MS
EKG Q-T INTERVAL: 380 MS
EKG Q-T INTERVAL: 392 MS
EKG QRS DURATION: 82 MS
EKG QRS DURATION: 90 MS
EKG QTC CALCULATION (BAZETT): 469 MS
EKG QTC CALCULATION (BAZETT): 475 MS
EKG R AXIS: -11 DEGREES
EKG R AXIS: -4 DEGREES
EKG T AXIS: -36 DEGREES
EKG T AXIS: -48 DEGREES
EKG VENTRICULAR RATE: 86 BPM
EKG VENTRICULAR RATE: 94 BPM
GFR AFRICAN AMERICAN: >60
GFR NON-AFRICAN AMERICAN: 54 ML/MIN/1.73
GLUCOSE BLD-MCNC: 186 MG/DL (ref 74–99)
LV EF: 48 %
LVEF MODALITY: NORMAL
METER GLUCOSE: 205 MG/DL (ref 74–99)
METER GLUCOSE: 238 MG/DL (ref 74–99)
POTASSIUM SERPL-SCNC: 4.1 MMOL/L (ref 3.5–5)
SODIUM BLD-SCNC: 139 MMOL/L (ref 132–146)

## 2022-05-10 PROCEDURE — 6370000000 HC RX 637 (ALT 250 FOR IP): Performed by: INTERNAL MEDICINE

## 2022-05-10 PROCEDURE — 80048 BASIC METABOLIC PNL TOTAL CA: CPT

## 2022-05-10 PROCEDURE — 93307 TTE W/O DOPPLER COMPLETE: CPT

## 2022-05-10 PROCEDURE — 2580000003 HC RX 258: Performed by: NURSE PRACTITIONER

## 2022-05-10 PROCEDURE — 82962 GLUCOSE BLOOD TEST: CPT

## 2022-05-10 PROCEDURE — 6370000000 HC RX 637 (ALT 250 FOR IP): Performed by: NURSE PRACTITIONER

## 2022-05-10 PROCEDURE — 36415 COLL VENOUS BLD VENIPUNCTURE: CPT

## 2022-05-10 RX ORDER — LANOLIN ALCOHOL/MO/W.PET/CERES
400 CREAM (GRAM) TOPICAL DAILY
Qty: 30 TABLET | Refills: 0 | Status: SHIPPED | OUTPATIENT
Start: 2022-05-11 | End: 2022-10-07

## 2022-05-10 RX ORDER — AMOXICILLIN 500 MG/1
500 CAPSULE ORAL 3 TIMES DAILY
Qty: 15 CAPSULE | Refills: 0 | Status: SHIPPED | OUTPATIENT
Start: 2022-05-10 | End: 2022-05-13 | Stop reason: ALTCHOICE

## 2022-05-10 RX ORDER — AMOXICILLIN 250 MG/1
500 CAPSULE ORAL EVERY 8 HOURS SCHEDULED
Status: DISCONTINUED | OUTPATIENT
Start: 2022-05-10 | End: 2022-05-10 | Stop reason: HOSPADM

## 2022-05-10 RX ADMIN — TAMSULOSIN HYDROCHLORIDE 0.4 MG: 0.4 CAPSULE ORAL at 10:35

## 2022-05-10 RX ADMIN — SOTALOL HYDROCHLORIDE 80 MG: 80 TABLET ORAL at 10:35

## 2022-05-10 RX ADMIN — METOPROLOL SUCCINATE 50 MG: 50 TABLET, EXTENDED RELEASE ORAL at 10:35

## 2022-05-10 RX ADMIN — Medication 400 MG: at 10:35

## 2022-05-10 RX ADMIN — INSULIN LISPRO 4 UNITS: 100 INJECTION, SOLUTION INTRAVENOUS; SUBCUTANEOUS at 10:34

## 2022-05-10 RX ADMIN — SODIUM CHLORIDE, PRESERVATIVE FREE 10 ML: 5 INJECTION INTRAVENOUS at 10:35

## 2022-05-10 ASSESSMENT — PAIN SCALES - GENERAL: PAINLEVEL_OUTOF10: 0

## 2022-05-10 NOTE — PROGRESS NOTES
Hospitalist Progress Note      PCP: Nimo Cummins DO    Date of Admission: 5/5/2022        Hospital Course:  *70 y. o. male presented with URINARY RETENTION, STATES HE HAS PROSTATE CANCER AND IS POST RADIATION. BEGAN TO HAVE BLOOD IN URINE AND PAIN WITH PASSING CLOTS, NO FEVER, NO CHILLS,  *  WANTS TO GO HOME*        Subjective: **NO COMPLAINTS          Medications:  Reviewed    Infusion Medications   Scheduled Medications   PRN Meds:       Intake/Output Summary (Last 24 hours) at 5/10/2022 1850  Last data filed at 5/10/2022 1522  Gross per 24 hour   Intake 390 ml   Output 1650 ml   Net -1260 ml       Exam:    BP (!) 155/85   Pulse 69   Temp 98.1 °F (36.7 °C)   Resp 20   Ht 5' 11\" (1.803 m)   Wt 194 lb 14.4 oz (88.4 kg)   SpO2 97%   BMI 27.18 kg/m²       General appearance:  No apparent distress, . HEENT:  Normal cephalic,   Neck: Supple, with full range of motion. . Trachea midline. Respiratory:  Normal respiratory effort. Clear to auscultation,   Cardiovascular:  Regular rate and rhythm   Abdomen: Soft, non-tender, non-distended with normal bowel sounds. Musculoskeletal:  No clubbing, cyanosis or edema bilaterally.    Skin: Skin color, texture, turgor normal.  No rashes or lesions. Neurologic:  . Cranial nerves: II-XII intact, grossly non-focal.  Psychiatric:  Alert and oriented, thought content appropriate, normal insight                Labs:   Recent Labs     05/07/22  2251 05/09/22  1023   HGB 8.1* 8.5*   HCT 25.8* 27.7*     Recent Labs     05/08/22  0550 05/09/22  0544 05/10/22  0534    134 139   K 3.8 3.8 4.1    102 105   CO2 20* 22 22   BUN 14 15 14   CREATININE 1.2 1.2 1.3*   CALCIUM 8.5* 8.5* 8.5*     No results for input(s): AST, ALT, BILIDIR, BILITOT, ALKPHOS in the last 72 hours. No results for input(s): INR in the last 72 hours. No results for input(s): Connee Matar in the last 72 hours.   No results for input(s): AST, ALT, ALB, BILIDIR, BILITOT, ALKPHOS in the last 72 hours. No results for input(s): LACTA in the last 72 hours.   Lab Results   Component Value Date    LABURIC 6.7 04/11/2022     No results found for: AMMONIA    Assessment:    Active Hospital Problems    Diagnosis Date Noted    Hematuria [R31.9] 05/05/2022     Priority: Medium    Urinary retention [R33.9] 05/05/2022     Priority: Medium    Elevated serum creatinine [R79.89] 05/05/2022     Priority: Medium    Anemia [D64.9] 05/05/2022     Priority: Medium    Essential hypertension [I10] 03/03/2012     Priority: Medium    Diabetes mellitus ([facility] [E11.9] 03/03/2012     Priority: Medium    [facility] [I48.0] 01/07/2022    ICD (implantable cardioverter-defibrillator) in place [Z95.810] 08/31/2018    Mixed hyperlipidemia [E78.2] 12/01/2017    Prostate cancer ([facility] Jannette Burton 11/29/2017    CAD (coronary artery disease) [I25.10] 02/03/2014   Dilated cardiomyopathy  NON SUSTAINED VT  H/O ICD    Plan:  *DC HOME    Electronically signed by Florin Scott DO on 5/10/2022 at 6:50 PM [facility]

## 2022-05-10 NOTE — PROGRESS NOTES
Electrophysiology progress note         Date:  5/10/2022  Patient: Antonina Adame  Admission:  5/5/2022  5:00 PM  Admit DX: Urinary retention [R33.9]  Hematuria [R31.9]  Acute cystitis with hematuria [N30.01]  Age:  70 y.o., 1950     LOS: 5 days     Reason for evaluation:   Ventricular tachycardia, ICD in situ, paroxysmal atrial fibrillation      SUBJECTIVE:    The patient was seen and examined. Notes and labs reviewed. There were no complications over night. Patient's cardiac review of systems: negative. The patient is generally feeling unchanged. OBJECTIVE:    Telemetry: Sinus rhythm  /86   Pulse 77   Temp 98.1 °F (36.7 °C) (Oral)   Resp 20   Ht 5' 11\" (1.803 m)   Wt 194 lb 14.4 oz (88.4 kg)   SpO2 95%   BMI 27.18 kg/m²     Intake/Output Summary (Last 24 hours) at 5/10/2022 1240  Last data filed at 5/10/2022 1131  Gross per 24 hour   Intake 350.06 ml   Output 1150 ml   Net -799.94 ml       EXAM:   CONSTITUTIONAL:  awake, alert, cooperative, no apparent distress, and appears stated age. HEENT: Normal jugular venous pulsations, . Head is atraumatic, normocephalic. Eyes and oral mucosa are normal.  LUNGS: Good respiratory effort. No for increased work of breathing. On auscultation: clear to auscultation bilaterally  CARDIOVASCULAR:  Normal apical impulse, regular rate and rhythm, normal S1 and S2, no S3   ABDOMEN: Soft, nontender, nondistended. SKIN: Warm and dry. EXTREMITIES: No lower extremity edema. Motor movement grossly intact. No cyanosis or clubbing.     Current Inpatient Medications:   magnesium oxide  400 mg Oral Daily    potassium chloride  40 mEq Oral Once    sotalol  80 mg Oral BID    metoprolol succinate  50 mg Oral Daily    sodium chloride flush  5-40 mL IntraVENous 2 times per day    atorvastatin  40 mg Oral Nightly    insulin lispro  0-12 Units SubCUTAneous TID WC    insulin lispro  0-6 Units SubCUTAneous Nightly    tamsulosin  0.4 mg Oral Daily       IV Infusions (if any):   sodium chloride 50 mL/hr at 05/09/22 1810    sodium chloride      dextrose         Diagnostics:    EKG: normal sinus rhythm, QTc 470-480 msec  ECHO: ordered, but not yet obtained. Stress Test: not obtained. Cardiac Angiography: not obtained. Labs:   CBC:   Recent Labs     05/07/22  2251 05/09/22  1023   HGB 8.1* 8.5*   HCT 25.8* 27.7*     BMP:   Recent Labs     05/09/22  0544 05/10/22  0534    139   K 3.8 4.1   CO2 22 22   BUN 15 14   CREATININE 1.2 1.3*   LABGLOM 60 54   GLUCOSE 151* 186*     BNP: No results for input(s): BNP in the last 72 hours. PT/INR: No results for input(s): PROTIME, INR in the last 72 hours. APTT:No results for input(s): APTT in the last 72 hours. CARDIAC ENZYMES:No results for input(s): CKTOTAL, CKMB, CKMBINDEX, TROPONINI in the last 72 hours. FASTING LIPID PANEL:  Lab Results   Component Value Date    HDL 27 04/11/2022    LDLCALC 83 04/11/2022    TRIG 112 04/11/2022     LIVER PROFILE:No results for input(s): AST, ALT, LABALBU in the last 72 hours. ASSESSMENT:    Patient Active Problem List   Diagnosis    Diabetes mellitus ([facility]      Essential hypertension      CAD (coronary artery disease)      ICD (implantable cardioverter-defibrillator) in place      Dilated cardiomyopathy ([facility]      PAF (paroxysmal atrial fibrillation) (HCC)--sotalol increased to 80 mg twice a day. Patient tolerates dose well. QTC on EKG < 500 ms      COVID 19 infection several months ago now--resolved      Hematuria    Urinary retention    Elevated serum creatinine   Nonsustained VT this admission--patient's dose of sotalol increased    PLAN:    Okay for discharge home when okay with all  Follow-up in the office as scheduled       Please see orders. Discussed with patient and nursing.     Sven Molina MD, [facility]

## 2022-05-10 NOTE — PROGRESS NOTES
Hospitalist Progress Note      PCP: Jerrell Zuleta DO    Date of Admission: 5/5/2022        Hospital Course:  70 y. o. male presented with URINARY RETENTION, STATES HE HAS PROSTATE CANCER AND IS POST RADIATION. BEGAN TO HAVE BLOOD IN URINE AND PAIN WITH PASSING CLOTS, NO FEVER, NO CHILLS,  * urine now lam, not sara blood like on admission** urine darker today, urology wants him to stay another day   **        Subjective: *wants to go home           Medications:  Reviewed    Infusion Medications    sodium chloride 50 mL/hr at 05/09/22 1810    sodium chloride      dextrose       Scheduled Medications    magnesium oxide  400 mg Oral Daily    potassium chloride  40 mEq Oral Once    sotalol  80 mg Oral BID    metoprolol succinate  50 mg Oral Daily    sodium chloride flush  5-40 mL IntraVENous 2 times per day    atorvastatin  40 mg Oral Nightly    insulin lispro  0-12 Units SubCUTAneous TID WC    insulin lispro  0-6 Units SubCUTAneous Nightly    tamsulosin  0.4 mg Oral Daily     PRN Meds: opium-belladonna, perflutren lipid microspheres, sodium chloride flush, sodium chloride, bisacodyl, trimethobenzamide, glucose, dextrose, glucagon (rDNA), dextrose      Intake/Output Summary (Last 24 hours) at 5/9/2022 2149  Last data filed at 5/9/2022 1927  Gross per 24 hour   Intake 1440.06 ml   Output 700 ml   Net 740.06 ml       Exam:    /82   Pulse 71   Temp 98.2 °F (36.8 °C) (Oral)   Resp 18   Ht 5' 11\" (1.803 m)   Wt 194 lb (88 kg)   SpO2 96%   BMI 27.06 kg/m²           Gen: *well developed  HEENT: NC/AT, moist mucous membranes, no oropharyngeal erythema or exudate  Neck: supple, trachea midline, no anterior cervical or SC LAD  Extrem:  No clubbing, cyanosis,  **no edema  Skin: no rashes or lesions  Psych: A & O x3  Neuro: grossly intact, moves all four extremities.                   Labs:   Recent Labs     05/07/22  1521 05/07/22  2251 05/09/22  1023   HGB 8.3* 8.1* 8.5*   HCT 26.4* 25.8* 27.7*     Recent Labs     05/07/22  0550 05/08/22  0550 05/09/22  0544    136 134   K 4.2 3.8 3.8    103 102   CO2 21* 20* 22   BUN 15 14 15   CREATININE 1.2 1.2 1.2   CALCIUM 8.5* 8.5* 8.5*     No results for input(s): AST, ALT, BILIDIR, BILITOT, ALKPHOS in the last 72 hours. No results for input(s): INR in the last 72 hours. No results for input(s): Rahul [place] in the last 72 hours. No results for input(s): AST, ALT, ALB, BILIDIR, BILITOT, ALKPHOS in the last 72 hours. No results for input(s): LACTA in the last 72 hours. Lab Results   Component Value Date    LABURIC 6.7 04/11/2022     No results found for: AMMONIA    Assessment:    Active Hospital Problems    Diagnosis Date Noted    Hematuria [R31.9] 05/05/2022     Priority: Medium    Urinary retention [R33.9] 05/05/2022     Priority: Medium    Elevated serum creatinine [R79.89] 05/05/2022     Priority: Medium    Anemia [D64.9] 05/05/2022     Priority: Medium    Essential hypertension [I10] 03/03/2012     Priority: Medium    Diabetes mellitus [email or web address] [E11.9] 03/03/2012     Priority: Medium    PAF (paroxysmal atrial fibrillation) ([email or web address] [I48.0] 01/07/2022    ICD (implantable cardioverter-defibrillator) in place [Z95.810] 08/31/2018    Mixed hyperlipidemia [E78.2] 12/01/2017    Prostate cancer ([facility] Ardith Kumar 11/29/2017    CAD (coronary artery disease) [I25.10] 02/03/2014   Dilated cardiomyopathy  NON SUSTAINED VT  H/O ICD        PLAN:  EP CONSULT   FLOMAX   SOTALOL   TOPROL   ROCEPHIN   cont bladder irrigations     DVT Prophylaxis: *SCD   Diet: ADULT DIET;  Regular; 5 carb choices (75 gm/meal)  Code Status: Full Code     PT/OT Eval Status: *ORDERED     Dispo - *HOME           Electronically signed by Dulce Worrell DO on 5/9/2022 at 9:49 PM [facility]

## 2022-05-10 NOTE — PROGRESS NOTES
5/10/2022 11:12 AM  Service: Urology  Group: [facility] (Matthieu/Marga/Fartun)    Jamilah Vail  [phone]    Subjective: Rodriguez with lam urine in tubing   No family present  No complaints  Wants to go home     Review of Systems  Constitutional: No fever or chills   Respiratory: negative for cough and hemoptysis  Cardiovascular: negative for chest pain and dyspnea  Gastrointestinal: negative for abdominal pain, diarrhea, nausea and vomiting   : See above  Derm: negative for rash and skin lesion(s)  Neurological: negative for seizures and tremors  Musculoskeletal: Negative    Psychiatric: Negative   All other reviews are negative      Scheduled Meds:   magnesium oxide  400 mg Oral Daily    potassium chloride  40 mEq Oral Once    sotalol  80 mg Oral BID    metoprolol succinate  50 mg Oral Daily    sodium chloride flush  5-40 mL IntraVENous 2 times per day    atorvastatin  40 mg Oral Nightly    insulin lispro  0-12 Units SubCUTAneous TID WC    insulin lispro  0-6 Units SubCUTAneous Nightly    tamsulosin  0.4 mg Oral Daily       Objective:  Vitals:    05/10/22 1036   BP: 135/86   Pulse: 77   Resp: 20   Temp:    SpO2: 95%         Allergies: Patient has no known allergies.     General Appearance: alert and oriented to person, place and time and in no acute distress  Skin: no rash or erythema  Head: normocephalic and atraumatic  Pulmonary/Chest: normal air movement, no respiratory distress  Abdomen: soft, non-tender, non-distended  Genitourinary: rodriguez with lam urine   Extremities: no cyanosis, clubbing or edema         Labs:     Recent Labs     05/10/22  0534      K 4.1      CO2 22   BUN 14   CREATININE 1.3*   GLUCOSE 186*   CALCIUM 8.5*       Lab Results   Component Value Date    HGB 8.5 05/09/2022    HCT 27.7 05/09/2022       Lab Results   Component Value Date    PSA <0.03 05/06/2022    PSA <0.03 04/11/2022    PSA <0.01 01/06/2022         Assessment/Plan:  Gross hematuria  PCa s/p XRT 0655   UTI     Urine culture, +Enterococcus   Antibiotics per primary, on Rocephin   Cont the rodriguez catheter at this time  He will follow up in the office as an outpatient for a voiding trial    PSA remains stable   Ok for discharge from [address] [place] [address]  Urology

## 2022-05-10 NOTE — CONSULTS
[facility] CONSULT NOTE    Reason for Consult: Concern for UTI  Requested by: Mariia Sy DO     Chief complaint: Hematuria    History Obtained From: Patient and EMR    HISTORY Elizabeth              The patient is a 70 y.o. male with history of CAD, abdominal aortic aneurysm, COPD, DM, hypertension, recurrent ventricular tachycardia with ICD in situ, atrial fibrillation, prostate cancer s/p radiation therapy in 2018, presented on 05/05 with hematuria, accompanied by dysuria. On admission, he was afebrile and hemodynamically stable with no leukocytosis. Urinalysis showed pyuria 5-10 WBCs with urine culture growing more than 100,000 CFU per mL of Enterococcus faecalis (susceptible to ampicillin). Blood cultures showed no growth to date. Ceftriaxone was started on admission then stopped on 05/09. ID service was subsequently consulted for further recommendations. Past Medical History  Past Medical History:   Diagnosis Date    Abdominal aortic aneurysm without rupture ([facility] 9/3/2021    Arthritis     CAD (coronary artery disease)     Cancer ([facility] 2017    Colon    Combined systolic and diastolic heart failure ([facility] 6/15/13    echo LVEF of 30-35%  stage II diastolic dysfunction    COPD (chronic obstructive pulmonary disease) ([facility]     Diabetes mellitus (HCC)     GERD (gastroesophageal reflux disease)     History of placement of internal cardiac defibrillator 2014?     [employer] ([place])    Hypertension     Sigmoid diverticulosis 8/31/2015    Sinusitis     Tubular adenoma of colon 8/31/2015    Vertigo        Current Facility-Administered Medications   Medication Dose Route Frequency Provider Last Rate Last Admin    magnesium oxide (MAG-OX) tablet 400 mg  400 mg Oral Daily Ayah Walden MD   400 mg at 05/10/22 1035    potassium chloride (KLOR-CON M) extended release tablet 40 mEq  40 mEq Oral Once Ayah Walden MD        sotalol (BETAPACE) tablet 80 mg  80 mg Oral BID Ayah Walden MD   80 mg at 05/10/22 1035    metoprolol succinate (TOPROL XL) extended release tablet 50 mg  50 mg Oral Daily Kei Day Adelita, DO   50 mg at 05/10/22 1035    opium-belladonna (B&O SUPPRETTES) 16.2-60 MG suppository 60 mg  60 mg Rectal Q8H PRN JERI Hernandez CNP        perflutren lipid microspheres (DEFINITY) injection 1.65 mg  1.5 mL IntraVENous ONCE PRN Lindsey Ashby MD        0.9 % sodium chloride infusion   IntraVENous Continuous Lindsey Ashby MD 50 mL/hr at 05/09/22 1810 New Bag at 05/09/22 1810    sodium chloride flush 0.9 % injection 5-40 mL  5-40 mL IntraVENous 2 times per day April JERI Johnson - CNP   10 mL at 05/10/22 1035    sodium chloride flush 0.9 % injection 5-40 mL  5-40 mL IntraVENous PRN April JERI Johnson CNP   10 mL at 05/06/22 0444    0.9 % sodium chloride infusion   IntraVENous PRN April HERMILO JohnsonN - CNP        bisacodyl (DULCOLAX) suppository 10 mg  10 mg Rectal Daily PRN April JERI Johnson - CNP        trimethobenzamide Wyvonnia Massa) injection 200 mg  200 mg IntraMUSCular Q6H PRN April HERMILO JohnsonN - ELEANOR        atorvastatin (LIPITOR) tablet 40 mg  40 mg Oral Nightly April JERI Johnson CNP   40 mg at 05/09/22 2033    glucose (GLUTOSE) 40 % oral gel 15 g  15 g Oral PRN April HERMILO JohnsonN - CNP        dextrose 50 % IV solution  12.5 g IntraVENous PRN April HERMILO JohnsonN - CNP        glucagon (rDNA) injection 1 mg  1 mg IntraMUSCular PRN April JERI Johnson - CNP        dextrose 5 % solution  100 mL/hr IntraVENous PRN April JERI Johnson - CNP        insulin lispro (HUMALOG) injection vial 0-12 Units  0-12 Units SubCUTAneous TID [facility] April JERI Johnson CNP   4 Units at 05/10/22 1034    insulin lispro (HUMALOG) injection vial 0-6 Units  0-6 Units SubCUTAneous Nightly April Alex, APRN - CNP   3 Units at 05/09/22 2037    tamsulosin (FLOMAX) capsule 0.4 mg  0.4 mg Oral Daily April Scottie-JERI Cruz - CNP   0.4 mg at 05/10/22 1035       No Known Allergies    Surgical History  Past Surgical History:   Procedure Laterality Date    ABDOMINAL AORTIC ANEURYSM REPAIR, ENDOVASCULAR N/A 10/1/2021    ABDOMINAL AORTIC ANEURYSM REPAIR ENDOVASCULAR performed by Ishmael Cervantes MD at [address] COLONOSCOPY  8/31/15    with polypectomy (x2) - Dr. Lynda Ty  14    3.5/15 Xience in Ramus and 3.0/15 Resolute in th 1st obtuse marginal.    HIP FRACTURE SURGERY Right 3/14/2020    RIGHT HIP OPEN REDUCTION INTERNAL FIXATION NAIL-SYNTHES -- OC 2 performed by Aletha Moseley DO at [address]          Social History  Social History     Socioeconomic History    Marital status:      Highest education level: High school graduate   Occupational History    Not on file   Tobacco Use    Smoking status: Former Smoker     Packs/day: 2.00     Years: 50.00     Pack years: 100.00     Types: Cigarettes     Start date: 1970     Quit date: 2014     Years since quittin.3    Smokeless tobacco: Never Used   Vaping Use    Vaping Use: Every day    Substances: Nicotine    Devices: Pre-filled or refillable cartridge   Substance and Sexual Activity    Alcohol use: Yes     Comment: rarely    Drug use: No         Family Medical History  Family History   Problem Relation Age of Onset    Heart Disease Mother     Cancer Father         abdominal    Cancer Brother         prostate    Cancer Brother         digestive       Review of Systems:  Constitutional: No fever, no chills  Eyes: No vision changes, no retroorbital pain  ENT: No hearing changes, no ear pain  Respiratory: No cough, no dyspnea  Cardiovascular: No chest pain, no palpitations  Gastrointestinal: No abdominal pain, no diarrhea  Genitourinary: Has dysuria, has hematuria  Integumentary: No rash, no itching  Musculoskeletal: No muscle pain, no joint pain  Neurologic: No headache, no numbness in extremities    Physical Examination:  Vitals:    05/09/22 1300 05/09/22 2330 05/10/22 0444 05/10/22 1036   BP: 136/82 121/77  135/86   Pulse: 71 66  77   Resp: 18 18  20   Temp: 98.2 °F (36.8 °C) 98.1 °F (36.7 °C)     TempSrc: Oral Oral     SpO2: 96% 96%  95%   Weight:   194 lb 14.4 oz (88.4 kg)    Height:         Constitutional: Alert, not in distress  Eyes: Sclerae anicteric, no conjunctival erythema  ENT: No buccal lesion, no pharyngeal exudates  Neck: No nuchal rigidity, no cervical adenopathy  Lungs: Clear breath sounds, no crackles, no wheezes  Heart: Regular rate and rhythm, no murmurs  Abdomen: Bowel sounds present, soft, nontender  Skin: Warm and dry, no active dermatoses  Musculoskeletal: No joint erythema, no joint swelling    Labs, imaging, and medical records/notes were personally reviewed. Assessment:  Enterococcus faecalis UTI/cystitis  Hematuria  History of prostate cancer    Plan:  Start amoxicillin 500 mg q8h and continue for 5 days. Thank you for involving me in the care of Edd Crabtree. I will sign off for now. Please do not hesitate to call for any questions, concerns, or new findings.     Electronically signed by Johan Palacio MD on 5/10/2022 at 10:45 AM

## 2022-05-10 NOTE — CARE COORDINATION
Patient's discharge was cancelled yesterday due to patient's rodriguez with tea colored urine. Patient was seen by urology and recommended manual irrigation and wanted to observe the patient over night. Patient for EKG this morning. Plan remains home, family will transport and referral for home care will be made once home care orders are placed.      Lesley Hurtado MSW, LSW [phone]

## 2022-05-10 NOTE — CARE COORDINATION
Patient for possible discharge later today. Referral made to [address] [place] [address] Drive home care for catheter care and PT; awaiting home care orders, will need faxed to [phone]. [facility] and she reports start of care likely Thursday. Patient to discharge home and family to provide transport.     Jeremiah Del Rosario, MSW, LSW [phone]

## 2022-05-10 NOTE — PROGRESS NOTES
CLINICAL PHARMACY NOTE: MEDS TO BEDS    Total # of Prescriptions Filled: 1   The following medications were delivered to the patient:  · Magnesium oxide 400 mg    Additional Documentation:  Delivered meds @3:41

## 2022-05-11 ENCOUNTER — CARE COORDINATION (OUTPATIENT)
Dept: CASE MANAGEMENT | Age: 72
End: 2022-05-11

## 2022-05-11 ENCOUNTER — TELEPHONE (OUTPATIENT)
Dept: PHARMACY | Facility: CLINIC | Age: 72
End: 2022-05-11

## 2022-05-11 NOTE — TELEPHONE ENCOUNTER
----- Message from Bang Bateman RN sent at 5/11/2022 12:18 PM EDT -----  Patient was unable to complete med review today but is agreeable to a call from [facility] to set up a telephone appointment to review medications. COPD/DM history. Last A1C 9.6% on 5/6/22. Thank You.

## 2022-05-11 NOTE — CARE COORDINATION
Request from [address] RN.,  Patient would like help scheduling Urology appt. Called office, - is going to speak with nurse then call patient back with time and date.     Po Engel, [address]  Care Coordination Transition

## 2022-05-11 NOTE — TELEPHONE ENCOUNTER
Received a referral:  from Care Coordinator  to review patients medications. Called patient to schedule a time to speak with a pharmacist over the telephone. Spoke to patient and advised them of the above message. Patient verified understanding and scheduled their appointment: tomorrow at [facility].    [facility] free: [id number] in place:  No   Recommendation Provided To: Patient/Caregiver: 1 via Telephone   Intervention Detail: Scheduled Appointment   Gap Closed?: Yes    Intervention Accepted By: Patient/Caregiver: 1   Time Spent (min): 10

## 2022-05-11 NOTE — CARE COORDINATION
Jessi 45 Transitions Initial Follow Up Call    Call within 2 business days of discharge: Yes    Patient: Joanna Watson Patient : 1950   MRN: [id number]  Reason for Admission: urinary retention  Discharge Date: 5/10/22 RARS: Readmission Risk Score: 16.7 ( )      Last Discharge [facility]       Complaint Diagnosis Description Type Department Provider    22 Urinary Retention Urinary retention . .. ED to Hosp-Admission (Discharged) (ADMITTED) SEYZ 6WE Andrew Linares DO; Cornelio Cortes... Transitions of Care Initial Call        Challenges to be reviewed by the provider   Additional needs identified to be addressed with provider: No  none             Method of communication with provider : none    Advance Care Planning:   Does patient have an Advance Directive: decision maker reviewed and current. Care Transition Nurse (CTN) contacted the patient by telephone to perform post hospital discharge assessment. Verified name and  with patient as identifiers. Provided introduction to self, and explanation of the CTN role. CTN reviewed discharge instructions, medical action plan and red flags with patient who verbalized understanding. Patient given an opportunity to ask questions and does not have any further questions or concerns at this time. Were discharge instructions available to patient? Yes. Reviewed appropriate site of care based on symptoms and resources available to patient including: PCP  Specialist  Home health. The patient agrees to contact the PCP office for questions related to their healthcare. Medication reconciliation was not performed with patient as patient was unable to do so at this time. Patient is agreeable to a call from [facility] to set up a telephone appointment to review medications-CTN will route to [facility] pharmacy. [id number] not entered. Patient states he will obtain his new medications of amoxil and B&O supprettes today.   Patient received other new medication of mag-oxide at hospital discharge. Was patient discharged with a pulse oximeter? no    CTN will plan additional call before handing back to AC. CTN will staff message to patient's ACM Araseli Cooper RN to update. Plan for next call: symptom management-check urination/catheter  follow up appointment-date for urology. appointment made with PCP? medication management-obtained new medications of amoxil and B&O supprettes? referrals-ensure [facility] telephone appointment completed. active with [address]? Non-face-to-face services provided:  Scheduled appointment with PCP-CTN explained to patient recommendation to have contact with PCP 1-2 weeks post hospital discharge. Patient states he has frequent contact with his PCP via text and declines assistance in scheduling. CTN will route to PCP office to inform.   Scheduled appointment with Malissa Jensen is accepting of assistance in scheduling of one week urology appointment post hospital discharge-CTN will route to .  Obtained and reviewed discharge summary and/or continuity of care documents  Communication with home health agencies or other community services the patient is currently using-SOC to begin tomorrow(5/12/22)per Jhonny Washington at [facility] or re-establishment of referrals-to send to [address] 24 Hour Call    Do you have a copy of your discharge instructions?: Yes  Do you have all of your prescriptions and are they filled?: No  Have you been contacted by a [address]?: No  Have you scheduled your follow up appointment?: No  Do you feel like you have everything you need to keep you well at home?: Yes    Care Transitions Interventions    Pharmacist: Completed      Registered Dietician: Declined      Smoking Cessation: Declined         -Spoke with patient for initial care transition call post hospital discharge.   -Patient reports his night \"wasn't bad and he slept pretty good. \"  -Patient states urinary catheter is draining well, currently clear yellow urine.  -Patient denies any further needs, questions, or concerns at this time.     Follow Up  Future Appointments   Date Time Provider Elliot Oseguera   11/2/2022  8:30 AM Tayla Quintero MD [facility]   2/2/2023 10:00 AM Silvino Woods MD [facility]       Stephen Carrillo RN

## 2022-05-12 RX ORDER — CEPHALEXIN 500 MG/1
500 CAPSULE ORAL 2 TIMES DAILY
Qty: 14 CAPSULE | Refills: 0 | Status: SHIPPED
Start: 2022-05-12 | End: 2022-05-13 | Stop reason: SDUPTHER

## 2022-05-13 ENCOUNTER — TELEPHONE (OUTPATIENT)
Dept: PHARMACY | Facility: CLINIC | Age: 72
End: 2022-05-13

## 2022-05-13 DIAGNOSIS — Z79.899 ENCOUNTER FOR MEDICATION REVIEW: Primary | ICD-10-CM

## 2022-05-13 PROCEDURE — 1111F DSCHRG MED/CURRENT MED MERGE: CPT

## 2022-05-13 RX ORDER — CEPHALEXIN 500 MG/1
500 CAPSULE ORAL 2 TIMES DAILY
Qty: 14 CAPSULE | Refills: 0 | Status: SHIPPED | OUTPATIENT
Start: 2022-05-13 | End: 2022-05-20

## 2022-05-13 NOTE — TELEPHONE ENCOUNTER
Toñito Garcia DO,   Identified medication discrepancies post-discharge:   Patient states he has not been taking finasteride for a long time and is active in med list. Educated patient to confirm with you at upcoming visit   Patient also states he has only been taking metformin once a day and is prescribed BID. Educated patient to take as prescribed    Patient has appointment with you 5/17/22. Please discuss with patient at appointment or route back to me to discuss with patient. See note below for complete details. Thank you,  Argyle Olszewski, PharmD, [address] Pharmacist  Department: [phone]    =======================================================  POPULATION HEALTH CLINICAL PHARMACY REVIEW: Post-Discharge Transitions of Care [facility])  Subjective/Objective:  Aure Torres is a 70 y.o. male. Patient outreach to review discharge medications and provide medication review and management. Spoke with patient. Never started the amoxicillin. pcp has now prescribed cephalexin to  and patient is aware to  this antibiotic and take until completed. Patient states he will start taking metformin BID. Also educated patient per med list he should be taking finasteride. Patient has visit with pcp on Monday and will ask pcp. Patient denies having COPD and states no SOB. No Known Allergies    Discharge Medications (as per discharging medication list found in AVS): There are NEW medications for you.  START taking them after you leave the hospital:  cephALEXin (KEFLEX) 500 MG capsule Take 1 capsule by mouth 2 times daily for 7 days  - will  today   magnesium oxide (MAG-OX) 400 (240 Mg) MG tablet Take 1 tablet by mouth daily   amoxicillin (AMOXIL) 500 MG capsule Take 1 capsule by mouth 3 times daily for 5 days  - never picked up and now changed to keflex     You told us you were taking these medications at home, but the amount or how often you take this medication has CHANGED:  metFORMIN (GLUCOPHAGE) 500 MG tablet Take 1 tablet by mouth 2 times daily (with meals) (Patient taking differently: Take 500 mg by mouth daily (with breakfast) )  - rx bottle does say BID, patient states he will start taking BID     sotalol (BETAPACE) 80 MG tablet Take 1 tablet by mouth 2 times daily     These are medications you told us you were taking at home, CONTINUE taking them after you leave the hospital:  Medication Sig         metoprolol succinate (TOPROL XL) 50 MG extended release tablet Take 1 tablet by mouth daily    opium-belladonna (B&O SUPPRETTES) 16.2-60 MG suppository Place 1 suppository rectally every 8 hours as needed for Pain for up to 3 days.  warfarin (COUMADIN) 5 MG tablet Take 5 mg by mouth See Admin Instructions Saturday, Sunday, Wednesday    warfarin (COUMADIN) 1 MG tablet Take 4 mg by mouth See Admin Instructions Monday, Tuesday, Thursday, Friday    Ascorbic Acid (VITAMIN C) 250 MG tablet Take 500 mg by mouth daily    Vitamin D (CHOLECALCIFEROL) 25 MCG (1000 UT) TABS tablet Take 1,000 Units by mouth daily    zinc gluconate 50 MG tablet Take 50 mg by mouth daily    tamsulosin (FLOMAX) 0.4 MG capsule Take 1 capsule by mouth daily    finasteride (PROSCAR) 5 MG tablet Take 1 tablet by mouth daily (Patient not taking: Reported on 4/18/2022)    atorvastatin (LIPITOR) 40 MG tablet Take 1 tablet by mouth daily          These are the medications you have told us you were taking at home, STOP taking them after you leave the hospital:  lisinopril 10 MG tablet (PRINIVIL;ZESTRIL)    Additional Medications:   na    Estimated Creatinine Clearance: 56 mL/min (A) (based on SCr of 1.3 mg/dL (H)). Assessment/Plan:  - Medication reconciliation completed. Patient has not filled new medications ordered after this hospital discharge and is not taking medications as directed by discharging provider.      - Instructions per discharge list provided except per below documentation.  Identified medication discrepancies/issues:   · Patient educated to  cephalexin at [facility] and start taking  · Confirm is suppose to be taking finasteride  · Medication list updated as appropriate/noted    - Identified Potential Medication Interactions: No clinically significant interactions identified via [employer] Interaction Analysis as category D or higher.    - Renal Dosing: No renal adjustments necessary.    - Follow up appointment(s):  · Reminded of upcoming appointment(s)  · Encouraged to schedule follow up as advised at discharge  Future Appointments   Date Time Provider Elliot Oseguera   5/17/2022  9:15 AM DO LÓPEZ Fuentes [facility]   11/2/2022  8:30 AM Gabriela Batres MD [facility]   2/2/2023 10:00 AM Елена Bee  [facility], PharmD, [address] Pharmacist  Department: [address] [place] [address] Ext in place:  No   Recommendation Provided To: Provider: 1 via Note to Provider and Patient/Caregiver: 1 via Telephone   Intervention Detail: SHIRA: Level 3 #: 1   Gap Closed?: Yes    Intervention Accepted By: Provider: 1 and Patient/Caregiver: 1   Time Spent (min): 30

## 2022-05-17 ENCOUNTER — ANTI-COAG VISIT (OUTPATIENT)
Dept: FAMILY MEDICINE CLINIC | Age: 72
End: 2022-05-17

## 2022-05-17 ENCOUNTER — CARE COORDINATION (OUTPATIENT)
Dept: CASE MANAGEMENT | Age: 72
End: 2022-05-17

## 2022-05-17 ENCOUNTER — OFFICE VISIT (OUTPATIENT)
Dept: FAMILY MEDICINE CLINIC | Age: 72
End: 2022-05-17
Payer: MEDICARE

## 2022-05-17 VITALS
HEIGHT: 72 IN | OXYGEN SATURATION: 90 % | SYSTOLIC BLOOD PRESSURE: 110 MMHG | WEIGHT: 191.8 LBS | HEART RATE: 74 BPM | BODY MASS INDEX: 25.98 KG/M2 | DIASTOLIC BLOOD PRESSURE: 74 MMHG | TEMPERATURE: 96.6 F

## 2022-05-17 DIAGNOSIS — I48.0 PAF (PAROXYSMAL ATRIAL FIBRILLATION) (HCC): ICD-10-CM

## 2022-05-17 DIAGNOSIS — E11.59 TYPE 2 DIABETES MELLITUS WITH CARDIAC COMPLICATION (HCC): Primary | ICD-10-CM

## 2022-05-17 DIAGNOSIS — Z09 HOSPITAL DISCHARGE FOLLOW-UP: ICD-10-CM

## 2022-05-17 DIAGNOSIS — R33.9 URINARY RETENTION: ICD-10-CM

## 2022-05-17 DIAGNOSIS — N18.32 STAGE 3B CHRONIC KIDNEY DISEASE (HCC): ICD-10-CM

## 2022-05-17 PROBLEM — N18.30 CHRONIC RENAL DISEASE, STAGE III (HCC): Status: ACTIVE | Noted: 2022-05-17

## 2022-05-17 LAB
INTERNATIONAL NORMALIZATION RATIO, POC: 1.1
PROTHROMBIN TIME, POC: 13.9

## 2022-05-17 PROCEDURE — 85610 PROTHROMBIN TIME: CPT | Performed by: FAMILY MEDICINE

## 2022-05-17 PROCEDURE — 99496 TRANSJ CARE MGMT HIGH F2F 7D: CPT | Performed by: FAMILY MEDICINE

## 2022-05-17 PROCEDURE — 1111F DSCHRG MED/CURRENT MED MERGE: CPT | Performed by: FAMILY MEDICINE

## 2022-05-17 RX ORDER — TRAMADOL HYDROCHLORIDE 50 MG/1
50 TABLET ORAL EVERY 6 HOURS PRN
Qty: 28 TABLET | Refills: 0 | Status: SHIPPED | OUTPATIENT
Start: 2022-05-17 | End: 2022-05-24

## 2022-05-17 ASSESSMENT — PATIENT HEALTH QUESTIONNAIRE - PHQ9
6. FEELING BAD ABOUT YOURSELF - OR THAT YOU ARE A FAILURE OR HAVE LET YOURSELF OR YOUR FAMILY DOWN: 0
8. MOVING OR SPEAKING SO SLOWLY THAT OTHER PEOPLE COULD HAVE NOTICED. OR THE OPPOSITE, BEING SO FIGETY OR RESTLESS THAT YOU HAVE BEEN MOVING AROUND A LOT MORE THAN USUAL: 0
2. FEELING DOWN, DEPRESSED OR HOPELESS: 1
7. TROUBLE CONCENTRATING ON THINGS, SUCH AS READING THE NEWSPAPER OR WATCHING TELEVISION: 1
3. TROUBLE FALLING OR STAYING ASLEEP: 0
5. POOR APPETITE OR OVEREATING: 1
10. IF YOU CHECKED OFF ANY PROBLEMS, HOW DIFFICULT HAVE THESE PROBLEMS MADE IT FOR YOU TO DO YOUR WORK, TAKE CARE OF THINGS AT HOME, OR GET ALONG WITH OTHER PEOPLE: 1
SUM OF ALL RESPONSES TO PHQ QUESTIONS 1-9: 7
SUM OF ALL RESPONSES TO PHQ QUESTIONS 1-9: 7
4. FEELING TIRED OR HAVING LITTLE ENERGY: 1
1. LITTLE INTEREST OR PLEASURE IN DOING THINGS: 3
SUM OF ALL RESPONSES TO PHQ9 QUESTIONS 1 & 2: 4
SUM OF ALL RESPONSES TO PHQ QUESTIONS 1-9: 7
9. THOUGHTS THAT YOU WOULD BE BETTER OFF DEAD, OR OF HURTING YOURSELF: 0
SUM OF ALL RESPONSES TO PHQ QUESTIONS 1-9: 7

## 2022-05-17 NOTE — CARE COORDINATION
Jessi 45 Transitions Follow Up Call    2022    Patient: Dennis Lerma  Patient : 1950   MRN: [id number]  Reason for Admission: urinary retention  Discharge Date: 5/10/22 RARS: Readmission Risk Score: 16.7 ( )    Care Transitions Follow Up Call    Needs to be reviewed by the provider   Additional needs identified to be addressed with provider: No  none             Method of communication with provider : none      Care Transition Nurse (CTN) contacted the patient by telephone to follow up after admission on 22. Addressed changes since last contact: home health care-active with [facility]  medications-see note below  labs-see note below  completed PCP visit, urology visit tomorrow(22) per patient     Discussed follow-up appointments. If no appointment was previously scheduled, appointment scheduling offered: N/A. Is follow up appointment scheduled within 7 days of discharge?  Yes, PCP visit completed today(22)      Patients top risk factors for readmission: medical condition-urinary retention, DM, COPD, immunocompromised  polypharmacy  Interventions to address risk factors: maintain consistent follow up with providers, [facility]      [facility] u[facility](Dr Sommers)tomorrow 22      Care Transitions Subsequent and Final Call    Subsequent and Final Calls  Do you have any ongoing symptoms?: No  Do you have any questions related to your medications?: No  Do you currently have any active services?: Yes  Are you currently active with any services?: Home Health  Do you have any needs or concerns that I can assist you with?: No  Identified Barriers: None  Care Transitions Interventions  No Identified Needs    Pharmacist: Completed      Registered Dietician: Declined      Smoking Cessation: Declined    Other Interventions:         -Spoke with patient for follow up care transition call.   -Patient completed PCP visit today, aware of need for future labs and of his INR result today of 1.1. Patient states he has been on 1 mg of coumadin daily recently d/t his urinary bleeding issues. Patient states he was told today by PCP to hold Coumadin for a couple days as he had some bleeding in his urinary catheter last night. Patient states urine is clear today and catheter is draining well. Patient states he will receive a call from PCP office for future INR draw. Patient states he is back to metformin frequency of twice daily and is off finasteride. Patient is taking keflex as prescribed and states his tramadol is ready for  at the pharmacy. -Active with [facility] with PT visiting yesterday(5/16/22) per patient.  -Patient denies any needs, questions, or concerns at this time. -CTN to return patient to his ACM Meghana Galvan RN.     Follow Up  Future Appointments   Date Time Provider Elliot Oseguera   11/2/2022  8:30 AM Jasmine Boas, MD [facility]   2/2/2023 10:00 AM Taina Reese MD [facility] Nielson RN

## 2022-05-17 NOTE — PROGRESS NOTES
Post-Discharge Transitional Care  Follow Up      Rosetta Kramer   YOB: 1950    Date of Office Visit:  5/17/2022  Date of Hospital Admission: 5/5/22  Date of Hospital Discharge: 5/10/22  Risk of hospital readmission (high >=14%. 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)  -     Basic Metabolic Panel; Future  -     CBC with Auto Differential; Future  -     Hemoglobin A1C; Future    ---VASCULAR PANEL  A) asa, plavix, aggrenox  B) COUMADIN, pletal, tzd, STATIN  C) ace, hctz, folic, ccb  D) cannikinumab, fish oils     ---CARDIAC---COUMADIN, ace, BETA, STATIN, hctz, ( ccb )    A) ace or arb  B) LIPITOR 40  or crestor ( 20 to 40 )  C) glp-1 or sglt 2       PAF (paroxysmal atrial fibrillation) (HCC)  -     POCT INR  -     Basic Metabolic Panel; Future  -     CBC with Auto Differential; Future  Urinary retention  -     Basic Metabolic Panel; Future  -     CBC with Auto Differential; Future  -     traMADol (ULTRAM) 50 MG tablet; Take 1 tablet by mouth every 6 hours as needed for Pain for up to 7 days. Intended supply: 7 days. Take lowest dose possible to manage pain, Disp-28 tablet, R-0Normal  Stage 3b chronic kidney disease [facility])      Medical Decision Making: high complexity  No follow-ups on file. On this date 5/17/2022 I have spent 30  minutes reviewing previous notes, test results and face to face with the patient discussing the diagnosis and importance of compliance with the treatment plan as well as documenting on the day of the visit.        Subjective:   HPI:  Follow up of Hospital problems/diagnosis(es): urinary obstruction     Inpatient course: Discharge summary reviewed- see chart. Interval history/Current status: he is fair today but there is still blood in his Cassidy bag     Patient Active Problem List   Diagnosis    Diabetes mellitus [facility]    Essential hypertension    COPD, very severe ([facility]    CAD (coronary artery disease)    Tubular adenoma of colon    Sigmoid diverticulosis    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 ([facility]    Prostate cancer ([facility]    Mixed hyperlipidemia    ICD (implantable cardioverter-defibrillator) in place    Cerebellar hemorrhage ([facility]    Dilated cardiomyopathy ([facility]    AAA (abdominal aortic aneurysm) without rupture ([facility]    PAF (paroxysmal atrial fibrillation) ([facility]    COVID    Hematuria    Urinary retention    Elevated serum creatinine    Anemia    Chronic renal disease, stage III ([facility] [130277]       Medications listed as ordered at the time of discharge from hospital     Medication List          Accurate as of May 17, 2022  9:43 AM. If you have any questions, ask your nurse or doctor. START taking these medications    traMADol 50 MG tablet  Commonly known as: Ultram  Take 1 tablet by mouth every 6 hours as needed for Pain for up to 7 days. Intended supply: 7 days.  Take lowest dose possible to manage pain  Started by: Torri Waldrop, DO        CONTINUE taking these medications    atorvastatin 40 MG tablet  Commonly known as: LIPITOR  Take 1 tablet by mouth daily     cephALEXin 500 MG capsule  Commonly known as: KEFLEX  Take 1 capsule by mouth 2 times daily for 7 days     magnesium oxide 400 (240 Mg) MG tablet  Commonly known as: MAG-OX  Take 1 tablet by mouth daily     metFORMIN 500 MG tablet  Commonly known as: GLUCOPHAGE  Take 1 tablet by mouth 2 times daily (with meals)     metoprolol succinate 50 MG extended release tablet  Commonly known as: TOPROL XL  Take 1 tablet by mouth daily     sotalol 80 MG tablet  Commonly known as: BETAPACE  Take 1 tablet by mouth 2 times Saturday, Sunday, Wednesday      warfarin (COUMADIN) 1 MG tablet Take 4 mg by mouth See Admin Instructions Monday, Tuesday, Thursday, Friday      vitamin C (ASCORBIC ACID) 500 MG tablet Take 500 mg by mouth daily       Vitamin D (CHOLECALCIFEROL) 25 MCG (1000 UT) TABS tablet Take 1,000 Units by mouth daily      zinc gluconate 50 MG tablet Take 50 mg by mouth daily      tamsulosin (FLOMAX) 0.4 MG capsule Take 1 capsule by mouth daily 90 capsule 1    atorvastatin (LIPITOR) 40 MG tablet Take 1 tablet by mouth daily 90 tablet 1    metFORMIN (GLUCOPHAGE) 500 MG tablet Take 1 tablet by mouth 2 times daily (with meals) 180 tablet 1        Medications patient taking as of now reconciled against medications ordered at time of hospital discharge: Yes    A comprehensive review of systems was negative except for what was noted in the HPI.     Objective:    /74   Pulse 74   Temp 96.6 °F (35.9 °C) (Temporal)   Ht 6' (1.829 m)   Wt 191 lb 12.8 oz (87 kg)   SpO2 90%   BMI 26.01 kg/m²   General Appearance: alert and oriented to person, place and time, lethargic and frail-appearing  Skin: warm and dry, no rash or erythema  Head: normocephalic and atraumatic  Eyes: pupils equal, round, and reactive to light, extraocular eye movements intact, conjunctivae normal  ENT: tympanic membrane, external ear and ear canal normal bilaterally, oropharynx clear and moist with normal mucous membranes  Neck: neck supple and non tender without mass, no thyromegaly or thyroid nodules, no cervical lymphadenopathy   Pulmonary/Chest: clear to auscultation bilaterally- no wheezes, rales or rhonchi, normal air movement, no respiratory distress  Cardiovascular: normal rate, regular rhythm, normal S1 and S2, no murmurs, no gallops, intact distal pulses and no carotid bruits  Abdomen: soft, non-tender, non-distended, normal bowel sounds, no masses or organomegaly  Genitourinary: genitals normal without hernia or inguinal adenopathy, Cassidy in place with blood in the bag   Extremities: no cyanosis and no clubbing  Musculoskeletal: normal range of motion, no joint swelling, deformity or tenderness  Neurologic: speech normal and very weak of gait but he is ambulating       An electronic signature was used to authenticate this note.   --Flaca Dawson, DO

## 2022-05-17 NOTE — DISCHARGE SUMMARY
Hospitalist Discharge Summary    Patient ID: Duyen Miguel   Patient : 1950  Patient's PCP: Ron Forte DO    Admit Date: 2022   Admitting Physician: No admitting provider for patient encounter. Discharge Date:  2022   Discharge Physician: Max Lan DO   Discharge Condition: Stable  Discharge Disposition: Home      Discharge Diagnoses: *  C/Tarun Tavares 1106 Problems    Diagnosis Date Noted    Hematuria [R31.9] 2022     Priority: Medium    Urinary retention [R33.9] 2022     Priority: Medium    Elevated serum creatinine [R79.89] 2022     Priority: Medium    Anemia [D64.9] 2022     Priority: Medium    Essential hypertension [I10] 2012     Priority: Medium    Diabetes mellitus ([facility] [address] [E11.9] 2012     Priority: Medium    PAF (paroxysmal atrial fibrillation) ([facility] [address] [I48.0] 2022    ICD (implantable cardioverter-defibrillator) in place [Z95.810] 2018    Mixed hyperlipidemia [E78.2] 2017    Prostate cancer ([facility] [address] Mckayla Stager 2017    CAD (coronary artery disease) [I25.10] 2014   Dilated cardiomyopathy  NON SUSTAINED VT  H/O ICD        Hospital course in brief:  70 y. o. male presented with URINARY RETENTION, STATES HE HAS PROSTATE CANCER AND IS POST RADIATION. BEGAN TO HAVE BLOOD IN URINE AND PAIN WITH PASSING CLOTS, NO FEVER, NO CHILLS,  *  WANTS TO GO HOME*      PHYSICAL EXAM:    BP (!) 155/85   Pulse 69   Temp 98.1 °F (36.7 °C)   Resp 20   Ht 5' 11\" (1.803 m)   Wt 194 lb 14.4 oz (88.4 kg)   SpO2 97%   BMI 27.18 kg/m²     General appearance:  No apparent distress, . HEENT:  Normal cephalic,   Neck: Supple, with full range of motion. . Trachea midline. Respiratory:  Normal respiratory effort. Clear to auscultation,   Cardiovascular:  Regular rate and rhythm   Abdomen: Soft, non-tender, non-distended with normal bowel sounds.   Musculoskeletal:  No clubbing, cyanosis or edema bilaterally.    Skin: Skin color, texture, turgor normal.  No rashes or lesions. Neurologic:  . Cranial nerves: II-XII intact, grossly non-focal.  Psychiatric:  Alert and oriented, thought content appropriate, normal insight          Prior to Admission medications    Medication Sig Start Date End Date Taking?  Authorizing Provider   magnesium oxide (MAG-OX) 400 (240 Mg) MG tablet Take 1 tablet by mouth daily 5/11/22  Yes Martha Miranda, DO   sotalol (BETAPACE) 80 MG tablet Take 1 tablet by mouth 2 times daily 5/9/22  Yes Martha Miranda, DO   metoprolol succinate (TOPROL XL) 50 MG extended release tablet Take 1 tablet by mouth daily 5/9/22  Yes Martha Miranda,    warfarin (COUMADIN) 5 MG tablet Take 5 mg by mouth See Admin Instructions Saturday, Sunday, Wednesday   Yes Historical Provider, MD   warfarin (COUMADIN) 1 MG tablet Take 4 mg by mouth See Admin Instructions Monday, Tuesday, Thursday, Friday   Yes Historical Provider, MD   vitamin C (ASCORBIC ACID) 500 MG tablet Take 500 mg by mouth daily    Yes Historical Provider, MD   Vitamin D (CHOLECALCIFEROL) 25 MCG (1000 UT) TABS tablet Take 1,000 Units by mouth daily   Yes Historical Provider, MD   zinc gluconate 50 MG tablet Take 50 mg by mouth daily   Yes Historical Provider, MD   cephALEXin (KEFLEX) 500 MG capsule Take 1 capsule by mouth 2 times daily for 7 days 5/13/22 5/20/22  Jayant Colunga DO   tamsulosin [facility]) 0.4 MG capsule Take 1 capsule by mouth daily 5/4/22   Toñito Sosa DO   finasteride (PROSCAR) 5 MG tablet Take 1 tablet by mouth daily  Patient not taking: Reported on 4/18/2022 1/21/22   Jayant Colunga DO   atorvastatin (LIPITOR) 40 MG tablet Take 1 tablet by mouth daily 1/7/22 7/6/22  Jayant Colunga DO   metFORMIN (GLUCOPHAGE) 500 MG tablet Take 1 tablet by mouth 2 times daily (with meals) 1/7/22   Toñito Sosa DO       Consults:   IP CONSULT TO INTERNAL MEDICINE  IP CONSULT TO SOCIAL WORK  IP CONSULT TO UROLOGY  IP CONSULT TO ELECTROPHYSIOLOGY  IP CONSULT TO INFECTIOUS DISEASES            Discharge Instructions / Follow up:    Future Appointments   Date Time Provider Elliot Robertsi   5/17/2022  9:15 AM [address] [place]  [facility]   11/2/2022  8:30 AM Fidencio Head MD [facility]   2/2/2023 10:00 AM Georgia Jhaveri MD [address] [facility]       Continued appropriate risk factor modification of blood pressure, diabetes and serum lipids will remain essential to reducing risk of future atherosclerotic development    Activity: activity as tolerated    Significant labs:  CBC:   No results for input(s): WBC, RBC, HGB, HCT, MCV, RDW, PLT in the last 72 hours. BMP: No results for input(s): NA, K, CL, CO2, BUN, CREATININE, CA, MG, PHOS in the last 72 hours. LFT:  No results for input(s): PROT, ALB, ALKPHOS, ALT, AST, BILITOT, AMYLASE, LIPASE in the last 72 hours. PT/INR: No results for input(s): INR, APTT in the last 72 hours. BNP: No results for input(s): BNP in the last 72 hours.   Hgb A1C:   Lab Results   Component Value Date    LABA1C 9.6 (H) 05/06/2022     Folate and B12: No results found for: Jason Pippins, No results found for: FOLATE  Thyroid Studies:   Lab Results   Component Value Date    TSH 1.700 04/11/2022       Urinalysis:    Lab Results   Component Value Date    NITRU POSITIVE 05/05/2022    WBCUA 5-10 05/05/2022    BACTERIA FEW 05/05/2022    RBCUA >20 05/05/2022    RBCUA 0-1 01/12/2014    BLOODU LARGE 05/05/2022    SPECGRAV 1.025 05/05/2022    GLUCOSEU >=1000 05/05/2022       Imaging:  Echo Complete    Result Date: 5/13/2022  Transthoracic Echocardiography Report (TTE)  Demographics   Patient Name       Mian Carlos Gender               Male                     [address]     [id number]          Room Number          2157  Number   Account #          [de-identified]         Procedure Date       05/10/2022   Corporate ID                         Ordering Physician   Elli Quiñones MD   Accession Number [id number]        Referring Physician   Date of Birth      1950        Sonographer          Maria Eugenia Stark   Age                70 year(s)        Interpreting         Juan Cortes MD                                       Physician                                        Any Other  Procedure Type of Study   TTE procedure  Procedure Date Date: 05/10/2022 Start: 02:25 PM Study Location: Portable Technical Quality: Adequate visualization Indications:LV function. Patient Status: Routine Height: 71 inches Weight: 196 pounds BSA: 2.09 m^2 BMI: 27.34 kg/m^2 Allergies   - No known allergies. Findings   Left Ventricle  Left ventricular size is normal.  Hypokinesis of the basal lateral wall and septum noted. Ejection fraction is visually estimated at 45-50%. Right Ventricle  Normal right ventricular size and function. Left Atrium  The left atrium is moderately dilated. Right Atrium  Mildly enlarged right atrium size. Mitral Valve  Structurally normal mitral valve. Mild to moderate mitral regurgitation is present. Tricuspid Valve  The tricuspid valve was not well visualized. Mild tricuspid regurgitation. RVSP is 55-60 mmHg. Pulmonary hypertension is moderate to severe . Aortic Valve  Aortic valve opens well. The aortic valve is trileaflet. Pulmonic Valve  The pulmonic valve was not well visualized. Pericardial Effusion  No evidence of pericardial effusion. Aorta  Aortic root dimension within normal limits. Conclusions   Summary  Left ventricular size is normal.  Hypokinesis of the basal lateral wall and septum noted. Ejection fraction is visually estimated at 45-50%. The left atrium is moderately dilated. Structurally normal mitral valve. Mild to moderate mitral regurgitation is present. Aortic valve opens well. The aortic valve is trileaflet. The tricuspid valve was not well visualized. Mild tricuspid regurgitation. RVSP is 55-60 mmHg. Pulmonary hypertension is moderate to severe .   No evidence of pericardial effusion. Signature   ----------------------------------------------------------------  Electronically signed by Nancy Lorenzo MD(Interpreting  physician) on 05/13/2022 03:39 PM  ----------------------------------------------------------------  M-Mode/2D Measurements & Calculations   LV Diastolic    LV Systolic Dimension: 4.7   AV Cusp Separation: 1.3 cmLA  Dimension: 5.5  cm                           Dimension: 4.5 cmAO Root  cm              LV Volume Diastolic: 797.8   Dimension: 2.9 cm  LV FS:14.6 %    ml  LV PW           LV Volume Systolic: 44.2 ml  Diastolic: 1.1  LV EDV/LV EDV Index: 149.7  cm              ml/72 ml/m^2LV ESV/LV ESV    RV Diastolic Dimension: 3.3  LV PW Systolic: Index: 31.3 BW/93AZ/ m^2     cm  1.7 cm          EF Calculated: 33.3 %  Septum          LV Mass Index: 109 l/min*m^2 Ascending Aorta: 3.6 cm  Diastolic: 1 cm LV Length: 8.2 cm            LA volume/Index: 90.1 ml  Septum                                       /37.83JA/D^2  Systolic: 1.2   LVOT: 2 cm                   RA Area: 27 cm^2  cm   LV Mass: 227.4  g  Doppler Measurements & Calculations   MV Peak E-Wave:   AV Peak Velocity: 1.89 m/s    LVOT Peak Velocity: 1.18  1.35 m/s          AV Peak Gradient: 14.33 mmHg  m/s  MV Peak A-Wave:   AV Mean Velocity: 1.31 m/s    LVOT Mean Velocity: 0.83  0.81 m/s          AV Mean Gradient: 7.5 mmHg    m/s  MV E/A Ratio:     AV VTI: 40 cm                 LVOT Peak Gradient: 5.5  1.66              AV Area (Continuity):1.88     mmHgLVOT Mean Gradient: 3  MV Peak Gradient: cm^2                          mmHg  11.5 mmHg  MV Mean Gradient: LVOT VTI: 24 cm  3.1 mmHg          IVRT: 100.3 msec  MV Mean Velocity:                               TR Velocity:3.79 m/s  0.78 m/s          Pulm. Vein A Reversal         TR Gradient:57.58 mmHg  MV Deceleration   Duration:103.8 msec           PV Peak Velocity: 0.89 m/s  Time: 209.1 msec  Pulm.  Vein D Velocity:0.8     PV Peak Gradient: 3.18  MV P1/2t: 56.3    m/sPulm. Vein A Reversal      mmHg  msec              Velocity:0.24 m/s             PV Mean Velocity: 0.69 m/s  MVA by PHT:3.91   Pulm. Vein S Velocity: 0.47   PV Mean Gradient: 2.1 mmHg  cm^2              m/s  MV Area  (continuity): 1.7  cm^2  MV E' Septal  Velocity: 0.05  m/s  MV E' Lateral  Velocity: 11 m/s  [email or web address] [id number]      Discharge Medications:      Medication List      START taking these medications    magnesium oxide 400 (240 Mg) MG tablet  Commonly known as: MAG-OX  Take 1 tablet by mouth daily        CHANGE how you take these medications    sotalol 80 MG tablet  Commonly known as: BETAPACE  Take 1 tablet by mouth 2 times daily  What changed: how much to take        CONTINUE taking these medications    atorvastatin 40 MG tablet  Commonly known as: LIPITOR  Take 1 tablet by mouth daily     finasteride 5 MG tablet  Commonly known as: Proscar  Take 1 tablet by mouth daily     metFORMIN 500 MG tablet  Commonly known as: GLUCOPHAGE  Take 1 tablet by mouth 2 times daily (with meals)     metoprolol succinate 50 MG extended release tablet  Commonly known as: TOPROL XL  Take 1 tablet by mouth daily     tamsulosin 0.4 MG capsule  Commonly known as: FLOMAX  Take 1 capsule by mouth daily     vitamin C 500 MG tablet  Commonly known as: ASCORBIC ACID     Vitamin D 25 MCG (1000 UT) Tabs tablet  Commonly known as: CHOLECALCIFEROL     * warfarin 5 MG tablet  Commonly known as: COUMADIN  Take as directed. If you are unsure how to take this medication, talk to your nurse or doctor. * warfarin 1 MG tablet  Commonly known as: COUMADIN  Take as directed. If you are unsure how to take this medication, talk to your nurse or doctor. zinc gluconate 50 MG tablet         * This list has 2 medication(s) that are the same as other medications prescribed for you.  Read the directions carefully, and ask your doctor or other care provider to review them with you. STOP taking these medications    lisinopril 10 MG tablet  Commonly known as: PRINIVIL;ZESTRIL           Where to Get Your Medications      These medications were sent to [facility] [address] [address] [address]    Phone: [phone]   · magnesium oxide 400 (240 Mg) MG tablet  · metoprolol succinate 50 MG extended release tablet  · sotalol 80 MG tablet         Time Spent on discharge is more than 45 minutes in the examination, evaluation, counseling and review of medications and discharge plan.    +++++++++++++++++++++++++++++++++++++++++++++++++  Kei Weiss, DO  [address], [place]  +++++++++++++++++++++++++++++++++++++++++++++++++  NOTE: This report was transcribed using voice recognition software. Every effort was made to ensure accuracy; however, inadvertent computerized transcription errors may be present.

## 2022-05-20 ENCOUNTER — CARE COORDINATION (OUTPATIENT)
Dept: CARE COORDINATION | Age: 72
End: 2022-05-20

## 2022-05-20 NOTE — CARE COORDINATION
Heart Failure Education outreach Date/Time: 2022 11:06 AM    Ambulatory Care Manager (ACM) contacted the patient by telephone to perform Ambulatory Care Coordination. Verified name and  with patient as identifiers. Provided introduction to self, and explanation of the Ambulatory Care Manager's role. ACM reviewed that a Health Healthy tips for the Summer packet has been sent to [facility]. ACM reviewed CHF zones, daily weights, fluid restriction, the importance of low sodium diet and healthy tips packet with the patient. Instructed patient to call their PCP if they have a weight gain of 3 lbs in 2 days or 5 lbs in a week. Patient reminded that there is a physician on call 24 hours a day / 7 days a week should the patient have questions or concerns. The patient verbalized understanding.

## 2022-05-23 LAB
EKG ATRIAL RATE: 68 BPM
EKG P AXIS: 20 DEGREES
EKG P-R INTERVAL: 140 MS
EKG Q-T INTERVAL: 446 MS
EKG QRS DURATION: 88 MS
EKG QTC CALCULATION (BAZETT): 474 MS
EKG R AXIS: -16 DEGREES
EKG T AXIS: -19 DEGREES
EKG VENTRICULAR RATE: 68 BPM

## 2022-05-26 RX ORDER — CEPHALEXIN 500 MG/1
500 CAPSULE ORAL 2 TIMES DAILY
Qty: 14 CAPSULE | Refills: 0 | Status: SHIPPED | OUTPATIENT
Start: 2022-05-26 | End: 2022-06-02

## 2022-05-26 RX ORDER — METHENAMINE HIPPURATE 1000 MG/1
1 TABLET ORAL 2 TIMES DAILY WITH MEALS
Qty: 60 TABLET | Refills: 3 | Status: SHIPPED
Start: 2022-05-26 | End: 2022-10-07

## 2022-05-31 DIAGNOSIS — R31.0 HEMATURIA, GROSS: ICD-10-CM

## 2022-05-31 DIAGNOSIS — N13.9 OBSTRUCTIVE UROPATHY: Primary | ICD-10-CM

## 2022-06-02 RX ORDER — PHENAZOPYRIDINE HYDROCHLORIDE 200 MG/1
200 TABLET, FILM COATED ORAL 3 TIMES DAILY PRN
Qty: 30 TABLET | Refills: 1 | Status: SHIPPED
Start: 2022-06-02 | End: 2022-10-07

## 2022-06-06 ENCOUNTER — TELEPHONE (OUTPATIENT)
Dept: FAMILY MEDICINE CLINIC | Age: 72
End: 2022-06-06

## 2022-06-06 DIAGNOSIS — R31.0 HEMATURIA, GROSS: ICD-10-CM

## 2022-06-06 DIAGNOSIS — N13.9 OBSTRUCTIVE UROPATHY: Primary | ICD-10-CM

## 2022-06-06 NOTE — TELEPHONE ENCOUNTER
Toñito Mao, DO [place], [place]  Caller: Unspecified (Today,  9:26 AM)  Salma Wynne now wants to see a Dr Leidy Ontiveros at [facility] in [place]

## 2022-06-08 ENCOUNTER — HOSPITAL ENCOUNTER (OUTPATIENT)
Age: 72
Discharge: HOME | End: 2022-06-10

## 2022-06-08 PROCEDURE — 88341 IMHCHEM/IMCYTCHM EA ADD ANTB: CPT

## 2022-06-08 PROCEDURE — 88307 TISSUE EXAM BY PATHOLOGIST: CPT

## 2022-06-08 PROCEDURE — 88342 IMHCHEM/IMCYTCHM 1ST ANTB: CPT

## 2022-06-09 ENCOUNTER — APPOINTMENT (OUTPATIENT)
Dept: CT IMAGING | Age: 72
End: 2022-06-09
Payer: MEDICARE

## 2022-06-09 ENCOUNTER — HOSPITAL ENCOUNTER (EMERGENCY)
Age: 72
Discharge: HOME | End: 2022-06-09
Attending: EMERGENCY MEDICINE
Payer: MEDICARE

## 2022-06-09 VITALS
OXYGEN SATURATION: 97 % | TEMPERATURE: 98.5 F | RESPIRATION RATE: 16 BRPM | DIASTOLIC BLOOD PRESSURE: 65 MMHG | SYSTOLIC BLOOD PRESSURE: 115 MMHG | HEART RATE: 61 BPM

## 2022-06-09 DIAGNOSIS — R79.1 SUBTHERAPEUTIC INTERNATIONAL NORMALIZED RATIO (INR): ICD-10-CM

## 2022-06-09 DIAGNOSIS — D64.9 ANEMIA, UNSPECIFIED TYPE: ICD-10-CM

## 2022-06-09 DIAGNOSIS — N30.01 ACUTE CYSTITIS WITH HEMATURIA: Primary | ICD-10-CM

## 2022-06-09 DIAGNOSIS — N32.89 BLADDER MASS: ICD-10-CM

## 2022-06-09 LAB
ALBUMIN SERPL-MCNC: 3.1 G/DL (ref 3.5–5.2)
ALP BLD-CCNC: 143 U/L (ref 40–129)
ALT SERPL-CCNC: 10 U/L (ref 0–40)
ANION GAP SERPL CALCULATED.3IONS-SCNC: 14 MMOL/L (ref 7–16)
APTT: <20 SEC (ref 24.5–35.1)
AST SERPL-CCNC: 12 U/L (ref 0–39)
BACTERIA: ABNORMAL /HPF
BASOPHILS ABSOLUTE: 0.02 E9/L (ref 0–0.2)
BASOPHILS RELATIVE PERCENT: 0.2 % (ref 0–2)
BILIRUB SERPL-MCNC: 0.3 MG/DL (ref 0–1.2)
BILIRUBIN URINE: ABNORMAL
BLOOD, URINE: ABNORMAL
BUN BLDV-MCNC: 22 MG/DL (ref 6–23)
CALCIUM SERPL-MCNC: 9.4 MG/DL (ref 8.6–10.2)
CHLORIDE BLD-SCNC: 99 MMOL/L (ref 98–107)
CLARITY: ABNORMAL
CO2: 21 MMOL/L (ref 22–29)
COLOR: YELLOW
CREAT SERPL-MCNC: 1.4 MG/DL (ref 0.7–1.2)
EOSINOPHILS ABSOLUTE: 0 E9/L (ref 0.05–0.5)
EOSINOPHILS RELATIVE PERCENT: 0 % (ref 0–6)
GFR AFRICAN AMERICAN: >60
GFR NON-AFRICAN AMERICAN: 50 ML/MIN/1.73
GLUCOSE BLD-MCNC: 278 MG/DL (ref 74–99)
GLUCOSE URINE: 100 MG/DL
HCT VFR BLD CALC: 28 % (ref 37–54)
HEMOGLOBIN: 9.1 G/DL (ref 12.5–16.5)
IMMATURE GRANULOCYTES #: 0.05 E9/L
IMMATURE GRANULOCYTES %: 0.4 % (ref 0–5)
INR BLD: 1.1
KETONES, URINE: NEGATIVE MG/DL
LACTIC ACID: 2.3 MMOL/L (ref 0.5–2.2)
LEUKOCYTE ESTERASE, URINE: ABNORMAL
LYMPHOCYTES ABSOLUTE: 1.03 E9/L (ref 1.5–4)
LYMPHOCYTES RELATIVE PERCENT: 8.4 % (ref 20–42)
MCH RBC QN AUTO: 25.6 PG (ref 26–35)
MCHC RBC AUTO-ENTMCNC: 32.5 % (ref 32–34.5)
MCV RBC AUTO: 78.7 FL (ref 80–99.9)
MONOCYTES ABSOLUTE: 0.73 E9/L (ref 0.1–0.95)
MONOCYTES RELATIVE PERCENT: 5.9 % (ref 2–12)
NEUTROPHILS ABSOLUTE: 10.45 E9/L (ref 1.8–7.3)
NEUTROPHILS RELATIVE PERCENT: 85.1 % (ref 43–80)
NITRITE, URINE: POSITIVE
PDW BLD-RTO: 15.1 FL (ref 11.5–15)
PH UA: 5.5 (ref 5–9)
PLATELET # BLD: 313 E9/L (ref 130–450)
PMV BLD AUTO: 8.7 FL (ref 7–12)
POTASSIUM SERPL-SCNC: 4.2 MMOL/L (ref 3.5–5)
PROTEIN UA: >=300 MG/DL
PROTHROMBIN TIME: 12.4 SEC (ref 9.3–12.4)
RBC # BLD: 3.56 E12/L (ref 3.8–5.8)
RBC UA: >20 /HPF (ref 0–2)
SODIUM BLD-SCNC: 134 MMOL/L (ref 132–146)
SPECIFIC GRAVITY UA: >=1.03 (ref 1–1.03)
TOTAL PROTEIN: 7.3 G/DL (ref 6.4–8.3)
UROBILINOGEN, URINE: 1 E.U./DL
WBC # BLD: 12.3 E9/L (ref 4.5–11.5)
WBC UA: ABNORMAL /HPF (ref 0–5)

## 2022-06-09 PROCEDURE — 85730 THROMBOPLASTIN TIME PARTIAL: CPT

## 2022-06-09 PROCEDURE — 83605 ASSAY OF LACTIC ACID: CPT

## 2022-06-09 PROCEDURE — 81001 URINALYSIS AUTO W/SCOPE: CPT

## 2022-06-09 PROCEDURE — 74176 CT ABD & PELVIS W/O CONTRAST: CPT

## 2022-06-09 PROCEDURE — 99284 EMERGENCY DEPT VISIT MOD MDM: CPT

## 2022-06-09 PROCEDURE — 85025 COMPLETE CBC W/AUTO DIFF WBC: CPT

## 2022-06-09 PROCEDURE — 85610 PROTHROMBIN TIME: CPT

## 2022-06-09 PROCEDURE — 80053 COMPREHEN METABOLIC PANEL: CPT

## 2022-06-09 PROCEDURE — 36415 COLL VENOUS BLD VENIPUNCTURE: CPT

## 2022-06-09 PROCEDURE — 87088 URINE BACTERIA CULTURE: CPT

## 2022-06-09 ASSESSMENT — PAIN - FUNCTIONAL ASSESSMENT: PAIN_FUNCTIONAL_ASSESSMENT: 0-10

## 2022-06-09 ASSESSMENT — PAIN DESCRIPTION - LOCATION: LOCATION: ABDOMEN;PENIS

## 2022-06-09 ASSESSMENT — PAIN DESCRIPTION - ORIENTATION: ORIENTATION: LOWER

## 2022-06-09 ASSESSMENT — PAIN SCALES - GENERAL: PAINLEVEL_OUTOF10: 3

## 2022-06-09 ASSESSMENT — PAIN DESCRIPTION - DESCRIPTORS: DESCRIPTORS: PRESSURE;SPASM;DISCOMFORT

## 2022-06-09 NOTE — ED NOTES
Irrigated rodriguez catheter. Now running clear fluids. Was able to get a clot out of the tubing.  MD alerted     Tasneem Carr RN  06/09/22 0957 Urine Pregnancy Test Result: negative Detail Level: Simple Performed By: DUNIA Expiration Date (Optional): 2019.07.31 Lot # (Optional): [id number]

## 2022-06-09 NOTE — ED PROVIDER NOTES
HPI:  6/9/22, Time: 11:24 AM EDT         Javon Guzman is a 70 y.o. male presenting to the ED for pain in urethra and a \"clogged\" rodriguez catheter, beginning this morning when he awoke. States he had a urological surgery yesterday and the catheter was draining just fine. Not sure was procedure he had. States he did drain about 1 inch of dark yellow urine out of the rodriguez bag. Patient is a poor historian. Was advised by urology office to come to the ER. The complaint has been persistent, moderate in severity, and worsened by nothing. Patient denies fever/chills, sore throat, cough, congestion, chest pain, shortness of breath, edema, headache, visual disturbances, focal paresthesias, focal weakness, abdominal pain, nausea, vomiting, diarrhea, constipation, trauma, neck or back pain, rash or other complaints. ROS:   A complete review of systems was performed and all pertinent positives and negatives are stated within HPI, all other systems reviewed and are negative.      --------------------------------------------- PAST HISTORY ---------------------------------------------  Past Medical History:  has a past medical history of Abdominal aortic aneurysm without rupture ([facility] [address] Arthritis, CAD (coronary artery disease), Cancer ([facility] [address] Combined systolic and diastolic heart failure ([facility] [address] COPD (chronic obstructive pulmonary disease) ([facility] [address] Diabetes mellitus ([facility] [address] GERD (gastroesophageal reflux disease), History of placement of internal cardiac defibrillator, Hypertension, Sigmoid diverticulosis, Sinusitis, Tubular adenoma of colon, and Vertigo. Past Surgical History:  has a past surgical history that includes Coronary angioplasty with stent (1/13/14); Umbilical hernia repair; Colonoscopy (8/31/15); Cardiac defibrillator placement; Hip fracture surgery (Right, 3/14/2020); and AAA repair, endovascular (N/A, 10/1/2021). Social History:  reports that he quit smoking about 8 years ago.  His smoking use included cigarettes. He started smoking about 52 years ago. He has a 100.00 pack-year smoking history. He has never used smokeless tobacco. He reports current alcohol use. He reports that he does not use drugs. Family History: family history includes Cancer in his brother, brother, and father; Heart Disease in his mother. The patients home medications have been reviewed. Allergies: Patient has no known allergies. ----------------------------------------PHYSICAL EXAM--------------------------------------  Constitutional:  Well developed, well nourished, no acute distress, non-toxic appearance   Eyes:  PERRL, conjunctiva normal, EOMI  HENT:  Atraumatic, external ears normal, nose normal, oropharynx moist. Neck- normal range of motion, no nuchal rigidity   Respiratory:  No respiratory distress, normal breath sounds, no rales, no wheezing   Cardiovascular:  Normal rate, normal rhythm, no murmurs, no gallops, no rubs. Radial and DP pulses 2+ bilaterally. GI:  Soft, nondistended, normal bowel sounds, nontender, no organomegaly, no mass, no rebound, no guarding   :  No costovertebral angle tenderness. In place and attached to leg bag draining dark yellow urine. Cassidy in place in urethral meatus without evidence of obvious trauma. Musculoskeletal:  No edema,  no deformities. Integument:  Well hydrated, no rash. Adequate perfusion. Lymphatic:  No inguinal lymphadenopathy noted   Neurologic:  Alert & oriented x 3, CN 2-12 normal, no focal deficits noted. Normal gait. Psychiatric:  Speech and behavior appropriate       -------------------------------------------------- RESULTS -------------------------------------------------  I have personally reviewed all laboratory and imaging results for this patient. Results are listed below.      LABS:  Results for orders placed or performed during the hospital encounter of 06/09/22   Urinalysis with Microscopic   Result Value Ref Range    Color, UA Yellow Phosphatase 143 (H) 40 - 129 U/L    ALT 10 0 - 40 U/L    AST 12 0 - 39 U/L   Lactic Acid   Result Value Ref Range    Lactic Acid 2.3 (H) 0.5 - 2.2 mmol/L   Protime-INR   Result Value Ref Range    Protime 12.4 9.3 - 12.4 sec    INR 1.1        RADIOLOGY:  Interpreted by Radiologist.  CT ABDOMEN PELVIS WO CONTRAST Additional Contrast? None   Final Result   1. Cassidy catheter in the bladder. Bladder wall thickening that is   nonspecific but infectious process cannot be excluded. 2. Mild left hydronephrosis and hydroureter extending down to the UVJ without   evidence of obstructing calculi. Possibility of a mass, stricture or   noncalcified debris obstructing the distal left ureter should be considered. No evidence of right-sided hydronephrosis or obstructing calculi. 3. Diverticulosis. No evidence of diverticulitis. 4. Stable bilateral renal and hepatic cysts. 5. Status post endovascular repair of abdominal aortic aneurysm. The   aneurysm sac measures 5.9 x 6.3 cm, slightly increased compared to the prior   study. This could be related to an endoleak. Follow-up CTA of the abdomen   and pelvis is suggested. 6. 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               ------------------------- NURSING NOTES AND VITALS REVIEWED ---------------------------  The nursing notes within the ED encounter and vital signs as below have been reviewed by myself. /65   Pulse 61   Temp 98.5 °F (36.9 °C) (Temporal)   Resp 16   SpO2 97%   Oxygen Saturation Interpretation: Normal      The patients available past medical records and past encounters were reviewed.         ------------------------------ ED COURSE/MEDICAL DECISION MAKING----------------------  Medications - No data to display        Procedures:   none      Medical Decision Making:    Anemia noted and INR 1.1 (subtherapeutic) - he is noted to be subtherapeutic consistently, but in the setting with hematuria and Cassidy catheter placement and known bladder mass will defer to primary care physician for management at this time. He is currently not in atrial fibrillation as his heart has a regular rate and rhythm on exam.  He is currently being treated with antibiotics for the potential cystitis and indwelling Cassidy catheter. He states he knows about his bladder mass. His Cassidy catheter was irrigated with a large clot removed and is now draining clear yellow urine. He states he is going to go home instead of going to urology office today and will follow up with Dr. Silverio Hutson as scheduled on Monday. He appears well, nontoxic, neurovascularly intact and very happy upon discharge. Urine culture sent. Patient was explicitly instructed on specific signs and symptoms on which to return to the emergency room for. Patient was instructed to return to the ER for any new or worsening symptoms. Additional discharge instructions were given verbally. All questions were answered. Patient is comfortable and agreeable with discharge plan. Patient in no acute distress and non-toxic in appearance. This patient's ED course included: re-evaluation prior to disposition and a personal history and physicial eaxmination    This patient has remained hemodynamically stable, improved and been closely monitored during their ED course. Re-Evaluations:  Time: 3:07 PM EDT   Re-evaluation. Patients symptoms are improving  Repeat physical examination is improved      Consultations:   2:47 PM EDT  Spoke with Dr Silverio Hutson, patient had cystoscopy yesterday, patient has large bladder mass. Patient is on antibiotics. Cassidy is to come out Monday. He may come to office now to be seen    Critical Care: none    IAlex, DO, am the Primary Provider of Record    Counseling: The emergency provider has spoken with the patient and discussed todays results, in addition to providing specific details for the plan of care and counseling regarding the diagnosis and prognosis. Questions are answered at this time and they are agreeable with the plan.    --------------------------- IMPRESSION AND DISPOSITION ---------------------------------    IMPRESSION  1. Acute cystitis with hematuria    2. Bladder mass    3. Anemia, unspecified type    4.  Subtherapeutic international normalized ratio (INR)        DISPOSITION  Disposition: Discharge to home  Patient condition is stable             Mikki Lentz DO  06/09/22 1500

## 2022-06-10 ENCOUNTER — CARE COORDINATION (OUTPATIENT)
Dept: CARE COORDINATION | Age: 72
End: 2022-06-10

## 2022-06-10 ASSESSMENT — ENCOUNTER SYMPTOMS: DYSPNEA ASSOCIATED WITH: EXERTION

## 2022-06-10 NOTE — CARE COORDINATION
Ambulatory Care Coordination Note  6/10/2022  CM Risk Score: 13  Charlson 10 Year Mortality Risk Score: 100%     ACC: Tay Zhang RN    Summary Note:   COPD:  Denies s/s of COPD Exacerbation. Discussed the Zone Tool and verbalizes understanding. Pt was in ER yesterday. His catheter become obstructed by a blood clot s/p cystoscopy  He was discharged, but ACM noted pt was to contact PCP office for subtherapeutic INR. ACM to send note to office to address with pt. Pt states PCP stopped his coumadin temporarily. Pt has appt with Dr Miquel Fallon on Monday, and is aware. FOLLOW-UP PLAN:    Discuss:   -How did appt with Dr Miquel Fallon go? Changes?; Is pt on coumadin again?  -COPD Management/Zone Tool  -Falls/Near Falls? -Appointment Reminders    Next Anticipated Outreach by [address] [place] [address] Team:  3 Weeks        Lab Results     None          Care Coordination Interventions    Program Enrollment: Complex Care  Referral from Primary Care Provider: No  Suggested Interventions and Community Resources  Fall Risk Prevention: In Process (Comment: Fall precautions)  Home Health Services: Completed  Medication Assistance Program: [address] [place] [address] Salma Ball or Pill Pack: Declined  Pharmacist: Declined  Registered Dietician: Declined  Social Work: Declined  Transportation Support: Declined  Zone Management Tools: In Process (Comment: DM, COPD)  Other Services or Interventions: Bleeding precautions         Goals Addressed                 This Visit's Progress     Conditions and Symptoms   On track     I will schedule office visits, as directed by my provider. I will keep my appointment or reschedule if I have to cancel. I will notify my provider of any barriers to my plan of care. I will follow my Zone Management tool to seek urgent or emergent care. I will notify my provider of any symptoms that indicate a worsening of my condition.     Barriers: lack of support and lack of education  Plan for overcoming my barriers: Work with care team  Confidence: 10/10  Anticipated Goal Completion Date: 7/8/22         Medication Management   On track     I will take my medication as directed. I will notify my provider of any problems with medications, like adverse effects or side effects. I will notify my provider/Care Coordinator if I am unable to afford my medications. I will notify my provider for advice before I stop taking any of my medication. Barriers: lack of support and lack of education  Plan for overcoming my barriers: Work with care team and family. Follow plan of care. Ask questions, voice concerns  Confidence: 10/10  Anticipated Goal Completion Date: 8/8/22            Prior to Admission medications    Medication Sig Start Date End Date Taking?  Authorizing Provider   phenazopyridine (PYRIDIUM) 200 MG tablet Take 1 tablet by mouth 3 times daily as needed for Pain 6/2/22   Naresh Colunga, DO   methenamine (HIPREX) 1 g tablet Take 1 tablet by mouth 2 times daily (with meals) 5/26/22   Evaristo Colunga, DO   magnesium oxide (MAG-OX) 400 (240 Mg) MG tablet Take 1 tablet by mouth daily 5/11/22   Kei Milton, DO   sotalol (BETAPACE) 80 MG tablet Take 1 tablet by mouth 2 times daily 5/9/22   Kei Milton, DO   metoprolol succinate (TOPROL XL) 50 MG extended release tablet Take 1 tablet by mouth daily 5/9/22   Kei Milton, DO   warfarin (COUMADIN) 5 MG tablet Take 5 mg by mouth See Admin Instructions Saturday, Sunday, Wednesday  Patient not taking: Reported on 5/17/2022    Historical Provider, MD   warfarin (COUMADIN) 1 MG tablet Take 4 mg by mouth See Admin Instructions Monday, Tuesday, Thursday, Friday  Patient not taking: Reported on 5/17/2022    Historical Provider, MD   vitamin C (ASCORBIC ACID) 500 MG tablet Take 500 mg by mouth daily     Historical Provider, MD   Vitamin D (CHOLECALCIFEROL) 25 MCG (1000 UT) TABS tablet Take 1,000 Units by mouth daily    Historical Provider, MD   zinc gluconate 50 MG tablet Take 50 mg by mouth daily    Historical Provider, MD   tamsulosin (FLOMAX) 0.4 MG capsule Take 1 capsule by mouth daily 5/4/22   Amy Colunga,    atorvastatin (LIPITOR) 40 MG tablet Take 1 tablet by mouth daily 1/7/22 7/6/22  Daphnie Grijalva, DO   metFORMIN (GLUCOPHAGE) 500 MG tablet Take 1 tablet by mouth 2 times daily (with meals) 1/7/22   Daphnie Grijalva, DO       Future Appointments   Date Time Provider Elliot Oseguera   9/1/2022  3:30 PM Manuela Leyva [address] [place] [address]   11/2/2022  8:30 AM Fidencio Head MD [facility]   2/2/2023 10:00 AM Angelina Ndiaye MD [address]      and   COPD Assessment    Does the patient understand envrionmental exposure?: Yes  Is the patient able to verbalize Rescue vs. Long Acting medications?: No  Does the patient have a nebulizer?: No  Does the patient use a space with inhaled medications?: No     No patient-reported symptoms         Symptoms:  None: Yes      Symptom course: stable  Breathlessness: exertion  Increase use of rapid acting/rescue inhaled medications?: No  Change in chronic cough?: No/At Baseline  Change in sputum?: No/At Baseline

## 2022-06-11 LAB — URINE CULTURE, ROUTINE: NORMAL

## 2022-06-28 ENCOUNTER — CARE COORDINATION (OUTPATIENT)
Dept: CARE COORDINATION | Age: 72
End: 2022-06-28

## 2022-06-28 NOTE — CARE COORDINATION
Left HIPAA compliant message for patient to return call to [phone]. Re: Follow up: COPD, Review POC   Person that answered the phone states he will give Jorge Grey the message.

## 2022-06-29 ENCOUNTER — CARE COORDINATION (OUTPATIENT)
Dept: CARE COORDINATION | Age: 72
End: 2022-06-29

## 2022-06-29 NOTE — CARE COORDINATION
Heart Failure Education outreach Date/Time: 2022 10:44 AM    Ambulatory Care Manager (ACM) contacted the patient by telephone to perform Ambulatory Care Coordination. Verified name and  with patient as identifiers. Provided introduction to self, and explanation of the Ambulatory Care Manager's role. ACM reviewed that a Health Healthy tips for the Summer packet has been sent to [employer]. ACM reviewed CHF zones, daily weights, fluid restriction, the importance of low sodium diet and healthy tips packet with the patient. Instructed patient to call their PCP if they have a weight gain of 3 lbs in 2 days or 5 lbs in a week. Patient reminded that there is a physician on call 24 hours a day / 7 days a week should the patient have questions or concerns. The patient verbalized understanding.

## 2022-07-05 DIAGNOSIS — R53.83 FATIGUE, UNSPECIFIED TYPE: ICD-10-CM

## 2022-07-05 DIAGNOSIS — E11.59 TYPE 2 DIABETES MELLITUS WITH CARDIAC COMPLICATION (HCC): Primary | ICD-10-CM

## 2022-07-05 DIAGNOSIS — E78.5 DYSLIPIDEMIA: ICD-10-CM

## 2022-07-05 DIAGNOSIS — C67.9 MALIGNANT NEOPLASM OF URINARY BLADDER, UNSPECIFIED SITE (HCC): Primary | ICD-10-CM

## 2022-07-11 PROBLEM — C67.8 MALIGNANT NEOPLASM OF OVERLAPPING SITES OF BLADDER (HCC): Status: ACTIVE | Noted: 2022-07-11

## 2022-07-18 ENCOUNTER — TELEPHONE (OUTPATIENT)
Dept: FAMILY MEDICINE CLINIC | Age: 72
End: 2022-07-18

## 2022-07-18 RX ORDER — UNDERPADS 23" X 36"
EACH MISCELLANEOUS
Qty: 72 EACH | Refills: 5 | Status: SHIPPED | OUTPATIENT
Start: 2022-07-18 | End: 2022-10-08

## 2022-07-22 ENCOUNTER — APPOINTMENT (OUTPATIENT)
Dept: GENERAL RADIOLOGY | Age: 72
DRG: 638 | End: 2022-07-22
Payer: MEDICARE

## 2022-07-22 ENCOUNTER — HOSPITAL ENCOUNTER (INPATIENT)
Age: 72
LOS: 2 days | Discharge: HOME | DRG: 638 | End: 2022-07-25
Attending: EMERGENCY MEDICINE | Admitting: INTERNAL MEDICINE
Payer: MEDICARE

## 2022-07-22 ENCOUNTER — APPOINTMENT (OUTPATIENT)
Dept: CT IMAGING | Age: 72
DRG: 638 | End: 2022-07-22
Payer: MEDICARE

## 2022-07-22 DIAGNOSIS — R73.9 HYPERGLYCEMIA: Primary | ICD-10-CM

## 2022-07-22 DIAGNOSIS — N30.00 ACUTE CYSTITIS WITHOUT HEMATURIA: ICD-10-CM

## 2022-07-22 DIAGNOSIS — T82.310A TYPE I ENDOLEAK OF AORTIC GRAFT (HCC): ICD-10-CM

## 2022-07-22 LAB
ALBUMIN SERPL-MCNC: 3.8 G/DL (ref 3.5–5.2)
ALP BLD-CCNC: 267 U/L (ref 40–129)
ALT SERPL-CCNC: 16 U/L (ref 0–40)
ANION GAP SERPL CALCULATED.3IONS-SCNC: 15 MMOL/L (ref 7–16)
AST SERPL-CCNC: 9 U/L (ref 0–39)
BACTERIA: ABNORMAL /HPF
BASOPHILS ABSOLUTE: 0.03 E9/L (ref 0–0.2)
BASOPHILS RELATIVE PERCENT: 0.2 % (ref 0–2)
BETA-HYDROXYBUTYRATE: 0.33 MMOL/L (ref 0.02–0.27)
BILIRUB SERPL-MCNC: 0.4 MG/DL (ref 0–1.2)
BILIRUBIN URINE: NEGATIVE
BLOOD, URINE: ABNORMAL
BUN BLDV-MCNC: 38 MG/DL (ref 6–23)
CALCIUM SERPL-MCNC: 9.7 MG/DL (ref 8.6–10.2)
CHLORIDE BLD-SCNC: 94 MMOL/L (ref 98–107)
CLARITY: CLEAR
CO2: 23 MMOL/L (ref 22–29)
COLOR: YELLOW
CREAT SERPL-MCNC: 1.3 MG/DL (ref 0.7–1.2)
EOSINOPHILS ABSOLUTE: 0.02 E9/L (ref 0.05–0.5)
EOSINOPHILS RELATIVE PERCENT: 0.1 % (ref 0–6)
EPITHELIAL CELLS, UA: ABNORMAL /HPF
GFR AFRICAN AMERICAN: >60
GFR NON-AFRICAN AMERICAN: 54 ML/MIN/1.73
GLUCOSE BLD-MCNC: 598 MG/DL (ref 74–99)
GLUCOSE BLD-MCNC: 718 MG/DL (ref 74–99)
GLUCOSE URINE: >=1000 MG/DL
HCT VFR BLD CALC: 36.2 % (ref 37–54)
HEMOGLOBIN: 11.2 G/DL (ref 12.5–16.5)
IMMATURE GRANULOCYTES #: 0.15 E9/L
IMMATURE GRANULOCYTES %: 0.8 % (ref 0–5)
KETONES, URINE: NEGATIVE MG/DL
LACTIC ACID: 2.2 MMOL/L (ref 0.5–2.2)
LEUKOCYTE ESTERASE, URINE: ABNORMAL
LIPASE: 168 U/L (ref 13–60)
LYMPHOCYTES ABSOLUTE: 0.61 E9/L (ref 1.5–4)
LYMPHOCYTES RELATIVE PERCENT: 3.4 % (ref 20–42)
MCH RBC QN AUTO: 24 PG (ref 26–35)
MCHC RBC AUTO-ENTMCNC: 30.9 % (ref 32–34.5)
MCV RBC AUTO: 77.5 FL (ref 80–99.9)
METER GLUCOSE: 449 MG/DL (ref 74–99)
METER GLUCOSE: 499 MG/DL (ref 74–99)
METER GLUCOSE: >500 MG/DL (ref 74–99)
MONOCYTES ABSOLUTE: 0.27 E9/L (ref 0.1–0.95)
MONOCYTES RELATIVE PERCENT: 1.5 % (ref 2–12)
NEUTROPHILS ABSOLUTE: 16.99 E9/L (ref 1.8–7.3)
NEUTROPHILS RELATIVE PERCENT: 94 % (ref 43–80)
NITRITE, URINE: POSITIVE
PDW BLD-RTO: 17.2 FL (ref 11.5–15)
PH UA: 6.5 (ref 5–9)
PLATELET # BLD: 256 E9/L (ref 130–450)
PMV BLD AUTO: 10.3 FL (ref 7–12)
POTASSIUM SERPL-SCNC: 5.4 MMOL/L (ref 3.5–5)
PROTEIN UA: ABNORMAL MG/DL
RBC # BLD: 4.67 E12/L (ref 3.8–5.8)
RBC UA: ABNORMAL /HPF (ref 0–2)
SODIUM BLD-SCNC: 132 MMOL/L (ref 132–146)
SPECIFIC GRAVITY UA: <=1.005 (ref 1–1.03)
TOTAL PROTEIN: 7.6 G/DL (ref 6.4–8.3)
UROBILINOGEN, URINE: 0.2 E.U./DL
WBC # BLD: 18.1 E9/L (ref 4.5–11.5)
WBC UA: ABNORMAL /HPF (ref 0–5)

## 2022-07-22 PROCEDURE — 96375 TX/PRO/DX INJ NEW DRUG ADDON: CPT

## 2022-07-22 PROCEDURE — 99285 EMERGENCY DEPT VISIT HI MDM: CPT

## 2022-07-22 PROCEDURE — 2580000003 HC RX 258: Performed by: STUDENT IN AN ORGANIZED HEALTH CARE EDUCATION/TRAINING PROGRAM

## 2022-07-22 PROCEDURE — 84132 ASSAY OF SERUM POTASSIUM: CPT

## 2022-07-22 PROCEDURE — 80053 COMPREHEN METABOLIC PANEL: CPT

## 2022-07-22 PROCEDURE — 71045 X-RAY EXAM CHEST 1 VIEW: CPT

## 2022-07-22 PROCEDURE — 81001 URINALYSIS AUTO W/SCOPE: CPT

## 2022-07-22 PROCEDURE — 96374 THER/PROPH/DIAG INJ IV PUSH: CPT

## 2022-07-22 PROCEDURE — 74177 CT ABD & PELVIS W/CONTRAST: CPT

## 2022-07-22 PROCEDURE — 6360000002 HC RX W HCPCS: Performed by: STUDENT IN AN ORGANIZED HEALTH CARE EDUCATION/TRAINING PROGRAM

## 2022-07-22 PROCEDURE — 82010 KETONE BODYS QUAN: CPT

## 2022-07-22 PROCEDURE — 83690 ASSAY OF LIPASE: CPT

## 2022-07-22 PROCEDURE — 82947 ASSAY GLUCOSE BLOOD QUANT: CPT

## 2022-07-22 PROCEDURE — G0378 HOSPITAL OBSERVATION PER HR: HCPCS

## 2022-07-22 PROCEDURE — 82962 GLUCOSE BLOOD TEST: CPT

## 2022-07-22 PROCEDURE — 96376 TX/PRO/DX INJ SAME DRUG ADON: CPT

## 2022-07-22 PROCEDURE — 6360000004 HC RX CONTRAST MEDICATION: Performed by: RADIOLOGY

## 2022-07-22 PROCEDURE — 83605 ASSAY OF LACTIC ACID: CPT

## 2022-07-22 PROCEDURE — 85025 COMPLETE CBC W/AUTO DIFF WBC: CPT

## 2022-07-22 PROCEDURE — 96361 HYDRATE IV INFUSION ADD-ON: CPT

## 2022-07-22 PROCEDURE — 6370000000 HC RX 637 (ALT 250 FOR IP): Performed by: STUDENT IN AN ORGANIZED HEALTH CARE EDUCATION/TRAINING PROGRAM

## 2022-07-22 RX ORDER — DULOXETIN HYDROCHLORIDE 30 MG/1
30 CAPSULE, DELAYED RELEASE ORAL DAILY
Qty: 90 CAPSULE | Refills: 1 | Status: SHIPPED
Start: 2022-07-22 | End: 2022-07-25

## 2022-07-22 RX ORDER — METHENAMINE HIPPURATE 1000 MG/1
1 TABLET ORAL 2 TIMES DAILY WITH MEALS
COMMUNITY
End: 2022-10-07

## 2022-07-22 RX ORDER — ATORVASTATIN CALCIUM 40 MG/1
40 TABLET, FILM COATED ORAL DAILY
COMMUNITY
End: 2022-09-01 | Stop reason: CLARIF

## 2022-07-22 RX ORDER — METOPROLOL SUCCINATE 50 MG/1
50 TABLET, EXTENDED RELEASE ORAL DAILY
COMMUNITY
End: 2022-09-01 | Stop reason: CLARIF

## 2022-07-22 RX ORDER — 0.9 % SODIUM CHLORIDE 0.9 %
1000 INTRAVENOUS SOLUTION INTRAVENOUS ONCE
Status: COMPLETED | OUTPATIENT
Start: 2022-07-22 | End: 2022-07-23

## 2022-07-22 RX ORDER — SENNA PLUS 8.6 MG/1
1 TABLET ORAL NIGHTLY
COMMUNITY
End: 2022-10-07

## 2022-07-22 RX ORDER — DEXAMETHASONE 4 MG/1
4 TABLET ORAL DAILY
Status: ON HOLD | COMMUNITY
End: 2022-07-25 | Stop reason: HOSPADM

## 2022-07-22 RX ORDER — 0.9 % SODIUM CHLORIDE 0.9 %
1000 INTRAVENOUS SOLUTION INTRAVENOUS ONCE
Status: COMPLETED | OUTPATIENT
Start: 2022-07-22 | End: 2022-07-22

## 2022-07-22 RX ORDER — INSULIN DEGLUDEC INJECTION 100 U/ML
20 INJECTION, SOLUTION SUBCUTANEOUS NIGHTLY
Qty: 5 PEN | Refills: 2 | Status: SHIPPED
Start: 2022-07-22 | End: 2022-07-25

## 2022-07-22 RX ORDER — LISINOPRIL 10 MG/1
10 TABLET ORAL DAILY
COMMUNITY
End: 2022-10-07

## 2022-07-22 RX ORDER — INSULIN DEGLUDEC INJECTION 100 U/ML
INJECTION, SOLUTION SUBCUTANEOUS
Status: ON HOLD | COMMUNITY
End: 2022-07-25 | Stop reason: HOSPADM

## 2022-07-22 RX ADMIN — IOPAMIDOL 50 ML: 755 INJECTION, SOLUTION INTRAVENOUS at 21:28

## 2022-07-22 RX ADMIN — SODIUM CHLORIDE 1000 ML: 9 INJECTION, SOLUTION INTRAVENOUS at 16:59

## 2022-07-22 RX ADMIN — SODIUM CHLORIDE 1000 ML: 9 INJECTION, SOLUTION INTRAVENOUS at 23:18

## 2022-07-22 RX ADMIN — INSULIN HUMAN 20 UNITS: 100 INJECTION, SOLUTION PARENTERAL at 20:17

## 2022-07-22 RX ADMIN — INSULIN HUMAN 10 UNITS: 100 INJECTION, SOLUTION PARENTERAL at 17:00

## 2022-07-22 RX ADMIN — WATER 1000 MG: 1 INJECTION INTRAMUSCULAR; INTRAVENOUS; SUBCUTANEOUS at 21:09

## 2022-07-22 NOTE — ED NOTES
Department of Emergency Medicine  FIRST PROVIDER TRIAGE NOTE             Independent MLP           7/22/22  2:56 PM EDT    Date of Encounter: 7/22/22   MRN: [id number]      HPI: Miracle Kent is a 67 y.o. male who presents to the ED for Hyperglycemia (States he checked his sugar today and it just read high was sent in by PCP)       ROS: Negative for cp or sob. PE: Gen Appearance/Constitutional: alert  Musculoskeletal: moves all extremities x 4     Initial Plan of Care: All treatment areas with department are currently occupied. Plan to order/Initiate the following while awaiting opening in ED: labs.   Initiate Treatment-Testing, Proceed toTreatment Area When Bed Available for ED Attending/MLP to Continue Care    Electronically signed by JERI Carrillo CNP   DD: 7/22/22      JERI Polanco CNP  07/22/22 5922

## 2022-07-22 NOTE — ED PROVIDER NOTES
[facility]  Department of Emergency Medicine     Written by: Pam De León DO  Patient Name: Darrin Vega  Attending Provider: Kristina Ramos MD  Admit Date: 2022  4:38 PM  MRN: [id number]                   : 1950        Chief Complaint   Patient presents with    Hyperglycemia     States he checked his sugar today and it just read high was sent in by PCP    - Chief complaint    Mr. Lena Wynne is a 67year old male who presents to the ED due to hyperglycemia. Patient states he has diabetes and has been taking metformin. He has been increasingly fatigued since he woke up this morning. Took his sugar at home which read high on multiple occasions. Patient spoke to his primary care provider who wanted him to start on insulin today. He also had mild shortness of breath. Patient has been diagnosed with bladder cancer recently and with has had some urinary incontinence. Symptoms are constant onset. Denies any aggravating or relieving factors. Denies any fevers, chills, nausea, vomiting, chest pain, abdominal pain, bowel or urinary changes, headaches or vision changes. Review of Systems   Constitutional:  Positive for fatigue. Negative for chills and fever. HENT:  Negative for congestion, sinus pressure, sinus pain and sore throat. Eyes:  Negative for pain, redness and visual disturbance. Respiratory:  Positive for shortness of breath. Negative for cough and chest tightness. Cardiovascular:  Negative for chest pain, palpitations and leg swelling. Gastrointestinal:  Negative for abdominal pain, constipation, diarrhea, nausea and vomiting. Genitourinary:  Negative for dysuria, flank pain, frequency, hematuria and urgency. Incontinence   Musculoskeletal:  Negative for arthralgias, back pain, gait problem, joint swelling and myalgias. Skin:  Negative for rash.    Neurological:  Negative for dizziness, tremors, syncope, weakness, light-headedness, numbness and headaches. Physical Exam  Constitutional:       General: He is not in acute distress. Appearance: Normal appearance. He is normal weight. He is not ill-appearing or toxic-appearing. HENT:      Head: Normocephalic and atraumatic. Right Ear: External ear normal.      Left Ear: External ear normal.      Nose: Nose normal.      Mouth/Throat:      Mouth: Mucous membranes are moist.      Pharynx: Oropharynx is clear. Eyes:      Extraocular Movements: Extraocular movements intact. Conjunctiva/sclera: Conjunctivae normal.   Cardiovascular:      Rate and Rhythm: Normal rate and regular rhythm. Pulses: Normal pulses. Heart sounds: Normal heart sounds. Pulmonary:      Effort: Pulmonary effort is normal. No respiratory distress. Breath sounds: Normal breath sounds. No wheezing. Abdominal:      General: Abdomen is flat. Bowel sounds are normal. There is no distension. Palpations: Abdomen is soft. Tenderness: There is no abdominal tenderness. There is no guarding or rebound. Musculoskeletal:         General: No swelling or tenderness. Normal range of motion. Cervical back: Normal range of motion and neck supple. No rigidity or tenderness. Skin:     General: Skin is warm and dry. Findings: No rash. Neurological:      General: No focal deficit present. Mental Status: He is alert and oriented to person, place, and time. Mental status is at baseline. Cranial Nerves: No cranial nerve deficit. Sensory: No sensory deficit. Motor: No weakness. Psychiatric:         Mood and Affect: Mood normal.         Behavior: Behavior normal.        Procedures       MDM  Number of Diagnoses or Management Options  Hyperglycemia  Diagnosis management comments: This is a 67year old male who presents to the ED due to hyperglycemia. Patient was given 1 L normal saline bolus and 10 units of insulin.   Patient's lab work was significant for an elevated leukocytosis of 18.1 his potassium was found to be 5.4 and his glucose was 718 with a mildly elevated creatinine of 1.3. Lactate was normal.  Beta hydroxybutyrate was also mildly elevated at 1.33 but patient had no anion gap. Lipase was elevated 168. Urinalysis revealed signs of urinary tract infection and he was given a dose of Rocephin. Repeat glucose remained elevated at 598 he was given 20 units of insulin. This brought his sugar down to 449 and he was given a second liter of normal saline. Chest x-ray revealed no active cardiopulmonary disease. CT abdomen pelvis reveals slightly increased size of abdominal aortic aneurysm with contrast visualized outside of the aorta by iliac stent suspicious for an endoleak which has been seen on prior CTs. Vascular surgery was consulted and is currently pending. Patient will be admitted to the hospital by Dr. Marzena Pool at this time for further work-up and treatment of his hyperglycemia.              --------------------------------------------- PAST HISTORY ---------------------------------------------  Past Medical History:  has a past medical history of CAD (coronary artery disease), [facility]congestive heart failure) ([facility] and Diabetes mellitus ([facility] Past Surgical History:  has a past surgical history that includes Cardiac surgery. Social History:  reports that he has quit smoking. His smoking use included cigarettes. He has never used smokeless tobacco. He reports that he does not currently use drugs. He reports that he does not drink alcohol. Family History: family history is not on file. The patients home medications have been reviewed. Allergies: Patient has no known allergies.     -------------------------------------------------- RESULTS -------------------------------------------------    LABS:  Results for orders placed or performed during the hospital encounter of 07/22/22   CBC with Auto Differential   Result Value Ref Range    WBC 18.1 (H) 4.5 - 11.5 E9/L RBC 4.67 3.80 - 5.80 E12/L    Hemoglobin 11.2 (L) 12.5 - 16.5 g/dL    Hematocrit 36.2 (L) 37.0 - 54.0 %    MCV 77.5 (L) 80.0 - 99.9 fL    MCH 24.0 (L) 26.0 - 35.0 pg    MCHC 30.9 (L) 32.0 - 34.5 %    RDW 17.2 (H) 11.5 - 15.0 fL    Platelets 691 838 - 270 E9/L    MPV 10.3 7.0 - 12.0 fL    Neutrophils % 94.0 (H) 43.0 - 80.0 %    Immature Granulocytes % 0.8 0.0 - 5.0 %    Lymphocytes % 3.4 (L) 20.0 - 42.0 %    Monocytes % 1.5 (L) 2.0 - 12.0 %    Eosinophils % 0.1 0.0 - 6.0 %    Basophils % 0.2 0.0 - 2.0 %    Neutrophils Absolute 16.99 (H) 1.80 - 7.30 E9/L    Immature Granulocytes # 0.15 E9/L    Lymphocytes Absolute 0.61 (L) 1.50 - 4.00 E9/L    Monocytes Absolute 0.27 0.10 - 0.95 E9/L    Eosinophils Absolute 0.02 (L) 0.05 - 0.50 E9/L    Basophils Absolute 0.03 0.00 - 0.20 E9/L   Comprehensive Metabolic Panel   Result Value Ref Range    Sodium 132 132 - 146 mmol/L    Potassium 5.4 (H) 3.5 - 5.0 mmol/L    Chloride 94 (L) 98 - 107 mmol/L    CO2 23 22 - 29 mmol/L    Anion Gap 15 7 - 16 mmol/L    Glucose 718 (HH) 74 - 99 mg/dL    BUN 38 (H) 6 - 23 mg/dL    Creatinine 1.3 (H) 0.7 - 1.2 mg/dL    GFR Non-African American 54 >=60 mL/min/1.73    GFR African American >60     Calcium 9.7 8.6 - 10.2 mg/dL    Total Protein 7.6 6.4 - 8.3 g/dL    Albumin 3.8 3.5 - 5.2 g/dL    Total Bilirubin 0.4 0.0 - 1.2 mg/dL    Alkaline Phosphatase 267 (H) 40 - 129 U/L    ALT 16 0 - 40 U/L    AST 9 0 - 39 U/L   Lactic Acid   Result Value Ref Range    Lactic Acid 2.2 0.5 - 2.2 mmol/L   Beta-Hydroxybutyrate   Result Value Ref Range    Beta-Hydroxybutyrate 0.33 (H) 0.02 - 0.27 mmol/L   Urinalysis   Result Value Ref Range    Color, UA Yellow Straw/Yellow    Clarity, UA Clear Clear    Glucose, Ur >=1000 (A) Negative mg/dL    Bilirubin Urine Negative Negative    Ketones, Urine Negative Negative mg/dL    Specific Gravity, UA <=1.005 1.005 - 1.030    Blood, Urine TRACE-LYSED Negative    pH, UA 6.5 5.0 - 9.0    Protein, UA TRACE Negative mg/dL Urobilinogen, Urine 0.2 <2.0 E.U./dL    Nitrite, Urine POSITIVE (A) Negative    Leukocyte Esterase, Urine TRACE (A) Negative   Lipase   Result Value Ref Range    Lipase 168 (H) 13 - 60 U/L   Glucose, Random   Result Value Ref Range    Glucose 598 (HH) 74 - 99 mg/dL   Microscopic Urinalysis   Result Value Ref Range    WBC, UA 1-3 0 - 5 /HPF    RBC, UA 0-1 0 - 2 /HPF    Epithelial Cells, UA FEW /HPF    Bacteria, UA FEW (A) None Seen /HPF   Potassium   Result Value Ref Range    Potassium 4.3 3.5 - 5.0 mmol/L   POCT Glucose   Result Value Ref Range    Meter Glucose >500 (H) 74 - 99 mg/dL   POCT Glucose   Result Value Ref Range    Meter Glucose 499 (H) 74 - 99 mg/dL   POCT Glucose   Result Value Ref Range    Meter Glucose 449 (H) 74 - 99 mg/dL       RADIOLOGY:  CT ABDOMEN PELVIS W IV CONTRAST Additional Contrast? None   Final Result   Slightly increased size of abdominal aortic aneurysm with contrast visualized   outside of aorto bi-iliac stent, suspicious for endoleak. Findings   suspicious for endoleak were present on prior CT. Recommend vascular consult   for further evaluation. Bilateral internal iliac artery aneurysms are  similar to prior. Moderate left hydroureteronephrosis, similar to prior, likely secondary to   focal irregular bladder wall thickening near the insertion of the left   ureter. Bladder mass appears slightly increased in size, suspicious for   malignancy. Critical results were called by Dr. Maylin Mckeon to [facility] on 7/22/2022   at 23:13. XR CHEST PORTABLE   Final Result   No active cardiopulmonary disease.           ------------------------- NURSING NOTES AND VITALS REVIEWED ---------------------------  Date / Time Roomed:  7/22/2022  4:38 PM  ED Bed Assignment:  1450/1046-I    The nursing notes within the ED encounter and vital signs as below have been reviewed.      Patient Vitals for the past 24 hrs:   BP Temp Temp src Pulse Resp SpO2 Height Weight   07/22/22 2300 115/67 -- -- 75 18 95 % -- --   07/22/22 2230 121/65 -- -- -- -- 97 % -- --   07/22/22 2100 119/81 -- -- 73 16 94 % -- --   07/22/22 2030 133/79 -- -- -- -- 95 % -- --   07/22/22 1848 130/74 -- -- 69 16 93 % -- --   07/22/22 1456 123/75 -- -- -- 17 -- 6' (1.829 m) 173 lb (78.5 kg)   07/22/22 1405 -- 97.9 °F (36.6 °C) Oral 69 -- 96 % -- --       Oxygen Saturation Interpretation: Normal    ------------------------------------------ PROGRESS NOTES ------------------------------------------  Re-evaluation(s):  Time: 2230  Patients symptoms show no change  Repeat physical examination is not changed    Counseling:  I have spoken with the patient and discussed todays results, in addition to providing specific details for the plan of care and counseling regarding the diagnosis and prognosis. Their questions are answered at this time and they are agreeable with the plan of admission.    --------------------------------- ADDITIONAL PROVIDER NOTES ---------------------------------  Consultations:  Time: 2245. Spoke with Dr. Mercedes Forrest. Discussed case. They will admit the patient. This patient's ED course included: a personal history and physicial examination, re-evaluation prior to disposition, multiple bedside re-evaluations, and IV medications    This patient has remained hemodynamically stable during their ED course. Diagnosis:  1. Hyperglycemia        Disposition:  Patient's disposition: Admit to med/surg floor  Patient's condition is stable. Patient was seen and evaluated by myself and my attending Vibha Hendrickson MD. Assessment and Plan discussed with attending provider, please see attestation for final plan of care.      DO Genet Griffith DO  Resident  07/23/22 9675

## 2022-07-23 PROBLEM — I97.89 TYPE II ENDOLEAK OF AORTIC GRAFT: Chronic | Status: ACTIVE | Noted: 2022-01-01

## 2022-07-23 PROBLEM — Z86.79 STATUS POST ENDOVASCULAR ANEURYSM REPAIR (EVAR): Chronic | Status: ACTIVE | Noted: 2022-01-01

## 2022-07-23 PROBLEM — Z98.890 STATUS POST ENDOVASCULAR ANEURYSM REPAIR (EVAR): Chronic | Status: ACTIVE | Noted: 2022-01-01

## 2022-07-23 LAB
ANION GAP SERPL CALCULATED.3IONS-SCNC: 11 MMOL/L (ref 7–16)
ANION GAP SERPL CALCULATED.3IONS-SCNC: 12 MMOL/L (ref 7–16)
BASOPHILS ABSOLUTE: 0.03 E9/L (ref 0–0.2)
BASOPHILS RELATIVE PERCENT: 0.2 % (ref 0–2)
BUN BLDV-MCNC: 28 MG/DL (ref 6–23)
BUN BLDV-MCNC: 30 MG/DL (ref 6–23)
CALCIUM SERPL-MCNC: 8.7 MG/DL (ref 8.6–10.2)
CALCIUM SERPL-MCNC: 8.8 MG/DL (ref 8.6–10.2)
CHLORIDE BLD-SCNC: 101 MMOL/L (ref 98–107)
CHLORIDE BLD-SCNC: 103 MMOL/L (ref 98–107)
CO2: 22 MMOL/L (ref 22–29)
CO2: 24 MMOL/L (ref 22–29)
CREAT SERPL-MCNC: 1.1 MG/DL (ref 0.7–1.2)
CREAT SERPL-MCNC: 1.2 MG/DL (ref 0.7–1.2)
EOSINOPHILS ABSOLUTE: 0.18 E9/L (ref 0.05–0.5)
EOSINOPHILS RELATIVE PERCENT: 1.2 % (ref 0–6)
GFR AFRICAN AMERICAN: >60
GFR AFRICAN AMERICAN: >60
GFR NON-AFRICAN AMERICAN: 60 ML/MIN/1.73
GFR NON-AFRICAN AMERICAN: >60 ML/MIN/1.73
GLUCOSE BLD-MCNC: 395 MG/DL (ref 74–99)
GLUCOSE BLD-MCNC: 588 MG/DL (ref 74–99)
HBA1C MFR BLD: 11.6 % (ref 4–5.6)
HCT VFR BLD CALC: 33.5 % (ref 37–54)
HEMOGLOBIN: 10.2 G/DL (ref 12.5–16.5)
IMMATURE GRANULOCYTES #: 0.11 E9/L
IMMATURE GRANULOCYTES %: 0.7 % (ref 0–5)
LYMPHOCYTES ABSOLUTE: 1.64 E9/L (ref 1.5–4)
LYMPHOCYTES RELATIVE PERCENT: 10.9 % (ref 20–42)
MCH RBC QN AUTO: 24.4 PG (ref 26–35)
MCHC RBC AUTO-ENTMCNC: 30.4 % (ref 32–34.5)
MCV RBC AUTO: 80.1 FL (ref 80–99.9)
METER GLUCOSE: 216 MG/DL (ref 74–99)
METER GLUCOSE: 277 MG/DL (ref 74–99)
METER GLUCOSE: 383 MG/DL (ref 74–99)
METER GLUCOSE: 390 MG/DL (ref 74–99)
METER GLUCOSE: 491 MG/DL (ref 74–99)
METER GLUCOSE: >500 MG/DL (ref 74–99)
METER GLUCOSE: >500 MG/DL (ref 74–99)
MONOCYTES ABSOLUTE: 0.75 E9/L (ref 0.1–0.95)
MONOCYTES RELATIVE PERCENT: 5 % (ref 2–12)
NEUTROPHILS ABSOLUTE: 12.32 E9/L (ref 1.8–7.3)
NEUTROPHILS RELATIVE PERCENT: 82 % (ref 43–80)
PDW BLD-RTO: 17.2 FL (ref 11.5–15)
PLATELET # BLD: 211 E9/L (ref 130–450)
PMV BLD AUTO: 10.3 FL (ref 7–12)
POTASSIUM REFLEX MAGNESIUM: 4.2 MMOL/L (ref 3.5–5)
POTASSIUM SERPL-SCNC: 4.3 MMOL/L (ref 3.5–5)
POTASSIUM SERPL-SCNC: 5 MMOL/L (ref 3.5–5)
RBC # BLD: 4.18 E12/L (ref 3.8–5.8)
SODIUM BLD-SCNC: 135 MMOL/L (ref 132–146)
SODIUM BLD-SCNC: 138 MMOL/L (ref 132–146)
WBC # BLD: 15 E9/L (ref 4.5–11.5)

## 2022-07-23 PROCEDURE — 96372 THER/PROPH/DIAG INJ SC/IM: CPT

## 2022-07-23 PROCEDURE — 82962 GLUCOSE BLOOD TEST: CPT

## 2022-07-23 PROCEDURE — 6370000000 HC RX 637 (ALT 250 FOR IP): Performed by: FAMILY MEDICINE

## 2022-07-23 PROCEDURE — 87088 URINE BACTERIA CULTURE: CPT

## 2022-07-23 PROCEDURE — 2580000003 HC RX 258: Performed by: PHYSICIAN ASSISTANT

## 2022-07-23 PROCEDURE — 96361 HYDRATE IV INFUSION ADD-ON: CPT

## 2022-07-23 PROCEDURE — 99223 1ST HOSP IP/OBS HIGH 75: CPT | Performed by: SURGERY

## 2022-07-23 PROCEDURE — 83036 HEMOGLOBIN GLYCOSYLATED A1C: CPT

## 2022-07-23 PROCEDURE — 2140000000 HC CCU INTERMEDIATE R&B

## 2022-07-23 PROCEDURE — 85025 COMPLETE CBC W/AUTO DIFF WBC: CPT

## 2022-07-23 PROCEDURE — 6360000002 HC RX W HCPCS: Performed by: PHYSICIAN ASSISTANT

## 2022-07-23 PROCEDURE — 80048 BASIC METABOLIC PNL TOTAL CA: CPT

## 2022-07-23 PROCEDURE — 36415 COLL VENOUS BLD VENIPUNCTURE: CPT

## 2022-07-23 PROCEDURE — 6370000000 HC RX 637 (ALT 250 FOR IP): Performed by: PHYSICIAN ASSISTANT

## 2022-07-23 RX ORDER — DULOXETIN HYDROCHLORIDE 30 MG/1
30 CAPSULE, DELAYED RELEASE ORAL DAILY
Status: DISCONTINUED | OUTPATIENT
Start: 2022-07-23 | End: 2022-07-25 | Stop reason: HOSPADM

## 2022-07-23 RX ORDER — ATORVASTATIN CALCIUM 40 MG/1
40 TABLET, FILM COATED ORAL DAILY
Status: DISCONTINUED | OUTPATIENT
Start: 2022-07-23 | End: 2022-07-25 | Stop reason: HOSPADM

## 2022-07-23 RX ORDER — POTASSIUM CHLORIDE 7.45 MG/ML
10 INJECTION INTRAVENOUS PRN
Status: DISCONTINUED | OUTPATIENT
Start: 2022-07-23 | End: 2022-07-25 | Stop reason: HOSPADM

## 2022-07-23 RX ORDER — INSULIN LISPRO 100 [IU]/ML
0-4 INJECTION, SOLUTION INTRAVENOUS; SUBCUTANEOUS NIGHTLY
Status: DISCONTINUED | OUTPATIENT
Start: 2022-07-23 | End: 2022-07-24

## 2022-07-23 RX ORDER — DEXTROSE MONOHYDRATE 25 G/50ML
12.5 INJECTION, SOLUTION INTRAVENOUS PRN
Status: DISCONTINUED | OUTPATIENT
Start: 2022-07-23 | End: 2022-07-25 | Stop reason: HOSPADM

## 2022-07-23 RX ORDER — METOPROLOL SUCCINATE 50 MG/1
50 TABLET, EXTENDED RELEASE ORAL DAILY
Status: DISCONTINUED | OUTPATIENT
Start: 2022-07-23 | End: 2022-07-25 | Stop reason: HOSPADM

## 2022-07-23 RX ORDER — INSULIN LISPRO 100 [IU]/ML
0-8 INJECTION, SOLUTION INTRAVENOUS; SUBCUTANEOUS
Status: DISCONTINUED | OUTPATIENT
Start: 2022-07-23 | End: 2022-07-24

## 2022-07-23 RX ORDER — SODIUM CHLORIDE 0.9 % (FLUSH) 0.9 %
10 SYRINGE (ML) INJECTION EVERY 12 HOURS SCHEDULED
Status: DISCONTINUED | OUTPATIENT
Start: 2022-07-23 | End: 2022-07-25 | Stop reason: HOSPADM

## 2022-07-23 RX ORDER — ENOXAPARIN SODIUM 100 MG/ML
40 INJECTION SUBCUTANEOUS DAILY
Status: DISCONTINUED | OUTPATIENT
Start: 2022-07-23 | End: 2022-07-25 | Stop reason: HOSPADM

## 2022-07-23 RX ORDER — DEXTROSE MONOHYDRATE 100 MG/ML
INJECTION, SOLUTION INTRAVENOUS CONTINUOUS PRN
Status: DISCONTINUED | OUTPATIENT
Start: 2022-07-23 | End: 2022-07-25 | Stop reason: HOSPADM

## 2022-07-23 RX ORDER — ACETAMINOPHEN 325 MG/1
650 TABLET ORAL EVERY 6 HOURS PRN
Status: DISCONTINUED | OUTPATIENT
Start: 2022-07-23 | End: 2022-07-25 | Stop reason: HOSPADM

## 2022-07-23 RX ORDER — INSULIN LISPRO 100 [IU]/ML
5 INJECTION, SOLUTION INTRAVENOUS; SUBCUTANEOUS ONCE
Status: COMPLETED | OUTPATIENT
Start: 2022-07-23 | End: 2022-07-23

## 2022-07-23 RX ORDER — ACETAMINOPHEN 650 MG/1
650 SUPPOSITORY RECTAL EVERY 6 HOURS PRN
Status: DISCONTINUED | OUTPATIENT
Start: 2022-07-23 | End: 2022-07-25 | Stop reason: HOSPADM

## 2022-07-23 RX ORDER — DEXAMETHASONE 4 MG/1
4 TABLET ORAL DAILY
Status: DISCONTINUED | OUTPATIENT
Start: 2022-07-23 | End: 2022-07-25 | Stop reason: HOSPADM

## 2022-07-23 RX ORDER — LISINOPRIL 10 MG/1
10 TABLET ORAL DAILY
Status: DISCONTINUED | OUTPATIENT
Start: 2022-07-23 | End: 2022-07-25 | Stop reason: HOSPADM

## 2022-07-23 RX ORDER — METHENAMINE HIPPURATE 1000 MG/1
1 TABLET ORAL 2 TIMES DAILY WITH MEALS
Status: DISCONTINUED | OUTPATIENT
Start: 2022-07-23 | End: 2022-07-25 | Stop reason: HOSPADM

## 2022-07-23 RX ORDER — SODIUM CHLORIDE 0.9 % (FLUSH) 0.9 %
10 SYRINGE (ML) INJECTION PRN
Status: DISCONTINUED | OUTPATIENT
Start: 2022-07-23 | End: 2022-07-25 | Stop reason: HOSPADM

## 2022-07-23 RX ORDER — SENNA PLUS 8.6 MG/1
1 TABLET ORAL NIGHTLY
Status: DISCONTINUED | OUTPATIENT
Start: 2022-07-23 | End: 2022-07-25 | Stop reason: HOSPADM

## 2022-07-23 RX ORDER — POTASSIUM CHLORIDE 20 MEQ/1
40 TABLET, EXTENDED RELEASE ORAL PRN
Status: DISCONTINUED | OUTPATIENT
Start: 2022-07-23 | End: 2022-07-25 | Stop reason: HOSPADM

## 2022-07-23 RX ORDER — SODIUM CHLORIDE 9 MG/ML
INJECTION, SOLUTION INTRAVENOUS CONTINUOUS
Status: DISCONTINUED | OUTPATIENT
Start: 2022-07-23 | End: 2022-07-25 | Stop reason: HOSPADM

## 2022-07-23 RX ORDER — SODIUM CHLORIDE 9 MG/ML
INJECTION, SOLUTION INTRAVENOUS PRN
Status: DISCONTINUED | OUTPATIENT
Start: 2022-07-23 | End: 2022-07-25 | Stop reason: HOSPADM

## 2022-07-23 RX ADMIN — DULOXETINE HYDROCHLORIDE 30 MG: 30 CAPSULE, DELAYED RELEASE ORAL at 08:44

## 2022-07-23 RX ADMIN — ATORVASTATIN CALCIUM 40 MG: 40 TABLET, FILM COATED ORAL at 08:44

## 2022-07-23 RX ADMIN — ENOXAPARIN SODIUM 40 MG: 100 INJECTION SUBCUTANEOUS at 08:44

## 2022-07-23 RX ADMIN — INSULIN LISPRO 8 UNITS: 100 INJECTION, SOLUTION INTRAVENOUS; SUBCUTANEOUS at 17:17

## 2022-07-23 RX ADMIN — SENNOSIDES 8.6 MG: 8.6 TABLET, FILM COATED ORAL at 20:51

## 2022-07-23 RX ADMIN — METOPROLOL SUCCINATE 50 MG: 50 TABLET, EXTENDED RELEASE ORAL at 08:44

## 2022-07-23 RX ADMIN — SENNOSIDES 8.6 MG: 8.6 TABLET, FILM COATED ORAL at 00:56

## 2022-07-23 RX ADMIN — INSULIN LISPRO 4 UNITS: 100 INJECTION, SOLUTION INTRAVENOUS; SUBCUTANEOUS at 11:38

## 2022-07-23 RX ADMIN — INSULIN LISPRO 5 UNITS: 100 INJECTION, SOLUTION INTRAVENOUS; SUBCUTANEOUS at 22:55

## 2022-07-23 RX ADMIN — DEXAMETHASONE 4 MG: 4 TABLET ORAL at 08:44

## 2022-07-23 RX ADMIN — SODIUM CHLORIDE: 9 INJECTION, SOLUTION INTRAVENOUS at 00:45

## 2022-07-23 RX ADMIN — INSULIN LISPRO 8 UNITS: 100 INJECTION, SOLUTION INTRAVENOUS; SUBCUTANEOUS at 06:05

## 2022-07-23 RX ADMIN — INSULIN LISPRO 4 UNITS: 100 INJECTION, SOLUTION INTRAVENOUS; SUBCUTANEOUS at 21:14

## 2022-07-23 RX ADMIN — INSULIN LISPRO 4 UNITS: 100 INJECTION, SOLUTION INTRAVENOUS; SUBCUTANEOUS at 00:56

## 2022-07-23 ASSESSMENT — ENCOUNTER SYMPTOMS
EYE PAIN: 0
ABDOMINAL PAIN: 0
VOMITING: 0
COUGH: 0
SINUS PRESSURE: 0
SORE THROAT: 0
NAUSEA: 0
EYE REDNESS: 0
BACK PAIN: 0
CONSTIPATION: 0
DIARRHEA: 0
SHORTNESS OF BREATH: 1
CHEST TIGHTNESS: 0
SINUS PAIN: 0

## 2022-07-23 ASSESSMENT — PAIN SCALES - GENERAL: PAINLEVEL_OUTOF10: 0

## 2022-07-23 NOTE — PROGRESS NOTES
Chip Cy was ordered methenamine (Hiprex) tab which is a nonformulary medication. This medication will need to be supplied by the patient as the pharmacy does not carry this non-formulary medication. If the medication has not been administered by 1400 on the following day from the time the order was placed, a pharmacist will follow-up with the nurse of the patient to assess the capability of the patient to bring in the medication. If it is determined that the patient cannot supply the medication and it is not available to be dispensed from the pharmacy, the provider will be notified.

## 2022-07-23 NOTE — H&P
[facility]  [facility]   History and Physical      CHIEF COMPLAINT: Fatigue      HISTORY OF PRESENT ILLNESS:    Patient was seen on the floor earlier this morning at the main campus of [facility]  Admission details noted  60-year-old  man with a known history of diabetes mellitus  He claims he has not been feeling well for the last several days with generalized fatigue  He was found to have extremely elevated blood sugar and was sent to emergency room by his PCP  He denied any other specific complaints such as abdominal pain diarrhea nausea emesis  CT of the abdomen showing possibility of endovascular leak of the aneurysm  Admitted for further management  Vascular surgery was consulted    Past Medical History:    Past Medical History:   Diagnosis Date    CAD (coronary artery disease)     CHF (congestive heart failure) ([facility]     Diabetes mellitus ([facility]        Past Surgical History:    Past Surgical History:   Procedure Laterality Date    CARDIAC SURGERY         Medications Prior to Admission:    Medications Prior to Admission: atorvastatin (LIPITOR) 40 MG tablet, Take 40 mg by mouth in the morning. DULoxetine HCl 30 MG CSDR, Take by mouth  dexamethasone (DECADRON) 4 MG tablet, Take 4 mg by mouth in the morning. Insulin Degludec (TRESIBA FLEXTOUCH) 100 UNIT/ML SOPN, Inject into the skin  lisinopril (PRINIVIL;ZESTRIL) 10 MG tablet, Take 10 mg by mouth in the morning. methenamine (HIPREX) 1 g tablet, Take 1 g by mouth in the morning and 1 g in the evening. Take with meals. metoprolol succinate (TOPROL XL) 50 MG extended release tablet, Take 50 mg by mouth in the morning. senna (SENOKOT) 8.6 MG tablet, Take 1 tablet by mouth at bedtime    Allergies:    Patient has no known allergies. Social History:    reports that he has quit smoking. His smoking use included cigarettes. He has never used smokeless tobacco. He reports that he does not currently use drugs.  He reports that he does not drink alcohol. Family History:   family history is not on file. REVIEW OF SYSTEMS:  As above in the HPI, otherwise negative    PHYSICAL EXAM:    Vitals:  BP (!) 109/54   Pulse 65   Temp 97.9 °F (36.6 °C) (Oral)   Resp 12   Ht 6' (1.829 m)   Wt 173 lb (78.5 kg)   SpO2 92%   BMI 23.46 kg/m²     General:  Awake, alert, oriented X 3. Well developed, well nourished, well groomed. No apparent distress. HEENT:  Normocephalic, atraumatic. Pupils equal, round, reactive to light. No scleral icterus. No conjunctival injection  No nasal discharge. Oral mucosa is somewhat dry  Neck:  Supple no adenopathy  Heart:  RRR, no murmurs, gallops, rubs  Lungs:  CTA bilaterally, bilat symmetrical expansion, no wheeze, rales, or rhonchi  Abdomen:   Bowel sounds present, soft, nontender, no masses, no organomegaly, no peritoneal signs  Extremities:  No clubbing, cyanosis, or edema  Skin:  Warm and dry, no open lesions or rash  Neuro:  Cranial nerves 2-12 intact, no focal deficits  Breast: deferred  Rectal: deferred  Genitalia:  deferred    LABS:        ASSESSMENT:      Principal Problem:    Hyperglycemia  Poorly controlled type 2 diabetes mellitus  Leukocytosis likely reactive  No signs of infection noted  Question of endovascular leak of abdominal aortic aneurysm based on CT findings        PLAN:  Check urine culture  Glucose monitoring  Sliding scale with insulin coverage  Advance diet as tolerated  Vascular surgery consult  No need for antibiotics at present    Jaswant Weems MD  5:03 PM  7/23/2022

## 2022-07-23 NOTE — CONSULTS
Vascular Surgery Consultation Note    Reason for Consult:  endoleak    HPI : This is a 67 y.o. male admitted on 7/22/2022  4:38 PM with hyperglycemia. He has recent dx of bladder cancer and has been having urinary incontinence. He had ct which noted previous evar repair with evidence of endoleak and 6.5 cm AAA. He denies current abdominal or back pain. Vascular surgery is consulted in regards to endoleak after evar. Pt had Evar done by Dr Yung Zuniga for 6.5 cm AAA 10/2021. A L[place]y conformable graft.       Main Body (Left  36 mm x 14.5 mm x 14 cm   Contralateral limb Right  27 mm x 14 cm        ROS : All others Negative if blank [], Positive if [x]  General Urinary   [] Fevers [] Hematuria   [] Chills [] Dysuria   [] Weight Loss Vascular   Skin [] Claudication   [] Tissue Loss [] Rest Pain   Eyes Neurologic   [] Wears Glasses/Contacts [] Stroke/TIA   [] Vision Changes [] Focal weakness   Respiratory [] Slurred Speech    [] Shortness of breath ENT   Cardiovascular [] Difficulty swallowing   [] Chest Pain Endocrine    [] Shortness of breath with exertion [] Increased Thirst   Gastrointestinal    [] Abdominal Pain    [] Melena   [] Hematochezia         Past Medical History:   Diagnosis Date    CAD (coronary artery disease)     CHF (congestive heart failure) (HCC)     Diabetes mellitus ([facility] [address]         Past Surgical History:   Procedure Laterality Date    CARDIAC SURGERY       Current Medications:    dextrose      sodium chloride      sodium chloride 75 mL/hr at 07/23/22 0045      glucose, glucagon (rDNA), dextrose, sodium chloride flush, sodium chloride, potassium chloride **OR** potassium alternative oral replacement **OR** potassium chloride, magnesium hydroxide, acetaminophen **OR** acetaminophen, trimethobenzamide, dextrose    atorvastatin  40 mg Oral Daily    dexamethasone  4 mg Oral Daily    DULoxetine  30 mg Oral Daily    lisinopril  10 mg Oral Daily    methenamine  1 g Oral BID WC    metoprolol succinate  50 mg Oral Daily    senna  1 tablet Oral Nightly    sodium chloride flush  10 mL IntraVENous 2 times per day    enoxaparin  40 mg SubCUTAneous Daily    insulin lispro  0-8 Units SubCUTAneous TID WC    insulin lispro  0-4 Units SubCUTAneous Nightly      Allergies:  Patient has no known allergies. Social History     Socioeconomic History    Marital status:      Spouse name: Not on file    Number of children: Not on file    Years of education: Not on file    Highest education level: Not on file   Occupational History    Not on file   Tobacco Use    Smoking status: Former     Types: Cigarettes    Smokeless tobacco: Never   Vaping Use    Vaping Use: Every day   Substance and Sexual Activity    Alcohol use: Never    Drug use: Not Currently    Sexual activity: Not on file   Other Topics Concern    Not on file   Social History Narrative    Not on file     Social Determinants of Health     Financial Resource Strain: Not on file   Food Insecurity: Not on file   Transportation Needs: Not on file   Physical Activity: Not on file   Stress: Not on file   Social Connections: Not on file   Intimate Partner Violence: Not on file   Housing Stability: Not on file     History reviewed. No pertinent family history.   PHYSICAL EXAM:    BP (!) 109/54   Pulse 65   Temp 97.9 °F (36.6 °C) (Oral)   Resp 12   Ht 6' (1.829 m)   Wt 173 lb (78.5 kg)   SpO2 92%   BMI 23.46 kg/m²   CONSTITUTIONAL:   Awake, alert, cooperative  PSYCHIATRIC :  Oriented to time, place and person      Appropriate insight to disease process  EYES: Lids and lashes normal  ENT:  External ears and nose without lesions   Hearing deficits notnoted  NECK: Supple, symmetrical, trachea midline   Thyroid goiter not appreciated  LUNGS:  No increased work of breathing                 Good respiratory excursion  CARDIOVASCULAR:  S1S2  ABDOMEN:  soft, non-distended, non-tender  Lymphatics : Cervical lymphadenopathy not noted     Femoral lymphadenopathy not

## 2022-07-24 LAB
METER GLUCOSE: 298 MG/DL (ref 74–99)
METER GLUCOSE: 305 MG/DL (ref 74–99)
METER GLUCOSE: 311 MG/DL (ref 74–99)
METER GLUCOSE: 317 MG/DL (ref 74–99)
METER GLUCOSE: 337 MG/DL (ref 74–99)

## 2022-07-24 PROCEDURE — 6370000000 HC RX 637 (ALT 250 FOR IP): Performed by: INTERNAL MEDICINE

## 2022-07-24 PROCEDURE — 6370000000 HC RX 637 (ALT 250 FOR IP): Performed by: PHYSICIAN ASSISTANT

## 2022-07-24 PROCEDURE — 2580000003 HC RX 258: Performed by: PHYSICIAN ASSISTANT

## 2022-07-24 PROCEDURE — 82962 GLUCOSE BLOOD TEST: CPT

## 2022-07-24 PROCEDURE — S5553 INSULIN LONG ACTING 5 U: HCPCS | Performed by: INTERNAL MEDICINE

## 2022-07-24 PROCEDURE — 2140000000 HC CCU INTERMEDIATE R&B

## 2022-07-24 PROCEDURE — 6360000002 HC RX W HCPCS: Performed by: PHYSICIAN ASSISTANT

## 2022-07-24 RX ORDER — INSULIN LISPRO 100 [IU]/ML
0-4 INJECTION, SOLUTION INTRAVENOUS; SUBCUTANEOUS NIGHTLY
Status: DISCONTINUED | OUTPATIENT
Start: 2022-07-24 | End: 2022-07-25 | Stop reason: HOSPADM

## 2022-07-24 RX ORDER — INSULIN GLARGINE-YFGN 100 [IU]/ML
25 INJECTION, SOLUTION SUBCUTANEOUS NIGHTLY
Status: DISCONTINUED | OUTPATIENT
Start: 2022-07-24 | End: 2022-07-25 | Stop reason: HOSPADM

## 2022-07-24 RX ORDER — INSULIN LISPRO 100 [IU]/ML
0-16 INJECTION, SOLUTION INTRAVENOUS; SUBCUTANEOUS
Status: DISCONTINUED | OUTPATIENT
Start: 2022-07-24 | End: 2022-07-25 | Stop reason: HOSPADM

## 2022-07-24 RX ADMIN — INSULIN LISPRO 12 UNITS: 100 INJECTION, SOLUTION INTRAVENOUS; SUBCUTANEOUS at 08:34

## 2022-07-24 RX ADMIN — INSULIN LISPRO 12 UNITS: 100 INJECTION, SOLUTION INTRAVENOUS; SUBCUTANEOUS at 16:46

## 2022-07-24 RX ADMIN — SENNOSIDES 8.6 MG: 8.6 TABLET, FILM COATED ORAL at 20:19

## 2022-07-24 RX ADMIN — SODIUM CHLORIDE: 9 INJECTION, SOLUTION INTRAVENOUS at 16:46

## 2022-07-24 RX ADMIN — ENOXAPARIN SODIUM 40 MG: 100 INJECTION SUBCUTANEOUS at 08:30

## 2022-07-24 RX ADMIN — DEXAMETHASONE 4 MG: 4 TABLET ORAL at 08:30

## 2022-07-24 RX ADMIN — ATORVASTATIN CALCIUM 40 MG: 40 TABLET, FILM COATED ORAL at 08:30

## 2022-07-24 RX ADMIN — INSULIN LISPRO 8 UNITS: 100 INJECTION, SOLUTION INTRAVENOUS; SUBCUTANEOUS at 11:36

## 2022-07-24 RX ADMIN — LISINOPRIL 10 MG: 10 TABLET ORAL at 08:29

## 2022-07-24 RX ADMIN — METOPROLOL SUCCINATE 50 MG: 50 TABLET, EXTENDED RELEASE ORAL at 08:29

## 2022-07-24 RX ADMIN — SODIUM CHLORIDE, PRESERVATIVE FREE 10 ML: 5 INJECTION INTRAVENOUS at 08:30

## 2022-07-24 RX ADMIN — DULOXETINE HYDROCHLORIDE 30 MG: 30 CAPSULE, DELAYED RELEASE ORAL at 08:36

## 2022-07-24 RX ADMIN — INSULIN GLARGINE-YFGN 25 UNITS: 100 INJECTION, SOLUTION SUBCUTANEOUS at 20:19

## 2022-07-24 RX ADMIN — INSULIN LISPRO 4 UNITS: 100 INJECTION, SOLUTION INTRAVENOUS; SUBCUTANEOUS at 20:19

## 2022-07-24 NOTE — PROGRESS NOTES
Call placed to Dr. Miguelina Rg. Left voicemail indicating need for further orders in regards to elevated glucose. Awaiting further instruction.

## 2022-07-24 NOTE — PROGRESS NOTES
[facility]  [facility]  Progress Notes      CHIEF COMPLAINT: Fatigue      HISTORY OF PRESENT ILLNESS:    7/23/22  Patient was seen on the floor earlier this morning at the main campus of [facility]  Admission details noted  79-year-old  man with a known history of diabetes mellitus  He claims he has not been feeling well for the last several days with generalized fatigue  He was found to have extremely elevated blood sugar and was sent to emergency room by his PCP  He denied any other specific complaints such as abdominal pain diarrhea nausea emesis  CT of the abdomen showing possibility of endovascular leak of the aneurysm  Admitted for further management  Vascular surgery was consulted  7/24/22  Patient was seen on the floor earlier this morning  Remains hyperglycemic  I am not sure why he is on Decadron  Seen by vascular surgery    Past Medical History:    Past Medical History:   Diagnosis Date    CAD (coronary artery disease)     CHF (congestive heart failure) ([facility] [address]     Diabetes mellitus ([facility] [address]     Status post endovascular aneurysm repair (EVAR) 7/23/2022    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 DULoxetine HCl 30 MG CSDR, Take by mouth  dexamethasone (DECADRON) 4 MG tablet, Take 4 mg by mouth in the morning. Insulin Degludec (TRESIBA FLEXTOUCH) 100 UNIT/ML SOPN, Inject into the skin  lisinopril (PRINIVIL;ZESTRIL) 10 MG tablet, Take 10 mg by mouth in the morning. methenamine (HIPREX) 1 g tablet, Take 1 g by mouth in the morning and 1 g in the evening. Take with meals. metoprolol succinate (TOPROL XL) 50 MG extended release tablet, Take 50 mg by mouth in the morning.   senna (SENOKOT) 8.6 MG tablet, Take 1 tablet by mouth at bedtime    Allergies:    Patient has no known allergies. Social History:    reports that he has quit smoking. His smoking use included cigarettes. He has never used smokeless tobacco. He reports that he does not currently use drugs. He reports that he does not drink alcohol. Family History:   family history is not on file. REVIEW OF SYSTEMS:  As above in the HPI, otherwise negative    PHYSICAL EXAM:    Vitals:  BP (!) 142/82   Pulse 81   Temp 97.7 °F (36.5 °C) (Temporal)   Resp 18   Ht 6' (1.829 m)   Wt 173 lb (78.5 kg)   SpO2 96%   BMI 23.46 kg/m²     General:  Awake, alert, oriented X 3. Well developed, well nourished, well groomed. No apparent distress. HEENT:  Normocephalic, atraumatic. Pupils equal, round, reactive to light. No scleral icterus. No conjunctival injection  No nasal discharge. Oral mucosa is somewhat dry  Neck:  Supple no adenopathy  Heart:  RRR, no murmurs, gallops, rubs  Lungs:  CTA bilaterally, bilat symmetrical expansion, no wheeze, rales, or rhonchi  Abdomen:   Bowel sounds present, soft, nontender, no masses, no organomegaly, no peritoneal signs  Extremities:  No clubbing, cyanosis, or edema  Skin:  Warm and dry, no open lesions or rash  Neuro:  Cranial nerves 2-12 intact, no focal deficits  Breast: deferred  Rectal: deferred  Genitalia:  deferred    LABS:        ASSESSMENT:      Principal Problem:    Hyperglycemia  Poorly controlled type 2 diabetes mellitus  Leukocytosis likely reactive  No signs of infection notedendovascular leak of abdominal aortic aneurysm based on CT findings        PLAN:  No interventions planned by vascular surgery  Add Lantus  Check urine culture  Glucose monitoring  Sliding scale with insulin coverage  Advance diet as tolerated  Vascular surgery consult/input appreciated  No need for antibiotics at present    Eddie Barcenas MD  3:27 PM  7/24/2022

## 2022-07-24 NOTE — PROGRESS NOTES
BMP completed. Glucose 588. Left message with Jed Sandoval who is listed as Dr. Elise Em coverage in perfect serve. Awaiting further instructions.

## 2022-07-25 VITALS
DIASTOLIC BLOOD PRESSURE: 83 MMHG | WEIGHT: 173 LBS | RESPIRATION RATE: 18 BRPM | OXYGEN SATURATION: 95 % | TEMPERATURE: 97.1 F | BODY MASS INDEX: 23.43 KG/M2 | HEART RATE: 60 BPM | SYSTOLIC BLOOD PRESSURE: 141 MMHG | HEIGHT: 72 IN

## 2022-07-25 LAB
METER GLUCOSE: 208 MG/DL (ref 74–99)
METER GLUCOSE: 237 MG/DL (ref 74–99)

## 2022-07-25 PROCEDURE — 99231 SBSQ HOSP IP/OBS SF/LOW 25: CPT | Performed by: NURSE PRACTITIONER

## 2022-07-25 PROCEDURE — 6370000000 HC RX 637 (ALT 250 FOR IP): Performed by: PHYSICIAN ASSISTANT

## 2022-07-25 PROCEDURE — 97535 SELF CARE MNGMENT TRAINING: CPT

## 2022-07-25 PROCEDURE — 6360000002 HC RX W HCPCS: Performed by: PHYSICIAN ASSISTANT

## 2022-07-25 PROCEDURE — 6370000000 HC RX 637 (ALT 250 FOR IP): Performed by: INTERNAL MEDICINE

## 2022-07-25 PROCEDURE — 82962 GLUCOSE BLOOD TEST: CPT

## 2022-07-25 PROCEDURE — 97165 OT EVAL LOW COMPLEX 30 MIN: CPT

## 2022-07-25 RX ORDER — INSULIN GLARGINE 100 [IU]/ML
25 INJECTION, SOLUTION SUBCUTANEOUS NIGHTLY
Qty: 5 PEN | Refills: 3 | Status: SHIPPED | OUTPATIENT
Start: 2022-07-25 | End: 2022-10-07

## 2022-07-25 RX ORDER — HUMAN INSULIN 100 [IU]/ML
20 INJECTION, SUSPENSION SUBCUTANEOUS NIGHTLY
Qty: 5 PEN | Refills: 3 | Status: SHIPPED
Start: 2022-07-25 | End: 2022-07-26

## 2022-07-25 RX ORDER — SERTRALINE HYDROCHLORIDE 25 MG/1
25 TABLET, FILM COATED ORAL DAILY
Qty: 90 TABLET | Refills: 3 | Status: SHIPPED
Start: 2022-07-25 | End: 2022-07-26

## 2022-07-25 RX ORDER — BLOOD-GLUCOSE METER
1 KIT MISCELLANEOUS DAILY
Qty: 1 KIT | Refills: 0 | Status: SHIPPED | OUTPATIENT
Start: 2022-07-25 | End: 2022-10-08

## 2022-07-25 RX ADMIN — ATORVASTATIN CALCIUM 40 MG: 40 TABLET, FILM COATED ORAL at 08:50

## 2022-07-25 RX ADMIN — INSULIN LISPRO 4 UNITS: 100 INJECTION, SOLUTION INTRAVENOUS; SUBCUTANEOUS at 12:06

## 2022-07-25 RX ADMIN — DULOXETINE HYDROCHLORIDE 30 MG: 30 CAPSULE, DELAYED RELEASE ORAL at 08:50

## 2022-07-25 RX ADMIN — INSULIN LISPRO 4 UNITS: 100 INJECTION, SOLUTION INTRAVENOUS; SUBCUTANEOUS at 08:51

## 2022-07-25 RX ADMIN — LISINOPRIL 10 MG: 10 TABLET ORAL at 08:50

## 2022-07-25 RX ADMIN — DEXAMETHASONE 4 MG: 4 TABLET ORAL at 08:49

## 2022-07-25 RX ADMIN — ENOXAPARIN SODIUM 40 MG: 100 INJECTION SUBCUTANEOUS at 08:51

## 2022-07-25 NOTE — PROGRESS NOTES
CLINICAL PHARMACY NOTE: MEDS TO BEDS    Total # of Prescriptions Filled: 2   The following medications were delivered to the patient:  Mabel carrillo  Leader pen needles    Additional Documentation:      To patient @4:20

## 2022-07-25 NOTE — PROGRESS NOTES
Vascular Surgery Progress Note    CC: Endoleak    HISTORY:  The patient is awake, alert, and oriented. Denies abdominal or back pain. IMPRESSION:  Type II endoleak s/p EVAR     PLAN: Type II endoleak appears to be coming from [place]. No plan for vascular intervention this admission. Will have pt fu in the office in next few weeks to discuss intervention options. Pt seen and plan reviewed with Dr. Cynthia Carpenter. Alejandra aPng, APRN - CNP    Patient Active Problem List   Diagnosis Code    Hyperglycemia R73.9    Type II endoleak of aortic graft I97.89    Status post endovascular aneurysm repair (EVAR) Z98.890, Z86.79       Current Medications:    dextrose      sodium chloride      sodium chloride 75 mL/hr at 07/24/22 1646      glucose, glucagon (rDNA), dextrose, sodium chloride flush, sodium chloride, potassium chloride **OR** potassium alternative oral replacement **OR** potassium chloride, magnesium hydroxide, acetaminophen **OR** acetaminophen, trimethobenzamide, dextrose    insulin lispro  0-16 Units SubCUTAneous TID WC    insulin lispro  0-4 Units SubCUTAneous Nightly    insulin glargine-yfgn  25 Units SubCUTAneous Nightly    atorvastatin  40 mg Oral Daily    dexamethasone  4 mg Oral Daily    DULoxetine  30 mg Oral Daily    lisinopril  10 mg Oral Daily    methenamine  1 g Oral BID WC    metoprolol succinate  50 mg Oral Daily    senna  1 tablet Oral Nightly    sodium chloride flush  10 mL IntraVENous 2 times per day    enoxaparin  40 mg SubCUTAneous Daily          PHYSICAL EXAM:    Vitals:    07/25/22 0747   BP: (!) 141/83   Pulse: 60   Resp: 18   Temp: 97.1 °F (36.2 °C)   SpO2: 95%     CONSTITUTIONAL:  awake, alert, cooperative, no apparent distress  LUNGS:  no increased work of breathing, good air exchange   CARDIOVASCULAR:  regular rate and rhythm   ABDOMEN:  soft, non-distended and non-tender. Aorta is not palpable.      LABS:    Lab Results   Component Value Date    WBC 15.0 (H) 07/23/2022    HGB 10.2 (L) 07/23/2022    HCT 33.5 (L) 07/23/2022     07/23/2022    K 5.0 07/23/2022    BUN 28 (H) 07/23/2022    CREATININE 1.1 07/23/2022       RADIOLOGY:

## 2022-07-25 NOTE — PROGRESS NOTES
Vascular Surgery Progress Note    Pt is being seen in f/u today regarding endovascular leak    Subjective:  Hesham Helm is a 67 y.o. male endovascular leak. He has no abdominal pain no discomfort. He has not been seen in the office now in some time. We are consulted secondary to endovascular stent graft leak.     Current Medications:    dextrose      sodium chloride      sodium chloride Stopped (07/25/22 1600)      glucose, glucagon (rDNA), dextrose, sodium chloride flush, sodium chloride, potassium chloride **OR** potassium alternative oral replacement **OR** potassium chloride, magnesium hydroxide, acetaminophen **OR** acetaminophen, trimethobenzamide, dextrose    insulin lispro  0-16 Units SubCUTAneous TID WC    insulin lispro  0-4 Units SubCUTAneous Nightly    insulin glargine-yfgn  25 Units SubCUTAneous Nightly    atorvastatin  40 mg Oral Daily    dexamethasone  4 mg Oral Daily    DULoxetine  30 mg Oral Daily    lisinopril  10 mg Oral Daily    methenamine  1 g Oral BID WC    metoprolol succinate  50 mg Oral Daily    senna  1 tablet Oral Nightly    sodium chloride flush  10 mL IntraVENous 2 times per day    enoxaparin  40 mg SubCUTAneous Daily        PHYSICAL EXAM:    BP (!) 141/83   Pulse 60   Temp 97.1 °F (36.2 °C) (Temporal)   Resp 18   Ht 6' (1.829 m)   Wt 173 lb (78.5 kg)   SpO2 95%   BMI 23.46 kg/m²     Intake/Output Summary (Last 24 hours) at 7/25/2022 1636  Last data filed at 7/25/2022 0747  Gross per 24 hour   Intake 840 ml   Output --   Net 840 ml          General: Alert and oriented answers questions appropriately no acute distress  Skin: Warm and dry no change in turgor no jaundice no scleral icterus  HEENT: Normocephalic atraumatic trach is midline no jugular venous distention  CVS: Currently regular rate and rhythm  Resp: Clear to auscultation bilaterally no wheezes rales or rhonchi  Abd: Soft nontender rebound or guarding positive bowel sounds  Extremities: Bilateral palpable brachial radial pulses. Bilateral palpable femorals. Motor and sensation are intact no gross vascular deficit  Neuro: Cranial nerves II to XII are grossly intact bilaterally gross cranial deficit    LABS:    Lab Results   Component Value Date    WBC 15.0 (H) 07/23/2022    HGB 10.2 (L) 07/23/2022    HCT 33.5 (L) 07/23/2022     07/23/2022    K 5.0 07/23/2022    BUN 28 (H) 07/23/2022    CREATININE 1.1 07/23/2022       RADIOLOGY:  CT ABDOMEN PELVIS W IV CONTRAST Additional Contrast? None   Final Result   Slightly increased size of abdominal aortic aneurysm with contrast visualized   outside of aorto bi-iliac stent, suspicious for endoleak. Findings   suspicious for endoleak were present on prior CT. Recommend vascular consult   for further evaluation. Bilateral internal iliac artery aneurysms are  similar to prior. Moderate left hydroureteronephrosis, similar to prior, likely secondary to   focal irregular bladder wall thickening near the insertion of the left   ureter. Bladder mass appears slightly increased in size, suspicious for   malignancy. Critical results were called by Dr. Maryse De La Vega to [facility] [place] on 7/22/2022   at 23:13. XR CHEST PORTABLE   Final Result   No active cardiopulmonary disease. ASSESSMENT/PLAN:   #1 status post endograft stent graft repair. There is no type Ia or type Ib leak. This appears to be a retrograde filling from the inferior mesenteric vessel consistent with a type II endoleak. This patient will be evaluated as an outpatient he will follow-up in the office we will then discuss options. I briefly went over coil embolization if needed right now I would allow him get a little stronger before we consider any intervention versus watchful waiting.         Electronically signed by Guzman Guillermo MD on 7/25/2022 at 4:36 PM

## 2022-07-25 NOTE — PROGRESS NOTES
[address]                                                  Patient Name: Miracle Kent    MRN: [id number]    : 1950    Room: [address] [place], [place] [address]          Evaluating OT: Keya Tipton OTR/L; [id number]       Referring Provider: GILDARDO Justin    Specific Provider Orders/Date: OT Eval and Treat 22       Diagnosis: Hyperglycemia    Surgery: None this admission     Pertinent Medical History:  has a past medical history of CAD (coronary artery disease), CHF (congestive heart failure) ([facility] [address] Diabetes mellitus ([facility] [address] Status post endovascular aneurysm repair (EVAR), and Type II endoleak of aortic graft.      Recommended Adaptive Equipment: TBD     Precautions:  Fall Risk     Assessment of current deficits    [x] Functional mobility  [x]ADLs  [x] Strength               []Cognition    [x] Functional transfers   [x] IADLs         [x] Safety Awareness   [x]Endurance    [] Fine Coordination              [x] Balance      [] Vision/perception   []Sensation     []Gross Motor Coordination  [] ROM  [] Delirium                   [] Motor Control     OT PLAN OF CARE   OT POC based on physician orders, patient diagnosis and results of clinical assessment    Frequency/Duration 1-3 days/wk for 2 weeks PRN   Specific OT Treatment Interventions to include:   * Instruction/training on adapted ADL techniques and AE recommendations to increase functional independence within precautions       * Training on energy conservation strategies, correct breathing pattern and techniques to improve independence/tolerance for self-care routine  * Functional transfer/mobility training/DME recommendations for increased independence, safety, and fall prevention  * Patient/Family education to increase follow through with safety techniques and functional independence  * Recommendation of environmental modifications for increased safety with functional transfers/mobility and ADLs  * Therapeutic exercise to improve motor endurance, ROM, and functional strength for ADLs/functional transfers  * Therapeutic activities to facilitate/challenge dynamic balance, stand tolerance for increased safety and independence with ADLs  * Positioning to improve skin integrity, interaction with environment and functional independence    Home Living: Pt lives with brother in 1 story home with 2 steps & 1 handrail to enter. Laundry in basement with full flight of stairs & 1 handrail. Bathroom setup: [address] owned: [employer]    Prior Level of Function: IND with ADLs , IND with IADLs; engaged in functional mobility with use of  [place]  Driving: Yes  Occupation: None reported    Pain Level: Pt with no c/o pain throughout session  Cognition: A&O: 4/4; Follows 2 step directions   Memory:  Good   Sequencing:  Fair   Problem solving:  Fair   Judgement/safety:  Fair     Functional Assessment:  AM-PAC Daily Activity Raw Score: 16/24   Initial Eval Status  Date: 7/25/22 Treatment Status  Date: STGs = LTGs  Time frame: 10-14 days   Feeding Setup  Spillage noted on gown upon arrival.     Grooming Stand by Assist   Pt stood at sink ~5 minutes to wash hands & brush teeth. Modified [place]    UB Dressing Minimal Assist   Assist to tie gown. Modified [place]    LB Dressing Moderate Assist   Assist to manage pants over hips & don slippers seated EOB. Modified [place]    Bathing Minimal Assist  Modified [place]    Toileting Moderate Assist   Assist for clothing management & to complete toilet transfer maintaining proper body mechanics. Modified [place]    Bed Mobility  Supine to sit: Stand by Assist   Sit to supine: Stand by Assist   Supine to sit:  Independent   Sit to supine: Independent    Functional Transfers Sit to stand:Minimal Assist   Stand to sit:Minimal Assist  Stand pivot: Minimal Assist  Commode: Minimal Assist  Sit to stand:Modified [place]    Stand to sit:Modified [place]   Stand pivot: Modified [place]   Commode: Modified [place]    Functional Mobility Minimal Assist  Use of hurrycane shorter than household distance - pt demo;ing garo knee buckling. Modified [place] with use of Appropriate AD   Balance Sitting:     Static - Supervision     Dynamic - SBA  Standing: Minimal Assist  Sitting:     Static: Independent     Dynamic: Independent  Standing: Modified [place]    Activity Tolerance Fair  Good   Visual/  Perceptual Glasses: Yes - readers  Appears WFL        Safety Fair  Good  during ADL completion     Hand Dominance Right   AROM (PROM) Strength Additional Info:  Goal:   RUE  WFL 4-/5 grossly tested good  and wfl FMC/dexterity noted during ADL tasks   Improve overall RUE strength WFL for participation in functional tasks       LUE WFL 4-/5 grossly tested Good  and wfl FMC/dexterity noted during ADL tasks   Improve overall LUE strength WFL for participation in functional tasks       [facility]  Sensation:  No c/o numbness or tingling BUE  Tone: WFL BUE  Edema: Unremarkable    Comment: Cleared by RN to see pt. Upon arrival patient supine in bed and agreeable to OT session. At end of session, patient supine in bed with call light and phone within reach, +bed alarm, all lines and tubes intact. Overall patient demonstrated decreased independence and safety during completion of ADL/functional transfer/mobility tasks. Therapist facilitated ADL tasks, functional transfers, functional mobility, bed mobility to address safety awareness, implementation of fall prevention strategies, & engagement throughout functional tasks. Pt c/o generalized weakness & unsteadiness throughout duration of session.   Pt would benefit from continued skilled OT to increase safety and independence with completion of ADL/IADL tasks for functional independence and quality of life.    Treatment: OT treatment provided this date includes: ADL-  Instruction/training on safety and adapted techniques for completion of ADLs: Pt sat EOB to don pants & shoes. Pt required assist to don pants over hips & don shoes over heels. Pt required assist for clothing management & commode transfer to maintain proper body mechanics throughout toileting task seated on commode. Pt stood at sink to wash hands & brush teeth ~5 minutes. Mobility-  Instruction/training on safety and improved independence with bed mobility/functional transfers and functional mobility. Pt required use of hurrycane to maintain dynamic standing balance - pt demo'ing garo knee buckling with increased fatigue throughout functional tasks. Sitting EOB ~5 minutes to improve dynamic sitting balance and activity tolerance during ADLs. Skilled positioning/alignment-  Proper Positioning/Alignment. Pt required assist/cuing to maintain proper body mechanics throughout session to promote safety awareness, implementation of fall prevention strategies, & functional engagement throughout daily activities. Rehab Potential: Good for established goals     LTG: maximize independence with ADLs to return to PLOF    Patient and/or family were instructed on functional diagnosis, prognosis/goals and OT plan of care. Demonstrated fair understanding. Eval Complexity: Low  History: Expanded chart review of medical records and additional review of physical, cognitive, or psychosocial history related to current functional performance  Exam: 3+ performance deficits  Assistance/Modification: Min/mod assistance or modifications required to perform tasks. May have comorbidities that affect occupational performance.     Evaluation time includes thorough review of current medical information, gathering information on past medical & social history & PLOF, completion of standardized testing, informal observation of tasks, consultation with other medical professions/disciplines, assessment of data & development of POC/goals. Time In: 1:05p  Time Out: 1:28p  Total Treatment Time: 8 minutes    Min Units   OT Eval Low 76773  x     OT Eval Medium 32867      OT Eval High 58352      OT Re-Eval [id number]       Therapeutic Ex 39920       Therapeutic Activities 27053       ADL/Self Care 03733  8 1    Orthotic Management 01473       Manual [id number]     Neuro Re-Ed 50561       Non-Billable Time          Evaluation Time additionally includes thorough review of current medical information, gathering information on past medical history/social history and prior level of function, interpretation of standardized testing/informal observation of tasks, assessment of data and development of plan of care and goals.             Ming Yo OTR/L; [id number]

## 2022-07-25 NOTE — PROGRESS NOTES
Reason for consult: new insulin teaching and gulcometer    A1C: 11.6%     [] Not available                     Patient states the following concerns/barriers to diabetes self-management:     [] None       [] Medication cost   [] Food cost/availability        [] Reading  [] Hearing   [] Vision                [] Work    [] Transportation  [] No insurance  [] Physical limitations    [] Other:        Patient states the following about their diabetes/health:   [x]  His wife had type 1 diabetes, familiar with insulin and monitoring  [x]  Knows to drink juice if BG drops, able to state symptoms of hypoglycemia      Diabetes survival packet provided to:   [x] Patient     [] Other:    Information reviewed:   Definition of diabetes   Target glucose ranges/A1C   Self-monitoring of blood glucose   Prevention/symptoms/treatment of hypo-/hyperglycemia   Medication adherence   The plate method/meal planning guidelines   The benefits of exercise and recommendations   Reducing the risk of chronic complications      Diabetes medications reviewed (use, purpose, action): Long acting and meal time insulin. Demonstrated use of insulin injection using pen, patient table to demonstrate proper injection technique without difficulty.              Post-education Assessment  [x]  Attentive to teaching  [x]  Answered questions appropriately when asked   [x]  Seems able to apply concepts to daily lifestyle  [x]  Seems motivated to do well  [x]  Verbalized an understanding of the plate method for portion control   [x]  Verbalized an understanding of prescribed antidiabetes medications   [x]  Verbalized an understanding of target glucose ranges/A1C level  [x]  Expresses an intent to comply with treatment plan   []  Showed very little interest in complying with treatment plan   []  Seems to have trouble applying concepts to daily lifestyle       COMMENTS:  Patient needs a new script for a glucometer as his glucometer stopped working. Recommendations:   [x] Carbohydrate-controlled diet    [x] Script for glucometer and supplies (per preference of patient's insurance)  [x] Script for insulin pens and pen needles (if insulin is ordered at discharge for home use)   [] Consult to social work: patient has no insurance or has financial hardship  [] Inpatient consult to endocrinologist   [] Follow up with endocrinologist as an outpatient   [x] Home healthcare nursing to increase compliance to treatment plan   [x] Script for outpatient diabetes education classes (from doctor)        Thank you for this consult.          Mumtaz Centeno MS, RD, LD

## 2022-07-25 NOTE — HOME CARE
[facility] received referral. Will follow after discharge.  Spoke with patient and verified demographics  Akua Cooper N, [address] [address] spontaneous

## 2022-07-26 LAB — URINE CULTURE, ROUTINE: NORMAL

## 2022-07-26 RX ORDER — INSULIN GLARGINE 100 [IU]/ML
20 INJECTION, SOLUTION SUBCUTANEOUS NIGHTLY
Qty: 5 PEN | Refills: 3
Start: 2022-07-26 | End: 2022-10-07

## 2022-08-01 ENCOUNTER — CARE COORDINATION (OUTPATIENT)
Dept: CARE COORDINATION | Age: 72
End: 2022-08-01

## 2022-08-01 ASSESSMENT — ENCOUNTER SYMPTOMS: DYSPNEA ASSOCIATED WITH: EXERTION

## 2022-08-01 NOTE — CARE COORDINATION
Ambulatory Care Coordination Note  8/1/2022    ACC: Rosemarie Lake, RN    Summary Note:   DM:  Denies s/s of Hypo/Hyperglycemia. Discussed DM Zone Tool and verbalizes understanding. FBS:  136  Pt's sister, Karissa Keller, states his blood sugars have been good. States she knows what to do should they fall too low. States the cost of insulin is $600/month, and the patient cannot afford that. They have enough for about 3 months currently. ACM referral to Prescription Assistance to see if there is any assistance. Pt and sister aware that this is no guarantee. COPD:  Denies s/s of COPD Exacerbation. Discussed the Zone Tool and verbalizes understanding. Pt's sister states pt sometimes will 'sugar coat how he is doing' to healthcare professionals. FOLLOW-UP PLAN:    Continue Care Coordination and Assess Plan Of Care  Discuss:   -Did pt hear from PAP?  -DM Management/Zone Tool  -COPD Management/Zone Tool  -Current Weight  -Current Blood Sugar  -Falls/Near Falls? -Appointment Reminders  -Referral: PAP    Next Anticipated Outreach by [address] Team:  3 Weeks  Anticipated schedule after next Outreach (or sooner if needed): Monthly   Pt has contact information      Lab Results       None            Care Coordination Interventions    Program Enrollment: Complex Care  Referral from Primary Care Provider: No  Suggested Interventions and Community Resources  Fall Risk Prevention: In Process (Comment: Fall precautions)  Home Health Services: Completed  Medication Assistance Program: [address] [place] [address] Dr or Pill Pack: Declined  Pharmacist: Declined  Registered Dietician: Declined  Social Work: Declined  Transportation Support: Declined  Zone Management Tools: In Process (Comment: DM, COPD)  Other Services or Interventions: Bleeding precautions          Goals Addressed                   This Visit's Progress     Conditions and Symptoms   On track     I will schedule office visits, as directed by my provider.   I will keep my appointment or reschedule if I have to cancel. I will notify my provider of any barriers to my plan of care. I will follow my Zone Management tool to seek urgent or emergent care. I will notify my provider of any symptoms that indicate a worsening of my condition. Barriers: lack of support and lack of education  Plan for overcoming my barriers: Work with care team  Confidence: 10/10  Anticipated Goal Completion Date: 7/8/22         Medication Management   On track     I will take my medication as directed. I will notify my provider of any problems with medications, like adverse effects or side effects. I will notify my provider/Care Coordinator if I am unable to afford my medications. I will notify my provider for advice before I stop taking any of my medication. Barriers: lack of support and lack of education  Plan for overcoming my barriers: Work with care team and family. Follow plan of care. Ask questions, voice concerns  Confidence: 10/10  Anticipated Goal Completion Date: 8/8/22              Prior to Admission medications    Medication Sig Start Date End Date Taking?  Authorizing Provider   LANTUS SOLOSTAR 100 UNIT/ML injection pen Inject 20 Units into the skin nightly 7/26/22   Toñito Calderon Seen Catterlin, DO   Insulin Pen Needle 31G X 5 MM MISC 1 each by Does not apply route daily 7/25/22   Toñito Calderon Seen Catterlin, DO   Insulin Pen Needle 31G X 5 MM MISC 1 each by Does not apply route daily 7/22/22   Juan F Patient, DO   Incontinence Supply Disposable (INCONTINENCE BRIEF LARGE) MISC XL INCONTINENCE BRIEF - Use daily as needed for urinary incontinence 7/18/22   Toñito Wilcox, DO   phenazopyridine (PYRIDIUM) 200 MG tablet Take 1 tablet by mouth 3 times daily as needed for Pain 6/2/22   Ashley Pour Catterlin, DO   methenamine (HIPREX) 1 g tablet Take 1 tablet by mouth 2 times daily (with meals) 5/26/22   Lella Salts P Catterlin, DO   magnesium oxide (MAG-OX) 400 (240 Mg) MG tablet Take 1 tablet by mouth daily 5/11/22   Kei Castellanos, DO   sotalol (BETAPACE) 80 MG tablet Take 1 tablet by mouth 2 times daily 5/9/22   Kei Castellanos, DO   metoprolol succinate (TOPROL XL) 50 MG extended release tablet Take 1 tablet by mouth daily 5/9/22   Kei Castellanos, DO   atorvastatin (LIPITOR) 40 MG tablet Take 1 tablet by mouth daily 1/7/22 7/6/22  Alex Rehman, DO   metFORMIN (GLUCOPHAGE) 500 MG tablet Take 1 tablet by mouth 2 times daily (with meals) 1/7/22   Alex Rehman, DO       Future Appointments   Date Time Provider [facility] [place]   8/4/2022  8:00 AM MD Rob Stephen [facility]   8/18/2022  8:30 AM MD Rob Stephen [facility]   9/1/2022  3:30 PM SCHEDULE, [facility]TOWN DEVICE [facility]OWN ELECT[facility]   11/2/2022  8:30 AM Sony Dorado MD [facility]   2/2/2023 10:00 AM [facility]   ,   Diabetes Assessment    Medic Alert ID: No  Meal Planning: Avoidance of concentrated sweets, Carb counting   How often do you test your blood sugar?: Daily   Do you have barriers with adherence to non-pharmacologic self-management interventions?  (Nutrition/Exercise/Self-Monitoring): No   Have you ever had to go to the ED for symptoms of low blood sugar?: No       No patient-reported symptoms   Do you have hyperglycemia symptoms?: No   Do you have hypoglycemia symptoms?: No   Last Blood Sugar Value: 136   Blood Sugar Monitoring Regimen: Morning Fasting        , and   COPD Assessment    Does the patient understand envrionmental exposure?: Yes  Is the patient able to verbalize Rescue vs. Long Acting medications?: No  Does the patient have a nebulizer?: No  Does the patient use a space with inhaled medications?: No     No patient-reported symptoms         Symptoms:  None: Yes      Symptom course: stable  Breathlessness: exertion  Increase use of rapid acting/rescue inhaled medications?: No  Change in chronic cough?: No/At Baseline  Change in sputum?: No/At Baseline

## 2022-08-04 ENCOUNTER — CARE COORDINATION (OUTPATIENT)
Dept: CARE COORDINATION | Age: 72
End: 2022-08-04

## 2022-08-04 NOTE — CARE COORDINATION
Per [employer] email (Prescription Assistance Program)  [facility] Saurabh Moreland. I spoke to Mr. Estes Reason sister today. We are going to start the Lantus application for him. Thank you.   Yvonne\"

## 2022-08-05 DIAGNOSIS — E78.5 DYSLIPIDEMIA: ICD-10-CM

## 2022-08-05 RX ORDER — ATORVASTATIN CALCIUM 40 MG/1
40 TABLET, FILM COATED ORAL DAILY
Qty: 90 TABLET | Refills: 1 | Status: SHIPPED
Start: 2022-08-05 | End: 2022-10-07

## 2022-08-05 RX ORDER — SOTALOL HYDROCHLORIDE 80 MG/1
80 TABLET ORAL 2 TIMES DAILY
Qty: 180 TABLET | Refills: 3 | Status: ON HOLD
Start: 2022-08-05 | End: 2022-10-12 | Stop reason: HOSPADM

## 2022-08-05 RX ORDER — METOPROLOL SUCCINATE 50 MG/1
50 TABLET, EXTENDED RELEASE ORAL DAILY
Qty: 90 TABLET | Refills: 3 | Status: ON HOLD
Start: 2022-08-05 | End: 2022-10-14 | Stop reason: HOSPADM

## 2022-08-13 DIAGNOSIS — C67.8 MALIGNANT NEOPLASM OF OVERLAPPING SITES OF BLADDER (HCC): Primary | ICD-10-CM

## 2022-08-13 RX ORDER — HYDROCODONE BITARTRATE AND ACETAMINOPHEN 10; 325 MG/1; MG/1
1 TABLET ORAL EVERY 6 HOURS PRN
Qty: 56 TABLET | Refills: 0 | Status: SHIPPED | OUTPATIENT
Start: 2022-08-13 | End: 2022-08-27

## 2022-08-13 RX ORDER — POLYETHYLENE GLYCOL 3350 17 G/17G
17 POWDER, FOR SOLUTION ORAL DAILY
Qty: 1530 G | Refills: 1 | Status: SHIPPED | OUTPATIENT
Start: 2022-08-13 | End: 2022-09-12

## 2022-08-13 RX ORDER — CEPHALEXIN 500 MG/1
500 CAPSULE ORAL 4 TIMES DAILY
Qty: 28 CAPSULE | Refills: 1 | Status: SHIPPED | OUTPATIENT
Start: 2022-08-13 | End: 2022-08-20

## 2022-08-14 RX ORDER — ONDANSETRON 4 MG/1
4 TABLET, FILM COATED ORAL DAILY PRN
Qty: 30 TABLET | Refills: 0 | Status: SHIPPED
Start: 2022-08-14 | End: 2022-10-08

## 2022-08-14 RX ORDER — OMEPRAZOLE 20 MG/1
20 CAPSULE, DELAYED RELEASE ORAL
Qty: 90 CAPSULE | Refills: 1 | Status: SHIPPED
Start: 2022-08-14 | End: 2022-10-08

## 2022-08-22 NOTE — DISCHARGE SUMMARY
[facility]   Discharge summary   Patient ID:  Kd Banuelos  [id number]  74 y.o.  1950    Admit date: 7/22/2022    Discharge date and time: 7/25/2022    Admission Diagnoses:   Patient Active Problem List   Diagnosis    Diabetes mellitus ([facility]    Essential hypertension    COPD, very severe ([facility]    CAD (coronary artery disease)    Tubular adenoma of colon    Sigmoid diverticulosis    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)    Prostate cancer ([facility]    Mixed hyperlipidemia    ICD (implantable cardioverter-defibrillator) in place    Cerebellar hemorrhage ([facility]    Dilated cardiomyopathy ([facility]    AAA (abdominal aortic aneurysm) without rupture (HCC)    PAF (paroxysmal atrial fibrillation) (HCC)    COVID    Hematuria    Urinary retention    Elevated serum creatinine    Anemia    Chronic renal disease, stage III ([facility] [391542]    Malignant neoplasm of overlapping sites of bladder (HCC)    Hyperglycemia    Type II endoleak of aortic graft    Status post endovascular aneurysm repair (EVAR)       Discharge Diagnoses: Endoleak of aortic graft    Consults: vascular surgery    Procedures: None    Hospital Course: The patient is a 67 y.o. male of Toñito Melendez, DO     Hyperglycemia  Poorly controlled type 2 diabetes mellitus  Leukocytosis likely reactive  No signs of infection notedendovascular leak of abdominal aortic aneurysm based on CT findings  No interventions planned by vascular surgery  Add Lantus  Check urine culture  Glucose monitoring  Sliding scale with insulin coverage  Advance diet as tolerated  Vascular surgery consult/input appreciated  No need for antibiotics at present    No results for input(s): WBC, HGB, HCT, PLT in the last 72 hours. No results for input(s): NA, K, CL, CO2, BUN, CREATININE, GLU, CALCIUM in the last 72 hours.     CT ABDOMEN PELVIS W IV CONTRAST Additional Contrast? None    Result Date: 7/22/2022  EXAMINATION: CT OF THE ABDOMEN AND PELVIS WITH free air. Bones/Soft Tissues: No acute fracture. Right femoral neck nail with interlocking screw. .     Slightly increased size of abdominal aortic aneurysm with contrast visualized outside of aorto bi-iliac stent, suspicious for endoleak. Findings suspicious for endoleak were present on prior CT. Recommend vascular consult for further evaluation. Bilateral internal iliac artery aneurysms are  similar to prior. Moderate left hydroureteronephrosis, similar to prior, likely secondary to focal irregular bladder wall thickening near the insertion of the left ureter. Bladder mass appears slightly increased in size, suspicious for malignancy. Critical results were called by Dr. Prabhu Monroe to [facility] on 7/22/2022 at 23:13. XR CHEST PORTABLE    Result Date: 7/22/2022  EXAMINATION: ONE XRAY VIEW OF THE CHEST 7/22/2022 5:01 pm COMPARISON: None. HISTORY: ORDERING SYSTEM PROVIDED HISTORY: elevated wbc hyperglycemia TECHNOLOGIST PROVIDED HISTORY: Reason for exam:->elevated wbc hyperglycemia What reading provider will be dictating this exam?->CRC FINDINGS: AP portable view of the chest was obtained on 2 images. A generator pack overlies the left hemithorax with 2 leads in place. Heart, mediastinum, and pulmonary vasculature are within normal limits. Lungs and pleural spaces are clear. No active cardiopulmonary disease. Discharge Exam:    HEENT: NCAT,  PERRLA, No JVD  Heart:  RRR, no murmurs, gallops, or rubs.   Lungs:  CTA bilaterally, no wheeze, rales or rhonchi  Abd: bowel sounds present, nontender, nondistended, no masses  Extrem:  No clubbing, cyanosis, or edema    Disposition: home     Patient Condition at Discharge: Stable    Patient Instructions:      Medication List        START taking these medications      glucose monitoring kit  1 kit by Does not apply route daily     Lantus SoloStar 100 UNIT/ML injection pen  Generic drug: insulin glargine  Inject 25 Units into the skin nightly CONTINUE taking these medications      atorvastatin 40 MG tablet  Commonly known as: LIPITOR     DULoxetine HCl 30 MG Csdr     lisinopril 10 MG tablet  Commonly known as: PRINIVIL;ZESTRIL     methenamine 1 g tablet  Commonly known as: HIPREX     metoprolol succinate 50 MG extended release tablet  Commonly known as: TOPROL XL     senna 8.6 MG tablet  Commonly known as: SENOKOT            STOP taking these medications      dexamethasone 4 MG tablet  Commonly known as: DECADRON     Ceclia Aiken FlexTouch 100 UNIT/ML Sopn  Generic drug: Insulin Degludec               Where to Get Your Medications        These medications were sent to [facility] [address]      Phone: [phone]   glucose monitoring kit  Lantus SoloStar 100 UNIT/ML injection pen       Activity: activity as tolerated  Diet: cardiac diet and diabetic diet    Pt has been advised to: Follow-up with Alee Colunga DO in 1 week. Follow-up with consultants as recommended by them    Note that over 30 minutes was spent in preparing discharge papers, discussing discharge with patient, medication review, etc.    Signed:  JERI Quan CNP  7/25/2022    Above note edited to reflect my thoughts     I personally saw, examined and provided care for the patient. Radiographs, labs and medication list were reviewed by me independently. The case was discussed in detail and plans for care were established. Review of Dinah BOSCH CNP, documentation was conducted and revisions were made as appropriate directly by me. I agree with the above documented exam, problem list, and plan of care.      Marigene Kehr, MD  7/25/2022

## 2022-09-01 ENCOUNTER — NURSE ONLY (OUTPATIENT)
Dept: NON INVASIVE DIAGNOSTICS | Age: 72
End: 2022-09-01
Payer: MEDICARE

## 2022-09-01 VITALS — DIASTOLIC BLOOD PRESSURE: 58 MMHG | SYSTOLIC BLOOD PRESSURE: 128 MMHG

## 2022-09-01 DIAGNOSIS — Z95.810 PRESENCE OF AUTOMATIC CARDIOVERTER/DEFIBRILLATOR (AICD): Primary | ICD-10-CM

## 2022-09-01 PROCEDURE — 93290 INTERROG DEV EVAL ICPMS IP: CPT | Performed by: INTERNAL MEDICINE

## 2022-09-01 PROCEDURE — 93283 PRGRMG EVAL IMPLANTABLE DFB: CPT | Performed by: INTERNAL MEDICINE

## 2022-09-01 NOTE — PROGRESS NOTES
HISTORY:  This is a patient with the following issues:  1. Hospitalizations with CHF in 2012 and 2013 and placed on appropriate medical therapy. 2. LVEF of 30% to 35% by echocardiography in June of 2013. 3. Presentation to the hospital  with sustained symptomatic monomorphic VT  4. Coronary angiography revealing 1-vessel disease, especially at the circumflex with involvement of the 1st marginal, which was successfully stented  January 2014  5. Recurrent monomorphic VT thereafter lasting for between 20 to 25 beats. 6. Electrophysiology testing thereafter revealing easily inducible sustained monomorphic VT with cycle length of 288 ms, which was successfully overdrive pace terminated. 7. Repeat echocardiography this admission revealed inferobasilar and basal septal scar or hypokinesis on echocardiography, overall LVEF in the 40% to 45% range. 8. S/P Medtronic Evera XT DR,  Model #[id number], serial [id number] implanted in 01-. MEDICATIONS:  Warfarin, Atorvastatin 40 mg daily, metformin 500 mg BID, Sotalol 80 mg 1/2 tab BID, lisinopril 10 mg daily, Toprol 50 mg daily     Vaccinated    ICD INTERROGATION: DDD    1. Battery voltage: 2.89 V     13 months (6-20)  2. Charge time: 6.2 seconds  (18 J)  3. Lead impedance: A= 437 ohms  RV= 627 ohms   HVB= 52 ohms   SVC= 67 ohms  4. Amplitude: P-wave=4.8 mV     R-wave= >20 mV  5. Pacing threshold:  A= 0.75 V @ 0.4 ms    V= 0.5 V @ 0.4 ms      6. AP= 2.6 %          = <0.1 %   7. Multiple AMS episodes the longest lasting greater than 66 hours. Episode terminated when RVR occurred at a rate in the VF zone and patient received a 25 joule shock. All NSVT episodes are all atrially driven. 8.  Vascular Hemodynamic Monitor:  No indication of fluid retention    ASSESSMENT: ICD function is appropriate. PLAN:  1. ICD check in 6 months  2. Carelink transmission in 3 months. 3. Pt is aware that continuous home monitoring is strongly recommended.   4.  Follow-up with  Stuart Stewart in October. Tiana Dang M.D., F.A.C.C.

## 2022-09-06 ENCOUNTER — CARE COORDINATION (OUTPATIENT)
Dept: CARE COORDINATION | Age: 72
End: 2022-09-06

## 2022-09-13 PROBLEM — C67.9 BLADDER CANCER (HCC): Status: ACTIVE | Noted: 2022-09-13

## 2022-09-14 PROBLEM — N17.9 AKI (ACUTE KIDNEY INJURY) (HCC): Status: ACTIVE | Noted: 2022-01-01

## 2022-09-14 PROBLEM — R57.9 SHOCK (HCC): Status: ACTIVE | Noted: 2022-01-01

## 2022-09-14 PROBLEM — E43 SEVERE MALNUTRITION (HCC): Chronic | Status: ACTIVE | Noted: 2022-09-14

## 2022-09-16 PROBLEM — D62 ACUTE BLOOD LOSS ANEMIA: Status: ACTIVE | Noted: 2022-01-01

## 2022-09-16 PROBLEM — Z93.6 S/P ILEAL CONDUIT (HCC): Status: ACTIVE | Noted: 2022-09-16

## 2022-09-23 PROBLEM — Z72.0 TOBACCO ABUSE: Status: ACTIVE | Noted: 2022-09-23

## 2022-09-23 PROBLEM — Z79.4 CONTROLLED TYPE 2 DIABETES MELLITUS WITH DIABETIC NEPHROPATHY, WITH LONG-TERM CURRENT USE OF INSULIN (HCC): Status: ACTIVE | Noted: 2017-01-16

## 2022-09-23 PROBLEM — E11.21 CONTROLLED TYPE 2 DIABETES MELLITUS WITH DIABETIC NEPHROPATHY, WITH LONG-TERM CURRENT USE OF INSULIN (HCC): Status: ACTIVE | Noted: 2017-01-16

## 2022-09-26 ENCOUNTER — CLINICAL DOCUMENTATION (OUTPATIENT)
Dept: FAMILY MEDICINE CLINIC | Age: 72
End: 2022-09-26

## 2022-10-05 ENCOUNTER — CARE COORDINATION (OUTPATIENT)
Dept: CARE COORDINATION | Age: 72
End: 2022-10-05

## 2022-10-05 NOTE — CARE COORDINATION
Pt remains in [facility] s/p treatment of bladder cancer. He underwent a Robotic Cystectomy, prostatectomy with pelvic lymphnode dissection, extracorporeal ileal conduit urinary diversion, and bilateral ureteral stents placement on 9/13/2022. Will follow as appropriate.

## 2022-10-07 ENCOUNTER — CLINICAL DOCUMENTATION (OUTPATIENT)
Dept: FAMILY MEDICINE CLINIC | Age: 72
End: 2022-10-07

## 2022-10-07 DIAGNOSIS — I48.0 PAF (PAROXYSMAL ATRIAL FIBRILLATION) (HCC): ICD-10-CM

## 2022-10-07 DIAGNOSIS — E11.29 TYPE 2 DIABETES MELLITUS WITH MICROALBUMINURIA, WITHOUT LONG-TERM CURRENT USE OF INSULIN (HCC): ICD-10-CM

## 2022-10-07 DIAGNOSIS — R80.9 TYPE 2 DIABETES MELLITUS WITH MICROALBUMINURIA, WITHOUT LONG-TERM CURRENT USE OF INSULIN (HCC): ICD-10-CM

## 2022-10-07 DIAGNOSIS — N18.32 STAGE 3B CHRONIC KIDNEY DISEASE (HCC): Primary | ICD-10-CM

## 2022-10-07 PROCEDURE — 99285 EMERGENCY DEPT VISIT HI MDM: CPT

## 2022-10-07 RX ORDER — ASPIRIN 81 MG/1
81 TABLET ORAL DAILY
Qty: 90 TABLET | Refills: 1
Start: 2022-10-07 | End: 2022-10-17

## 2022-10-07 ASSESSMENT — PAIN - FUNCTIONAL ASSESSMENT: PAIN_FUNCTIONAL_ASSESSMENT: NONE - DENIES PAIN

## 2022-10-08 ENCOUNTER — HOSPITAL ENCOUNTER (INPATIENT)
Age: 72
LOS: 6 days | Discharge: NURSING, REHAB OR LONG-TERM CARE | DRG: 309 | End: 2022-10-14
Attending: EMERGENCY MEDICINE | Admitting: INTERNAL MEDICINE
Payer: MEDICARE

## 2022-10-08 ENCOUNTER — APPOINTMENT (OUTPATIENT)
Dept: GENERAL RADIOLOGY | Age: 72
DRG: 309 | End: 2022-10-08
Payer: MEDICARE

## 2022-10-08 DIAGNOSIS — Z45.02 AICD DISCHARGE: ICD-10-CM

## 2022-10-08 DIAGNOSIS — I48.91 ATRIAL FIBRILLATION WITH RVR (HCC): Primary | ICD-10-CM

## 2022-10-08 LAB
ANION GAP SERPL CALCULATED.3IONS-SCNC: 14 MMOL/L (ref 7–16)
ANISOCYTOSIS: ABNORMAL
BACTERIA: ABNORMAL /HPF
BASOPHILS ABSOLUTE: 0.08 E9/L (ref 0–0.2)
BASOPHILS RELATIVE PERCENT: 0.7 % (ref 0–2)
BILIRUBIN URINE: NEGATIVE
BLOOD, URINE: ABNORMAL
BUN BLDV-MCNC: 18 MG/DL (ref 6–23)
BURR CELLS: ABNORMAL
CALCIUM SERPL-MCNC: 8.8 MG/DL (ref 8.6–10.2)
CHLORIDE BLD-SCNC: 104 MMOL/L (ref 98–107)
CLARITY: ABNORMAL
CO2: 20 MMOL/L (ref 22–29)
COLOR: YELLOW
CREAT SERPL-MCNC: 1.3 MG/DL (ref 0.7–1.2)
EOSINOPHILS ABSOLUTE: 0.21 E9/L (ref 0.05–0.5)
EOSINOPHILS RELATIVE PERCENT: 1.9 % (ref 0–6)
GFR AFRICAN AMERICAN: >60
GFR NON-AFRICAN AMERICAN: 54 ML/MIN/1.73
GLUCOSE BLD-MCNC: 195 MG/DL (ref 74–99)
GLUCOSE URINE: NEGATIVE MG/DL
HCT VFR BLD CALC: 32 % (ref 37–54)
HEMOGLOBIN: 10.1 G/DL (ref 12.5–16.5)
HYPOCHROMIA: ABNORMAL
IMMATURE GRANULOCYTES #: 0.04 E9/L
IMMATURE GRANULOCYTES %: 0.4 % (ref 0–5)
KETONES, URINE: NEGATIVE MG/DL
LEUKOCYTE ESTERASE, URINE: ABNORMAL
LYMPHOCYTES ABSOLUTE: 1 E9/L (ref 1.5–4)
LYMPHOCYTES RELATIVE PERCENT: 9 % (ref 20–42)
MCH RBC QN AUTO: 30 PG (ref 26–35)
MCHC RBC AUTO-ENTMCNC: 31.6 % (ref 32–34.5)
MCV RBC AUTO: 95 FL (ref 80–99.9)
MONOCYTES ABSOLUTE: 0.85 E9/L (ref 0.1–0.95)
MONOCYTES RELATIVE PERCENT: 7.6 % (ref 2–12)
NEUTROPHILS ABSOLUTE: 8.96 E9/L (ref 1.8–7.3)
NEUTROPHILS RELATIVE PERCENT: 80.4 % (ref 43–80)
NITRITE, URINE: POSITIVE
OVALOCYTES: ABNORMAL
PDW BLD-RTO: 20.6 FL (ref 11.5–15)
PH UA: 6 (ref 5–9)
PLATELET # BLD: 240 E9/L (ref 130–450)
PMV BLD AUTO: 9 FL (ref 7–12)
POIKILOCYTES: ABNORMAL
POLYCHROMASIA: ABNORMAL
POTASSIUM REFLEX MAGNESIUM: 3.6 MMOL/L (ref 3.5–5)
PRO-BNP: 1815 PG/ML (ref 0–125)
PROTEIN UA: ABNORMAL MG/DL
RBC # BLD: 3.37 E12/L (ref 3.8–5.8)
RBC UA: ABNORMAL /HPF (ref 0–2)
SODIUM BLD-SCNC: 138 MMOL/L (ref 132–146)
SPECIFIC GRAVITY UA: 1.02 (ref 1–1.03)
TARGET CELLS: ABNORMAL
TEAR DROP CELLS: ABNORMAL
TROPONIN, HIGH SENSITIVITY: 31 NG/L (ref 0–11)
TSH SERPL DL<=0.05 MIU/L-ACNC: 1.31 UIU/ML (ref 0.27–4.2)
UROBILINOGEN, URINE: 0.2 E.U./DL
WBC # BLD: 11.1 E9/L (ref 4.5–11.5)
WBC UA: ABNORMAL /HPF (ref 0–5)

## 2022-10-08 PROCEDURE — 6360000002 HC RX W HCPCS: Performed by: INTERNAL MEDICINE

## 2022-10-08 PROCEDURE — 2580000003 HC RX 258: Performed by: INTERNAL MEDICINE

## 2022-10-08 PROCEDURE — 96360 HYDRATION IV INFUSION INIT: CPT

## 2022-10-08 PROCEDURE — 93005 ELECTROCARDIOGRAM TRACING: CPT | Performed by: EMERGENCY MEDICINE

## 2022-10-08 PROCEDURE — 84443 ASSAY THYROID STIM HORMONE: CPT

## 2022-10-08 PROCEDURE — 2500000003 HC RX 250 WO HCPCS: Performed by: EMERGENCY MEDICINE

## 2022-10-08 PROCEDURE — 2580000003 HC RX 258: Performed by: EMERGENCY MEDICINE

## 2022-10-08 PROCEDURE — 80048 BASIC METABOLIC PNL TOTAL CA: CPT

## 2022-10-08 PROCEDURE — 6370000000 HC RX 637 (ALT 250 FOR IP): Performed by: EMERGENCY MEDICINE

## 2022-10-08 PROCEDURE — 6370000000 HC RX 637 (ALT 250 FOR IP): Performed by: FAMILY MEDICINE

## 2022-10-08 PROCEDURE — 83880 ASSAY OF NATRIURETIC PEPTIDE: CPT

## 2022-10-08 PROCEDURE — 87040 BLOOD CULTURE FOR BACTERIA: CPT

## 2022-10-08 PROCEDURE — 71045 X-RAY EXAM CHEST 1 VIEW: CPT

## 2022-10-08 PROCEDURE — 2140000000 HC CCU INTERMEDIATE R&B

## 2022-10-08 PROCEDURE — 2580000003 HC RX 258: Performed by: FAMILY MEDICINE

## 2022-10-08 PROCEDURE — 6360000002 HC RX W HCPCS: Performed by: FAMILY MEDICINE

## 2022-10-08 PROCEDURE — 84484 ASSAY OF TROPONIN QUANT: CPT

## 2022-10-08 PROCEDURE — 81001 URINALYSIS AUTO W/SCOPE: CPT

## 2022-10-08 PROCEDURE — 36415 COLL VENOUS BLD VENIPUNCTURE: CPT

## 2022-10-08 PROCEDURE — 85025 COMPLETE CBC W/AUTO DIFF WBC: CPT

## 2022-10-08 RX ORDER — SOTALOL HYDROCHLORIDE 80 MG/1
80 TABLET ORAL 2 TIMES DAILY
Status: DISCONTINUED | OUTPATIENT
Start: 2022-10-08 | End: 2022-10-09

## 2022-10-08 RX ORDER — ENOXAPARIN SODIUM 100 MG/ML
1 INJECTION SUBCUTANEOUS 2 TIMES DAILY
Status: DISCONTINUED | OUTPATIENT
Start: 2022-10-08 | End: 2022-10-14 | Stop reason: HOSPADM

## 2022-10-08 RX ORDER — LISINOPRIL 10 MG/1
10 TABLET ORAL DAILY
COMMUNITY

## 2022-10-08 RX ORDER — 0.9 % SODIUM CHLORIDE 0.9 %
500 INTRAVENOUS SOLUTION INTRAVENOUS ONCE
Status: COMPLETED | OUTPATIENT
Start: 2022-10-08 | End: 2022-10-08

## 2022-10-08 RX ORDER — DILTIAZEM HYDROCHLORIDE 5 MG/ML
10 INJECTION INTRAVENOUS ONCE
Status: COMPLETED | OUTPATIENT
Start: 2022-10-08 | End: 2022-10-08

## 2022-10-08 RX ORDER — ASPIRIN 81 MG/1
81 TABLET ORAL DAILY
Status: DISCONTINUED | OUTPATIENT
Start: 2022-10-08 | End: 2022-10-14 | Stop reason: HOSPADM

## 2022-10-08 RX ORDER — SODIUM CHLORIDE 0.9 % (FLUSH) 0.9 %
5-40 SYRINGE (ML) INJECTION EVERY 12 HOURS SCHEDULED
Status: DISCONTINUED | OUTPATIENT
Start: 2022-10-08 | End: 2022-10-14 | Stop reason: HOSPADM

## 2022-10-08 RX ORDER — ACETAMINOPHEN 650 MG/1
650 SUPPOSITORY RECTAL EVERY 6 HOURS PRN
Status: DISCONTINUED | OUTPATIENT
Start: 2022-10-08 | End: 2022-10-14 | Stop reason: HOSPADM

## 2022-10-08 RX ORDER — POLYETHYLENE GLYCOL 3350 17 G/17G
17 POWDER, FOR SOLUTION ORAL DAILY PRN
Status: DISCONTINUED | OUTPATIENT
Start: 2022-10-08 | End: 2022-10-14 | Stop reason: HOSPADM

## 2022-10-08 RX ORDER — METOPROLOL SUCCINATE 25 MG/1
50 TABLET, EXTENDED RELEASE ORAL DAILY
Status: DISCONTINUED | OUTPATIENT
Start: 2022-10-08 | End: 2022-10-09

## 2022-10-08 RX ORDER — SODIUM CHLORIDE 9 MG/ML
INJECTION, SOLUTION INTRAVENOUS PRN
Status: DISCONTINUED | OUTPATIENT
Start: 2022-10-08 | End: 2022-10-14 | Stop reason: HOSPADM

## 2022-10-08 RX ORDER — ACETAMINOPHEN 325 MG/1
650 TABLET ORAL EVERY 6 HOURS PRN
Status: DISCONTINUED | OUTPATIENT
Start: 2022-10-08 | End: 2022-10-14 | Stop reason: HOSPADM

## 2022-10-08 RX ORDER — INSULIN GLARGINE 100 [IU]/ML
10 INJECTION, SOLUTION SUBCUTANEOUS NIGHTLY
COMMUNITY

## 2022-10-08 RX ORDER — SODIUM CHLORIDE 0.9 % (FLUSH) 0.9 %
10 SYRINGE (ML) INJECTION PRN
Status: DISCONTINUED | OUTPATIENT
Start: 2022-10-08 | End: 2022-10-14 | Stop reason: HOSPADM

## 2022-10-08 RX ORDER — ATORVASTATIN CALCIUM 40 MG/1
40 TABLET, FILM COATED ORAL DAILY
COMMUNITY

## 2022-10-08 RX ADMIN — SODIUM CHLORIDE 500 ML: 9 INJECTION, SOLUTION INTRAVENOUS at 00:08

## 2022-10-08 RX ADMIN — SODIUM CHLORIDE, PRESERVATIVE FREE 10 ML: 5 INJECTION INTRAVENOUS at 23:55

## 2022-10-08 RX ADMIN — ACETAMINOPHEN 650 MG: 325 TABLET ORAL at 16:16

## 2022-10-08 RX ADMIN — SODIUM CHLORIDE, PRESERVATIVE FREE 10 ML: 5 INJECTION INTRAVENOUS at 14:08

## 2022-10-08 RX ADMIN — DILTIAZEM HYDROCHLORIDE 10 MG: 5 INJECTION, SOLUTION INTRAVENOUS at 01:35

## 2022-10-08 RX ADMIN — DEXTROSE MONOHYDRATE 5 MG/HR: 50 INJECTION, SOLUTION INTRAVENOUS at 01:35

## 2022-10-08 RX ADMIN — METOPROLOL SUCCINATE 50 MG: 25 TABLET, FILM COATED, EXTENDED RELEASE ORAL at 16:07

## 2022-10-08 RX ADMIN — SOTALOL HYDROCHLORIDE 80 MG: 80 TABLET ORAL at 16:07

## 2022-10-08 RX ADMIN — ASPIRIN 81 MG: 81 TABLET, COATED ORAL at 16:07

## 2022-10-08 RX ADMIN — ENOXAPARIN SODIUM 80 MG: 100 INJECTION SUBCUTANEOUS at 16:05

## 2022-10-08 RX ADMIN — CEFTRIAXONE 1000 MG: 1 INJECTION, POWDER, FOR SOLUTION INTRAMUSCULAR; INTRAVENOUS at 16:07

## 2022-10-08 RX ADMIN — SODIUM CHLORIDE, PRESERVATIVE FREE 10 ML: 5 INJECTION INTRAVENOUS at 16:09

## 2022-10-08 RX ADMIN — DEXTROSE MONOHYDRATE 10 MG/HR: 50 INJECTION, SOLUTION INTRAVENOUS at 23:54

## 2022-10-08 RX ADMIN — POTASSIUM BICARBONATE 40 MEQ: 391 TABLET, EFFERVESCENT ORAL at 01:48

## 2022-10-08 NOTE — ED PROVIDER NOTES
Department of Emergency Medicine   ED  Provider Note  Admit Date/RoomTime: 10/8/2022 12:05 AM  ED Room: 10/10          History of Present Illness:  10/8/22, Time: 12:08 AM EDT  Chief Complaint   Patient presents with    Irregular Heart Beat     A fib rvr pt has hx also had bladder removed 2 weeks ago                Jermaine Bolanos is a 67 y.o. male presenting to the ED for irregular heart rate and chest pain, beginning earlier this evening. The complaint has been persistent, moderate in severity, and worsened by nothing. Patient states that 2 weeks ago he had his bladder removed, currently has urostomy that he states has had no complications since being discharged back home. He states that this evening he felt that his heart began to beat fast and irregular. He states he has history of atrial fibrillation with pacemaker/AICD. Patient stated that he felt his AICD fired 5 times. Patient states he then had improvement in the sensation of irregular heart rate, does now have a dull chest pain. He denies any recent episodes of chest pain or lightheadedness, no recent dyspnea on exertion. No recent fevers or cough. He denies any abdominal pain or discomfort at is any worse since his surgery.     Review of Systems:   Pertinent positives and negatives are stated within HPI, all other systems reviewed and are negative.        --------------------------------------------- PAST HISTORY ---------------------------------------------  Past Medical History:  has a past medical history of Abdominal aortic aneurysm without rupture, Arthritis, CAD (coronary artery disease), Cancer ([facility] [address] CHF (congestive heart failure) ([facility] [address] Combined systolic and diastolic heart failure ([facility] [address] COPD (chronic obstructive pulmonary disease) ([facility] [address] Diabetes mellitus ([facility] [address] GERD (gastroesophageal reflux disease), History of placement of internal cardiac defibrillator, Hypertension, Sigmoid diverticulosis, Sinusitis, Status post endovascular aneurysm repair (EVAR), Tubular adenoma of colon, Type II endoleak of aortic graft, and Vertigo. Past Surgical History:  has a past surgical history that includes Coronary angioplasty with stent (01/13/2014); Umbilical hernia repair; Colonoscopy (08/31/2015); Cardiac defibrillator placement; Hip fracture surgery (Right, 03/14/2020); AAA repair, endovascular (N/A, 10/01/2021); Cardiac surgery; and Prostatectomy (09/13/2022). Social History:  reports that he quit smoking about 8 years ago. His smoking use included cigarettes. He started smoking about 52 years ago. He has a 100.00 pack-year smoking history. He has never used smokeless tobacco. He reports current alcohol use of about 1.0 standard drink per week. He reports that he does not currently use drugs. Family History: family history includes Cancer in his brother, brother, and father; Heart Disease in his mother. . Unless otherwise noted, family history is non contributory    The patients home medications have been reviewed. Allergies: Hydrocodone        ---------------------------------------------------PHYSICAL EXAM--------------------------------------    Constitutional/General: Alert and oriented x3  Head: Normocephalic and atraumatic  Eyes: PERRL, EOMI, sclera non icteric  Mouth: Oropharynx clear, handling secretions, no trismus, no asymmetry of the posterior oropharynx or uvular edema  Neck: Supple, full ROM, no stridor, no meningeal signs  Respiratory: Lungs clear to auscultation bilaterally, no wheezes, rales, or rhonchi. Not in respiratory distress  Cardiovascular: Tachycardic rate, irregularly irregular rhythm. No murmurs, no aortic murmurs, no gallops, or rubs. 2+ distal pulses. Equal extremity pulses. Chest: No chest wall tenderness  GI:  Abdomen Soft, Non tender, Non distended. No rebound, guarding, or rigidity. No pulsatile masses. Urostomy noted with straw-colored urine  Musculoskeletal: Moves all extremities x 4.  Warm and well perfused, no clubbing, cyanosis, or edema. Capillary refill <3 seconds  Integument: skin warm and dry. No rashes. Neurologic: GCS 15, no focal deficits, symmetric strength 5/5 in the upper and lower extremities bilaterally  Psychiatric: Normal Affect          -------------------------------------------------- RESULTS -------------------------------------------------  I have personally reviewed all laboratory and imaging results for this patient. Results are listed below.      LABS: (Lab results interpreted by me)  Results for orders placed or performed during the hospital encounter of 10/08/22   CBC with Auto Differential   Result Value Ref Range    WBC 11.1 4.5 - 11.5 E9/L    RBC 3.37 (L) 3.80 - 5.80 E12/L    Hemoglobin 10.1 (L) 12.5 - 16.5 g/dL    Hematocrit 32.0 (L) 37.0 - 54.0 %    MCV 95.0 80.0 - 99.9 fL    MCH 30.0 26.0 - 35.0 pg    MCHC 31.6 (L) 32.0 - 34.5 %    RDW 20.6 (H) 11.5 - 15.0 fL    Platelets 771 994 - 196 E9/L    MPV 9.0 7.0 - 12.0 fL   Basic Metabolic Panel w/ Reflex to MG   Result Value Ref Range    Sodium 138 132 - 146 mmol/L    Potassium reflex Magnesium 3.6 3.5 - 5.0 mmol/L    Chloride 104 98 - 107 mmol/L    CO2 20 (L) 22 - 29 mmol/L    Anion Gap 14 7 - 16 mmol/L    Glucose 195 (H) 74 - 99 mg/dL    BUN 18 6 - 23 mg/dL    Creatinine 1.3 (H) 0.7 - 1.2 mg/dL    GFR Non-African American 54 >=60 mL/min/1.73    GFR African American >60     Calcium 8.8 8.6 - 10.2 mg/dL   Troponin   Result Value Ref Range    Troponin, High Sensitivity 31 (H) 0 - 11 ng/L   Brain Natriuretic Peptide   Result Value Ref Range    Pro-BNP 1,815 (H) 0 - 125 pg/mL   Urinalysis with Microscopic   Result Value Ref Range    Color, UA Yellow Straw/Yellow    Clarity, UA CLOUDY (A) Clear    Glucose, Ur Negative Negative mg/dL    Bilirubin Urine Negative Negative    Ketones, Urine Negative Negative mg/dL    Specific Gravity, UA 1.020 1.005 - 1.030    Blood, Urine TRACE-INTACT Negative    pH, UA 6.0 5.0 - 9.0    Protein, UA TRACE Making:     I, Dr. Jaime Alonso, am the primary provider of record    20-year-old male with history of atrial fibrillation with pacemaker/AICD and recent bladder removal with urostomy presenting with rapid heart rate. Patient had an episode where he felt his heart was beating very fast, appeared to have AICD firing x5. He arrives normotensive, A. fib with -130. No increased work of breathing or hypoxia. Patient was given a small fluid bolus as well as pacemaker interrogation before treating the A. fib. EKG shows no findings suggestive of acute ischemia or injury. Troponin 31. BNP 1815. Chest x-ray is unremarkable. Metabolic panel is within acceptable limits, no leukocytosis or anemia. Urinalysis has suspicion for infection. Patient was given potassium as it is 3.6 and he has been having arrhythmia. He was also given Cardizem bolus with infusion, improving heart rate now 100-115. Patient will be admitted for further management. Please note that the withdrawal or failure to initiate urgent interventions for this patient would likely result in a life threatening deterioration or permanent disability. Accordingly this patient received 35 minutes of critical care time, excluding separately billable procedures. Re-Evaluations: This patient's ED course included: a personal history and physicial examination, re-evaluation prior to disposition, multiple bedside re-evaluations, IV medications, cardiac monitoring, and continuous pulse oximetry    This patient has remained hemodynamically stable, improved, and been closely monitored during their ED course. Counseling: The emergency provider has spoken with the patient and discussed todays results, in addition to providing specific details for the plan of care and counseling regarding the diagnosis and prognosis.   Questions are answered at this time and they are agreeable with the plan.       --------------------------------- IMPRESSION AND DISPOSITION ---------------------------------    IMPRESSION  1. Atrial fibrillation with RVR ([address]    2. AICD discharge        DISPOSITION  Disposition: Admit to telemetry  Patient condition is stable        NOTE: This report was transcribed using voice recognition software.  Every effort was made to ensure accuracy; however, inadvertent computerized transcription errors may be present        Lit Jimenez DO  10/08/22 [id number]

## 2022-10-08 NOTE — PLAN OF CARE
Problem: Discharge Planning  Goal: Discharge to home or other facility with appropriate resources  Outcome: Progressing  Flowsheets (Taken 10/8/2022 1457)  Discharge to home or other facility with appropriate resources: Identify barriers to discharge with patient and caregiver     Problem: ABCDS Injury Assessment  Goal: Absence of physical injury  Outcome: Progressing  Flowsheets (Taken 10/8/2022 1455)  Absence of Physical Injury: Implement safety measures based on patient assessment     Problem: Safety - Adult  Goal: Free from fall injury  Outcome: Progressing

## 2022-10-08 NOTE — ED NOTES
pATPt resting in position of comfort on cot presenting in no acute/ apparent distress (NAD). Respirations are noted even and unlabored with good rise and fall of the chest observed. Pt updated with current plan of care (POC) and all questions/ concerns addressed. Patient voices no needs at this time. Cot noted in lowest position, locked, with side rails X 2 up for patient safety. Will continue to monitor patient for acute changes.        Rosalia Gracia RN  10/08/22 [id number]

## 2022-10-08 NOTE — PROGRESS NOTES
Attempted US 10/8/22 at 4:20pm - per RN, patient unable to come to [address] at this time d/t HR being too high. Will attempt again when schedule permits.

## 2022-10-08 NOTE — PROGRESS NOTES
Patient's HR ranging from 150-199. Notified Dr. Natalie Mccarthy via perfect service. Awaiting orders.

## 2022-10-09 ENCOUNTER — APPOINTMENT (OUTPATIENT)
Dept: ULTRASOUND IMAGING | Age: 72
DRG: 309 | End: 2022-10-09
Payer: MEDICARE

## 2022-10-09 PROBLEM — Z45.02 AICD DISCHARGE: Status: ACTIVE | Noted: 2022-01-01

## 2022-10-09 LAB
ANION GAP SERPL CALCULATED.3IONS-SCNC: 8 MMOL/L (ref 7–16)
BASOPHILS ABSOLUTE: 0.1 E9/L (ref 0–0.2)
BASOPHILS RELATIVE PERCENT: 1.3 % (ref 0–2)
BUN BLDV-MCNC: 14 MG/DL (ref 6–23)
CALCIUM SERPL-MCNC: 8.2 MG/DL (ref 8.6–10.2)
CHLORIDE BLD-SCNC: 103 MMOL/L (ref 98–107)
CO2: 21 MMOL/L (ref 22–29)
CREAT SERPL-MCNC: 1.1 MG/DL (ref 0.7–1.2)
EOSINOPHILS ABSOLUTE: 0.13 E9/L (ref 0.05–0.5)
EOSINOPHILS RELATIVE PERCENT: 1.7 % (ref 0–6)
GFR AFRICAN AMERICAN: >60
GFR NON-AFRICAN AMERICAN: >60 ML/MIN/1.73
GLUCOSE BLD-MCNC: 131 MG/DL (ref 74–99)
HCT VFR BLD CALC: 33.9 % (ref 37–54)
HEMOGLOBIN: 10 G/DL (ref 12.5–16.5)
IMMATURE GRANULOCYTES #: 0.05 E9/L
IMMATURE GRANULOCYTES %: 0.6 % (ref 0–5)
INR BLD: 1.3
LYMPHOCYTES ABSOLUTE: 0.98 E9/L (ref 1.5–4)
LYMPHOCYTES RELATIVE PERCENT: 12.7 % (ref 20–42)
MAGNESIUM: 1.4 MG/DL (ref 1.6–2.6)
MCH RBC QN AUTO: 30.3 PG (ref 26–35)
MCHC RBC AUTO-ENTMCNC: 29.5 % (ref 32–34.5)
MCV RBC AUTO: 102.7 FL (ref 80–99.9)
METER GLUCOSE: 181 MG/DL (ref 74–99)
METER GLUCOSE: 188 MG/DL (ref 74–99)
METER GLUCOSE: 206 MG/DL (ref 74–99)
MONOCYTES ABSOLUTE: 0.78 E9/L (ref 0.1–0.95)
MONOCYTES RELATIVE PERCENT: 10.1 % (ref 2–12)
NEUTROPHILS ABSOLUTE: 5.69 E9/L (ref 1.8–7.3)
NEUTROPHILS RELATIVE PERCENT: 73.6 % (ref 43–80)
PDW BLD-RTO: 20 FL (ref 11.5–15)
PLATELET # BLD: 126 E9/L (ref 130–450)
PMV BLD AUTO: 9.6 FL (ref 7–12)
POTASSIUM REFLEX MAGNESIUM: 3.3 MMOL/L (ref 3.5–5)
PROCALCITONIN: 0.15 NG/ML (ref 0–0.08)
PROTHROMBIN TIME: 13.7 SEC (ref 9.3–12.4)
RBC # BLD: 3.3 E12/L (ref 3.8–5.8)
SODIUM BLD-SCNC: 132 MMOL/L (ref 132–146)
WBC # BLD: 7.7 E9/L (ref 4.5–11.5)

## 2022-10-09 PROCEDURE — 2580000003 HC RX 258: Performed by: INTERNAL MEDICINE

## 2022-10-09 PROCEDURE — 2580000003 HC RX 258: Performed by: FAMILY MEDICINE

## 2022-10-09 PROCEDURE — 85025 COMPLETE CBC W/AUTO DIFF WBC: CPT

## 2022-10-09 PROCEDURE — 93283 PRGRMG EVAL IMPLANTABLE DFB: CPT | Performed by: INTERNAL MEDICINE

## 2022-10-09 PROCEDURE — 2140000000 HC CCU INTERMEDIATE R&B

## 2022-10-09 PROCEDURE — 99291 CRITICAL CARE FIRST HOUR: CPT | Performed by: INTERNAL MEDICINE

## 2022-10-09 PROCEDURE — 85610 PROTHROMBIN TIME: CPT

## 2022-10-09 PROCEDURE — 93005 ELECTROCARDIOGRAM TRACING: CPT | Performed by: INTERNAL MEDICINE

## 2022-10-09 PROCEDURE — 6360000002 HC RX W HCPCS: Performed by: FAMILY MEDICINE

## 2022-10-09 PROCEDURE — 82962 GLUCOSE BLOOD TEST: CPT

## 2022-10-09 PROCEDURE — 6360000002 HC RX W HCPCS: Performed by: INTERNAL MEDICINE

## 2022-10-09 PROCEDURE — 93308 TTE F-UP OR LMTD: CPT

## 2022-10-09 PROCEDURE — 76770 US EXAM ABDO BACK WALL COMP: CPT

## 2022-10-09 PROCEDURE — 6370000000 HC RX 637 (ALT 250 FOR IP): Performed by: FAMILY MEDICINE

## 2022-10-09 PROCEDURE — 84145 PROCALCITONIN (PCT): CPT

## 2022-10-09 PROCEDURE — 36415 COLL VENOUS BLD VENIPUNCTURE: CPT

## 2022-10-09 PROCEDURE — 83735 ASSAY OF MAGNESIUM: CPT

## 2022-10-09 PROCEDURE — S5553 INSULIN LONG ACTING 5 U: HCPCS | Performed by: INTERNAL MEDICINE

## 2022-10-09 PROCEDURE — 6370000000 HC RX 637 (ALT 250 FOR IP): Performed by: INTERNAL MEDICINE

## 2022-10-09 PROCEDURE — 80048 BASIC METABOLIC PNL TOTAL CA: CPT

## 2022-10-09 RX ORDER — METOPROLOL SUCCINATE 25 MG/1
25 TABLET, EXTENDED RELEASE ORAL 2 TIMES DAILY
Status: DISCONTINUED | OUTPATIENT
Start: 2022-10-09 | End: 2022-10-09

## 2022-10-09 RX ORDER — METOPROLOL SUCCINATE 50 MG/1
50 TABLET, EXTENDED RELEASE ORAL 2 TIMES DAILY
Status: DISCONTINUED | OUTPATIENT
Start: 2022-10-09 | End: 2022-10-14 | Stop reason: HOSPADM

## 2022-10-09 RX ORDER — INSULIN LISPRO 100 [IU]/ML
0-4 INJECTION, SOLUTION INTRAVENOUS; SUBCUTANEOUS NIGHTLY
Status: DISCONTINUED | OUTPATIENT
Start: 2022-10-09 | End: 2022-10-14 | Stop reason: HOSPADM

## 2022-10-09 RX ORDER — INSULIN LISPRO 100 [IU]/ML
0-4 INJECTION, SOLUTION INTRAVENOUS; SUBCUTANEOUS
Status: DISCONTINUED | OUTPATIENT
Start: 2022-10-09 | End: 2022-10-14 | Stop reason: HOSPADM

## 2022-10-09 RX ORDER — INSULIN GLARGINE-YFGN 100 [IU]/ML
10 INJECTION, SOLUTION SUBCUTANEOUS NIGHTLY
Status: DISCONTINUED | OUTPATIENT
Start: 2022-10-09 | End: 2022-10-14 | Stop reason: HOSPADM

## 2022-10-09 RX ORDER — MAGNESIUM SULFATE IN WATER 40 MG/ML
4000 INJECTION, SOLUTION INTRAVENOUS ONCE
Status: COMPLETED | OUTPATIENT
Start: 2022-10-09 | End: 2022-10-09

## 2022-10-09 RX ORDER — SOTALOL HYDROCHLORIDE 120 MG/1
120 TABLET ORAL 2 TIMES DAILY
Status: DISCONTINUED | OUTPATIENT
Start: 2022-10-09 | End: 2022-10-14 | Stop reason: HOSPADM

## 2022-10-09 RX ORDER — DEXTROSE MONOHYDRATE 100 MG/ML
INJECTION, SOLUTION INTRAVENOUS CONTINUOUS PRN
Status: DISCONTINUED | OUTPATIENT
Start: 2022-10-09 | End: 2022-10-14 | Stop reason: HOSPADM

## 2022-10-09 RX ADMIN — SOTALOL HYDROCHLORIDE 120 MG: 120 TABLET ORAL at 20:59

## 2022-10-09 RX ADMIN — SODIUM CHLORIDE, PRESERVATIVE FREE 10 ML: 5 INJECTION INTRAVENOUS at 21:00

## 2022-10-09 RX ADMIN — MAGNESIUM SULFATE HEPTAHYDRATE 4000 MG: 40 INJECTION, SOLUTION INTRAVENOUS at 12:53

## 2022-10-09 RX ADMIN — METOPROLOL SUCCINATE 50 MG: 50 TABLET, EXTENDED RELEASE ORAL at 20:59

## 2022-10-09 RX ADMIN — ASPIRIN 81 MG: 81 TABLET, COATED ORAL at 09:56

## 2022-10-09 RX ADMIN — SOTALOL HYDROCHLORIDE 120 MG: 120 TABLET ORAL at 09:56

## 2022-10-09 RX ADMIN — ENOXAPARIN SODIUM 80 MG: 100 INJECTION SUBCUTANEOUS at 20:59

## 2022-10-09 RX ADMIN — INSULIN GLARGINE-YFGN 10 UNITS: 100 INJECTION, SOLUTION SUBCUTANEOUS at 21:00

## 2022-10-09 RX ADMIN — ENOXAPARIN SODIUM 80 MG: 100 INJECTION SUBCUTANEOUS at 09:56

## 2022-10-09 RX ADMIN — CEFTRIAXONE 1000 MG: 1 INJECTION, POWDER, FOR SOLUTION INTRAMUSCULAR; INTRAVENOUS at 14:04

## 2022-10-09 RX ADMIN — POTASSIUM BICARBONATE 40 MEQ: 782 TABLET, EFFERVESCENT ORAL at 10:08

## 2022-10-09 RX ADMIN — METOPROLOL SUCCINATE 50 MG: 50 TABLET, EXTENDED RELEASE ORAL at 10:01

## 2022-10-09 ASSESSMENT — PAIN SCALES - GENERAL: PAINLEVEL_OUTOF10: 0

## 2022-10-09 NOTE — CONSULTS
[facility],[address] and [address] [facility] Electrophysiology  Consultation Report  PATIENT: Jacquelin1 [facility] RECORD NUMBER: [id number]  DATE OF SERVICE:  10/9/2022  ATTENDING ELECTROPHYSIOLOGIST: Inessa Helm MD  PRIMARY ELECTROPHYSIOLOGIST: Rhea Garcia MD  REFERRING PHYSICIAN: No ref. provider found and Toñito Yost DO  CHIEF COMPLAINT: Palpitations and ICD shocks    HPI: This is a 67 y.o. male with a history of   Patient Active Problem List   Diagnosis    Diabetes mellitus ([address]    Essential hypertension    COPD, very severe ([address]    CAD (coronary artery disease)    Tubular adenoma of colon    Sigmoid diverticulosis    Controlled type 2 diabetes mellitus with diabetic nephropathy, with long-term current use of insulin ([address]    Prostate cancer ([address]    Mixed hyperlipidemia    ICD (implantable cardioverter-defibrillator) in place    Cerebellar hemorrhage ([address]    Dilated cardiomyopathy ([address]    AAA (abdominal aortic aneurysm) without rupture    PAF (paroxysmal atrial fibrillation) ([facility])    COVID    Hematuria    Urinary retention    Elevated serum creatinine    Anemia    Chronic renal disease, stage III ([address] [[id number]]    Malignant neoplasm of overlapping sites of bladder (HCC)    Hyperglycemia    Type II endoleak of aortic graft    Status post endovascular aneurysm repair (EVAR)    Bladder cancer ([address]    AIME (acute kidney injury) ([address]    Shock ([address]    Severe malnutrition (HCC)    Acute blood loss anemia    S/P ileal conduit ([facility])    Tobacco abuse    Atrial fibrillation with RVR ([address]   who presented to the hospital on 10/8/22 complaining of ICD shocks. The patient has history of paroxysmal atrial fibrillation, sustained monomorphic ventricular tachycardia during EPS in 2013 on Sotalol, dual chamber ICD in situ since 1/15/2014, coronary artery disease status post PCI, mixed cardiomyopathy LV EF 30-35% in 2013, AAA status post EVAR, hypertension, diabetes mellitus, hyperlipidemia, CKD and bladder cancer. . He has was diagnosed with new onset atrial fibrillation in January of 2022 during COVID-19 infection. He was seen by Cardiology and was started on Eliquis which he could not afford and was switch to Warfarin. He developed hematuria in May 2022 and was noted to have NSVT and was seen by Dr. Vera Vasquez. His Sotalol was increased from 40 mg bid to 80 mg bid. He was last seen in clinic by Dr. Vera Vasquez on 6/15/22. The patient was diagnosed with bladder cancer and underwent robotic Cystectomy, prostatectomy with pelvic lymphnode dissection, extracorporeal ileal conduit urinary diversion, and bilateral ureteral stents placement on 9/13/2022. Yesterday while he was at home, he felt palpitations and he experienced ICD firing 5 times. He reporst chest pain after the ICD shocks and came to ED while he was noted to be in AF with RVR at 120-130 bpm (no rhythm strip and EKG available to review). He was started on IV Cardizem drip. ICD interrogation showed atrial tachycardia/atrial flutter  with inappropriate shocks x 5. Currently he is back in NSR with run of PATs on IV Cardizem drip. Cardiac electrophysiology service is consulted for ICD shocks.     Patient Active Problem List    Diagnosis Date Noted    Atrial fibrillation with RVR ([address] 10/08/2022     Priority: Medium    Tobacco abuse 09/23/2022     Priority: Medium    Acute blood loss anemia 09/16/2022     Priority: Medium    S/P ileal conduit ([address] 09/16/2022     Priority: Medium    AIME (acute kidney injury) ([address] 09/14/2022     Priority: Medium    Shock ([address] 09/14/2022     Priority: Medium    Severe malnutrition ([address] 09/14/2022     Priority: Medium    Bladder cancer ([address] 09/13/2022     Priority: Medium    Type II endoleak of aortic graft 07/23/2022     Priority: Medium    Status post endovascular aneurysm repair (EVAR) 07/23/2022     Priority: Medium    Hyperglycemia 07/22/2022     Priority: Medium    Chronic renal disease, stage III [facility]) [[id number]] 05/17/2022     Priority: Medium    Hematuria 05/05/2022     Priority: Medium    Urinary retention 05/05/2022     Priority: Medium    Elevated serum creatinine 05/05/2022     Priority: Medium    Anemia 05/05/2022     Priority: Medium    Diabetes mellitus ([facility] 03/03/2012     Priority: Medium    Essential hypertension 03/03/2012     Priority: Medium    Malignant neoplasm of overlapping sites of bladder ([facility] 07/11/2022     Overview Note:     Squamous and sarcomatoid variant features       COVID 02/02/2022    PAF (paroxysmal atrial fibrillation) ([address] 01/07/2022     Overview Note:     CHADSVASC = 4        AAA (abdominal aortic aneurysm) without rupture 10/01/2021     Overview Note:     A. EVAR 10/2021      Cerebellar hemorrhage ([address] 03/14/2020    Dilated cardiomyopathy ([address] 03/14/2020     Overview Note:     A. Echo 2012: EF 30-35%  B. Echo 2014: normal EF      ICD (implantable cardioverter-defibrillator) in place 08/31/2018    Mixed hyperlipidemia 12/01/2017    Prostate cancer ([address] 11/29/2017    Controlled type 2 diabetes mellitus with diabetic nephropathy, with long-term current use of insulin ([address] 01/16/2017    Tubular adenoma of colon 08/31/2015    Sigmoid diverticulosis 08/31/2015    CAD (coronary artery disease) 02/03/2014     Overview Note:     A. PCI to OM1 and mid CX 2014 [facility])      COPD, very severe ([address] 06/14/2013       Past Medical History:   Diagnosis Date    Abdominal aortic aneurysm without rupture 9/3/2021    Arthritis     CAD (coronary artery disease)     Cancer ([address] 2017    Colon    CHF (congestive heart failure) (HCC)     Combined systolic and diastolic heart failure ([address] 6/15/13    echo LVEF of 30-35%  stage II diastolic dysfunction    COPD (chronic obstructive pulmonary disease) (HCC)     Diabetes mellitus (HCC)     GERD (gastroesophageal reflux disease)     History of placement of internal cardiac defibrillator 2014?     Medtronic ([place])    Hypertension     Sigmoid diverticulosis 2015    Sinusitis     Status post endovascular aneurysm repair (EVAR) 2022    Tubular adenoma of colon 2015    Type II endoleak of aortic graft 2022    Vertigo        Family History   Problem Relation Age of Onset    Heart Disease Mother     Cancer Father         abdominal    Cancer Brother         prostate    Cancer Brother         digestive       Social History     Tobacco Use    Smoking status: Former     Packs/day: 2.00     Years: 50.00     Pack years: 100.00     Types: Cigarettes     Start date: 1970     Quit date: 2014     Years since quittin.7    Smokeless tobacco: Never   Substance Use Topics    Alcohol use:  Yes     Alcohol/week: 1.0 standard drink     Types: 1 Glasses of wine per week     Comment: rarely       Current Facility-Administered Medications   Medication Dose Route Frequency Provider Last Rate Last Admin    sotalol (BETAPACE) tablet 120 mg  120 mg Oral BID Fuad De La Cruz MD        dilTIAZem 100 mg in dextrose 5 % 100 mL infusion ([facility])  2.5-15 mg/hr IntraVENous Continuous Verena Ponce DO 10 mL/hr at 10/09/22 0637 10 mg/hr at 10/09/22 0986    aspirin EC tablet 81 mg  81 mg Oral Daily Mino Barrientos, APRN - CNP   81 mg at 10/08/22 1607    metoprolol succinate (TOPROL XL) extended release tablet 50 mg  50 mg Oral Daily Mino Barrientos, APRN - CNP   50 mg at 10/08/22 1607    sodium chloride flush 0.9 % injection 5-40 mL  5-40 mL IntraVENous 2 times per day Mino Barrientos, APRN - CNP   10 mL at 10/08/22 2355    sodium chloride flush 0.9 % injection 10 mL  10 mL IntraVENous PRN Mino Lay Learn, APRN - CNP   10 mL at 10/08/22 1609    0.9 % sodium chloride infusion   IntraVENous PRN Mino Lay Learn, APRN - CNP        polyethylene glycol (GLYCOLAX) packet 17 g  17 g Oral Daily PRN Mino Lay Learn, APRN - CNP        acetaminophen (TYLENOL) tablet 650 mg  650 mg Oral Q6H PRN Mino Lay Learn, APRN - CNP   650 mg at 10/08/22 1616 Or    acetaminophen (TYLENOL) suppository 650 mg  650 mg Rectal Q6H PRN Jeremiah Plaza Learn, APRN - CNP        enoxaparin (LOVENOX) injection 80 mg  1 mg/kg SubCUTAneous BID Jeremiah Barrientos, APRN - CNP   80 mg at 10/08/22 1605    cefTRIAXone (ROCEPHIN) 1,000 mg in sterile water 10 mL IV syringe  1,000 mg IntraVENous Q24H Cinda Lamb MD   1,000 mg at 10/08/22 1607        Allergies   Allergen Reactions    Hydrocodone Nausea And Vomiting     ROS:   Constitutional: Negative for fever. Positive for activity change and negative for appetite change. HENT: Negative for epistaxis. Eyes: Negative for diploplia, blurred vision. Respiratory: Negative for cough. Positive for chest tightness. Negative for shortness of breath and wheezing. Cardiovascular: pertinent positives in HPI  Gastrointestinal: Negative for abdominal pain and blood in stool. All other review of systems are negative     PHYSICAL EXAM:   Vitals:    10/08/22 1955 10/08/22 2337 10/09/22 0330 10/09/22 0810   BP: 99/60 (!) 124/56 (!) 111/56 (!) 90/54   Pulse: 71 92 70 94   Resp: 18 18 18 20   Temp: 98.9 °F (37.2 °C) 98.8 °F (37.1 °C) 99.3 °F (37.4 °C) 98.1 °F (36.7 °C)   TempSrc: Temporal Temporal Temporal Temporal   SpO2: 98% 97% 96% 95%   Weight:       Height:          Constitutional: Well-developed, no acute distress  Eyes: conjunctivae normal, no xanthelasma   Ears, Nose, Throat: oral mucosa moist, no cyanosis   CV: no JVD. Regular rate and rhythm. Normal S1S2 and no S3. No murmurs, rubs, or gallops. PMI is nondisplaced  Lungs: clear to auscultation bilaterally, normal respiratory effort without used of accessory muscles  Abdomen: soft, non-tender, bowel sounds present, no masses or hepatomegaly   Musculoskeletal: no digital clubbing, no edema   Skin: warm, no rashes   ICD site: well healed.  No erosion, infection or migration    I have personally reviewed the laboratory, cardiac diagnostic and radiographic testing as outlined below:    Data:    Recent Labs     10/08/22  0028 10/09/22  0621   WBC 11.1 7.7   HGB 10.1* 10.0*   HCT 32.0* 33.9*    126*     Recent Labs     10/08/22  0028 10/09/22  0621    132   K 3.6 3.3*    103   CO2 20* 21*   BUN 18 14   CREATININE 1.3* 1.1   CALCIUM 8.8 8.2*      Lab Results   Component Value Date/Time    MG 1.4 10/09/2022 06:21 AM     Recent Labs     10/08/22  1434   TSH 1.310     Recent Labs     10/09/22  0621   INR 1.3       CXR 10/8/22:      FINDINGS:   The lungs are without acute focal process. There is no effusion or   pneumothorax. The cardiomediastinal silhouette is without acute process. The   osseous structures are without acute process. Left-sided transvenous pacer   device. Impression   No acute process. Telemetry 10/09/22 : NSR with PATs    EKG 10/8/22: NSR with PATs at 138 bpm, Qtc 472 ms. Please see scan in Cardiology. Echocardiogram 5/10/22: Findings      Left Ventricle   Left ventricular size is normal.   Hypokinesis of the basal lateral wall and septum noted. Ejection fraction is visually estimated at 45-50%. Right Ventricle   Normal right ventricular size and function. Left Atrium   The left atrium is moderately dilated. Right Atrium   Mildly enlarged right atrium size. Mitral Valve   Structurally normal mitral valve. Mild to moderate mitral regurgitation is present. Tricuspid Valve   The tricuspid valve was not well visualized. Mild tricuspid regurgitation. RVSP is 55-60 mmHg. Pulmonary hypertension is moderate to severe . Aortic Valve   Aortic valve opens well. The aortic valve is trileaflet. Pulmonic Valve   The pulmonic valve was not well visualized. Pericardial Effusion   No evidence of pericardial effusion. Aorta   Aortic root dimension within normal limits. Conclusions      Summary   Left ventricular size is normal.   Hypokinesis of the basal lateral wall and septum noted. Ejection fraction is visually estimated at 45-50%. The left atrium is moderately dilated. Structurally normal mitral valve. Mild to moderate mitral regurgitation is present. Aortic valve opens well. The aortic valve is trileaflet. The tricuspid valve was not well visualized. Mild tricuspid regurgitation. RVSP is 55-60 mmHg. Pulmonary hypertension is moderate to severe . No evidence of pericardial effusion. Signature      ----------------------------------------------------------------   Electronically signed by Dickson Damon MD(Interpreting   physician) on 05/13/2022 03:39 PM   ----------------------------------------------------------------    Cardiac Catheterization 1/11/14:   SUMMARY OF INJECTIONS:      II.   SELECTIVE CORONARY ARTERIOGRAMS:            A.   Right coronary artery - Preponderant. A large, dominant vessel      with marked ectasia noted throughout. No areas of critical stenosis      noted, however. B. Left coronary:         1. Left main trunk - Normal.         2.   Left anterior descending:  A high bifurcation into a moderate 1st          diagonal branch with mild luminal wall irregularities. The distal          anterior descending artery is markedly attenuated, but without areas          of critical stenosis. 3.   Circumflex: In the 1st obtuse marginal branch, there is an          eccentric 70% stenosis proximally. The distal vessel is of moderate          luminal caliber and widely patent. In the continuation of the          circumflex into the 1st posterolateral marginal branch, there is 65%          to 70% stenosis. III. LEFT VENTRICULAR ANGIOGRAM:            Left ventricular cavity size is upper limits of normal to mildly      enlarged with mild hypokinesis of the _____ inferomedial segment. Estimated left ventricular ejection fraction 50%. There is no mitral      regurgitation seen. CONCLUSIONS:   1.     Severely stenotic native coronary atherosclerosis. 2.    Mildly impaired left ventricular systolic function. 3.    Abnormal diastolic dysfunction. Dictated by:  Bhaskar Tate., [facility], [facility], [facility]        Stress Test 9/26/18:   FINDINGS:  There is a normal distribution of left ventricular   myocardial activity on both the stress and rest SPECT myocardial   perfusion images. Left ventricle appears dilated. Gated study shows dyskinetic left ventricular inferior wall motion. Otherwise, normal left ventricular wall motion and myocardial   thickening during systole. Left ventricular end systolic volume estimated at 85 ml and   end-diastolic volume estimated at 144 ml. Resting left ventricular   ejection fraction over 41%. Impression   1. No evidence of left ventricular myocardial stress-induced   ischemia. 2. Left ventricular ejection fraction estimated at 41%. 3. Dilated left ventricle. Device Interrogation:   Underlying rhythm: PAT with AsVs  Mode: DDD   Battery Voltage/Longevity:  12 months    Charge time: 8.7 seconds  Pacing: A: <0.1%  RV: <0.1%    P wave: 2.0 mV  Impedance: 380 ohms   Threshold: 0.75 V @ 0.4 ms  RV R wave: 11.6 mV  Impedance: 437 ohms   Threshold: 1.0 V @ 0.4 ms  Episodes: AT/AFL since 10/7/22 on 4.19 with RVR and inappropriate ICD shocks x 5. Reprogramming included: none  Overall device function is normal    All device programmable settings were evaluated per above and in the scanned document, along with iterative adjustments (capture thresholds) to assess and select the most appropriate final programming to provide for consistent delivery of the appropriate therapy and to verify function of the device. I have independently reviewed all of the ECGs and rhythm strips per above     Assessment/Plan:  This is a 67 y.o. male with a history of   Patient Active Problem List   Diagnosis    Diabetes mellitus ([facility] [address]    Essential hypertension    COPD, very severe ([address]    CAD (coronary artery disease)    Tubular adenoma of colon    Sigmoid diverticulosis    Controlled type 2 diabetes mellitus with diabetic nephropathy, with long-term current use of insulin ([address]    Prostate cancer ([address]    Mixed hyperlipidemia    ICD (implantable cardioverter-defibrillator) in place    Cerebellar hemorrhage ([address]    Dilated cardiomyopathy ([address]    AAA (abdominal aortic aneurysm) without rupture    PAF (paroxysmal atrial fibrillation) ([facility])    COVID    Hematuria    Urinary retention    Elevated serum creatinine    Anemia    Chronic renal disease, stage III ([facility]) [[id number]]    Malignant neoplasm of overlapping sites of bladder ([facility])    Hyperglycemia    Type II endoleak of aortic graft    Status post endovascular aneurysm repair (EVAR)    Bladder cancer ([address]    AIME (acute kidney injury) ([address]    Shock ([address]    Severe malnutrition ([facility])    Acute blood loss anemia    S/P ileal conduit ([facility])    Tobacco abuse    Atrial fibrillation with RVR ([facility]    who presents with inappropriate ICD shocks from AT/AFL. 1. Paroxysmal atrial tachycardia, atrial flutter and atrial fibrillation  - First diagnosed January 2022 during COVID-19 infection.  - [id number] of 6  - Started on Eliquis but could not afford and was switch to Warfarin. Developed hematuria in May 2022 and [facility] discontinued. - Presented with palpitations and he experienced ICD firing 5 times. Noted to be in AF with RVR at 120-130 bpm (no rhythm strip and EKG available to review). Started on IV Cardizem drip. ICD interrogation showed atrial tachycardia/atrial flutter  with inappropriate shocks x 5.   - Currently he is back in NSR with run of PATs on IV Cardizem drip. 2. History of ventricular tachycardia  - Sustained monomorphic ventricular tachycardia during EPS in 2013.  - Status post ICD placement 1/15/2014.  - On Sotalol and Toprol XL.  - Noted NSVT.  Sotalol was increased from 40 mg bid to 80 mg bid in May 2022.     3. Dual chamber ICD in situ   - Medtronic.  - Secondary prevention.  - DOI: 1/15/2014  - Normal device function. 4. Coronary artery disease   - Status post PCI. 5. Mixed ischemic and nonischemic cardiomyopathy   - LV EF 30-35% in 2013.  - LV EF 45-50% on last echo 5/13/22. 6.AAA   - Status post EVAR. 7. Hypertension  - On Zestril    8. Diabetes mellitus  - On Lantus and Glucophage. 9. Hyperlipidemia  - On Lipitor. 10. CKD  Estimated Creatinine Clearance: 67 mL/min (based on SCr of 1.1 mg/dL). 11. bladder cancer  - Underwent robotic Cystectomy, prostatectomy with pelvic lymphnode dissection, extracorporeal ileal conduit urinary diversion, and bilateral ureteral stents placement on 9/13/2022. Recommendations:  Wean off IV Cardizem. Increase Sotalol to 120 mg every 12 hours and monitor QTc per protocol. Increase Toprol XL to 50 mg bid. Continue Lovenox 1 mg/kg every 12 hours and switch to [facility] if no contraindications. Limited echocardiogram for LV function. Nuclear stress test to exclude ischemia due to chest pain and refractory atrial tachycardia and flutter. Keep potassium around 4 and magnesium around 2. I have spent a total of 45 CCT minutes with the patient and the family reviewing the above stated recommendations. And a total of >50% of that time involved face-to-face time providing counseling and or coordination of care with the other providers, reviewing records/tests, counseling/education of the patient, ordering medications/tests/procedures, coordinating care, and documenting clinical information in the EHR. Thank you for allowing me to participate in your patient's care. Please call me if there are any questions or concerns.       Odin Taylor MD  Cardiac Electrophysiology  [address]   [facility] [place] [place] [facility]  8:54 AM  10/9/2022

## 2022-10-09 NOTE — PLAN OF CARE
Problem: Discharge Planning  Goal: Discharge to home or other facility with appropriate resources  10/9/2022 1712 by Agnes Aden, EVERARDO  Outcome: Progressing  Flowsheets (Taken 10/9/2022 0810)  Discharge to home or other facility with appropriate resources: Identify barriers to discharge with patient and caregiver  10/9/2022 0318 by Yosi Steen RN  Outcome: Progressing  Flowsheets (Taken 10/8/2022 1502 by Ange Bolaños, EVERARDO)  Discharge to home or other facility with appropriate resources: Identify barriers to discharge with patient and caregiver

## 2022-10-09 NOTE — PROGRESS NOTES
[facility]   Progress note      Subjective: The patient is awake and alert. The rigors, indicates he feels much better today    Objective:    /65   Pulse 75   Temp 97.8 °F (36.6 °C) (Temporal)   Resp 18   Ht 6' (1.829 m)   Wt 173 lb (78.5 kg)   SpO2 97%   BMI 23.46 kg/m²     In: 757.9 [P.O.:520; I.V.:237.9]  Out: 1200   In: 757.9   Out: 1200 [Urine:1200]    General appearance: NAD, conversant  HEENT: AT/NC, MMM  Neck: FROM, supple  Lungs: Clear to auscultation  CV: RRR, no MRGs  Vasc: Radial pulses 2+  Abdomen: Soft, non-tender; no masses or HSM  Extremities: No peripheral edema or digital cyanosis  Skin: no rash, lesions or ulcers  Psych: Alert and oriented to person, place and time  Neuro: Alert and interactive     Recent Labs     10/08/22  0028 10/09/22  0621   WBC 11.1 7.7   HGB 10.1* 10.0*   HCT 32.0* 33.9*    126*       Recent Labs     10/08/22  0028 10/09/22  0621    132   K 3.6 3.3*    103   CO2 20* 21*   BUN 18 14   CREATININE 1.3* 1.1   CALCIUM 8.8 8.2*       Assessment:    Principal Problem:    Atrial fibrillation with RVR (HCC)  Active Problems:    AICD discharge  Resolved Problems:    * No resolved hospital problems. *      Plan:    59-year-old male with a history of bladder cancer status post recent cystoscopy prostatectomy and ileal conduit urinary diversion with urostomy placement last week,  and history of A. fib presented to the ED with complaints of chest pain and irregular heartbeat and his ICD had fired 5 times is admitted to telemetry unit.   Concern for UTI sepsis post procedure/instrumentation     10/8  Rate control-Cardizem drip with titration parameters  Consult cardiology/EP - await input  Chads - Vasc -  patient was receiving lovenox injection 40 mg daily to end on 10/11/2022  TSH   Rocephin 1 g daily with iv fluids   Check procal no urine culture      10/9  no further rigors or chills, afebrile  Continue IV Rocephin, blood cultures unremarkable to date-White count 7.7  Retroperitoneal ultrasound without hydronephrosis or pyelonephritis  Elevated rate this morning which has since been controlled by medication adjustment by EP-sotalol and Toprol-XL  Initiated on Lovenox 1 edd per cake subcu twice daily, will need p.o. anticoagulation with Eliquis   supplement magnesium and potassium, 2 g and 40 mEq p.o. respectively  Heme-onc consultation for bladder cancer      Code Status: Full  Consultants: Cardiology  DVT Prophylaxis   PT/OT  Discharge planning           Gauri Capps MD  3:33 PM  10/9/2022

## 2022-10-10 ENCOUNTER — APPOINTMENT (OUTPATIENT)
Dept: NON INVASIVE DIAGNOSTICS | Age: 72
DRG: 309 | End: 2022-10-10
Payer: MEDICARE

## 2022-10-10 ENCOUNTER — APPOINTMENT (OUTPATIENT)
Dept: NUCLEAR MEDICINE | Age: 72
DRG: 309 | End: 2022-10-10
Payer: MEDICARE

## 2022-10-10 PROBLEM — Z90.6 H/O TOTAL CYSTECTOMY: Status: ACTIVE | Noted: 2022-01-01

## 2022-10-10 LAB
ANION GAP SERPL CALCULATED.3IONS-SCNC: 8 MMOL/L (ref 7–16)
BASOPHILS ABSOLUTE: 0.08 E9/L (ref 0–0.2)
BASOPHILS RELATIVE PERCENT: 1.4 % (ref 0–2)
BUN BLDV-MCNC: 15 MG/DL (ref 6–23)
CALCIUM SERPL-MCNC: 7.9 MG/DL (ref 8.6–10.2)
CHLORIDE BLD-SCNC: 108 MMOL/L (ref 98–107)
CO2: 21 MMOL/L (ref 22–29)
CREAT SERPL-MCNC: 1 MG/DL (ref 0.7–1.2)
EKG ATRIAL RATE: 178 BPM
EKG ATRIAL RATE: 63 BPM
EKG ATRIAL RATE: 70 BPM
EKG ATRIAL RATE: 82 BPM
EKG ATRIAL RATE: 87 BPM
EKG P AXIS: -5 DEGREES
EKG P AXIS: 11 DEGREES
EKG P AXIS: 33 DEGREES
EKG P AXIS: 90 DEGREES
EKG P-R INTERVAL: 116 MS
EKG P-R INTERVAL: 134 MS
EKG P-R INTERVAL: 146 MS
EKG P-R INTERVAL: 148 MS
EKG P-R INTERVAL: 160 MS
EKG Q-T INTERVAL: 312 MS
EKG Q-T INTERVAL: 378 MS
EKG Q-T INTERVAL: 396 MS
EKG Q-T INTERVAL: 444 MS
EKG Q-T INTERVAL: 488 MS
EKG QRS DURATION: 78 MS
EKG QRS DURATION: 80 MS
EKG QRS DURATION: 86 MS
EKG QRS DURATION: 88 MS
EKG QRS DURATION: 90 MS
EKG QTC CALCULATION (BAZETT): 441 MS
EKG QTC CALCULATION (BAZETT): 472 MS
EKG QTC CALCULATION (BAZETT): 476 MS
EKG QTC CALCULATION (BAZETT): 479 MS
EKG QTC CALCULATION (BAZETT): 499 MS
EKG R AXIS: -12 DEGREES
EKG R AXIS: -14 DEGREES
EKG R AXIS: -15 DEGREES
EKG R AXIS: -20 DEGREES
EKG R AXIS: -7 DEGREES
EKG T AXIS: -174 DEGREES
EKG T AXIS: -178 DEGREES
EKG T AXIS: 149 DEGREES
EKG T AXIS: 38 DEGREES
EKG T AXIS: 43 DEGREES
EKG VENTRICULAR RATE: 138 BPM
EKG VENTRICULAR RATE: 63 BPM
EKG VENTRICULAR RATE: 70 BPM
EKG VENTRICULAR RATE: 82 BPM
EKG VENTRICULAR RATE: 87 BPM
EOSINOPHILS ABSOLUTE: 0.34 E9/L (ref 0.05–0.5)
EOSINOPHILS RELATIVE PERCENT: 5.9 % (ref 0–6)
GFR AFRICAN AMERICAN: >60
GFR NON-AFRICAN AMERICAN: >60 ML/MIN/1.73
GLUCOSE BLD-MCNC: 87 MG/DL (ref 74–99)
HCT VFR BLD CALC: 29.5 % (ref 37–54)
HEMOGLOBIN: 9.4 G/DL (ref 12.5–16.5)
IMMATURE GRANULOCYTES #: 0.02 E9/L
IMMATURE GRANULOCYTES %: 0.3 % (ref 0–5)
LV EF: 40 %
LVEF MODALITY: NORMAL
LYMPHOCYTES ABSOLUTE: 1.17 E9/L (ref 1.5–4)
LYMPHOCYTES RELATIVE PERCENT: 20.3 % (ref 20–42)
MAGNESIUM: 1.9 MG/DL (ref 1.6–2.6)
MCH RBC QN AUTO: 31 PG (ref 26–35)
MCHC RBC AUTO-ENTMCNC: 31.9 % (ref 32–34.5)
MCV RBC AUTO: 97.4 FL (ref 80–99.9)
METER GLUCOSE: 118 MG/DL (ref 74–99)
METER GLUCOSE: 161 MG/DL (ref 74–99)
METER GLUCOSE: 95 MG/DL (ref 74–99)
MONOCYTES ABSOLUTE: 0.71 E9/L (ref 0.1–0.95)
MONOCYTES RELATIVE PERCENT: 12.3 % (ref 2–12)
NEUTROPHILS ABSOLUTE: 3.45 E9/L (ref 1.8–7.3)
NEUTROPHILS RELATIVE PERCENT: 59.8 % (ref 43–80)
PDW BLD-RTO: 19.1 FL (ref 11.5–15)
PLATELET # BLD: 220 E9/L (ref 130–450)
PMV BLD AUTO: 9.1 FL (ref 7–12)
POTASSIUM SERPL-SCNC: 3.9 MMOL/L (ref 3.5–5)
RBC # BLD: 3.03 E12/L (ref 3.8–5.8)
SODIUM BLD-SCNC: 137 MMOL/L (ref 132–146)
WBC # BLD: 5.8 E9/L (ref 4.5–11.5)

## 2022-10-10 PROCEDURE — 80048 BASIC METABOLIC PNL TOTAL CA: CPT

## 2022-10-10 PROCEDURE — 2140000000 HC CCU INTERMEDIATE R&B

## 2022-10-10 PROCEDURE — 93018 CV STRESS TEST I&R ONLY: CPT | Performed by: INTERNAL MEDICINE

## 2022-10-10 PROCEDURE — 6360000002 HC RX W HCPCS: Performed by: INTERNAL MEDICINE

## 2022-10-10 PROCEDURE — 6360000002 HC RX W HCPCS: Performed by: FAMILY MEDICINE

## 2022-10-10 PROCEDURE — 83735 ASSAY OF MAGNESIUM: CPT

## 2022-10-10 PROCEDURE — 78452 HT MUSCLE IMAGE SPECT MULT: CPT

## 2022-10-10 PROCEDURE — 93017 CV STRESS TEST TRACING ONLY: CPT

## 2022-10-10 PROCEDURE — 93016 CV STRESS TEST SUPVJ ONLY: CPT | Performed by: INTERNAL MEDICINE

## 2022-10-10 PROCEDURE — 6370000000 HC RX 637 (ALT 250 FOR IP): Performed by: FAMILY MEDICINE

## 2022-10-10 PROCEDURE — 99233 SBSQ HOSP IP/OBS HIGH 50: CPT | Performed by: INTERNAL MEDICINE

## 2022-10-10 PROCEDURE — A9500 TC99M SESTAMIBI: HCPCS | Performed by: RADIOLOGY

## 2022-10-10 PROCEDURE — 85025 COMPLETE CBC W/AUTO DIFF WBC: CPT

## 2022-10-10 PROCEDURE — 78452 HT MUSCLE IMAGE SPECT MULT: CPT | Performed by: INTERNAL MEDICINE

## 2022-10-10 PROCEDURE — 93005 ELECTROCARDIOGRAM TRACING: CPT | Performed by: INTERNAL MEDICINE

## 2022-10-10 PROCEDURE — 97165 OT EVAL LOW COMPLEX 30 MIN: CPT

## 2022-10-10 PROCEDURE — 6370000000 HC RX 637 (ALT 250 FOR IP): Performed by: INTERNAL MEDICINE

## 2022-10-10 PROCEDURE — 36415 COLL VENOUS BLD VENIPUNCTURE: CPT

## 2022-10-10 PROCEDURE — 2580000003 HC RX 258: Performed by: INTERNAL MEDICINE

## 2022-10-10 PROCEDURE — S5553 INSULIN LONG ACTING 5 U: HCPCS | Performed by: INTERNAL MEDICINE

## 2022-10-10 PROCEDURE — 2580000003 HC RX 258: Performed by: FAMILY MEDICINE

## 2022-10-10 PROCEDURE — 82962 GLUCOSE BLOOD TEST: CPT

## 2022-10-10 PROCEDURE — 3430000000 HC RX DIAGNOSTIC RADIOPHARMACEUTICAL: Performed by: RADIOLOGY

## 2022-10-10 RX ORDER — MAGNESIUM SULFATE 1 G/100ML
1000 INJECTION INTRAVENOUS ONCE
Status: COMPLETED | OUTPATIENT
Start: 2022-10-10 | End: 2022-10-10

## 2022-10-10 RX ADMIN — MAGNESIUM SULFATE HEPTAHYDRATE 1000 MG: 1 INJECTION, SOLUTION INTRAVENOUS at 17:28

## 2022-10-10 RX ADMIN — CEFTRIAXONE 1000 MG: 1 INJECTION, POWDER, FOR SOLUTION INTRAMUSCULAR; INTRAVENOUS at 13:59

## 2022-10-10 RX ADMIN — ENOXAPARIN SODIUM 80 MG: 100 INJECTION SUBCUTANEOUS at 21:16

## 2022-10-10 RX ADMIN — SOTALOL HYDROCHLORIDE 120 MG: 120 TABLET ORAL at 21:16

## 2022-10-10 RX ADMIN — SODIUM CHLORIDE, PRESERVATIVE FREE 10 ML: 5 INJECTION INTRAVENOUS at 17:24

## 2022-10-10 RX ADMIN — ENOXAPARIN SODIUM 80 MG: 100 INJECTION SUBCUTANEOUS at 08:21

## 2022-10-10 RX ADMIN — INSULIN GLARGINE-YFGN 10 UNITS: 100 INJECTION, SOLUTION SUBCUTANEOUS at 21:17

## 2022-10-10 RX ADMIN — METOPROLOL SUCCINATE 50 MG: 50 TABLET, EXTENDED RELEASE ORAL at 21:15

## 2022-10-10 RX ADMIN — SODIUM CHLORIDE, PRESERVATIVE FREE 10 ML: 5 INJECTION INTRAVENOUS at 08:22

## 2022-10-10 RX ADMIN — Medication 12 MILLICURIE: at 08:37

## 2022-10-10 RX ADMIN — POLYETHYLENE GLYCOL 3350 17 G: 17 POWDER, FOR SOLUTION ORAL at 17:34

## 2022-10-10 RX ADMIN — Medication 36.2 MILLICURIE: at 09:50

## 2022-10-10 RX ADMIN — ASPIRIN 81 MG: 81 TABLET, COATED ORAL at 08:22

## 2022-10-10 RX ADMIN — REGADENOSON 0.4 MG: 0.08 INJECTION, SOLUTION INTRAVENOUS at 09:50

## 2022-10-10 RX ADMIN — POTASSIUM BICARBONATE 20 MEQ: 782 TABLET, EFFERVESCENT ORAL at 17:22

## 2022-10-10 RX ADMIN — SODIUM CHLORIDE, PRESERVATIVE FREE 10 ML: 5 INJECTION INTRAVENOUS at 22:30

## 2022-10-10 RX ADMIN — SOTALOL HYDROCHLORIDE 120 MG: 120 TABLET ORAL at 08:59

## 2022-10-10 RX ADMIN — SODIUM CHLORIDE, PRESERVATIVE FREE 10 ML: 5 INJECTION INTRAVENOUS at 13:59

## 2022-10-10 RX ADMIN — METOPROLOL SUCCINATE 50 MG: 50 TABLET, EXTENDED RELEASE ORAL at 08:22

## 2022-10-10 NOTE — PROGRESS NOTES
Notified Guevara Dela Cruz patient insurance does not cover eliquis, coumadin will need resumed if stress test is negative per Kristofer Rocha with EP.

## 2022-10-10 NOTE — CARE COORDINATION
10/10/22 Transition of Care: Patient is admitted to telemetry due to c/o rapid heart rate. He has a pacer/aicd. He has a recent history of bladder removal and now has an ileul conduit. He is alert and oriented. He resides with his family. He has a one story home. He does have rails. He has grab bars, walk in shower, and shower chair. He is independent. He follows with Dr Alberta Kovacs and [facility]. He has a recent history at [facility] for therapies. Currently he is pending stress test results. He will need his coumadin resumed if no cardiology interventions. Will follow for further plan of care and readiness to discharge.  Electronically signed by Laurie Syed RN CM on 10/10/2022 at 2:25 PM

## 2022-10-10 NOTE — PROGRESS NOTES
OCCUPATIONAL THERAPY INITIAL EVALUATION    [address] [address] [address]        Date:10/10/2022                                                  Patient Name: Bard Hess    MRN: [id number]    : 1950    Room: [address] [place] [address]          Evaluating OT: Alex Lezama OTR/L; [id number]       Referring Provider: JERI Garcia CNP    Specific Provider Orders/Date: OT Eval and Treat 10/08/22      Diagnosis: Atrial fibrillation with RVR; AICD discharge    Surgery: 10/10/22 EKG     Pertinent Medical History:  has a past medical history of Abdominal aortic aneurysm without rupture, Arthritis, CAD (coronary artery disease), Cancer ([facility] [address] CHF (congestive heart failure) ([facility] [address] Combined systolic and diastolic heart failure ([facility] [address] COPD (chronic obstructive pulmonary disease) ([facility] [address] Diabetes mellitus ([facility] [address] GERD (gastroesophageal reflux disease), History of placement of internal cardiac defibrillator, Hypertension, Sigmoid diverticulosis, Sinusitis, Status post endovascular aneurysm repair (EVAR), Tubular adenoma of colon, Type II endoleak of aortic graft, and Vertigo.   H/o postatectomy 22    Recommended Adaptive Equipment: TBD     Precautions:  Fall Risk     Assessment of current deficits    [x] Functional mobility  [x]ADLs  [x] Strength               []Cognition    [x] Functional transfers   [x] IADLs         [x] Safety Awareness   [x]Endurance    [] Fine Coordination              [x] Balance      [] Vision/perception   []Sensation     []Gross Motor Coordination  [] ROM  [] Delirium                   [] Motor Control     OT PLAN OF CARE   OT POC based on physician orders, patient diagnosis and results of clinical assessment    Frequency/Duration 1-3 days/wk for 2 weeks PRN   Specific OT Treatment Interventions to include:   * Instruction/training on adapted ADL techniques and AE recommendations to increase functional independence within precautions       * Training on energy conservation strategies, correct breathing pattern and techniques to improve independence/tolerance for self-care routine  * Functional transfer/mobility training/DME recommendations for increased independence, safety, and fall prevention  * Patient/Family education to increase follow through with safety techniques and functional independence  * Recommendation of environmental modifications for increased safety with functional transfers/mobility and ADLs  * Therapeutic exercise to improve motor endurance, ROM, and functional strength for ADLs/functional transfers  * Therapeutic activities to facilitate/challenge dynamic balance, stand tolerance for increased safety and independence with ADLs  * Positioning to improve skin integrity, interaction with environment and functional independence    Home Living: Pt lives with brother in 1 story home with 2 steps & 1 handrail to enter. Bathroom setup: [address] owned: [employer]    Prior Level of Function: IND with ADLs , IND with IADLs; engaged in functional mobility with use of  Sign[employer]cane  Driving: Yes  Occupation: None reported    Pain Level: Pt with no c/o pain during session  Cognition: A&O: 4/4; Follows 2 step directions   Memory:  Good   Sequencing:  Fair+   Problem solving:  Fair+   Judgement/safety:  Fair     Functional Assessment:  AM-PAC Daily Activity Raw Score: 16/24   Initial Eval Status  Date: 10/10/22 Treatment Status  Date: STGs = LTGs  Time frame: 10-14 days   Feeding Setup for tray. Independent    Grooming Stand by Assist   Modified [place]    UB Dressing Stand by Assist   Modified [place]    LB Dressing Moderate Assist   Modified [place]    Bathing Minimal Assist  Modified [place]    Toileting Moderate Assist   Modified [place]    Bed Mobility  Supine to sit: Minimal Assist   Sit to supine: Minimal Assist   Supine to sit:  Independent   Sit to supine: Independent Functional Transfers Sit to stand:Minimal Assist   Stand to sit:Minimal Assist  Stand pivot: NT  Commode: NT  Sit to stand:Modified [place]   Stand to sit:Modified [place]   Stand pivot: Modified [place]   Commode: Modified [place]     Functional Mobility Minimal Assist  Use of ww shorter than household distance  Modified [place] with use of Appropriate AD   Balance Sitting:     Static - Supervision     Dynamic - SBA  Standing: Minimal Assist  Sitting:     Static: Independent     Dynamic: Independent  Standing: Modified [place]   Activity Tolerance Fair  Good   Visual/  Perceptual Glasses: Yes  Appears WFL        Safety Fair  Good  during ADL completion     Hand Dominance Right   AROM (PROM) Strength Additional Info:  Goal:   RUE  WFL 4-/5 grossly tested good  and wfl FMC/dexterity noted during ADL tasks   Improve overall RUE strength WFL for participation in functional tasks       LUE WFL 4-/5 grossly tested Good  and wfl FMC/dexterity noted during ADL tasks   Improve overall LUE strength WFL for participation in functional tasks       [facility]   Sensation:  No c/o numbness or tingling BUE  Tone: WFL BUE  Edema: Unremarkable    Comment: Cleared by RN to see pt. Upon arrival patient supine in bed and agreeable to OT session. At end of session, patient supine in bed with call light and phone within reach, +bed alarm, all lines and tubes intact. Overall patient demonstrated decreased independence and safety during completion of ADL/functional transfer/mobility tasks. Therapist facilitated ADL tasks, functional transfers, functional mobility, bed mobility to address safety awareness, implementation of fall prevention strategies & engagement throughout functional tasks. Pt demo'd narrow WILBERTO, flexed posture, & mildly unsteady gait pattern throughout session. Pt required assist to maintain dynamic sitting balance during LB ADL tasks seated EOB.  Pt would benefit from continued skilled OT to increase safety and independence with completion of ADL/IADL tasks for functional independence and quality of life. Rehab Potential: Good for established goals     LTG: maximize independence with ADLs to return to PLOF    Patient and/or family were instructed on functional diagnosis, prognosis/goals and OT plan of care. Demonstrated fair understanding. Eval Complexity: Low  History: Expanded chart review of medical records and additional review of physical, cognitive, or psychosocial history related to current functional performance  Exam: 3+ performance deficits  Assistance/Modification: Min/mod assistance or modifications required to perform tasks. May have comorbidities that affect occupational performance. Evaluation time includes thorough review of current medical information, gathering information on past medical & social history & PLOF, completion of standardized testing, informal observation of tasks, consultation with other medical professions/disciplines, assessment of data & development of POC/goals. Time In: 1:00p  Time Out: 1:15p  Total Treatment Time: 0 minutes    Min Units   OT Eval Low 72516  x     OT Eval Medium 88026      OT Eval High 56590      OT Re-Eval [id number]       Therapeutic Ex 19288       Therapeutic Activities 41043       ADL/Self Care 84789       Orthotic Management 39330       Manual [id number]     Neuro Re-Ed 17604       Non-Billable Time          Evaluation Time additionally includes thorough review of current medical information, gathering information on past medical history/social history and prior level of function, interpretation of standardized testing/informal observation of tasks, assessment of data and development of plan of care and goals.               Juan Nguyen OTR/L; [id number]

## 2022-10-10 NOTE — PLAN OF CARE
Problem: Discharge Planning  Goal: Discharge to home or other facility with appropriate resources  Outcome: Progressing  Flowsheets  Taken 10/10/2022 0747 by Yenni Reddy RN  Discharge to home or other facility with appropriate resources: Identify barriers to discharge with patient and caregiver       Problem: Safety - Adult  Goal: Free from fall injury  Outcome: Progressing  Flowsheets (Taken 10/10/2022 0931)  Free From Fall Injury: Instruct family/caregiver on patient safety     Problem: ABCDS Injury Assessment  Goal: Absence of physical injury  Outcome: Progressing  Flowsheets (Taken 10/10/2022 0931)  Absence of Physical Injury: Implement safety measures based on patient assessment

## 2022-10-10 NOTE — PROGRESS NOTES
[address] [facility],2Nd Floor and [address] [facility] Electrophysiology  Inpatient Progress Report  PATIENT: [facility] RECORD NUMBER: [id number]  DATE OF SERVICE:  10/10/2022  ATTENDING ELECTROPHYSIOLOGIST: Carol Marie MD   PRIMARY ELECTROPHYSIOLOGIST: Rexann Aase, MD  REFERRING PHYSICIAN: Dahlia Valentine DO  CHIEF COMPLAINT: Palpitations and ICD shocks    HPI: This is a 67 y.o. male with a history of   Patient Active Problem List   Diagnosis    Diabetes mellitus ([address]    Essential hypertension    COPD, very severe ([address]    CAD (coronary artery disease)    Tubular adenoma of colon    Sigmoid diverticulosis    Controlled type 2 diabetes mellitus with diabetic nephropathy, with long-term current use of insulin ([address]    Prostate cancer ([address]    Mixed hyperlipidemia    ICD (implantable cardioverter-defibrillator) in place    Cerebellar hemorrhage ([address]    Dilated cardiomyopathy ([address]    AAA (abdominal aortic aneurysm) without rupture    PAF (paroxysmal atrial fibrillation) ([facility])    COVID    Hematuria    Urinary retention    Elevated serum creatinine    Anemia    Chronic renal disease, stage III ([address] [643020]    Malignant neoplasm of overlapping sites of bladder ([facility])    Hyperglycemia    Type II endoleak of aortic graft    Status post endovascular aneurysm repair (EVAR)    Bladder cancer ([address]    AIME (acute kidney injury) ([address]    Shock ([address]    Severe malnutrition (HCC)    Acute blood loss anemia    S/P ileal conduit ([facility])    Tobacco abuse    Atrial fibrillation with RVR ([address]    AICD discharge   who presented to the hospital on 10/8/22 complaining of ICD shocks. The patient has history of paroxysmal atrial fibrillation, sustained monomorphic ventricular tachycardia during EPS in 2013 on Sotalol, dual chamber ICD in situ since 1/15/2014, coronary artery disease status post PCI, mixed cardiomyopathy LV EF 30-35% in 2013, AAA status post EVAR, hypertension, diabetes mellitus, hyperlipidemia, CKD and bladder cancer. He has was diagnosed with new onset atrial fibrillation in January of 2022 during COVID-19 infection. He was seen by Cardiology and was started on Eliquis which he could not afford and was switch to Warfarin. He developed hematuria in May 2022 and was noted to have NSVT and was seen by Dr. Genet Polanco. His Sotalol was increased from 40 mg bid to 80 mg bid. He was last seen in clinic by Dr. Genet Polanco on 6/15/22. The patient was diagnosed with bladder cancer and underwent robotic Cystectomy, prostatectomy with pelvic lymphnode dissection, extracorporeal ileal conduit urinary diversion, and bilateral ureteral stents placement on 9/13/2022. Yesterday while he was at home, he felt palpitations and he experienced ICD firing 5 times. He reporst chest pain after the ICD shocks and came to ED while he was noted to be in AF with RVR at 120-130 bpm (no rhythm strip and EKG available to review). He was started on IV Cardizem drip. ICD interrogation showed atrial tachycardia/atrial flutter  with inappropriate shocks x 5. Currently he is back in NSR with run of PATs on IV Cardizem drip. Cardiac electrophysiology service is consulted for ICD shocks. 10/10/22: The Sotalol was increased to 120mg ever 12 hours he was received 3/6 increased dose and his last QTc was 479ms night and CrCl is 73.67 mL/min, he is on Lovenox and his count is 9.4/29.5 today. He is for a stress test today as well as an echo.            Patient Active Problem List    Diagnosis Date Noted    AICD discharge 10/09/2022     Priority: Medium    Atrial fibrillation with RVR ([address] 10/08/2022     Priority: Medium    Tobacco abuse 09/23/2022     Priority: Medium    Acute blood loss anemia 09/16/2022     Priority: Medium    S/P ileal conduit ([address] 09/16/2022     Priority: Medium    AIME (acute kidney injury) ([facility] 09/14/2022     Priority: Medium    Shock ([address] 09/14/2022     Priority: Medium    Severe malnutrition ([address] 09/14/2022 Priority: Medium    Bladder cancer ([address] 09/13/2022     Priority: Medium    Type II endoleak of aortic graft 07/23/2022     Priority: Medium    Status post endovascular aneurysm repair (EVAR) 07/23/2022     Priority: Medium    Hyperglycemia 07/22/2022     Priority: Medium    Chronic renal disease, stage III [facility]) [751003] 05/17/2022     Priority: Medium    Hematuria 05/05/2022     Priority: Medium    Urinary retention 05/05/2022     Priority: Medium    Elevated serum creatinine 05/05/2022     Priority: Medium    Anemia 05/05/2022     Priority: Medium    Diabetes mellitus ([address] 03/03/2012     Priority: Medium    Essential hypertension 03/03/2012     Priority: Medium    Malignant neoplasm of overlapping sites of bladder ([address] 07/11/2022     Overview Note:     Squamous and sarcomatoid variant features       COVID 02/02/2022    PAF (paroxysmal atrial fibrillation) ([address] 01/07/2022     Overview Note:     CHADSVASC = 4        AAA (abdominal aortic aneurysm) without rupture 10/01/2021     Overview Note:     A. EVAR 10/2021      Cerebellar hemorrhage ([address] 03/14/2020    Dilated cardiomyopathy ([address] 03/14/2020     Overview Note:     A. Echo 2012: EF 30-35%  B. Echo 2014: normal EF      ICD (implantable cardioverter-defibrillator) in place 08/31/2018    Mixed hyperlipidemia 12/01/2017    Prostate cancer ([address] 11/29/2017    Controlled type 2 diabetes mellitus with diabetic nephropathy, with long-term current use of insulin ([address] 01/16/2017    Tubular adenoma of colon 08/31/2015    Sigmoid diverticulosis 08/31/2015    CAD (coronary artery disease) 02/03/2014     Overview Note:     A.  PCI to OM1 and mid CX 2014 [facility])      COPD, very severe ([address] 06/14/2013       Past Medical History:   Diagnosis Date    Abdominal aortic aneurysm without rupture 9/3/2021    Arthritis     CAD (coronary artery disease)     Cancer ([address] 2017    Colon    CHF (congestive heart failure) (HCC)     Combined systolic and diastolic heart failure ([address] MD        dextrose bolus 10% 125 mL  125 mL IntraVENous PRN Baby Seip, MD        Or    dextrose bolus 10% 250 mL  250 mL IntraVENous PRN Baby Seip, MD        glucagon (rDNA) injection 1 mg  1 mg SubCUTAneous PRN Baby Seip, MD        dextrose 10 % infusion   IntraVENous Continuous PRN Baby Seip, MD        perflutren lipid microspheres (DEFINITY) injection 1.65 mg  1.5 mL IntraVENous ONCE PRN Dominique Tejeda MD        perflutren lipid microspheres (DEFINITY) injection 1.65 mg  1.5 mL IntraVENous ONCE PRN Dominique Tejeda MD        regadenoson (LEXISCAN) injection 0.4 mg  0.4 mg IntraVENous ONCE PRN Dominique Tejeda MD        aspirin EC tablet 81 mg  81 mg Oral Daily Donny Barrientos, APRN - CNP   81 mg at 10/10/22 5394    sodium chloride flush 0.9 % injection 5-40 mL  5-40 mL IntraVENous 2 times per day Donny Hernandez Learn, APRN - CNP   10 mL at 10/09/22 2100    sodium chloride flush 0.9 % injection 10 mL  10 mL IntraVENous PRN Donny Hernandez Learn, APRN - CNP   10 mL at 10/08/22 1609    0.9 % sodium chloride infusion   IntraVENous PRN Donny Mary Learn, APRN - CNP        polyethylene glycol (GLYCOLAX) packet 17 g  17 g Oral Daily PRN Donny Swensone Learn, APRN - CNP        acetaminophen (TYLENOL) tablet 650 mg  650 mg Oral Q6H PRN Donny Swensone Learn, APRN - CNP   650 mg at 10/08/22 1616    Or    acetaminophen (TYLENOL) suppository 650 mg  650 mg Rectal Q6H PRN Donny Hernandez Learn, APRN - CNP        enoxaparin (LOVENOX) injection 80 mg  1 mg/kg SubCUTAneous BID Donny Barrientos, APRN - CNP   80 mg at 10/10/22 4624    cefTRIAXone (ROCEPHIN) 1,000 mg in sterile water 10 mL IV syringe  1,000 mg IntraVENous Q24H Baby Seip, MD   1,000 mg at 10/09/22 1404        Allergies   Allergen Reactions    Hydrocodone Nausea And Vomiting     ROS:   Constitutional: Negative for fever. Positive for activity change and negative for appetite change. HENT: Negative for epistaxis. Eyes: Negative for diploplia, blurred vision. Respiratory: Negative for cough. Positive for chest tightness. Negative for shortness of breath and wheezing. Cardiovascular: pertinent positives in HPI  Gastrointestinal: Negative for abdominal pain and blood in stool. All other review of systems are negative     PHYSICAL EXAM:   Vitals:    10/09/22 2357 10/10/22 0334 10/10/22 0747 10/10/22 0822   BP: 118/73 125/75 125/79 125/79   Pulse: 70 67 63 63   Resp: 18 18 17    Temp: 98.5 °F (36.9 °C) 97.9 °F (36.6 °C) 97 °F (36.1 °C)    TempSrc: Temporal Temporal Temporal    SpO2: 96% 97% 96%    Weight:       Height:          Constitutional: Well-developed, no acute distress  Eyes: conjunctivae normal, no xanthelasma   Ears, Nose, Throat: oral mucosa moist, no cyanosis   CV: no JVD. Regular rate and rhythm. Normal S1S2 and no S3. No murmurs, rubs, or gallops. PMI is nondisplaced  Lungs: clear to auscultation bilaterally, normal respiratory effort without used of accessory muscles  Abdomen: soft, non-tender, bowel sounds present, no masses or hepatomegaly   Musculoskeletal: no digital clubbing, no edema   Skin: warm, no rashes   ICD site: well healed. No erosion, infection or migration    I have personally reviewed the laboratory, cardiac diagnostic and radiographic testing as outlined below:    Data:    Recent Labs     10/08/22  0028 10/09/22  0621 10/10/22  0410   WBC 11.1 7.7 5.8   HGB 10.1* 10.0* 9.4*   HCT 32.0* 33.9* 29.5*    126* 220     Recent Labs     10/08/22  0028 10/09/22  0621 10/10/22  0410    132 137   K 3.6 3.3* 3.9    103 108*   CO2 20* 21* 21*   BUN 18 14 15   CREATININE 1.3* 1.1 1.0   CALCIUM 8.8 8.2* 7.9*      Lab Results   Component Value Date/Time    MG 1.4 10/09/2022 06:21 AM     Recent Labs     10/08/22  1434   TSH 1.310     Recent Labs     10/09/22  0621   INR 1.3       CXR 10/8/22:      FINDINGS:   The lungs are without acute focal process. There is no effusion or   pneumothorax.  The cardiomediastinal silhouette is without acute process. The   osseous structures are without acute process. Left-sided transvenous pacer   device. Impression   No acute process. Telemetry 10/10/22 : NSR with PATs rate 63 bpm    EKG 10/9/22: NSR with PACs at 70 bpm, Qtc 479 ms. Please see scan in Cardiology. Echocardiogram 5/10/22: Findings      Left Ventricle   Left ventricular size is normal.   Hypokinesis of the basal lateral wall and septum noted. Ejection fraction is visually estimated at 45-50%. Right Ventricle   Normal right ventricular size and function. Left Atrium   The left atrium is moderately dilated. Right Atrium   Mildly enlarged right atrium size. Mitral Valve   Structurally normal mitral valve. Mild to moderate mitral regurgitation is present. Tricuspid Valve   The tricuspid valve was not well visualized. Mild tricuspid regurgitation. RVSP is 55-60 mmHg. Pulmonary hypertension is moderate to severe . Aortic Valve   Aortic valve opens well. The aortic valve is trileaflet. Pulmonic Valve   The pulmonic valve was not well visualized. Pericardial Effusion   No evidence of pericardial effusion. Aorta   Aortic root dimension within normal limits. Conclusions      Summary   Left ventricular size is normal.   Hypokinesis of the basal lateral wall and septum noted. Ejection fraction is visually estimated at 45-50%. The left atrium is moderately dilated. Structurally normal mitral valve. Mild to moderate mitral regurgitation is present. Aortic valve opens well. The aortic valve is trileaflet. The tricuspid valve was not well visualized. Mild tricuspid regurgitation. RVSP is 55-60 mmHg. Pulmonary hypertension is moderate to severe . No evidence of pericardial effusion.       Signature      ----------------------------------------------------------------   Electronically signed by Arsen Quinones MD(Interpreting   physician) on 05/13/2022 03:39 PM ----------------------------------------------------------------    Cardiac Catheterization 1/11/14:   SUMMARY OF INJECTIONS:      II.   SELECTIVE CORONARY ARTERIOGRAMS:            A.   Right coronary artery - Preponderant. A large, dominant vessel      with marked ectasia noted throughout. No areas of critical stenosis      noted, however. B. Left coronary:         1. Left main trunk - Normal.         2.   Left anterior descending:  A high bifurcation into a moderate 1st          diagonal branch with mild luminal wall irregularities. The distal          anterior descending artery is markedly attenuated, but without areas          of critical stenosis. 3.   Circumflex: In the 1st obtuse marginal branch, there is an          eccentric 70% stenosis proximally. The distal vessel is of moderate          luminal caliber and widely patent. In the continuation of the          circumflex into the 1st posterolateral marginal branch, there is 65%          to 70% stenosis. III. LEFT VENTRICULAR ANGIOGRAM:            Left ventricular cavity size is upper limits of normal to mildly      enlarged with mild hypokinesis of the _____ inferomedial segment. Estimated left ventricular ejection fraction 50%. There is no mitral      regurgitation seen. CONCLUSIONS:   1. Severely stenotic native coronary atherosclerosis. 2.    Mildly impaired left ventricular systolic function. 3.    Abnormal diastolic dysfunction. Dictated by:  Lesvia Kapoor., FACP, [facility], [facility]        Stress Test 9/26/18:   FINDINGS:  There is a normal distribution of left ventricular   myocardial activity on both the stress and rest SPECT myocardial   perfusion images. Left ventricle appears dilated. Gated study shows dyskinetic left ventricular inferior wall motion. Otherwise, normal left ventricular wall motion and myocardial   thickening during systole.          Left ventricular end systolic volume estimated at 85 ml and   end-diastolic volume estimated at 144 ml. Resting left ventricular   ejection fraction over 41%. Impression   1. No evidence of left ventricular myocardial stress-induced   ischemia. 2. Left ventricular ejection fraction estimated at 41%. 3. Dilated left ventricle. Device Interrogation:   Underlying rhythm: PAT with AsVs  Mode: DDD   Battery Voltage/Longevity:  12 months    Charge time: 8.7 seconds  Pacing: A: <0.1%  RV: <0.1%    P wave: 2.0 mV  Impedance: 380 ohms   Threshold: 0.75 V @ 0.4 ms  RV R wave: 11.6 mV  Impedance: 437 ohms   Threshold: 1.0 V @ 0.4 ms  Episodes: AT/AFL since 10/7/22 on 4.19 with RVR and inappropriate ICD shocks x 5. Reprogramming included: none  Overall device function is normal    All device programmable settings were evaluated per above and in the scanned document, along with iterative adjustments (capture thresholds) to assess and select the most appropriate final programming to provide for consistent delivery of the appropriate therapy and to verify function of the device. I have independently reviewed all of the ECGs and rhythm strips per above     Assessment/Plan:  This is a 67 y.o. male with a history of   Patient Active Problem List   Diagnosis    Diabetes mellitus ([address]    Essential hypertension    COPD, very severe ([address]    CAD (coronary artery disease)    Tubular adenoma of colon    Sigmoid diverticulosis    Controlled type 2 diabetes mellitus with diabetic nephropathy, with long-term current use of insulin ([address]    Prostate cancer ([address]    Mixed hyperlipidemia    ICD (implantable cardioverter-defibrillator) in place    Cerebellar hemorrhage ([address]    Dilated cardiomyopathy ([address]    AAA (abdominal aortic aneurysm) without rupture    PAF (paroxysmal atrial fibrillation) (HCC)    COVID    Hematuria    Urinary retention    Elevated serum creatinine    Anemia    Chronic renal disease, stage III ([address] [965599] (based on SCr of 1 mg/dL). 11. bladder cancer  - Underwent robotic Cystectomy, prostatectomy with pelvic lymphnode dissection, extracorporeal ileal conduit urinary diversion, and bilateral ureteral stents placement on 9/13/2022. Recommendations:  Continue Sotalol to 120 mg every 12 hours and monitor QTc per protocol. Continue Toprol XL to 50 mg bid. Continue Lovenox 1 mg/kg every 12 hours and switch to [address] [place] [address] if no contraindications, given the previous cost of Eliquis Warfarin maybe the preferred agent pending the stress test today. Limited echocardiogram for LV function. Nuclear stress test to exclude ischemia due to chest pain and refractory atrial tachycardia and flutter. Keep potassium around 4 and magnesium around 2. I have spent a total of 10 minutes with the patient and the family reviewing the above stated recommendations. And a total of >50% of that time involved face-to-face time providing counseling and or coordination of care with the other providers, reviewing records/tests, counseling/education of the patient, ordering medications/tests/procedures, coordinating care, and documenting clinical information in the EHR. Thank you for allowing me to participate in your patient's care. Please call me if there are any questions or concerns. Discussed with Dr. Nabil Sanchez. Kamron Grimes, APRN - CNP  Cardiac Electrophysiology  [address] [facility]  [facility] [place]: [facility]  9:52 AM  10/10/2022    Attending Physician's Statement    Patient seen with the ANP. Agree with the findings, assessment and plan. Management plan was discussed. I have personally interviewed the patient, independently performed a focused cardiac examination, reviewed the pertinent laboratory and diagnostic testing with the patient and directly participated in the medical decision-making as noted above with the following additions:     Feeling well. Remains in NSR.  EKG this morning showed Qtc of 499 ms. Echo showed LV EF 45-50%. Nuclear stress test showed no ischemia. Physical exam showed no JVD, RRR, normal S1S2, no murmur, clear lungs bilaterally, trace edema    Continue Sotalol 120 mg every 12 hours. Continue Toprol XL 50 mg bid. Continue Lovenox and can restart Warfarin to target INR 2 to 3. Can discharge home tomorrow if Qtc is stable. Keep potassium around 4 and magnesium around 2. I have spent a total of 35 minutes with the patient and the family reviewing the above stated recommendations. And a total of >50% of that time involved face-to-face time providing counseling and/or coordination of care with the other providers, reviewing records/tests, counseling/education of the patient, ordering medications/tests/procedures, coordinating care, and documenting clinical information in the EHR.       Elder Cadr MD  Cardiac Electrophysiology  [address]   [facility] [place]: [facility]  5:15 PM  10/10/2022

## 2022-10-10 NOTE — PROCEDURES
Lexiscan Stress EKG Report: Indication: Refractory arrythmias    Baseline EKG: normal sinus rhythm, occasional PVC noted, unifocal.    Stress EKG: No ST-T changes. Arrhythmias: None. Symptoms: None. Summary:  Unremarkable lexiscan stress EKG. See separate report for stress perfusion results.      Chaya Guzman MD  Cardiologist  Cardiology, [facility]) Physicians

## 2022-10-10 NOTE — PROGRESS NOTES
[facility]   Progress note      Subjective: The patient is awake and alert. No acute events overnight  Denies chest pain, SOB    Objective:    /69   Pulse 64   Temp 97.2 °F (36.2 °C) (Temporal)   Resp 18   Ht 6' (1.829 m)   Wt 173 lb (78.5 kg)   SpO2 96%   BMI 23.46 kg/m²     In: 540 [P.O.:540]  Out: 2075   In: 540   Out: 2075 [Urine:2075]    General appearance: NAD, conversant  HEENT: AT/NC, MMM  Neck: FROM, supple  Lungs: Clear to auscultation  CV: irregular, no MRGs  Vasc: Radial pulses 2+  Abdomen: Soft, non-tender; no masses or HSM  Extremities: No peripheral edema or digital cyanosis  Skin: no rash, lesions or ulcers  Psych: Alert and oriented to person, place and time  Neuro: Alert and interactive     Recent Labs     10/08/22  0028 10/09/22  0621 10/10/22  0410   WBC 11.1 7.7 5.8   HGB 10.1* 10.0* 9.4*   HCT 32.0* 33.9* 29.5*    126* 220       Recent Labs     10/08/22  0028 10/09/22  0621 10/10/22  0410    132 137   K 3.6 3.3* 3.9    103 108*   CO2 20* 21* 21*   BUN 18 14 15   CREATININE 1.3* 1.1 1.0   CALCIUM 8.8 8.2* 7.9*       Assessment:    Principal Problem:    Atrial fibrillation with RVR (HCC)  Active Problems:    H/O total cystectomy    AICD discharge  Resolved Problems:    * No resolved hospital problems. *      Plan:    79-year-old male with a history of bladder cancer status post recent cystoscopy prostatectomy and ileal conduit urinary diversion with urostomy placement last week,  and history of A. fib presented to the ED with complaints of chest pain and irregular heartbeat and his ICD had fired 5 times is admitted to telemetry unit.   Concern for UTI sepsis post procedure/instrumentation     10/8  Rate control-Cardizem drip with titration parameters  Consult cardiology/EP - await input  Chads - Vasc -  patient was receiving lovenox injection 40 mg daily to end on 10/11/2022  TSH   Rocephin 1 g daily with iv fluids   Check procal no urine culture      10/9  no further rigors or chills, afebrile  Continue IV Rocephin, blood cultures unremarkable to date-White count 7.7  Retroperitoneal ultrasound without hydronephrosis or pyelonephritis  Elevated rate this morning which has since been controlled by medication adjustment by EP-sotalol and Toprol-XL  Initiated on Lovenox 1 mg/kg  subcu twice daily, will need p.o. anticoagulation with Eliquis   supplement magnesium and potassium, 2 g and 40 mEq p.o. respectively  Heme-onc consultation for bladder cancer    10/10/2022  WBC 5.8  Stress test today -abnormal myocardial perfusion scan due to moderate global hypokinesis, no perfusion defect, EF 40  Echo completed ejection fraction 45 to 50%  QtC - 499 with increased dose of sotalol  Transition Lovenox to oral anticoagulation at discretion of cardiology for A. fib  Await oncology consult, possible discharge tomorrow      Code Status: Full  Consultants: Cardiology, Oncology    DVT Prophylaxis - Lovenox   PT/OT  Discharge planning - Possible LESLI placement      JERI Neumann CNP  1:50 PM  10/10/2022   Above note edited to reflect my thoughts     I personally saw, examined and provided care for the patient. Radiographs, labs and medication list were reviewed by me independently. The case was discussed in detail and plans for care were established. Review of Dinah BOSCH CNP, documentation was conducted and revisions were made as appropriate directly by me. I agree with the above documented exam, problem list, and plan of care.      Meir Dias MD  6:15 PM  10/10/2022

## 2022-10-11 PROBLEM — F01.A0 MILD VASCULAR DEMENTIA: Status: ACTIVE | Noted: 2022-01-01

## 2022-10-11 LAB
ANION GAP SERPL CALCULATED.3IONS-SCNC: 10 MMOL/L (ref 7–16)
BUN BLDV-MCNC: 12 MG/DL (ref 6–23)
CALCIUM SERPL-MCNC: 8.6 MG/DL (ref 8.6–10.2)
CHLORIDE BLD-SCNC: 106 MMOL/L (ref 98–107)
CO2: 20 MMOL/L (ref 22–29)
CREAT SERPL-MCNC: 1.1 MG/DL (ref 0.7–1.2)
EKG ATRIAL RATE: 65 BPM
EKG ATRIAL RATE: 70 BPM
EKG P AXIS: 23 DEGREES
EKG P AXIS: 32 DEGREES
EKG P-R INTERVAL: 138 MS
EKG P-R INTERVAL: 142 MS
EKG Q-T INTERVAL: 430 MS
EKG Q-T INTERVAL: 462 MS
EKG QRS DURATION: 84 MS
EKG QRS DURATION: 86 MS
EKG QTC CALCULATION (BAZETT): 464 MS
EKG QTC CALCULATION (BAZETT): 480 MS
EKG R AXIS: -4 DEGREES
EKG R AXIS: 36 DEGREES
EKG T AXIS: -32 DEGREES
EKG T AXIS: 27 DEGREES
EKG VENTRICULAR RATE: 65 BPM
EKG VENTRICULAR RATE: 70 BPM
GFR AFRICAN AMERICAN: >60
GFR NON-AFRICAN AMERICAN: >60 ML/MIN/1.73
GLUCOSE BLD-MCNC: 131 MG/DL (ref 74–99)
INR BLD: 1.2
MAGNESIUM: 1.9 MG/DL (ref 1.6–2.6)
METER GLUCOSE: 117 MG/DL (ref 74–99)
METER GLUCOSE: 118 MG/DL (ref 74–99)
METER GLUCOSE: 135 MG/DL (ref 74–99)
METER GLUCOSE: 234 MG/DL (ref 74–99)
POTASSIUM SERPL-SCNC: 4.6 MMOL/L (ref 3.5–5)
PROTHROMBIN TIME: 12.7 SEC (ref 9.3–12.4)
SODIUM BLD-SCNC: 136 MMOL/L (ref 132–146)

## 2022-10-11 PROCEDURE — 2580000003 HC RX 258: Performed by: INTERNAL MEDICINE

## 2022-10-11 PROCEDURE — 93005 ELECTROCARDIOGRAM TRACING: CPT | Performed by: INTERNAL MEDICINE

## 2022-10-11 PROCEDURE — 6360000002 HC RX W HCPCS: Performed by: INTERNAL MEDICINE

## 2022-10-11 PROCEDURE — 6370000000 HC RX 637 (ALT 250 FOR IP): Performed by: INTERNAL MEDICINE

## 2022-10-11 PROCEDURE — 2580000003 HC RX 258: Performed by: FAMILY MEDICINE

## 2022-10-11 PROCEDURE — 97161 PT EVAL LOW COMPLEX 20 MIN: CPT

## 2022-10-11 PROCEDURE — 82962 GLUCOSE BLOOD TEST: CPT

## 2022-10-11 PROCEDURE — 99233 SBSQ HOSP IP/OBS HIGH 50: CPT | Performed by: STUDENT IN AN ORGANIZED HEALTH CARE EDUCATION/TRAINING PROGRAM

## 2022-10-11 PROCEDURE — 6360000002 HC RX W HCPCS: Performed by: FAMILY MEDICINE

## 2022-10-11 PROCEDURE — 2140000000 HC CCU INTERMEDIATE R&B

## 2022-10-11 PROCEDURE — 6370000000 HC RX 637 (ALT 250 FOR IP): Performed by: FAMILY MEDICINE

## 2022-10-11 PROCEDURE — 83735 ASSAY OF MAGNESIUM: CPT

## 2022-10-11 PROCEDURE — 85610 PROTHROMBIN TIME: CPT

## 2022-10-11 PROCEDURE — 36415 COLL VENOUS BLD VENIPUNCTURE: CPT

## 2022-10-11 PROCEDURE — 6370000000 HC RX 637 (ALT 250 FOR IP): Performed by: NURSE PRACTITIONER

## 2022-10-11 PROCEDURE — 80048 BASIC METABOLIC PNL TOTAL CA: CPT

## 2022-10-11 RX ORDER — PANTOPRAZOLE SODIUM 40 MG/1
40 TABLET, DELAYED RELEASE ORAL
Status: DISCONTINUED | OUTPATIENT
Start: 2022-10-12 | End: 2022-10-14 | Stop reason: HOSPADM

## 2022-10-11 RX ORDER — WARFARIN SODIUM 5 MG/1
5 TABLET ORAL
Status: COMPLETED | OUTPATIENT
Start: 2022-10-11 | End: 2022-10-11

## 2022-10-11 RX ADMIN — ENOXAPARIN SODIUM 80 MG: 100 INJECTION SUBCUTANEOUS at 21:07

## 2022-10-11 RX ADMIN — POLYETHYLENE GLYCOL 3350 17 G: 17 POWDER, FOR SOLUTION ORAL at 15:24

## 2022-10-11 RX ADMIN — METOPROLOL SUCCINATE 50 MG: 50 TABLET, EXTENDED RELEASE ORAL at 08:55

## 2022-10-11 RX ADMIN — SODIUM CHLORIDE, PRESERVATIVE FREE 10 ML: 5 INJECTION INTRAVENOUS at 21:08

## 2022-10-11 RX ADMIN — ENOXAPARIN SODIUM 80 MG: 100 INJECTION SUBCUTANEOUS at 08:54

## 2022-10-11 RX ADMIN — ASPIRIN 81 MG: 81 TABLET, COATED ORAL at 08:54

## 2022-10-11 RX ADMIN — SODIUM CHLORIDE, PRESERVATIVE FREE 10 ML: 5 INJECTION INTRAVENOUS at 08:54

## 2022-10-11 RX ADMIN — SOTALOL HYDROCHLORIDE 120 MG: 120 TABLET ORAL at 08:55

## 2022-10-11 RX ADMIN — INSULIN LISPRO 1 UNITS: 100 INJECTION, SOLUTION INTRAVENOUS; SUBCUTANEOUS at 12:15

## 2022-10-11 RX ADMIN — CEFTRIAXONE 1000 MG: 1 INJECTION, POWDER, FOR SOLUTION INTRAMUSCULAR; INTRAVENOUS at 15:10

## 2022-10-11 RX ADMIN — METOPROLOL SUCCINATE 50 MG: 50 TABLET, EXTENDED RELEASE ORAL at 21:06

## 2022-10-11 RX ADMIN — SOTALOL HYDROCHLORIDE 120 MG: 120 TABLET ORAL at 21:06

## 2022-10-11 RX ADMIN — WARFARIN SODIUM 5 MG: 5 TABLET ORAL at 18:40

## 2022-10-11 ASSESSMENT — PAIN SCALES - GENERAL
PAINLEVEL_OUTOF10: 0
PAINLEVEL_OUTOF10: 0

## 2022-10-11 NOTE — PROGRESS NOTES
[facility]   Progress note      Subjective: The patient is awake and alert. Lying in bed without complaints  No acute events overnight  Denies chest pain, SOB    Objective:    /68   Pulse 68   Temp 98 °F (36.7 °C) (Temporal)   Resp 16   Ht 6' (1.829 m)   Wt 173 lb (78.5 kg)   SpO2 97%   BMI 23.46 kg/m²     In: 540 [P.O.:540]  Out: 1825   In: 540   Out: 1825 [Urine:1825]    General appearance: NAD, conversant  HEENT: AT/NC, MMM  Neck: FROM, supple  Lungs: Clear to auscultation  CV: irregular, no MRGs  Vasc: Radial pulses 2+  Abdomen: Soft, non-tender; no masses or HSM  Extremities: No peripheral edema or digital cyanosis  Skin: no rash, lesions or ulcers  Psych: Alert and oriented to person, place and time  Neuro: Alert and interactive     Recent Labs     10/09/22  0621 10/10/22  0410   WBC 7.7 5.8   HGB 10.0* 9.4*   HCT 33.9* 29.5*   * 220       Recent Labs     10/09/22  0621 10/10/22  0410    137   K 3.3* 3.9    108*   CO2 21* 21*   BUN 14 15   CREATININE 1.1 1.0   CALCIUM 8.2* 7.9*       Assessment:    Principal Problem:    Atrial fibrillation with RVR (HCC)  Active Problems:    H/O total cystectomy    AICD discharge  Resolved Problems:    * No resolved hospital problems. *      Plan:    69-year-old male with a history of bladder cancer status post recent cystoscopy prostatectomy and ileal conduit urinary diversion with urostomy placement last week,  and history of A. fib presented to the ED with complaints of chest pain and irregular heartbeat and his ICD had fired 5 times is admitted to telemetry unit.   Concern for UTI sepsis post procedure/instrumentation     10/8  Rate control-Cardizem drip with titration parameters  Consult cardiology/EP - await input  Chads - Vasc -  patient was receiving lovenox injection 40 mg daily to end on 10/11/2022  TSH   Rocephin 1 g daily with iv fluids   Check procal no urine culture      10/9  no further rigors or chills, afebrile  Continue IV Rocephin, blood cultures unremarkable to date-White count 7.7  Retroperitoneal ultrasound without hydronephrosis or pyelonephritis  Elevated rate this morning which has since been controlled by medication adjustment by EP-sotalol and Toprol-XL  Initiated on Lovenox 1 mg/kg  subcu twice daily, will need p.o. anticoagulation with Eliquis   supplement magnesium and potassium, 2 g and 40 mEq p.o. respectively  Heme-onc consultation for bladder cancer    10/10/2022  WBC 5.8  Stress test today -abnormal myocardial perfusion scan due to moderate global hypokinesis, no perfusion defect, EF 40  Echo completed ejection fraction 45 to 50%  QtC - 499 with increased dose of sotalol  Transition Lovenox to oral anticoagulation at discretion of cardiology for A. fib  Await oncology consult, possible discharge tomorrow    10/11/2022  Begin coumadin - pharmacy to dose PT/Inr daily - bridge with Lovenox. This can be monitored at facility  QtC - 480 with 5/6 increased doses of Sotolol will continue to monitor  Oncology - okay to follow with oncology as outpatient once final biopsy reports are back from [facility]-no immediate need for inpatient evaluation  Discharge plan tomorrow once final dose of sotalol is administered, if patient doing okay    Code Status: Full  Consultants: Cardiology, Oncology    DVT Prophylaxis - Coumadin  PT/OT  Discharge planning - [facility] accepted await [address], APRN - CNP  2:13 PM  10/11/2022     Above note edited to reflect my thoughts     I personally saw, examined and provided care for the patient. Radiographs, labs and medication list were reviewed by me independently. The case was discussed in detail and plans for care were established. Review of Dinah BOSCH CNP, documentation was conducted and revisions were made as appropriate directly by me. I agree with the above documented exam, problem list, and plan of care.      Cinda Lamb MD  7:01 PM  10/11/2022

## 2022-10-11 NOTE — PROGRESS NOTES
Physical Therapy  Physical Therapy Initial Assessment     Name: Dasha Chiang  : 1950  MRN: [id number]      Date of Service: 10/11/2022    Evaluating PT:  Rey Silva, PT, DPT [id number]    Room #:  1855/9324-V  Diagnosis:  Atrial fibrillation with RVR ([facility] [I48.91]  AICD discharge [Z45.02]  PMHx/PSHx:    Past Medical History:   Diagnosis Date    Abdominal aortic aneurysm without rupture 9/3/2021    Arthritis     CAD (coronary artery disease)     Cancer ([facility] 2017    Colon    CHF (congestive heart failure) ([facility])     Combined systolic and diastolic heart failure ([facility] 6/15/13    echo LVEF of 30-35%  stage II diastolic dysfunction    COPD (chronic obstructive pulmonary disease) ([facility]     Diabetes mellitus ([facility]     GERD (gastroesophageal reflux disease)     History of placement of internal cardiac defibrillator ? [employer]tr[employer] ([place])    Hypertension     Sigmoid diverticulosis 2015    Sinusitis     Status post endovascular aneurysm repair (EVAR) 2022    Tubular adenoma of colon 2015    Type II endoleak of aortic graft 2022    Vertigo      Procedure/Surgery:  None  Precautions:  Falls  Equipment Needs:  TBD    SUBJECTIVE:    Pt lives with brother in a 1 story home with 2 stairs to enter and no rail. Pt ambulated with hurry-cane PTA. Pt also owns WC and rollator. OBJECTIVE:   Initial Evaluation  Date: 10/11/22 Treatment Short Term/ Long Term   Goals   AM-PAC 6 Clicks 65/25     Was pt agreeable to Eval/treatment? Yes     Does pt have pain?  No c/o pain     Bed Mobility  Rolling: NT  Supine to sit: SBA  Sit to supine: SBA  Scooting: SBA  Mod Independent   Transfers Sit to stand: Shashank  Stand to sit: Shashank  Stand pivot: Shashank with [employer]  Mod Independent with [employer]   Ambulation   60 feet with Shashank with [employer]  >150 feet with Mod Independent with [employer]    Stair negotiation: ascended and descended NT  >4 steps with 1 rail Mod Independent   ROM BUE:  WFL  BLE:  WFL     Strength BUE:  WFL  BLE:  4/5 grossly  Increase by 1/3 MMT grade   Balance Sitting EOB:  SBA  Dynamic Standing:  Shashank with [employer]  Sitting EOB:  Independent  Dynamic Standing: Mod Independent with [employer]     Pt is A & O x 4  Sensation:  No reported paresthesias  Edema:  None    Therapeutic Exercises:  NA    Patient education  Pt educated on safety    Patient response to education:   Pt verbalized understanding Pt demonstrated skill Pt requires further education in this area   x x x     ASSESSMENT:    Conditions Requiring Skilled Therapeutic Intervention:    [x]Decreased strength     []Decreased ROM  [x]Decreased functional mobility  [x]Decreased balance   [x]Decreased endurance   [x]Decreased posture  []Decreased sensation  []Decreased coordination   []Decreased vision  [x]Decreased safety awareness   []Increased pain       Comments:  Pt was in bed upon arrival, agreeable to initial evaluation. Slight dizziness reported at EOB but improved rather quickly. Pt stood with flexed posture that continued into ambulation. Pt's gait was slow with impaired [employer] approximation, specifically when turning. Fatigue limited further activity. Pt declined sitting in chair. Pt was left in bed with all needs met and call light in reach. Educated pt on need for further therapy and assistance required to return home. Treatment:  Patient practiced and was instructed in the following treatment:    Bed mobility training - pt given verbal and tactile cues to facilitate proper sequencing and safety during supine>sit as well as provided with physical assistance. Sitting EOB for >3 minutes for upright tolerance, postural awareness and BLE ROM  Transfer training - pt was given verbal and tactile cues to facilitate proper hand placement, technique and safety during sit to stand, stand to sit and stand pivot transfers as well as provided with physical assistance.    Gait training- pt was given verbal and tactile cues to facilitate safety, balance and use of [employer] during ambulation as well as provided with physical assistance. Pt's/ family goals   1. Return home    Prognosis is good for reaching above PT goals. Patient and or family understand(s) diagnosis, prognosis, and plan of care. yes    PHYSICAL THERAPY PLAN OF CARE:    PT POC is established based on physician order and patient diagnosis     Referring provider/PT Order:  JERI Garcia CNP /10/08/22 1230 PT eval and treat  Diagnosis:  Atrial fibrillation with RVR ([address] [I48.91]  AICD discharge [Z45.02]  Specific instructions for next treatment:  Progress activity    Current Treatment Recommendations:     [x] Strengthening to improve independence with functional mobility   [] ROM to improve independence with functional mobility   [x] Balance Training to improve static/dynamic balance and to reduce fall risk  [x] Endurance Training to improve activity tolerance during functional mobility   [x] Transfer Training to improve safety and independence with all functional transfers   [x] Gait Training to improve gait mechanics, endurance and asses need for appropriate assistive device  [x] Stair Training in preparation for safe discharge home and/or into the community   [] Positioning to prevent skin breakdown and contractures  [x] Safety and Education Training   [x] Patient/Caregiver Education   [] HEP  [] Other     PT long term treatment goals are located in above grid    Frequency of treatments: 2-5x/week x 1-2 weeks. Time in  0746  Time out  0800    Total Treatment Time  - minutes     Evaluation Time includes thorough review of current medical information, gathering information on past medical history/social history and prior level of function, completion of standardized testing/informal observation of tasks, assessment of data and education on plan of care and goals.     CPT codes:  [x] Low Complexity PT evaluation 39557  [] Moderate Complexity PT evaluation 74974  [] High Complexity PT evaluation 38755  [] PT Re-evaluation [id number]  [] Gait training 54930 - minutes  [] Manual therapy 78456 - minutes  [] Therapeutic activities 71568 - minutes  [] Therapeutic exercises 51372 - minutes  [] Neuromuscular reeducation 86910 - minutes     Brien Chavez PT, DPT  [id number] No pallor, no cervical/supraclavicular/inguinal adenopathy.  No splenomegaly

## 2022-10-11 NOTE — PROGRESS NOTES
[address] [facility],2Nd Floor and [address] [facility] Electrophysiology  Inpatient Progress Report  PATIENT: [facility] RECORD NUMBER: [id number]  DATE OF SERVICE:  10/11/2022  ATTENDING ELECTROPHYSIOLOGIST: Clarance Brittle, MD   PRIMARY ELECTROPHYSIOLOGIST: Hien Vega MD  REFERRING PHYSICIAN: Albertina Perea DO  CHIEF COMPLAINT: Palpitations and ICD shocks    HPI: This is a 67 y.o. male with a history of   Patient Active Problem List   Diagnosis    Diabetes mellitus ([address]    Essential hypertension    COPD, very severe ([address]    CAD (coronary artery disease)    Tubular adenoma of colon    Sigmoid diverticulosis    Controlled type 2 diabetes mellitus with diabetic nephropathy, with long-term current use of insulin ([address]    Prostate cancer ([address]    Mixed hyperlipidemia    ICD (implantable cardioverter-defibrillator) in place    Cerebellar hemorrhage ([address]    Dilated cardiomyopathy ([address]    AAA (abdominal aortic aneurysm) without rupture    PAF (paroxysmal atrial fibrillation) (HCC)    COVID    Hematuria    Urinary retention    Elevated serum creatinine    Anemia    Chronic renal disease, stage III ([address] [328004]    Malignant neoplasm of overlapping sites of bladder (HCC)    Hyperglycemia    Type II endoleak of aortic graft    Status post endovascular aneurysm repair (EVAR)    Bladder cancer ([address]    AIME (acute kidney injury) ([address]    Shock ([address]    Severe malnutrition (HCC)    Acute blood loss anemia    H/O total cystectomy    Tobacco abuse    Atrial fibrillation with RVR ([address]    AICD discharge    Mild vascular dementia   who presented to the hospital on 10/8/22 complaining of ICD shocks. The patient has history of paroxysmal atrial fibrillation, sustained monomorphic ventricular tachycardia during EPS in 2013 on Sotalol, dual chamber ICD in situ since 1/15/2014, coronary artery disease status post PCI, mixed cardiomyopathy LV EF 30-35% in 2013, AAA status post EVAR, hypertension, diabetes mellitus, hyperlipidemia, CKD and bladder cancer. He has was diagnosed with new onset atrial fibrillation in January of 2022 during COVID-19 infection. He was seen by Cardiology and was started on Eliquis which he could not afford and was switch to Warfarin. He developed hematuria in May 2022 and was noted to have NSVT and was seen by Dr. Osvaldo Gomes. His Sotalol was increased from 40 mg bid to 80 mg bid. He was last seen in clinic by Dr. Osvaldo Gomes on 6/15/22. The patient was diagnosed with bladder cancer and underwent robotic Cystectomy, prostatectomy with pelvic lymphnode dissection, extracorporeal ileal conduit urinary diversion, and bilateral ureteral stents placement on 9/13/2022. Yesterday while he was at home, he felt palpitations and he experienced ICD firing 5 times. He reporst chest pain after the ICD shocks and came to ED while he was noted to be in AF with RVR at 120-130 bpm (no rhythm strip and EKG available to review). He was started on IV Cardizem drip. ICD interrogation showed atrial tachycardia/atrial flutter  with inappropriate shocks x 5. Currently he is back in NSR with run of PATs on IV Cardizem drip. Cardiac electrophysiology service is consulted for ICD shocks. 10/10/22: The Sotalol was increased to 120mg ever 12 hours he was received 3/6 increased dose and his last QTc was 479ms night and CrCl is 73.67 mL/min, he is on Lovenox and his count is 9.4/29.5 today. He is for a stress test today as well as an echo. 10/11/2022: He has completed 5/6 increased doses without QTc prolongation today QTc 480ms,  he is tolerating Lovenox without bleeding, and is agreeable to start on Coumadin as long as his PCP manages him at home due to his prior success.  The VF zone was made 230 bpm from 200 bpm.       Patient Active Problem List    Diagnosis Date Noted    Mild vascular dementia 10/11/2022     Priority: Medium    AICD discharge 10/09/2022     Priority: Medium    Atrial fibrillation with RVR ([facility] 10/08/2022     Priority: Medium    Tobacco abuse 09/23/2022     Priority: Medium    Acute blood loss anemia 09/16/2022     Priority: Medium    H/O total cystectomy 09/16/2022     Priority: Medium     Overview Note:     Ileal conduit       AIME (acute kidney injury) ([address] 09/14/2022     Priority: Medium    Shock ([address] 09/14/2022     Priority: Medium    Severe malnutrition ([address] 09/14/2022     Priority: Medium    Bladder cancer ([address] 09/13/2022     Priority: Medium    Type II endoleak of aortic graft 07/23/2022     Priority: Medium    Status post endovascular aneurysm repair (EVAR) 07/23/2022     Priority: Medium    Hyperglycemia 07/22/2022     Priority: Medium    Chronic renal disease, stage III [facility]) [363145] 05/17/2022     Priority: Medium    Hematuria 05/05/2022     Priority: Medium    Urinary retention 05/05/2022     Priority: Medium    Elevated serum creatinine 05/05/2022     Priority: Medium    Anemia 05/05/2022     Priority: Medium    Diabetes mellitus ([address] 03/03/2012     Priority: Medium    Essential hypertension 03/03/2012     Priority: Medium    Malignant neoplasm of overlapping sites of bladder ([address] 07/11/2022     Overview Note:     Squamous and sarcomatoid variant features       COVID 02/02/2022    PAF (paroxysmal atrial fibrillation) ([address] 01/07/2022     Overview Note:     CHADSVASC = 4        AAA (abdominal aortic aneurysm) without rupture 10/01/2021     Overview Note:     A.  EVAR 10/2021      Cerebellar hemorrhage ([address] 03/14/2020    Dilated cardiomyopathy ([address] 03/14/2020     Overview Note:     A. Echo 2012: EF 30-35%  B. Echo 2014: normal EF      ICD (implantable cardioverter-defibrillator) in place 08/31/2018    Mixed hyperlipidemia 12/01/2017    Prostate cancer ([address] 11/29/2017    Controlled type 2 diabetes mellitus with diabetic nephropathy, with long-term current use of insulin ([address] 01/16/2017    Tubular adenoma of colon 08/31/2015    Sigmoid diverticulosis 2015    CAD (coronary artery disease) 2014     Overview Note:     A. PCI to OM1 and mid CX 2014 [facility])      COPD, very severe ([facility] 2013       Past Medical History:   Diagnosis Date    Abdominal aortic aneurysm without rupture 9/3/2021    Arthritis     CAD (coronary artery disease)     Cancer ([facility]     Colon    CHF (congestive heart failure) ([facility])     Combined systolic and diastolic heart failure ([facility] 6/15/13    echo LVEF of 30-35%  stage II diastolic dysfunction    COPD (chronic obstructive pulmonary disease) (HCC)     Diabetes mellitus (HCC)     GERD (gastroesophageal reflux disease)     History of placement of internal cardiac defibrillator ? [email or web address])    Hypertension     Sigmoid diverticulosis 2015    Sinusitis     Status post endovascular aneurysm repair (EVAR) 2022    Tubular adenoma of colon 2015    Type II endoleak of aortic graft 2022    Vertigo        Family History   Problem Relation Age of Onset    Heart Disease Mother     Cancer Father         abdominal    Cancer Brother         prostate    Cancer Brother         digestive       Social History     Tobacco Use    Smoking status: Former     Packs/day: 2.00     Years: 50.00     Pack years: 100.00     Types: Cigarettes     Start date: 1970     Quit date: 2014     Years since quittin.7    Smokeless tobacco: Never   Substance Use Topics    Alcohol use:  Yes     Alcohol/week: 1.0 standard drink     Types: 1 Glasses of wine per week     Comment: rarely       Current Facility-Administered Medications   Medication Dose Route Frequency Provider Last Rate Last Admin    sotalol (BETAPACE) tablet 120 mg  120 mg Oral BID Charmayne Guadalajara, MD   120 mg at 10/11/22 0855    metoprolol succinate (TOPROL XL) extended release tablet 50 mg  50 mg Oral BID Charmayne Guadalajara, MD   50 mg at 10/11/22 0855    insulin glargine-yfgn (SEMGLEE-YFGN) injection vial 10 Units  10 Units SubCUTAneous Nightly Maria Luisa Lai Hilary Lamas MD   10 Units at 10/10/22 2117    insulin lispro (HUMALOG) injection vial 0-4 Units  0-4 Units SubCUTAneous TID WC Pernell Nuñez MD   1 Units at 10/11/22 1215    insulin lispro (HUMALOG) injection vial 0-4 Units  0-4 Units SubCUTAneous Nightly Pernell Nuñez MD        glucose chewable tablet 16 g  4 tablet Oral PRN Pernell Nuñez MD        dextrose bolus 10% 125 mL  125 mL IntraVENous PRN Pernell Nuñez MD        Or    dextrose bolus 10% 250 mL  250 mL IntraVENous PRN Pernell Nuñez MD        glucagon (rDNA) injection 1 mg  1 mg SubCUTAneous PRN Pernell Nuñez MD        dextrose 10 % infusion   IntraVENous Continuous PRN Pernell Nuñez MD        perflutren lipid microspheres (DEFINITY) injection 1.65 mg  1.5 mL IntraVENous ONCE PRN Patrice Angel MD        perflutren lipid microspheres (DEFINITY) injection 1.65 mg  1.5 mL IntraVENous ONCE PRN Tawana Lamb MD        aspirin EC tablet 81 mg  81 mg Oral Daily Leidy Nae Learn, APRN - CNP   81 mg at 10/11/22 0854    sodium chloride flush 0.9 % injection 5-40 mL  5-40 mL IntraVENous 2 times per day Leidy Nae Learn, APRN - CNP   10 mL at 10/11/22 0854    sodium chloride flush 0.9 % injection 10 mL  10 mL IntraVENous PRN Leidy Diazows Learn, APRN - CNP   10 mL at 10/10/22 1724    0.9 % sodium chloride infusion   IntraVENous PRN Leidy Diazows Learn, APRN - CNP        polyethylene glycol (GLYCOLAX) packet 17 g  17 g Oral Daily PRN Leidy Nae Learn, APRN - CNP   17 g at 10/10/22 1734    acetaminophen (TYLENOL) tablet 650 mg  650 mg Oral Q6H PRN Leidy Nae Learn, APRN - CNP   650 mg at 10/08/22 1616    Or    acetaminophen (TYLENOL) suppository 650 mg  650 mg Rectal Q6H PRN Leidy Nae Learn, APRN - CNP        enoxaparin (LOVENOX) injection 80 mg  1 mg/kg SubCUTAneous BID Leidy Nae Learn, APRN - CNP   80 mg at 10/11/22 0854    cefTRIAXone (ROCEPHIN) 1,000 mg in sterile water 10 mL IV syringe  1,000 mg IntraVENous Q24H Pernell Nuñez MD   1,000 mg at 10/10/22 0230 Allergies   Allergen Reactions    Hydrocodone Nausea And Vomiting     ROS:   Constitutional: Negative for fever. Positive for activity change and negative for appetite change. HENT: Negative for epistaxis. Eyes: Negative for diploplia, blurred vision. Respiratory: Negative for cough. Positive for chest tightness. Negative for shortness of breath and wheezing. Cardiovascular: pertinent positives in HPI  Gastrointestinal: Negative for abdominal pain and blood in stool. All other review of systems are negative     PHYSICAL EXAM:   Vitals:    10/11/22 0003 10/11/22 0539 10/11/22 0726 10/11/22 1126   BP: 110/84 131/79 132/74 121/68   Pulse: 70 66 69 68   Resp: 15 12 16 16   Temp: 97.6 °F (36.4 °C) 97.7 °F (36.5 °C) 98 °F (36.7 °C) 98 °F (36.7 °C)   TempSrc: Temporal Temporal Temporal Temporal   SpO2: 100% 100% 95% 97%   Weight:       Height:          Constitutional: Well-developed, no acute distress, pale   Eyes: conjunctivae normal, no xanthelasma   Ears, Nose, Throat: oral mucosa moist, no cyanosis   CV: no JVD. Regular rate and rhythm. Normal S1S2 and no S3. No murmurs, rubs, or gallops. PMI is nondisplaced  Lungs: clear to auscultation bilaterally, normal respiratory effort without used of accessory muscles  Abdomen: soft, non-tender, bowel sounds present, no masses or hepatomegaly   Musculoskeletal: no digital clubbing, no edema   Skin: warm, no rashes   ICD site: well healed.  No erosion, infection or migration    I have personally reviewed the laboratory, cardiac diagnostic and radiographic testing as outlined below:    Data:    Recent Labs     10/09/22  0621 10/10/22  0410   WBC 7.7 5.8   HGB 10.0* 9.4*   HCT 33.9* 29.5*   * 220     Recent Labs     10/09/22  0621 10/10/22  0410    137   K 3.3* 3.9    108*   CO2 21* 21*   BUN 14 15   CREATININE 1.1 1.0   CALCIUM 8.2* 7.9*      Lab Results   Component Value Date/Time    MG 1.9 10/11/2022 05:27 AM     Recent Labs     10/08/22  5874 TSH 1.310     Recent Labs     10/09/22  0621   INR 1.3       CXR 10/8/22:      FINDINGS:   The lungs are without acute focal process. There is no effusion or   pneumothorax. The cardiomediastinal silhouette is without acute process. The   osseous structures are without acute process. Left-sided transvenous pacer   device. Impression   No acute process. Telemetry 10/11/22 : NSR with PATs rate 63 bpm    EKG 10/11/22: NSR with PVCs at 65 bpm, Qtc 480 ms. Please see scan in Cardiology. Echocardiogram 5/10/22: Findings      Left Ventricle   Left ventricular size is normal.   Hypokinesis of the basal lateral wall and septum noted. Ejection fraction is visually estimated at 45-50%. Right Ventricle   Normal right ventricular size and function. Left Atrium   The left atrium is moderately dilated. Right Atrium   Mildly enlarged right atrium size. Mitral Valve   Structurally normal mitral valve. Mild to moderate mitral regurgitation is present. Tricuspid Valve   The tricuspid valve was not well visualized. Mild tricuspid regurgitation. RVSP is 55-60 mmHg. Pulmonary hypertension is moderate to severe . Aortic Valve   Aortic valve opens well. The aortic valve is trileaflet. Pulmonic Valve   The pulmonic valve was not well visualized. Pericardial Effusion   No evidence of pericardial effusion. Aorta   Aortic root dimension within normal limits. Conclusions      Summary   Left ventricular size is normal.   Hypokinesis of the basal lateral wall and septum noted. Ejection fraction is visually estimated at 45-50%. The left atrium is moderately dilated. Structurally normal mitral valve. Mild to moderate mitral regurgitation is present. Aortic valve opens well. The aortic valve is trileaflet. The tricuspid valve was not well visualized. Mild tricuspid regurgitation. RVSP is 55-60 mmHg.    Pulmonary hypertension is moderate to severe . No evidence of pericardial effusion. Signature      ----------------------------------------------------------------   Electronically signed by Gael Luther MD(Interpreting   physician) on 05/13/2022 03:39 PM   ----------------------------------------------------------------    Cardiac Catheterization 1/11/14:   SUMMARY OF INJECTIONS:      II.   SELECTIVE CORONARY ARTERIOGRAMS:            A.   Right coronary artery - Preponderant. A large, dominant vessel      with marked ectasia noted throughout. No areas of critical stenosis      noted, however. B. Left coronary:         1. Left main trunk - Normal.         2.   Left anterior descending:  A high bifurcation into a moderate 1st          diagonal branch with mild luminal wall irregularities. The distal          anterior descending artery is markedly attenuated, but without areas          of critical stenosis. 3.   Circumflex: In the 1st obtuse marginal branch, there is an          eccentric 70% stenosis proximally. The distal vessel is of moderate          luminal caliber and widely patent. In the continuation of the          circumflex into the 1st posterolateral marginal branch, there is 65%          to 70% stenosis. III. LEFT VENTRICULAR ANGIOGRAM:            Left ventricular cavity size is upper limits of normal to mildly      enlarged with mild hypokinesis of the _____ inferomedial segment. Estimated left ventricular ejection fraction 50%. There is no mitral      regurgitation seen. CONCLUSIONS:   1. Severely stenotic native coronary atherosclerosis. 2.    Mildly impaired left ventricular systolic function. 3.    Abnormal diastolic dysfunction. Dictated by:  Lobo Bauman, FACP, [facility], [facility]        Stress Test 9/26/18:   FINDINGS:  There is a normal distribution of left ventricular   myocardial activity on both the stress and rest SPECT myocardial   perfusion images.   Left ventricle appears dilated. Gated study shows dyskinetic left ventricular inferior wall motion. Otherwise, normal left ventricular wall motion and myocardial   thickening during systole. Left ventricular end systolic volume estimated at 85 ml and   end-diastolic volume estimated at 144 ml. Resting left ventricular   ejection fraction over 41%. Impression   1. No evidence of left ventricular myocardial stress-induced   ischemia. 2. Left ventricular ejection fraction estimated at 41%. 3. Dilated left ventricle. Device Interrogation:   Underlying rhythm: PAT with AsVs  Mode: DDD   Battery Voltage/Longevity:  12 months    Charge time: 8.7 seconds  Pacing: A: <0.1%  RV: <0.1%    P wave: 2.0 mV  Impedance: 380 ohms   Threshold: 0.75 V @ 0.4 ms  RV R wave: 11.6 mV  Impedance: 437 ohms   Threshold: 1.0 V @ 0.4 ms  Episodes: AT/AFL since 10/7/22 on 4.19 with RVR and inappropriate ICD shocks x 5. Reprogramming included: none  Overall device function is normal    All device programmable settings were evaluated per above and in the scanned document, along with iterative adjustments (capture thresholds) to assess and select the most appropriate final programming to provide for consistent delivery of the appropriate therapy and to verify function of the device. I have independently reviewed all of the ECGs and rhythm strips per above     Assessment/Plan:  This is a 67 y.o. male with a history of   Patient Active Problem List   Diagnosis    Diabetes mellitus ([address]    Essential hypertension    COPD, very severe ([address]    CAD (coronary artery disease)    Tubular adenoma of colon    Sigmoid diverticulosis    Controlled type 2 diabetes mellitus with diabetic nephropathy, with long-term current use of insulin ([address]    Prostate cancer ([address]    Mixed hyperlipidemia    ICD (implantable cardioverter-defibrillator) in place    Cerebellar hemorrhage ([address]    Dilated cardiomyopathy ([address] AAA (abdominal aortic aneurysm) without rupture    PAF (paroxysmal atrial fibrillation) (HCC)    COVID    Hematuria    Urinary retention    Elevated serum creatinine    Anemia    Chronic renal disease, stage III (HCC) [003640]    Malignant neoplasm of overlapping sites of bladder (HCC)    Hyperglycemia    Type II endoleak of aortic graft    Status post endovascular aneurysm repair (EVAR)    Bladder cancer ([address]    AIME (acute kidney injury) ([address]    Shock ([address]    Severe malnutrition (HCC)    Acute blood loss anemia    H/O total cystectomy    Tobacco abuse    Atrial fibrillation with RVR ([address]    AICD discharge    Mild vascular dementia    who presents with inappropriate ICD shocks from AT/AFL. 1. Paroxysmal atrial tachycardia, atrial flutter and atrial fibrillation  - First diagnosed January 2022 during COVID-19 infection.  - [id number] of 6  - Started on Eliquis but could not afford and was switch to Warfarin. Developed hematuria in May 2022 and [address] [place] [address] Road discontinued. - Presented with palpitations and he experienced ICD firing 5 times. Noted to be in AF with RVR at 120-130 bpm (no rhythm strip and EKG available to review). Started on IV Cardizem drip. ICD interrogation showed atrial tachycardia/atrial flutter  with inappropriate shocks x 5.   - Currently he is back in NSR.  - Sotalol increased to 120mg every 12 hours, 5/6 doses completed without QTc prolongation.   - To resume Coumadin today (10/11/2022)    2. History of ventricular tachycardia  - Sustained monomorphic ventricular tachycardia during EPS in 2013.  - Status post ICD placement 1/15/2014.  - On Sotalol and Toprol XL.  - Noted NSVT. Sotalol was increased from 40 mg bid to 80 mg bid in May 2022. - Increased Sotalol to 120mg every 12 hours, on 10/08/22. 3. Dual chamber ICD in situ   - [employer].  - Secondary prevention.  - DOI: 1/15/2014  - Normal device function. 4. Coronary artery disease   - Status post PCI.     5. Mixed ischemic and nonischemic cardiomyopathy   - LV EF 30-35% in 2013.  - LV EF 45-50% on last echo 5/13/22. 6.AAA   - Status post EVAR. 7. Hypertension  - On Zestril    8. Diabetes mellitus  - On Lantus and Glucophage. 9. Hyperlipidemia  - On Lipitor. 10. CKD  Estimated Creatinine Clearance: 73 mL/min (based on SCr of 1 mg/dL). 11. bladder cancer  - Underwent robotic Cystectomy, prostatectomy with pelvic lymphnode dissection, extracorporeal ileal conduit urinary diversion, and bilateral ureteral stents placement on 9/13/2022. Recommendations:  Continue Sotalol to 120 mg every 12 hours and monitor QTc per protocol. Continue Toprol XL to 50 mg bid. Consult pharmacy to dose Coumadin goal INR 2-3. Keep potassium around 4 and magnesium around 2. I have spent a total of 10 minutes with the patient and the family reviewing the above stated recommendations. And a total of >50% of that time involved face-to-face time providing counseling and or coordination of care with the other providers, reviewing records/tests, counseling/education of the patient, ordering medications/tests/procedures, coordinating care, and documenting clinical information in the EHR. Thank you for allowing me to participate in your patient's care. Please call me if there are any questions or concerns. Discussed with Dr. Satnam So. JERI Crews - CNP  Cardiac Electrophysiology  [address] [place] [address]  [facility] [place]: [place] [facility]  2:04 PM  10/11/2022    Attending Supervising Physicians [address] Statement  I performed at least 51% of the work.   I was present with the nurse practitioner during the history and exam. I discussed the findings and plans with the nurse practitioner and agree as documented in his note     Electronically signed by Herminia Wise DO on 10/11/22 at 9:46 PM EDT     66-year-old male with a past medical history of HFpEF-improved, nonvalvular paroxysmal AF/AFL/AT, VT sp [employer]onic dual-chamber ICD (DOI 1/15/2014), CAD sp PCI, AAA sp EVAR, HTN, ICH, DM2, COPD, colon cancer, bladder cancer sp cystectomy/prostatectomy with lymph node dissection/extracorporeal ileocolonic conduit urinary diversion/bilateral ureteral stents (9/13/2022). He is managed by Dr Genet Polanco with aspirin 81 mg daily, Lipitor 40 mg daily, lisinopril 10 mg daily, metoprolol XL 50 mg daily, sotalol 80 mg twice daily, and VKA. Unclear who his established cardiologist is. On 10/8/2022, patient presented with chief complaint of ICD shock. ICD was interrogated and revealed that ICD DC shock was inappropriate due to AF with RVR. EP service was consulted by admitting physician for further evaluation management of AF and ICD shock. Sotalol was increased to 120 mg twice daily and metoprolol increased to 50 mg twice daily. Additionally, ICD was reprogrammed in an effort to avoid inappropriate shocks (VF zone change from 200 bpm to 230 bpm). Patient denies any complaints at this time. Assessment/plan:  1. Nonvalvular paroxysmal AF/AFL/AT  - [id number] score = 6.  - Continue Coumadin with INR goal of 2-3. Patient is also on aspirin. Therefore recommend PPI in order to reduce risk of major GI bleed. - Continue sotalol 120 mg twice daily. While on sotalol, recommend monitoring of BMP, magnesium, and ECG every 6 months. Patient to have 6 doses of sotalol 120 mg this evening.  - Anticipate discharge 10/12/2022. Patient to follow-up with Dr. Arun Stafford in the near future. My office will arrange. 2.  VT sp [employer]tronic dual-chamber ICD  - Inappropriate ICD shock due to AF with RVR. Medications titrated in effort to suppress AF and reduce ventricular rates during AF. Additionally, VF zone changed from 200 bpm to 230 bpm in an effort to avoid inappropriate ICD shocks in the future.     [facility]  [id number] [facility] Cardiac Electrophysiology  [address] Physicians this came starting out I am

## 2022-10-11 NOTE — PROGRESS NOTES
Pharmacy Consultation Note  (Warfarin Dosing and Monitoring)    Initial consult date: 10/11/22  Consulting Provider: JERI Brock    Mukul Nair is a 67 y.o. male for whom pharmacy has been asked to manage warfarin therapy. Weight:   Wt Readings from Last 1 Encounters:   10/08/22 173 lb (78.5 kg)       TSH:    Lab Results   Component Value Date/Time    TSH 1.310 10/08/2022 02:34 PM       Hepatic Function Panel:                            Lab Results   Component Value Date/Time    ALKPHOS 267 07/22/2022 03:33 PM    ALT 16 07/22/2022 03:33 PM    AST 9 07/22/2022 03:33 PM    PROT 7.6 07/22/2022 03:33 PM    BILITOT 0.4 07/22/2022 03:33 PM    BILIDIR <0.2 01/11/2014 10:30 PM    LABALBU 3.8 07/22/2022 03:33 PM    LABALBU 4.1 03/03/2012 08:30 PM       Current warfarin drug-drug interactions include: No significant interactions    Recent Labs     10/09/22  0621 10/10/22  0410   HGB 10.0* 9.4*   * 220     Date Warfarin Dose INR Heparin or LMWH Comment   10/11 5 mg 1.2 Lovenox                                   Assessment:  Patient is a 67 y.o. male on warfarin for Atrial Fibrillation. Patient's home warfarin dosing regimen is unclear; documented as 5 mg Sat/Sun/W and 4 mg M/T/Thu/F on 6/30/22 per Dr. Liliane Dinh and dispense history shows warfarin 1 mg and warfarin 5 mg previously dispensed in April and May 2022. Previously seen by Cardiology; started on Eliquis; he could not afford and was switch to Warfarin. History of hematuria in May of 2022; patient diagnosed with bladder cancer, warfarin held at that time. Goal INR 2 - 3  10/11: INR= 1.2. Plan:  Warfarin 5 mg tonight. Daily PT/INR until the INR is stable within the therapeutic range  Pharmacist will follow and monitor/adjust dosing as necessary    Thank you for this consult,  Tyler Sawyer, PharmD  PGY1 Pharmacy Resident  10/11/2022 2:43 PM     I agree with the above note. Any changes or corrections have been made as needed. Manuel Swartz, PharmD  10/11/2022  4:39 PM

## 2022-10-11 NOTE — CARE COORDINATION
10/11/22 Update CM Note: He is accepted at [facility] rehab. HENS will need completed on day of discharge, nimisha, destination and ambulance form completed. Envelope placed in soft chart. Covid will need done on day of discharge.  Electronically signed by Jd Echeverria RN CM on 10/11/2022 at 1:37 PM

## 2022-10-11 NOTE — CARE COORDINATION
10/11/22 Update CM Note: Received a call from patients sister, [facility], concerned about the patient returning home. She states the patient cannot care for himself at home and the brother he lives with cannot take care of the patient any longer due to major surgery next week. She is requesting a referral to [facility] as she has already contacted them. Referral sent to Sarah at [facility]. Sister states patient has \"dementia\" and short term memory loss. She states she is in the process of having the  come to the hospital to have POA papers signed by patient. It was explained that patient has been up with therapies and walked 60ft with minimal assistance and a [employer]. He is alert and oriented. He is appropriate with his behaviors. He is able to address his medications without issue. He states he is in agreement with [facility] for a short rehab stay. If he needs to return home he wishes to use [facility]. Will await response from Sarah at [facility]. Electronically signed by Nohemy Skaggs RN CM on 10/11/2022 at 11:13 AM     The Plan for Transition of Care is related to the following treatment goals: Rehab    The Patient and/or patient representative , [facility], was provided with a choice of provider and agrees   with the discharge plan. [x] Yes [] No    Freedom of choice list was provided with basic dialogue that supports the patient's individualized plan of care/goals, treatment preferences and shares the quality data associated with the providers.  [x] Yes [] No

## 2022-10-11 NOTE — DISCHARGE INSTR - COC
Continuity of Care Form    Patient Name: Pastor Rios   :  1950  MRN:  [id number]    Admit date:  10/8/2022  Discharge date:  10/14/2022    Code Status Order: Full Code   Advance Directives:     Admitting Physician:  Fanny Nash MD  PCP: Blank Gonsalez DO    Discharging Nurse: samuel Pedraza 23 Unit/Room#: 6551/1239-O  Discharging Unit Phone Number: [phone]    Emergency Contact:   Extended Emergency Contact Information  Primary Emergency Contact: [facility] Phone: [phone]  Mobile Phone: [phone]  Relation: Brother/Sister  Secondary Emergency Contact: Dwaine Iron \"campbell\"  Home Phone: [phone]  Mobile Phone: [phone]  Relation: Brother/Sister  Preferred language: English   needed?  No    Past Surgical History:  Past Surgical History:   Procedure Laterality Date    ABDOMINAL AORTIC ANEURYSM REPAIR, ENDOVASCULAR N/A 10/01/2021    ABDOMINAL AORTIC ANEURYSM REPAIR ENDOVASCULAR performed by Cruz Tristan MD at [place] [address]      COLONOSCOPY  2015    with polypectomy (x2) - Dr. Garcia Ripa  2014    3.5/15 Xience in Ramus and 3.0/15 Resolute in th 1st obtuse marginal.    HIP FRACTURE SURGERY Right 2020    RIGHT HIP OPEN REDUCTION INTERNAL FIXATION NAIL-SYNTHES -- OC 2 performed by Jayy Knight DO at [facility]  2022    robotic cystoprostatectomy, bilateral plvic lymph node dissection, ileal conduit urinary diversion    UMBILICAL HERNIA REPAIR         Immunization History:   Immunization History   Administered Date(s) Administered    COVID-19, PFIZER PURPLE top, DILUTE for use, (age 15 y+), 30mcg/0.3mL 2021, 2021, 2021, 2021    Influenza Virus Vaccine 2014    Influenza, FLUAD, (age 72 y+), Adjuvanted, 0.5mL 2020, 10/13/2021    Influenza, FLUZONE (age 72 y+), High Dose, 0.7mL 09/28/2020    Influenza, High Dose (Fluzone 65 yrs and older) 11/29/2015    Influenza, Triv, inactivated, subunit, adjuvanted, IM (Fluad 65 yrs and older) 01/17/2018, 12/31/2018, 11/19/2019       Active Problems:  Patient Active Problem List   Diagnosis Code    Diabetes mellitus ([facility] E11.9    Essential hypertension I10    COPD, very severe ([facility] J44.9    CAD (coronary artery disease) I25.10    Tubular adenoma of colon D12.6    Sigmoid diverticulosis K57.30    Controlled type 2 diabetes mellitus with diabetic nephropathy, with long-term current use of insulin ([facility]) E11.21, Z79.4    Prostate cancer ([facility] C61    Mixed hyperlipidemia E78.2    ICD (implantable cardioverter-defibrillator) in place Z95.810    Cerebellar hemorrhage ([facility] I61.4    Dilated cardiomyopathy ([facility] I42.0    AAA (abdominal aortic aneurysm) without rupture I71.40    PAF (paroxysmal atrial fibrillation) ([facility]) I48.0    COVID U07.1    Hematuria R31.9    Urinary retention R33.9    Elevated serum creatinine R79.89    Anemia D64.9    Chronic renal disease, stage III [facility]) [[id number]] N18.30    Malignant neoplasm of overlapping sites of bladder ([facility]) C67.8    Hyperglycemia R73.9    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 A0       Isolation/Infection:   Isolation            No Isolation          Patient Infection Status       Infection Onset Added Last Indicated Last Indicated By Review Planned Expiration Resolved Resolved By    None active    Resolved    C-diff Rule Out 09/22/22 09/22/22 09/22/22 C. difficile toxin Molecular (Ordered)   09/22/22 Rule-Out Test Resulted    COVID-19 01/07/22 22 COVID-19, Rapid   22     COVID-19 (Rule Out) 22 COVID-19, Rapid (Ordered)   22 Rule-Out Test Resulted    COVID-19 (Rule Out) 12/15/20 12/15/20 12/15/20 COVID-19 Ambulatory (Ordered)   20 Rule-Out Test Resulted            Nurse Assessment:  Last Vital Signs: /68   Pulse 68   Temp 98 °F (36.7 °C) (Temporal)   Resp 16   Ht 6' (1.829 m)   Wt 173 lb (78.5 kg)   SpO2 97%   BMI 23.46 kg/m²     Last documented pain score (0-10 scale): Pain Level: 0  Last Weight:   Wt Readings from Last 1 Encounters:   10/08/22 173 lb (78.5 kg)     Mental Status:  oriented, alert, coherent, logical, thought processes intact, and able to concentrate and follow conversation    IV Access:  - None    Nursing Mobility/ADLs:  Walking   Assisted  Transfer  Assisted  Bathing  Assisted  Dressing  Assisted  Toileting  Assisted  Feeding  Independent  Med [address]  Independent  Med Delivery   whole    Wound Care Documentation and Therapy:  Incision 22 Abdomen (Active)   Dressing Status Other (Comment) 10/11/22 1004   Dressing/Treatment Open to air 10/11/22 1004   Closure Surgical glue 10/10/22 0747   Margins Approximated 10/10/22 0747   Incision Assessment Dry 10/10/22 0747   Drainage Amount None 10/10/22 0747   Drainage Description Serosanguinous 22 0838   Odor None 10/10/22 0747   Bailey-incision Assessment Intact 10/10/22 0747   Number of days: 28       Incision 22 Abdomen Lower;Mid (Active)   Dressing Status Clean;Dry; Intact 22 0838   Dressing/Treatment Open to air 22 0800   Closure Surgical glue 22 0800   Margins Approximated 22   Incision Assessment Dry 22 0800   Drainage Amount None 22 0800   Drainage Description Serosanguinous 22 2003   Odor None 22 0800   Bailey-incision Assessment Intact 22 0800   Number of days: 28        Elimination:  Continence:    Bowel: Yes  Bladder: Yes  Urinary Catheter: None   Colostomy/Ileostomy/Ileal Conduit: No  Urostomy Ileal conduit RLQ-Stomal Appliance: 1 piece, Clean, dry & intact  Urostomy Ileal conduit RLQ-Stoma  Assessment: Pink, Red  Urostomy Ileal conduit RLQ-Peristomal Assessment: Clean, Intact  Urostomy Ileal conduit RLQ-Treatment: Bag change    Date of Last BM: 10/14/2022      Intake/Output Summary (Last 24 hours) at 10/11/2022 1338  Last data filed at 10/11/2022 1256  Gross per 24 hour   Intake 540 ml   Output 1000 ml   Net -460 ml     I/O last 3 completed shifts: In: 240 [P.O.:240]  Out: 2825 [Urine:2825]    Safety Concerns: At Risk for Falls    Impairments/Disabilities:      None    Nutrition Therapy:  Current Nutrition Therapy:   - Oral Diet:  Carb Control 4 carbs/meal (1800kcals/day) and Cardiac    Routes of Feeding: Oral  Liquids: No Restrictions  Daily Fluid Restriction: no  Last Modified Barium Swallow with Video (Video Swallowing Test): not done    Treatments at the Time of Hospital Discharge:   Respiratory Treatments: ***  Oxygen Therapy:  is not on home oxygen therapy.   Ventilator:    - No ventilator support    Rehab Therapies: Physical Therapy and Occupational Therapy  Weight Bearing Status/Restrictions: No weight bearing restrictions  Other Medical Equipment (for information only, NOT a DME order):  walker  Other Treatments: ***    Patient's personal belongings (please select all that are sent with patient):  Glasses    RN SIGNATURE:  Electronically signed by Horace Agudelo RN on 10/14/22 at 3:55 PM EDT    CASE MANAGEMENT/SOCIAL WORK SECTION    Inpatient Status Date: ***    Readmission Risk Assessment Score:  Readmission Risk              Risk of Unplanned Readmission:  32           Discharging to Facility/ Agency   Name:   [facility]  Address:  Phone:  Fax:    Dialysis Facility (if applicable)   Name:  Address:  Dialysis Schedule:  Phone:  Fax:    / signature: Electronically signed by Jennifer Dickens RN CM on 10/11/2022 at 1:38 PM      PHYSICIAN SECTION    Prognosis: Good    Condition at Discharge: Stable    Rehab Potential (if transferring to Rehab): Good    Recommended Labs or Other Treatments After Discharge: INR in 2 days    Physician Certification: I certify the above information and transfer of Shy Barreto  is necessary for the continuing treatment of the diagnosis listed and that he requires [place] for less 30 days.      Update Admission H&P: No change in H&P    PHYSICIAN SIGNATURE:  Electronically signed by JERI Guerrero CNP on 10/14/22 at 12:56 PM EDT

## 2022-10-12 LAB
ANION GAP SERPL CALCULATED.3IONS-SCNC: 11 MMOL/L (ref 7–16)
BUN BLDV-MCNC: 11 MG/DL (ref 6–23)
CALCIUM SERPL-MCNC: 8.7 MG/DL (ref 8.6–10.2)
CHLORIDE BLD-SCNC: 107 MMOL/L (ref 98–107)
CO2: 22 MMOL/L (ref 22–29)
CREAT SERPL-MCNC: 1 MG/DL (ref 0.7–1.2)
EKG ATRIAL RATE: 62 BPM
EKG ATRIAL RATE: 70 BPM
EKG P AXIS: 19 DEGREES
EKG P AXIS: 50 DEGREES
EKG P-R INTERVAL: 138 MS
EKG P-R INTERVAL: 158 MS
EKG Q-T INTERVAL: 450 MS
EKG Q-T INTERVAL: 468 MS
EKG QRS DURATION: 84 MS
EKG QRS DURATION: 84 MS
EKG QTC CALCULATION (BAZETT): 475 MS
EKG QTC CALCULATION (BAZETT): 486 MS
EKG R AXIS: -17 DEGREES
EKG R AXIS: 10 DEGREES
EKG T AXIS: -3 DEGREES
EKG T AXIS: -40 DEGREES
EKG VENTRICULAR RATE: 62 BPM
EKG VENTRICULAR RATE: 70 BPM
GFR AFRICAN AMERICAN: >60
GFR NON-AFRICAN AMERICAN: >60 ML/MIN/1.73
GLUCOSE BLD-MCNC: 107 MG/DL (ref 74–99)
HCT VFR BLD CALC: 31.3 % (ref 37–54)
HEMOGLOBIN: 10 G/DL (ref 12.5–16.5)
INR BLD: 1.1
MAGNESIUM: 1.6 MG/DL (ref 1.6–2.6)
MCH RBC QN AUTO: 30.7 PG (ref 26–35)
MCHC RBC AUTO-ENTMCNC: 31.9 % (ref 32–34.5)
MCV RBC AUTO: 96 FL (ref 80–99.9)
METER GLUCOSE: 151 MG/DL (ref 74–99)
METER GLUCOSE: 159 MG/DL (ref 74–99)
METER GLUCOSE: 188 MG/DL (ref 74–99)
METER GLUCOSE: 98 MG/DL (ref 74–99)
PDW BLD-RTO: 18.7 FL (ref 11.5–15)
PLATELET # BLD: 316 E9/L (ref 130–450)
PMV BLD AUTO: 8.9 FL (ref 7–12)
POTASSIUM SERPL-SCNC: 4.5 MMOL/L (ref 3.5–5)
PROTHROMBIN TIME: 12.3 SEC (ref 9.3–12.4)
RBC # BLD: 3.26 E12/L (ref 3.8–5.8)
SODIUM BLD-SCNC: 140 MMOL/L (ref 132–146)
WBC # BLD: 5.9 E9/L (ref 4.5–11.5)

## 2022-10-12 PROCEDURE — 6370000000 HC RX 637 (ALT 250 FOR IP): Performed by: INTERNAL MEDICINE

## 2022-10-12 PROCEDURE — 6360000002 HC RX W HCPCS: Performed by: NURSE PRACTITIONER

## 2022-10-12 PROCEDURE — 6370000000 HC RX 637 (ALT 250 FOR IP): Performed by: FAMILY MEDICINE

## 2022-10-12 PROCEDURE — 2580000003 HC RX 258: Performed by: INTERNAL MEDICINE

## 2022-10-12 PROCEDURE — S5553 INSULIN LONG ACTING 5 U: HCPCS | Performed by: INTERNAL MEDICINE

## 2022-10-12 PROCEDURE — 83735 ASSAY OF MAGNESIUM: CPT

## 2022-10-12 PROCEDURE — 36415 COLL VENOUS BLD VENIPUNCTURE: CPT

## 2022-10-12 PROCEDURE — 2580000003 HC RX 258: Performed by: FAMILY MEDICINE

## 2022-10-12 PROCEDURE — 80048 BASIC METABOLIC PNL TOTAL CA: CPT

## 2022-10-12 PROCEDURE — 97535 SELF CARE MNGMENT TRAINING: CPT

## 2022-10-12 PROCEDURE — 2140000000 HC CCU INTERMEDIATE R&B

## 2022-10-12 PROCEDURE — 82962 GLUCOSE BLOOD TEST: CPT

## 2022-10-12 PROCEDURE — 6360000002 HC RX W HCPCS: Performed by: INTERNAL MEDICINE

## 2022-10-12 PROCEDURE — 93005 ELECTROCARDIOGRAM TRACING: CPT | Performed by: INTERNAL MEDICINE

## 2022-10-12 PROCEDURE — 6370000000 HC RX 637 (ALT 250 FOR IP): Performed by: STUDENT IN AN ORGANIZED HEALTH CARE EDUCATION/TRAINING PROGRAM

## 2022-10-12 PROCEDURE — 6370000000 HC RX 637 (ALT 250 FOR IP)

## 2022-10-12 PROCEDURE — 85610 PROTHROMBIN TIME: CPT

## 2022-10-12 PROCEDURE — 6360000002 HC RX W HCPCS: Performed by: FAMILY MEDICINE

## 2022-10-12 PROCEDURE — 6370000000 HC RX 637 (ALT 250 FOR IP): Performed by: NURSE PRACTITIONER

## 2022-10-12 PROCEDURE — 85027 COMPLETE CBC AUTOMATED: CPT

## 2022-10-12 PROCEDURE — 97530 THERAPEUTIC ACTIVITIES: CPT

## 2022-10-12 PROCEDURE — 99233 SBSQ HOSP IP/OBS HIGH 50: CPT | Performed by: STUDENT IN AN ORGANIZED HEALTH CARE EDUCATION/TRAINING PROGRAM

## 2022-10-12 RX ORDER — LANOLIN ALCOHOL/MO/W.PET/CERES
400 CREAM (GRAM) TOPICAL DAILY
Status: DISCONTINUED | OUTPATIENT
Start: 2022-10-12 | End: 2022-10-14 | Stop reason: HOSPADM

## 2022-10-12 RX ORDER — SOTALOL HYDROCHLORIDE 120 MG/1
120 TABLET ORAL 2 TIMES DAILY
Qty: 60 TABLET | Refills: 3 | Status: SHIPPED | OUTPATIENT
Start: 2022-10-13

## 2022-10-12 RX ORDER — WARFARIN SODIUM 5 MG/1
5 TABLET ORAL
Status: COMPLETED | OUTPATIENT
Start: 2022-10-12 | End: 2022-10-12

## 2022-10-12 RX ORDER — MAGNESIUM SULFATE IN WATER 40 MG/ML
2000 INJECTION, SOLUTION INTRAVENOUS ONCE
Status: COMPLETED | OUTPATIENT
Start: 2022-10-12 | End: 2022-10-12

## 2022-10-12 RX ADMIN — SOTALOL HYDROCHLORIDE 120 MG: 120 TABLET ORAL at 09:19

## 2022-10-12 RX ADMIN — ASPIRIN 81 MG: 81 TABLET, COATED ORAL at 09:18

## 2022-10-12 RX ADMIN — CEFTRIAXONE 1000 MG: 1 INJECTION, POWDER, FOR SOLUTION INTRAMUSCULAR; INTRAVENOUS at 15:10

## 2022-10-12 RX ADMIN — METOPROLOL SUCCINATE 50 MG: 50 TABLET, EXTENDED RELEASE ORAL at 20:46

## 2022-10-12 RX ADMIN — SODIUM CHLORIDE, PRESERVATIVE FREE 10 ML: 5 INJECTION INTRAVENOUS at 20:48

## 2022-10-12 RX ADMIN — SOTALOL HYDROCHLORIDE 120 MG: 120 TABLET ORAL at 20:46

## 2022-10-12 RX ADMIN — Medication 400 MG: at 09:18

## 2022-10-12 RX ADMIN — PANTOPRAZOLE SODIUM 40 MG: 40 TABLET, DELAYED RELEASE ORAL at 06:05

## 2022-10-12 RX ADMIN — ENOXAPARIN SODIUM 80 MG: 100 INJECTION SUBCUTANEOUS at 09:18

## 2022-10-12 RX ADMIN — SODIUM CHLORIDE, PRESERVATIVE FREE 10 ML: 5 INJECTION INTRAVENOUS at 09:18

## 2022-10-12 RX ADMIN — MAGNESIUM SULFATE HEPTAHYDRATE 2000 MG: 40 INJECTION, SOLUTION INTRAVENOUS at 09:24

## 2022-10-12 RX ADMIN — WARFARIN SODIUM 5 MG: 5 TABLET ORAL at 16:55

## 2022-10-12 RX ADMIN — METOPROLOL SUCCINATE 50 MG: 50 TABLET, EXTENDED RELEASE ORAL at 09:18

## 2022-10-12 RX ADMIN — INSULIN GLARGINE-YFGN 10 UNITS: 100 INJECTION, SOLUTION SUBCUTANEOUS at 20:49

## 2022-10-12 RX ADMIN — SODIUM CHLORIDE, PRESERVATIVE FREE 10 ML: 5 INJECTION INTRAVENOUS at 15:11

## 2022-10-12 RX ADMIN — ENOXAPARIN SODIUM 80 MG: 100 INJECTION SUBCUTANEOUS at 20:47

## 2022-10-12 NOTE — PROGRESS NOTES
Pharmacy Consultation Note  (Warfarin Dosing and Monitoring)    Initial consult date: 10/11/22  Consulting Provider: JERI Brock    Mukul Nair is a 67 y.o. male for whom pharmacy has been asked to manage warfarin therapy. Weight:   Wt Readings from Last 1 Encounters:   10/08/22 173 lb (78.5 kg)       TSH:    Lab Results   Component Value Date/Time    TSH 1.310 10/08/2022 02:34 PM       Hepatic Function Panel:                            Lab Results   Component Value Date/Time    ALKPHOS 267 07/22/2022 03:33 PM    ALT 16 07/22/2022 03:33 PM    AST 9 07/22/2022 03:33 PM    PROT 7.6 07/22/2022 03:33 PM    BILITOT 0.4 07/22/2022 03:33 PM    BILIDIR <0.2 01/11/2014 10:30 PM    LABALBU 3.8 07/22/2022 03:33 PM    LABALBU 4.1 03/03/2012 08:30 PM       Current warfarin drug-drug interactions include: No significant interactions    Recent Labs     10/10/22  0410 10/12/22  0610   HGB 9.4* 10.0*    316       Date Warfarin Dose INR Heparin or LMWH Comment   10/11 5 mg 1.2 Lovenox     10/12 5 mg 1.1 Lovenox                           Assessment:  Patient is a 67 y.o. male on warfarin for Atrial Fibrillation. Patient's home warfarin dosing regimen is unclear; documented as 5 mg Sat/Sun/W and 4 mg M/T/Thu/F on 6/30/22 per Dr. Liliane Dinh and dispense history shows warfarin 1 mg and warfarin 5 mg previously dispensed in April and May 2022. Previously seen by Cardiology; started on Eliquis; he could not afford and was switch to Warfarin. History of hematuria in May of 2022; patient diagnosed with bladder cancer, warfarin held at that time. Goal INR 2 - 3  10/11: INR= 1.2.    10/12: INR= 1.1. Plan:  Warfarin 5 mg tonight.   Daily PT/INR until the INR is stable within the therapeutic range  Pharmacist will follow and monitor/adjust dosing as necessary    Thank you for this consult,  Tyler Sawyer, PharmD  PGY1 Pharmacy Resident  10/12/2022 12:12 PM

## 2022-10-12 NOTE — PLAN OF CARE
Problem: Discharge Planning  Goal: Discharge to home or other facility with appropriate resources  Outcome: Progressing  Flowsheets (Taken 10/11/2022 2100)  Discharge to home or other facility with appropriate resources: Identify barriers to discharge with patient and caregiver     Problem: Safety - Adult  Goal: Free from fall injury  Outcome: Progressing

## 2022-10-12 NOTE — CARE COORDINATION
10/12/22 Update CM Note: Patient continues with Coumadin dosing. INR 1.0 today. He is tolerating the sotolol dosing. The discharge plan remains to [facility] for skilled rehab.  Electronically signed by Dana Cooley RN CM on 10/12/2022 at 2:29 PM

## 2022-10-12 NOTE — PROGRESS NOTES
hypertension, diabetes mellitus, hyperlipidemia, CKD and bladder cancer. He has was diagnosed with new onset atrial fibrillation in January of 2022 during COVID-19 infection. He was seen by Cardiology and was started on Eliquis which he could not afford and was switch to Warfarin. He developed hematuria in May 2022 and was noted to have NSVT and was seen by Dr. Lucretia Johnson. His Sotalol was increased from 40 mg bid to 80 mg bid. He was last seen in clinic by Dr. Lucretia Johnson on 6/15/22. The patient was diagnosed with bladder cancer and underwent robotic Cystectomy, prostatectomy with pelvic lymphnode dissection, extracorporeal ileal conduit urinary diversion, and bilateral ureteral stents placement on 9/13/2022. Yesterday while he was at home, he felt palpitations and he experienced ICD firing 5 times. He reporst chest pain after the ICD shocks and came to ED while he was noted to be in AF with RVR at 120-130 bpm (no rhythm strip and EKG available to review). He was started on IV Cardizem drip. ICD interrogation showed atrial tachycardia/atrial flutter  with inappropriate shocks x 5. Currently he is back in NSR with run of PATs on IV Cardizem drip. Cardiac electrophysiology service is consulted for ICD shocks. 10/10/22: The Sotalol was increased to 120mg ever 12 hours he was received 3/6 increased dose and his last QTc was 479ms night and CrCl is 73.67 mL/min, he is on Lovenox and his count is 9.4/29.5 today. He is for a stress test today as well as an echo. 10/11/2022: He has completed 5/6 increased doses without QTc prolongation today QTc 480ms,  he is tolerating Lovenox without bleeding, and is agreeable to start on Coumadin as long as his PCP manages him at home due to his prior success. The VF zone was made 230 bpm from 200 bpm.     10/12/2022: He has completed his six monitored doses without QTc prolongation, has no cardiac complaints at this time and is tolerating the increased Toprol and Sotalol at this time. Patient Active Problem List    Diagnosis Date Noted    Mild vascular dementia 10/11/2022     Priority: Medium    AICD discharge 10/09/2022     Priority: Medium    Atrial fibrillation with RVR ([address] 10/08/2022     Priority: Medium    Tobacco abuse 09/23/2022     Priority: Medium    Acute blood loss anemia 09/16/2022     Priority: Medium    H/O total cystectomy 09/16/2022     Priority: Medium     Overview Note:     Ileal conduit       AIME (acute kidney injury) ([address] 09/14/2022     Priority: Medium    Shock ([address] 09/14/2022     Priority: Medium    Severe malnutrition ([address] 09/14/2022     Priority: Medium    Bladder cancer ([address] 09/13/2022     Priority: Medium    Type II endoleak of aortic graft 07/23/2022     Priority: Medium    Status post endovascular aneurysm repair (EVAR) 07/23/2022     Priority: Medium    Hyperglycemia 07/22/2022     Priority: Medium    Chronic renal disease, stage III [facility]) [[id number]] 05/17/2022     Priority: Medium    Hematuria 05/05/2022     Priority: Medium    Urinary retention 05/05/2022     Priority: Medium    Elevated serum creatinine 05/05/2022     Priority: Medium    Anemia 05/05/2022     Priority: Medium    Diabetes mellitus ([address] 03/03/2012     Priority: Medium    Essential hypertension 03/03/2012     Priority: Medium    Malignant neoplasm of overlapping sites of bladder ([address] 07/11/2022     Overview Note:     Squamous and sarcomatoid variant features       COVID 02/02/2022    PAF (paroxysmal atrial fibrillation) ([address] 01/07/2022     Overview Note:     CHADSVASC = 4        AAA (abdominal aortic aneurysm) without rupture 10/01/2021     Overview Note:     A.  EVAR 10/2021      Cerebellar hemorrhage ([address] 03/14/2020    Dilated cardiomyopathy ([address] 03/14/2020     Overview Note:     A. Echo 2012: EF 30-35%  B. Echo 2014: normal EF      ICD (implantable cardioverter-defibrillator) in place 08/31/2018    Mixed hyperlipidemia 12/01/2017    Prostate cancer ([address] 11/29/2017    Controlled type 2 diabetes mellitus with diabetic nephropathy, with long-term current use of insulin ([facility] 2017    Tubular adenoma of colon 2015    Sigmoid diverticulosis 2015    CAD (coronary artery disease) 2014     Overview Note:     A. PCI to OM1 and mid CX 2014 [facility])      COPD, very severe ([facility] 2013       Past Medical History:   Diagnosis Date    Abdominal aortic aneurysm without rupture 9/3/2021    Arthritis     CAD (coronary artery disease)     Cancer ([facility]     Colon    CHF (congestive heart failure) (HCC)     Combined systolic and diastolic heart failure ([facility] 6/15/13    echo LVEF of 30-35%  stage II diastolic dysfunction    COPD (chronic obstructive pulmonary disease) (HCC)     Diabetes mellitus (HCC)     GERD (gastroesophageal reflux disease)     History of placement of internal cardiac defibrillator ? Medtronic ([place] [facility])    Hypertension     Sigmoid diverticulosis 2015    Sinusitis     Status post endovascular aneurysm repair (EVAR) 2022    Tubular adenoma of colon 2015    Type II endoleak of aortic graft 2022    Vertigo        Family History   Problem Relation Age of Onset    Heart Disease Mother     Cancer Father         abdominal    Cancer Brother         prostate    Cancer Brother         digestive       Social History     Tobacco Use    Smoking status: Former     Packs/day: 2.00     Years: 50.00     Pack years: 100.00     Types: Cigarettes     Start date: 1970     Quit date: 2014     Years since quittin.7    Smokeless tobacco: Never   Substance Use Topics    Alcohol use:  Yes     Alcohol/week: 1.0 standard drink     Types: 1 Glasses of wine per week     Comment: rarely       Current Facility-Administered Medications   Medication Dose Route Frequency Provider Last Rate Last Admin    magnesium oxide (MAG-OX) tablet 400 mg  400 mg Oral Daily JERI Giraldo - CNP   400 mg at 10/12/22 4097    warfarin placeholder: dosing by pharmacy   Other RX Placeholder JERI Mcdonald - CNP        pantoprazole (PROTONIX) tablet 40 mg  40 mg Oral QAM AC Hans Kae,    40 mg at 10/12/22 4662    sotalol (BETAPACE) tablet 120 mg  120 mg Oral BID Agnes Winters MD   120 mg at 10/12/22 0919    metoprolol succinate (TOPROL XL) extended release tablet 50 mg  50 mg Oral BID Agnes Winters MD   50 mg at 10/12/22 0918    insulin glargine-yfgn (SEMGLEE-YFGN) injection vial 10 Units  10 Units SubCUTAneous Nightly Rocio Sanchez MD   10 Units at 10/10/22 2117    insulin lispro (HUMALOG) injection vial 0-4 Units  0-4 Units SubCUTAneous TID WC Rocio Sanchez MD   1 Units at 10/11/22 1215    insulin lispro (HUMALOG) injection vial 0-4 Units  0-4 Units SubCUTAneous Nightly Rocio Sanchez MD        glucose chewable tablet 16 g  4 tablet Oral PRN Rocio Sanchez MD        dextrose bolus 10% 125 mL  125 mL IntraVENous PRN Rocio Sanchez MD        Or    dextrose bolus 10% 250 mL  250 mL IntraVENous PRN Rocio Sanchez MD        glucagon (rDNA) injection 1 mg  1 mg SubCUTAneous PRN Rocio Sanchez MD        dextrose 10 % infusion   IntraVENous Continuous PRN Rocio Sanchez MD        perflutren lipid microspheres (DEFINITY) injection 1.65 mg  1.5 mL IntraVENous ONCE PRN Agnes Winters MD        perflutren lipid microspheres (DEFINITY) injection 1.65 mg  1.5 mL IntraVENous ONCE PRN Agnes Winters MD        aspirin EC tablet 81 mg  81 mg Oral Daily JERI Apple - CNP   81 mg at 10/12/22 0918    sodium chloride flush 0.9 % injection 5-40 mL  5-40 mL IntraVENous 2 times per day Drake Barrientos APRN - CNP   10 mL at 10/12/22 0918    sodium chloride flush 0.9 % injection 10 mL  10 mL IntraVENous PRN Drake Barrientos APRN - CNP   10 mL at 10/10/22 1724    0.9 % sodium chloride infusion   IntraVENous PRN Drake Barrientos APRN - CNP        polyethylene glycol (GLYCOLAX) packet 17 g  17 g Oral Daily PRN Drake Barrientos APRN - CNP   17 g at 10/11/22 1520 acetaminophen (TYLENOL) tablet 650 mg  650 mg Oral Q6H PRN Eilleen Rust Learn, APRN - CNP   650 mg at 10/08/22 1616    Or    acetaminophen (TYLENOL) suppository 650 mg  650 mg Rectal Q6H PRN Eilleen Rust Learn, APRN - CNP        enoxaparin (LOVENOX) injection 80 mg  1 mg/kg SubCUTAneous BID Eilleen Rust Learn, APRN - CNP   80 mg at 10/12/22 0918    cefTRIAXone (ROCEPHIN) 1,000 mg in sterile water 10 mL IV syringe  1,000 mg IntraVENous Q24H Peri Polo MD   1,000 mg at 10/11/22 1510        Allergies   Allergen Reactions    Hydrocodone Nausea And Vomiting     ROS:   Constitutional: Negative for fever. Positive for activity change and negative for appetite change. HENT: Negative for epistaxis. Eyes: Negative for diploplia, blurred vision. Respiratory: Negative for cough. Positive for chest tightness. Negative for shortness of breath and wheezing. Cardiovascular: pertinent positives in HPI  Gastrointestinal: Negative for abdominal pain and blood in stool. All other review of systems are negative     PHYSICAL EXAM:   Vitals:    10/11/22 2337 10/12/22 0328 10/12/22 0707 10/12/22 1110   BP: 110/70 (!) 122/58 (!) 142/71 110/72   Pulse: 68 63 67 62   Resp: 16 16 16 18   Temp: 97.4 °F (36.3 °C) 97.4 °F (36.3 °C) 97.6 °F (36.4 °C) 98 °F (36.7 °C)   TempSrc: Temporal Temporal Temporal Temporal   SpO2: 97% 96% 97% 97%   Weight:       Height:          Constitutional: Well-developed, no acute distress, pale   Eyes: conjunctivae normal, no xanthelasma   Ears, Nose, Throat: oral mucosa moist, no cyanosis   CV: no JVD. Regular rate and rhythm. Normal S1S2 and no S3. No murmurs, rubs, or gallops. PMI is nondisplaced  Lungs: clear to auscultation bilaterally, normal respiratory effort without used of accessory muscles  Abdomen: soft, non-tender, bowel sounds present, no masses or hepatomegaly   Musculoskeletal: no digital clubbing, no edema   Skin: warm, no rashes   ICD site: well healed.  No erosion, infection or migration    I have personally reviewed the laboratory, cardiac diagnostic and radiographic testing as outlined below:    Data:    Recent Labs     10/10/22  0410 10/12/22  0610   WBC 5.8 5.9   HGB 9.4* 10.0*   HCT 29.5* 31.3*    316     Recent Labs     10/10/22  0410 10/11/22  1432 10/12/22  0610    136 140   K 3.9 4.6 4.5   * 106 107   CO2 21* 20* 22   BUN 15 12 11   CREATININE 1.0 1.1 1.0   CALCIUM 7.9* 8.6 8.7      Lab Results   Component Value Date/Time    MG 1.6 10/12/2022 06:10 AM     No results for input(s): TSH in the last 72 hours. Recent Labs     10/11/22  1353 10/12/22  0610   INR 1.2 1.1       CXR 10/8/22:      FINDINGS:   The lungs are without acute focal process. There is no effusion or   pneumothorax. The cardiomediastinal silhouette is without acute process. The   osseous structures are without acute process. Left-sided transvenous pacer   device. Impression   No acute process. Telemetry 10/12/22 : NSR with PATs rate 64-77 bpm    EKG 10/12/22: NSR with PVCs at 65 bpm, Qtc 475 ms. Please see scan in Cardiology. Echocardiogram 5/10/22: Findings      Left Ventricle   Left ventricular size is normal.   Hypokinesis of the basal lateral wall and septum noted. Ejection fraction is visually estimated at 45-50%. Right Ventricle   Normal right ventricular size and function. Left Atrium   The left atrium is moderately dilated. Right Atrium   Mildly enlarged right atrium size. Mitral Valve   Structurally normal mitral valve. Mild to moderate mitral regurgitation is present. Tricuspid Valve   The tricuspid valve was not well visualized. Mild tricuspid regurgitation. RVSP is 55-60 mmHg. Pulmonary hypertension is moderate to severe . Aortic Valve   Aortic valve opens well. The aortic valve is trileaflet. Pulmonic Valve   The pulmonic valve was not well visualized. Pericardial Effusion   No evidence of pericardial effusion. Aorta   Aortic root dimension within normal limits. Conclusions      Summary   Left ventricular size is normal.   Hypokinesis of the basal lateral wall and septum noted. Ejection fraction is visually estimated at 45-50%. The left atrium is moderately dilated. Structurally normal mitral valve. Mild to moderate mitral regurgitation is present. Aortic valve opens well. The aortic valve is trileaflet. The tricuspid valve was not well visualized. Mild tricuspid regurgitation. RVSP is 55-60 mmHg. Pulmonary hypertension is moderate to severe . No evidence of pericardial effusion. Signature      ----------------------------------------------------------------   Electronically signed by Gladis Srinivasan MD(Interpreting   physician) on 05/13/2022 03:39 PM   ----------------------------------------------------------------    Cardiac Catheterization 1/11/14:   SUMMARY OF INJECTIONS:      II.   SELECTIVE CORONARY ARTERIOGRAMS:            A.   Right coronary artery - Preponderant. A large, dominant vessel      with marked ectasia noted throughout. No areas of critical stenosis      noted, however. B. Left coronary:         1. Left main trunk - Normal.         2.   Left anterior descending:  A high bifurcation into a moderate 1st          diagonal branch with mild luminal wall irregularities. The distal          anterior descending artery is markedly attenuated, but without areas          of critical stenosis. 3.   Circumflex: In the 1st obtuse marginal branch, there is an          eccentric 70% stenosis proximally. The distal vessel is of moderate          luminal caliber and widely patent. In the continuation of the          circumflex into the 1st posterolateral marginal branch, there is 65%          to 70% stenosis. III.   LEFT VENTRICULAR ANGIOGRAM:            Left ventricular cavity size is upper limits of normal to mildly      enlarged with mild hypokinesis of the _____ inferomedial segment. Estimated left ventricular ejection fraction 50%. There is no mitral      regurgitation seen. CONCLUSIONS:   1. Severely stenotic native coronary atherosclerosis. 2.    Mildly impaired left ventricular systolic function. 3.    Abnormal diastolic dysfunction. Dictated by:  Pablito Roque., FACP, [facility] [place], [facility]        Stress Test 9/26/18:   FINDINGS:  There is a normal distribution of left ventricular   myocardial activity on both the stress and rest SPECT myocardial   perfusion images. Left ventricle appears dilated. Gated study shows dyskinetic left ventricular inferior wall motion. Otherwise, normal left ventricular wall motion and myocardial   thickening during systole. Left ventricular end systolic volume estimated at 85 ml and   end-diastolic volume estimated at 144 ml. Resting left ventricular   ejection fraction over 41%. Impression   1. No evidence of left ventricular myocardial stress-induced   ischemia. 2. Left ventricular ejection fraction estimated at 41%. 3. Dilated left ventricle. Device Interrogation:   Underlying rhythm: PAT with AsVs  Mode: DDD   Battery Voltage/Longevity:  12 months    Charge time: 8.7 seconds  Pacing: A: <0.1%  RV: <0.1%    P wave: 2.0 mV  Impedance: 380 ohms   Threshold: 0.75 V @ 0.4 ms  RV R wave: 11.6 mV  Impedance: 437 ohms   Threshold: 1.0 V @ 0.4 ms  Episodes: AT/AFL since 10/7/22 on 4.19 with RVR and inappropriate ICD shocks x 5. Reprogramming included: none  Overall device function is normal    All device programmable settings were evaluated per above and in the scanned document, along with iterative adjustments (capture thresholds) to assess and select the most appropriate final programming to provide for consistent delivery of the appropriate therapy and to verify function of the device.       I have independently reviewed all of the ECGs and rhythm strips per above     Assessment/Plan: This is a 67 y.o. male with a history of   Patient Active Problem List   Diagnosis    Diabetes mellitus ([address]    Essential hypertension    COPD, very severe ([address]    CAD (coronary artery disease)    Tubular adenoma of colon    Sigmoid diverticulosis    Controlled type 2 diabetes mellitus with diabetic nephropathy, with long-term current use of insulin ([address]    Prostate cancer ([address]    Mixed hyperlipidemia    ICD (implantable cardioverter-defibrillator) in place    Cerebellar hemorrhage ([address]    Dilated cardiomyopathy ([address]    AAA (abdominal aortic aneurysm) without rupture    PAF (paroxysmal atrial fibrillation) (HCC)    COVID    Hematuria    Urinary retention    Elevated serum creatinine    Anemia    Chronic renal disease, stage III ([address] [[id number]]    Malignant neoplasm of overlapping sites of bladder (HCC)    Hyperglycemia    Type II endoleak of aortic graft    Status post endovascular aneurysm repair (EVAR)    Bladder cancer ([address]    AIME (acute kidney injury) ([address]    Shock ([address]    Severe malnutrition (HCC)    Acute blood loss anemia    H/O total cystectomy    Tobacco abuse    Atrial fibrillation with RVR ([address]    AICD discharge    Mild vascular dementia    who presents with inappropriate ICD shocks from AT/AFL. 1. Paroxysmal atrial tachycardia, atrial flutter and atrial fibrillation  - First diagnosed January 2022 during COVID-19 infection.  - [id number] of 6  - Started on Eliquis but could not afford and was switch to Warfarin. Developed hematuria in May 2022 and [address] discontinued. - Presented with palpitations and he experienced ICD firing 5 times. Noted to be in AF with RVR at 120-130 bpm (no rhythm strip and EKG available to review). Started on IV Cardizem drip. ICD interrogation showed atrial tachycardia/atrial flutter  with inappropriate shocks x 5.   - Currently he is back in NSR.  - Sotalol increased to 120mg every 12 hours, 6/6 doses completed without QTc prolongation. -  Coumadin resumed 10/11/2022.     2. History of ventricular tachycardia  - Sustained monomorphic ventricular tachycardia during EPS in 2013.  - Status post ICD placement 1/15/2014.  - On Sotalol and Toprol XL.  - Noted NSVT. Sotalol was increased from 40 mg bid to 80 mg bid in May 2022. - Increased Sotalol to 120mg every 12 hours, on 10/08/22. 3. Dual chamber ICD in situ   - Medtronic.  - Secondary prevention.  - DOI: 1/15/2014  - Normal device function. 4. Coronary artery disease   - Status post PCI. 5. Mixed ischemic and nonischemic cardiomyopathy   - LV EF 30-35% in 2013.  - LV EF 45-50% on last echo 5/13/22. 6.AAA   - Status post EVAR. 7. Hypertension  - On Zestril    8. Diabetes mellitus  - On Lantus and Glucophage. 9. Hyperlipidemia  - On Lipitor. 10. CKD  Estimated Creatinine Clearance: 73 mL/min (based on SCr of 1 mg/dL). 11. Bladder cancer  - Underwent robotic Cystectomy, prostatectomy with pelvic lymphnode dissection, extracorporeal ileal conduit urinary diversion, and bilateral ureteral stents placement on 9/13/2022. Recommendations:  Continue Sotalol to 120 mg every 12 hours and follow QTc with an EKG in one week then every 3- months. Replace Mag today and start daily magOx   Continue Toprol XL to 50 mg bid. Coumadin goal INR 2-3. Will need BMP and mag every 3-6 months. Ok to discharge after he has been seen by Dr. Rhea Gonsalez from an EP perspective with follow-up in office with Dr. Alicia Houser in 4 weeks. I have spent a total of 10 minutes with the patient and the family reviewing the above stated recommendations. And a total of >50% of that time involved face-to-face time providing counseling and or coordination of care with the other providers, reviewing records/tests, counseling/education of the patient, ordering medications/tests/procedures, coordinating care, and documenting clinical information in the EHR.      Thank you for allowing me to participate in your patient's care. Please call me if there are any questions or concerns. Discussed with Dr. Lucy Roblero. Thang Avalos, APRN - CNP  Cardiac Electrophysiology  [facility]) Physicians  [facility]: [facility]  11:28 AM  10/12/2022    Attending Supervising Physicians [facility] [address] [facility] [address] Statement  I performed at least 51% of the work. I was present with the nurse practitioner during the history and exam. I discussed the findings and plans with the nurse practitioner and agree as documented in his note      Electronically signed by Olinda Xavier DO on 10/12/22 at 9:38 PM EDT     24-year-old male with a past medical history of HFpEF-improved, nonvalvular paroxysmal AF/AFL/AT, VT sp Medtronic dual-chamber ICD (DOI 1/15/2014), CAD sp PCI, AAA sp EVAR, HTN, ICH, DM2, COPD, colon cancer, bladder cancer sp cystectomy/prostatectomy with lymph node dissection/extracorporeal ileocolonic conduit urinary diversion/bilateral ureteral stents (9/13/2022). He is managed by Dr Tameka Luna with aspirin 81 mg daily, Lipitor 40 mg daily, lisinopril 10 mg daily, metoprolol XL 50 mg daily, sotalol 80 mg twice daily, and VKA. Unclear who his established cardiologist is. On 10/8/2022, patient presented with chief complaint of ICD shock. ICD was interrogated and revealed that ICD DC shock was inappropriate due to AF with RVR. EP service was consulted by admitting physician for further evaluation management of AF and ICD shock. Sotalol was increased to 120 mg twice daily and metoprolol increased to 50 mg twice daily. Additionally, ICD was reprogrammed in an effort to avoid inappropriate shocks (VF zone change from 200 bpm to 230 bpm). Patient denies any complaints at this time. Assessment/plan:  1. Nonvalvular paroxysmal AF/AFL/AT  - [id number] score = 6.  - Continue Coumadin with INR goal of 2-3. Currently being bridged with LMWH and managed by Pharmacy. Patient is also on aspirin.   Continue PPI in order to reduce risk of major GI bleed. - Continue sotalol 120 mg twice daily. While on sotalol, recommend monitoring of BMP, magnesium, and ECG every 6 months. Qtc stable after 6th dose of sotalol 120 mg BID.  - Ok to discharge from EP standpoint. Patient to follow-up with Dr. Tameka Luna in the near future. My office will arrange. -- EP service will sign off. Please contact with questions/concerns. 2.  VT sp Medtronic dual-chamber ICD  - Inappropriate ICD shock due to AF with RVR. Medications titrated in effort to suppress AF and reduce ventricular rates during AF. Additionally, VF zone changed from 200 bpm to 230 bpm in an effort to avoid inappropriate ICD shocks in the future.      Olinda Xavier DO  [facility] Cardiac Electrophysiology  [address] [facility]

## 2022-10-12 NOTE — PROGRESS NOTES
OCCUPATIONAL THERAPY TREATMENT NOTE    [address] [facility] [address] [facility]   [address] [place] [address]         XCFD:  Patient Name: Gary Bazan  MRN: [id number]  : 1950  Room: [address] [place] [address]            Evaluating OT: Taylor Carrero OTR/L; [id number]        Referring Provider: JERI Sawyer - ELEANOR    Specific Provider Orders/Date: OT Eval and Treat 10/08/22       Diagnosis: Atrial fibrillation with RVR; AICD discharge    Surgery: 10/10/22 EKG     Pertinent Medical History:  has a past medical history of Abdominal aortic aneurysm without rupture, Arthritis, CAD (coronary artery disease), Cancer ([facility] CHF (congestive heart failure) ([facility] Combined systolic and diastolic heart failure ([facility] COPD (chronic obstructive pulmonary disease) ([facility] Diabetes mellitus ([facility] GERD (gastroesophageal reflux disease), History of placement of internal cardiac defibrillator, Hypertension, Sigmoid diverticulosis, Sinusitis, Status post endovascular aneurysm repair (EVAR), Tubular adenoma of colon, Type II endoleak of aortic graft, and Vertigo.   H/o postatectomy 22     Recommended Adaptive Equipment: TBD      Precautions:  Fall Risk      Assessment of current deficits    [x] Functional mobility            [x]ADLs           [x] Strength                  []Cognition    [x] Functional transfers          [x] IADLs         [x] Safety Awareness   [x]Endurance    [] Fine Coordination                         [x] Balance      [] Vision/perception   []Sensation      []Gross Motor Coordination             [] ROM           [] Delirium                   [] Motor Control      OT PLAN OF CARE   OT POC based on physician orders, patient diagnosis and results of clinical assessment     Frequency/Duration 1-3 days/wk for 2 weeks PRN   Specific OT Treatment Interventions to include:   * Instruction/training on adapted ADL techniques and AE recommendations to increase functional independence within precautions       * Training on energy conservation strategies, correct breathing pattern and techniques to improve independence/tolerance for self-care routine  * Functional transfer/mobility training/DME recommendations for increased independence, safety, and fall prevention  * Patient/Family education to increase follow through with safety techniques and functional independence  * Recommendation of environmental modifications for increased safety with functional transfers/mobility and ADLs  * Therapeutic exercise to improve motor endurance, ROM, and functional strength for ADLs/functional transfers  * Therapeutic activities to facilitate/challenge dynamic balance, stand tolerance for increased safety and independence with ADLs  * Positioning to improve skin integrity, interaction with environment and functional independence     Home Living: Pt lives with brother in 1 story home with 2 steps & 1 handrail to enter. Bathroom setup: [address] owned: [employer]     Prior Level of Function: IND with ADLs , IND with IADLs; engaged in functional mobility with use of  [employer]  Driving: Yes  Occupation: None reported     Pain Level: Pt with no c/o pain during session  Cognition: A&O: 4/4; Follows 2 step directions              Memory:  Good              Sequencing:  Fair+              Problem solving:  Fair+              Judgement/safety:  Fair                Functional Assessment:  AM-PAC Daily Activity Raw Score: 16/24    Initial Eval Status  Date: 10/10/22 Treatment Status  Date: 10/12/22 STGs = LTGs  Time frame: 10-14 days   Feeding Setup for tray. N/t  Independent    Grooming Stand by Assist  MIN A seated EOB with pt requiring MIN A for sitting balance.   Modified [place]    UB Dressing Stand by Assist  SBA to jason/[facility] gown  Modified [place]    LB Dressing Moderate Assist  MOD A to jason pants requiring assist to thread pants over B LE  Modified [place] Bathing Minimal Assist N/t Modified [place]    Toileting Moderate Assist  N/t Modified [place]    Bed Mobility  Supine to sit: Minimal Assist   Sit to supine: Minimal Assist  Supine<>sit MIN A pt requiring assist with trunk control to sit EOB  Supine to sit: Independent   Sit to supine: Independent    Functional Transfers Sit to stand:Minimal Assist   Stand to sit:Minimal Assist  Stand pivot: NT  Commode: NT MIN A sit<>stand from EOB to w/w v/c's for safety  Sit to stand:Modified [place]   Stand to sit:Modified [place]   Stand pivot: Modified [place]   Commode: Modified [place]     Functional Mobility Minimal Assist  Use of ww shorter than household distance MIN A sit<>stand with w/w house hold distances   Modified [place] with use of Appropriate AD   Balance Sitting:     Static - Supervision     Dynamic - SBA  Standing: Minimal Assist Sitting:   Static: MIN A initially progressing to SBA   Dynamic: MIN A   Standing: MIN A   Sitting:     Static: Independent     Dynamic: Independent  Standing: Modified [place]   Activity Tolerance Fair Fair   Good   Visual/  Perceptual Glasses: Yes  Appears WFL          Safety Fair   Good  during ADL completion     Comments: Upon arrival pt supine in bed, agreeable to therapy session. Pt educated with regards to bed mobility, functional transfers, functional mobility. Pt educated with regards to bed mobility, functional transfers, functional mobility, hand placement, walker safety, sitting balance, LE/UE dressing techniques. At end of session pt supine in bed,  all lines and tubes intact, call light within reach. Pt has made fair  progress towards set goals.    Continue with current plan of care      Treatment Time In: 1425          Treatment Time Out: 1448                Treatment Charges: Mins Units   Ther Ex  [id number]     Manual Therapy [address] [facility]     Thera Activities 70451 10 1   ADL/Home t 49197 13 1   Neuro Re-ed 73704     Group Therapy Orthotic manage/training  39791     Non-Billable Time     Total Timed Treatment 23 8768 [facility] [address]

## 2022-10-13 ENCOUNTER — TELEPHONE (OUTPATIENT)
Dept: NON INVASIVE DIAGNOSTICS | Age: 72
End: 2022-10-13

## 2022-10-13 LAB
ANION GAP SERPL CALCULATED.3IONS-SCNC: 11 MMOL/L (ref 7–16)
BLOOD CULTURE, ROUTINE: NORMAL
BUN BLDV-MCNC: 12 MG/DL (ref 6–23)
CALCIUM SERPL-MCNC: 9.4 MG/DL (ref 8.6–10.2)
CHLORIDE BLD-SCNC: 105 MMOL/L (ref 98–107)
CO2: 22 MMOL/L (ref 22–29)
CREAT SERPL-MCNC: 1.1 MG/DL (ref 0.7–1.2)
CULTURE, BLOOD 2: NORMAL
GFR AFRICAN AMERICAN: >60
GFR NON-AFRICAN AMERICAN: >60 ML/MIN/1.73
GLUCOSE BLD-MCNC: 115 MG/DL (ref 74–99)
INR BLD: 1.4
MAGNESIUM: 1.9 MG/DL (ref 1.6–2.6)
METER GLUCOSE: 113 MG/DL (ref 74–99)
METER GLUCOSE: 149 MG/DL (ref 74–99)
METER GLUCOSE: 164 MG/DL (ref 74–99)
METER GLUCOSE: 173 MG/DL (ref 74–99)
POTASSIUM SERPL-SCNC: 4.7 MMOL/L (ref 3.5–5)
PROTHROMBIN TIME: 15.3 SEC (ref 9.3–12.4)
SODIUM BLD-SCNC: 138 MMOL/L (ref 132–146)

## 2022-10-13 PROCEDURE — 6370000000 HC RX 637 (ALT 250 FOR IP): Performed by: STUDENT IN AN ORGANIZED HEALTH CARE EDUCATION/TRAINING PROGRAM

## 2022-10-13 PROCEDURE — 36415 COLL VENOUS BLD VENIPUNCTURE: CPT

## 2022-10-13 PROCEDURE — 2580000003 HC RX 258: Performed by: FAMILY MEDICINE

## 2022-10-13 PROCEDURE — 93005 ELECTROCARDIOGRAM TRACING: CPT | Performed by: INTERNAL MEDICINE

## 2022-10-13 PROCEDURE — 85610 PROTHROMBIN TIME: CPT

## 2022-10-13 PROCEDURE — 2140000000 HC CCU INTERMEDIATE R&B

## 2022-10-13 PROCEDURE — 82962 GLUCOSE BLOOD TEST: CPT

## 2022-10-13 PROCEDURE — 6370000000 HC RX 637 (ALT 250 FOR IP): Performed by: FAMILY MEDICINE

## 2022-10-13 PROCEDURE — 83735 ASSAY OF MAGNESIUM: CPT

## 2022-10-13 PROCEDURE — 6370000000 HC RX 637 (ALT 250 FOR IP): Performed by: INTERNAL MEDICINE

## 2022-10-13 PROCEDURE — 6360000002 HC RX W HCPCS: Performed by: FAMILY MEDICINE

## 2022-10-13 PROCEDURE — S5553 INSULIN LONG ACTING 5 U: HCPCS | Performed by: INTERNAL MEDICINE

## 2022-10-13 PROCEDURE — 80048 BASIC METABOLIC PNL TOTAL CA: CPT

## 2022-10-13 PROCEDURE — 6370000000 HC RX 637 (ALT 250 FOR IP): Performed by: NURSE PRACTITIONER

## 2022-10-13 RX ORDER — POLYVINYL ALCOHOL 14 MG/ML
1 SOLUTION/ DROPS OPHTHALMIC EVERY 6 HOURS PRN
Status: DISCONTINUED | OUTPATIENT
Start: 2022-10-13 | End: 2022-10-14 | Stop reason: HOSPADM

## 2022-10-13 RX ORDER — WARFARIN SODIUM 5 MG/1
5 TABLET ORAL
Status: COMPLETED | OUTPATIENT
Start: 2022-10-13 | End: 2022-10-13

## 2022-10-13 RX ADMIN — METOPROLOL SUCCINATE 50 MG: 50 TABLET, EXTENDED RELEASE ORAL at 09:15

## 2022-10-13 RX ADMIN — SOTALOL HYDROCHLORIDE 120 MG: 120 TABLET ORAL at 09:15

## 2022-10-13 RX ADMIN — ASPIRIN 81 MG: 81 TABLET, COATED ORAL at 09:15

## 2022-10-13 RX ADMIN — ENOXAPARIN SODIUM 80 MG: 100 INJECTION SUBCUTANEOUS at 20:59

## 2022-10-13 RX ADMIN — SODIUM CHLORIDE, PRESERVATIVE FREE 10 ML: 5 INJECTION INTRAVENOUS at 20:59

## 2022-10-13 RX ADMIN — PANTOPRAZOLE SODIUM 40 MG: 40 TABLET, DELAYED RELEASE ORAL at 05:56

## 2022-10-13 RX ADMIN — METOPROLOL SUCCINATE 50 MG: 50 TABLET, EXTENDED RELEASE ORAL at 20:59

## 2022-10-13 RX ADMIN — SOTALOL HYDROCHLORIDE 120 MG: 120 TABLET ORAL at 20:59

## 2022-10-13 RX ADMIN — ENOXAPARIN SODIUM 80 MG: 100 INJECTION SUBCUTANEOUS at 09:15

## 2022-10-13 RX ADMIN — Medication 400 MG: at 09:15

## 2022-10-13 RX ADMIN — SODIUM CHLORIDE, PRESERVATIVE FREE 10 ML: 5 INJECTION INTRAVENOUS at 09:15

## 2022-10-13 RX ADMIN — INSULIN GLARGINE-YFGN 10 UNITS: 100 INJECTION, SOLUTION SUBCUTANEOUS at 21:01

## 2022-10-13 RX ADMIN — WARFARIN SODIUM 5 MG: 5 TABLET ORAL at 10:36

## 2022-10-13 NOTE — PROGRESS NOTES
[facility]   Progress note      Subjective: The patient is awake and alert. Lying in bed without complaints  No acute events overnight  Denies chest pain, SOB    Objective:    /76   Pulse 60   Temp 97.2 °F (36.2 °C)   Resp 27   Ht 6' (1.829 m)   Wt 173 lb (78.5 kg)   SpO2 96%   BMI 23.46 kg/m²     In: 120 [P.O.:120]  Out: 2025   In: 120   Out: 2025 [Urine:2025]    General appearance: NAD, conversant  HEENT: AT/NC, MMM  Neck: FROM, supple  Lungs: Clear to auscultation  CV: irregular, no MRGs  Vasc: Radial pulses 2+  Abdomen: Soft, non-tender; no masses or HSM  Extremities: No peripheral edema or digital cyanosis  Skin: no rash, lesions or ulcers  Psych: Alert and oriented to person, place and time  Neuro: Alert and interactive     Recent Labs     10/12/22  0610   WBC 5.9   HGB 10.0*   HCT 31.3*          Recent Labs     10/11/22  1432 10/12/22  0610 10/13/22  0559    140 138   K 4.6 4.5 4.7    107 105   CO2 20* 22 22   BUN 12 11 12   CREATININE 1.1 1.0 1.1   CALCIUM 8.6 8.7 9.4       Assessment:    Principal Problem:    Atrial fibrillation with RVR (HCC)  Active Problems:    H/O total cystectomy    AICD discharge  Resolved Problems:    * No resolved hospital problems. *      Plan:    51-year-old male with a history of bladder cancer status post recent cystoscopy prostatectomy and ileal conduit urinary diversion with urostomy placement last week,  and history of A. fib presented to the ED with complaints of chest pain and irregular heartbeat and his ICD had fired 5 times is admitted to telemetry unit.   Concern for UTI sepsis post procedure/instrumentation     10/8  Rate control-Cardizem drip with titration parameters  Consult cardiology/EP - await input  Chads - Vasc -  patient was receiving lovenox injection 40 mg daily to end on 10/11/2022  TSH   Rocephin 1 g daily with iv fluids   Check procal no urine culture      10/9  no further rigors or chills, afebrile  Continue IV Rocephin, blood cultures unremarkable to date-White count 7.7  Retroperitoneal ultrasound without hydronephrosis or pyelonephritis  Elevated rate this morning which has since been controlled by medication adjustment by EP-sotalol and Toprol-XL  Initiated on Lovenox 1 mg/kg  subcu twice daily, will need p.o. anticoagulation with Eliquis   supplement magnesium and potassium, 2 g and 40 mEq p.o. respectively  Heme-onc consultation for bladder cancer    10/10/2022  WBC 5.8  Stress test today -abnormal myocardial perfusion scan due to moderate global hypokinesis, no perfusion defect, EF 40  Echo completed ejection fraction 45 to 50%  QtC - 499 with increased dose of sotalol  Transition Lovenox to oral anticoagulation at discretion of cardiology for A. fib  Await oncology consult, possible discharge tomorrow    10/11/2022  Begin coumadin - pharmacy to dose PT/Inr daily - bridge with Lovenox. This can be monitored at facility  QtC - 480 with 5/6 increased doses of Sotolol will continue to monitor  Oncology - okay to follow with oncology as outpatient once final biopsy reports are back from [facility]-no immediate need for inpatient evaluation  Discharge plan tomorrow once final dose of sotalol is administered, if patient doing okay    10/12/2022  INR - 1.4  pharmacy dosing coumadin 5 mg today bridge with lovenox. Patient has to remain inpatient until INR is greater than 2.0.     Monitor labs    Code Status: Full  Consultants: Cardiology, Oncology    DVT Prophylaxis - Coumadin  PT/OT  Discharge planning - JERI Church - CNP  4:03 PM  10/13/2022 Pounds Preamble Statement (Weight Entered In Details Tab): Reported Weight in pounds:

## 2022-10-13 NOTE — TELEPHONE ENCOUNTER
----- Message from JERI Shetty - CNP sent at 10/12/2022 11:26 AM EDT -----  Saturnino Moncada was seen in hospital on 10/12/2022 and his Sotalol was increased to 120mg BID, he will need a follow-up EKG in one week, Follow-up with Dr. Michi Avalos in one month, thanks.

## 2022-10-13 NOTE — TELEPHONE ENCOUNTER
Patient scheduled 10/20/2022 for 1 week ekg.   Patient already scheduled 12/27/2022 with Dr. Abby Canavan

## 2022-10-13 NOTE — DISCHARGE INSTRUCTIONS
Electrophysiology Discharge Instructions  -Medication changes: sotalol increased from 80 mg every 12 hours to 120 mg every hours.  -Follow-up: you will be contacted for follow-up with Dr Sarath Barnett office in ~6 weeks. -Questions/concerns: please contact Dr Sarath Barnett office at [phone]. Miguel Angel Electrophysiology:   [facility] [address], [address] [place] [address]  [phone]

## 2022-10-13 NOTE — PLAN OF CARE
Problem: Skin/Tissue Integrity - Adult  Goal: Skin integrity remains intact  Outcome: Progressing     Problem: Skin/Tissue Integrity - Adult  Goal: Incisions, wounds, or drain sites healing without S/S of infection  Outcome: Progressing

## 2022-10-13 NOTE — CARE COORDINATION
10/13/22 Update CM Note: Patient will remain until his inr is equal to or above 2.0. pharmacy is adjusting the coumadin. Spoke with patient sister, Lawyer Mahmood, and updated her on the patients clinical status. She states her questions have been answered. She wishes to be called when he is ready for discharge to [facility].  Electronically signed by Darek Mcnamara RN CM on 10/13/2022 at 1:26 PM

## 2022-10-13 NOTE — PROGRESS NOTES
Pharmacy Consultation Note  (Warfarin Dosing and Monitoring)    Initial consult date: 10/11/22  Consulting Provider: JERI Guerrier Sites is a 67 y.o. male for whom pharmacy has been asked to manage warfarin therapy. Weight:   Wt Readings from Last 1 Encounters:   10/08/22 173 lb (78.5 kg)       TSH:    Lab Results   Component Value Date/Time    TSH 1.310 10/08/2022 02:34 PM       Hepatic Function Panel:                            Lab Results   Component Value Date/Time    ALKPHOS 267 07/22/2022 03:33 PM    ALT 16 07/22/2022 03:33 PM    AST 9 07/22/2022 03:33 PM    PROT 7.6 07/22/2022 03:33 PM    BILITOT 0.4 07/22/2022 03:33 PM    BILIDIR <0.2 01/11/2014 10:30 PM    LABALBU 3.8 07/22/2022 03:33 PM    LABALBU 4.1 03/03/2012 08:30 PM       Current warfarin drug-drug interactions include: No significant interactions    Recent Labs     10/12/22  0610   HGB 10.0*          Date Warfarin Dose INR Heparin or LMWH Comment   10/11 5 mg 1.2 Lovenox     10/12 5 mg 1.1 Lovenox    10/13 5 mg 1.4 Lovenox                    Assessment:  Patient is a 67 y.o. male on warfarin for Atrial Fibrillation. Patient's home warfarin dosing regimen is unclear; documented as 5 mg Sat/Sun/W and 4 mg M/T/Thu/F on 6/30/22 per Dr. Karla Tucker and dispense history shows warfarin 1 mg and warfarin 5 mg previously dispensed in April and May 2022. Previously seen by Cardiology; started on Eliquis; he could not afford and was switch to Warfarin. History of hematuria in May of 2022; patient diagnosed with bladder cancer, warfarin held at that time. Goal INR 2 - 3  10/11: INR= 1.2.    10/12: INR= 1.1.   10/13: INR= 1.4. Plan:  Warfarin 5 mg today.   Daily PT/INR until the INR is stable within the therapeutic range  Pharmacist will follow and monitor/adjust dosing as necessary    Thank you for this consult,  Guzman Lee, SupriyaD  PGY1 Pharmacy Resident  10/13/2022 8:36 AM

## 2022-10-14 VITALS
BODY MASS INDEX: 23.43 KG/M2 | HEART RATE: 69 BPM | DIASTOLIC BLOOD PRESSURE: 77 MMHG | HEIGHT: 72 IN | TEMPERATURE: 98.1 F | WEIGHT: 173 LBS | RESPIRATION RATE: 15 BRPM | SYSTOLIC BLOOD PRESSURE: 116 MMHG | OXYGEN SATURATION: 99 %

## 2022-10-14 PROBLEM — Z93.6 S/P ILEAL CONDUIT (HCC): Status: ACTIVE | Noted: 2022-01-01

## 2022-10-14 LAB
EKG ATRIAL RATE: 64 BPM
EKG ATRIAL RATE: 64 BPM
EKG ATRIAL RATE: 65 BPM
EKG ATRIAL RATE: 67 BPM
EKG P AXIS: 20 DEGREES
EKG P AXIS: 25 DEGREES
EKG P AXIS: 39 DEGREES
EKG P AXIS: 51 DEGREES
EKG P-R INTERVAL: 140 MS
EKG P-R INTERVAL: 140 MS
EKG P-R INTERVAL: 142 MS
EKG P-R INTERVAL: 142 MS
EKG Q-T INTERVAL: 434 MS
EKG Q-T INTERVAL: 450 MS
EKG Q-T INTERVAL: 454 MS
EKG Q-T INTERVAL: 458 MS
EKG QRS DURATION: 82 MS
EKG QRS DURATION: 86 MS
EKG QRS DURATION: 86 MS
EKG QRS DURATION: 88 MS
EKG QTC CALCULATION (BAZETT): 458 MS
EKG QTC CALCULATION (BAZETT): 464 MS
EKG QTC CALCULATION (BAZETT): 468 MS
EKG QTC CALCULATION (BAZETT): 476 MS
EKG R AXIS: -12 DEGREES
EKG R AXIS: -14 DEGREES
EKG R AXIS: -15 DEGREES
EKG R AXIS: -15 DEGREES
EKG T AXIS: -18 DEGREES
EKG T AXIS: -34 DEGREES
EKG T AXIS: 168 DEGREES
EKG T AXIS: 21 DEGREES
EKG VENTRICULAR RATE: 64 BPM
EKG VENTRICULAR RATE: 64 BPM
EKG VENTRICULAR RATE: 65 BPM
EKG VENTRICULAR RATE: 67 BPM
INR BLD: 1.9
METER GLUCOSE: 124 MG/DL (ref 74–99)
METER GLUCOSE: 162 MG/DL (ref 74–99)
METER GLUCOSE: 178 MG/DL (ref 74–99)
PROTHROMBIN TIME: 20.4 SEC (ref 9.3–12.4)
SARS-COV-2, NAAT: NOT DETECTED

## 2022-10-14 PROCEDURE — 85610 PROTHROMBIN TIME: CPT

## 2022-10-14 PROCEDURE — 36415 COLL VENOUS BLD VENIPUNCTURE: CPT

## 2022-10-14 PROCEDURE — 6370000000 HC RX 637 (ALT 250 FOR IP): Performed by: INTERNAL MEDICINE

## 2022-10-14 PROCEDURE — 82962 GLUCOSE BLOOD TEST: CPT

## 2022-10-14 PROCEDURE — 6370000000 HC RX 637 (ALT 250 FOR IP)

## 2022-10-14 PROCEDURE — 2580000003 HC RX 258: Performed by: FAMILY MEDICINE

## 2022-10-14 PROCEDURE — 6360000002 HC RX W HCPCS: Performed by: FAMILY MEDICINE

## 2022-10-14 PROCEDURE — 87635 SARS-COV-2 COVID-19 AMP PRB: CPT

## 2022-10-14 PROCEDURE — 93005 ELECTROCARDIOGRAM TRACING: CPT | Performed by: INTERNAL MEDICINE

## 2022-10-14 PROCEDURE — 6370000000 HC RX 637 (ALT 250 FOR IP): Performed by: FAMILY MEDICINE

## 2022-10-14 PROCEDURE — 6370000000 HC RX 637 (ALT 250 FOR IP): Performed by: NURSE PRACTITIONER

## 2022-10-14 PROCEDURE — 6370000000 HC RX 637 (ALT 250 FOR IP): Performed by: STUDENT IN AN ORGANIZED HEALTH CARE EDUCATION/TRAINING PROGRAM

## 2022-10-14 RX ORDER — PANTOPRAZOLE SODIUM 40 MG/1
40 TABLET, DELAYED RELEASE ORAL
Qty: 30 TABLET | Refills: 3 | Status: SHIPPED | OUTPATIENT
Start: 2022-10-15

## 2022-10-14 RX ORDER — ENOXAPARIN SODIUM 100 MG/ML
1 INJECTION SUBCUTANEOUS 2 TIMES DAILY
Qty: 2 EACH | Refills: 0 | Status: SHIPPED | OUTPATIENT
Start: 2022-10-14 | End: 2022-10-17

## 2022-10-14 RX ORDER — WARFARIN SODIUM 5 MG/1
5 TABLET ORAL
Status: COMPLETED | OUTPATIENT
Start: 2022-10-14 | End: 2022-10-14

## 2022-10-14 RX ORDER — METOPROLOL SUCCINATE 50 MG/1
50 TABLET, EXTENDED RELEASE ORAL 2 TIMES DAILY
Qty: 30 TABLET | Refills: 3 | Status: SHIPPED | OUTPATIENT
Start: 2022-10-14

## 2022-10-14 RX ORDER — WARFARIN SODIUM 5 MG/1
5 TABLET ORAL DAILY
Qty: 30 TABLET | Refills: 0 | Status: SHIPPED | OUTPATIENT
Start: 2022-10-14

## 2022-10-14 RX ADMIN — ENOXAPARIN SODIUM 80 MG: 100 INJECTION SUBCUTANEOUS at 08:54

## 2022-10-14 RX ADMIN — Medication 400 MG: at 08:53

## 2022-10-14 RX ADMIN — SOTALOL HYDROCHLORIDE 120 MG: 120 TABLET ORAL at 08:53

## 2022-10-14 RX ADMIN — PANTOPRAZOLE SODIUM 40 MG: 40 TABLET, DELAYED RELEASE ORAL at 06:16

## 2022-10-14 RX ADMIN — SODIUM CHLORIDE, PRESERVATIVE FREE 10 ML: 5 INJECTION INTRAVENOUS at 08:54

## 2022-10-14 RX ADMIN — METOPROLOL SUCCINATE 50 MG: 50 TABLET, EXTENDED RELEASE ORAL at 08:53

## 2022-10-14 RX ADMIN — WARFARIN SODIUM 5 MG: 5 TABLET ORAL at 10:48

## 2022-10-14 RX ADMIN — ASPIRIN 81 MG: 81 TABLET, COATED ORAL at 08:53

## 2022-10-14 ASSESSMENT — PAIN SCALES - GENERAL
PAINLEVEL_OUTOF10: 0
PAINLEVEL_OUTOF10: 0

## 2022-10-14 NOTE — PROGRESS NOTES
Monitor dcd cleaned and placed in slot in nurses station. Patient sent to [facility].

## 2022-10-14 NOTE — CARE COORDINATION
10/14/22 Update CM Note: Patient is able to discharge to [facility] today. Per the DON they can give the 2 remaining doses of lovenox to patient and will do labs as ordered. Coumadin is being adjusted. He is getting 5mg today. INR 1.9. Per primary patient will discharge today. Hens requires completion once discharge order is placed/nimisha/destination is completed. Envelope in the soft chart. Covid will need completed prior to discharge. [facility] to transport patient via stretcher to [facility] with pickup time 6:15pm today.   Electronically signed by Ana Blount RN CM on 10/14/2022 at 12:48 PM

## 2022-10-14 NOTE — DISCHARGE SUMMARY
[facility]   Discharge summary   Patient ID:  Chago Le  [id number]  65 y.o.  1950    Admit date: 10/8/2022    Discharge date and time: 10/14/2022    Admission Diagnoses:   Patient Active Problem List   Diagnosis    Diabetes mellitus ([address]    Essential hypertension    COPD, very severe ([address]    CAD (coronary artery disease)    Tubular adenoma of colon    Sigmoid diverticulosis    Controlled type 2 diabetes mellitus with diabetic nephropathy, with long-term current use of insulin (HCC)    Prostate cancer ([address]    Mixed hyperlipidemia    ICD (implantable cardioverter-defibrillator) in place    Cerebellar hemorrhage ([address]    Dilated cardiomyopathy ([address]    AAA (abdominal aortic aneurysm) without rupture    PAF (paroxysmal atrial fibrillation) ([facility])    COVID    Hematuria    Urinary retention    Elevated serum creatinine    Anemia    Chronic renal disease, stage III ([facility]) [426043]    Malignant neoplasm of overlapping sites of bladder (HCC)    Hyperglycemia    Type II endoleak of aortic graft    Status post endovascular aneurysm repair (EVAR)    Bladder cancer ([address]    AIME (acute kidney injury) ([address]    Shock ([address]    Severe malnutrition (HCC)    Acute blood loss anemia    H/O total cystectomy    Tobacco abuse    Atrial fibrillation with RVR ([address]    AICD discharge    Mild vascular dementia       Discharge Diagnoses: Afib RVR    Consults: cardiology    Procedures: None    Hospital Course: The patient is a 67 y.o. male of [facility]   70-year-old male with a history of bladder cancer status post recent cystoscopy prostatectomy and ileal conduit urinary diversion with urostomy placement last week,  and history of A. fib presented to the ED with complaints of chest pain and irregular heartbeat and his ICD had fired 5 times is admitted to telemetry unit.   Concern for UTI sepsis post procedure/instrumentation     10/8  Rate control-Cardizem drip with titration parameters  Consult cardiology/EP - await input  Chads - Vasc -  patient was receiving lovenox injection 40 mg daily to end on 10/11/2022  TSH   Rocephin 1 g daily with iv fluids   Check procal no urine culture      10/9  no further rigors or chills, afebrile  Continue IV Rocephin, blood cultures unremarkable to date-White count 7.7  Retroperitoneal ultrasound without hydronephrosis or pyelonephritis  Elevated rate this morning which has since been controlled by medication adjustment by EP-sotalol and Toprol-XL  Initiated on Lovenox 1 mg/kg  subcu twice daily, will need p.o. anticoagulation with Eliquis   supplement magnesium and potassium, 2 g and 40 mEq p.o. respectively  Heme-onc consultation for bladder cancer     10/10/2022  WBC 5.8  Stress test today -abnormal myocardial perfusion scan due to moderate global hypokinesis, no perfusion defect, EF 40  Echo completed ejection fraction 45 to 50%  QtC - 499 with increased dose of sotalol  Transition Lovenox to oral anticoagulation at discretion of cardiology for A. fib  Await oncology consult, possible discharge tomorrow     10/11/2022  Begin coumadin - pharmacy to dose PT/Inr daily - bridge with Lovenox. This can be monitored at facility  QtC - 480 with 5/6 increased doses of Sotolol will continue to monitor  Oncology - okay to follow with oncology as outpatient once final biopsy reports are back from [facility]-no immediate need for inpatient evaluation  Discharge plan tomorrow once final dose of sotalol is administered, if patient doing okay     10/12/2022  INR - 1.4  pharmacy dosing coumadin 5 mg today bridge with lovenox. Patient has to remain inpatient until INR is greater than 2.0.     Monitor labs     10/13/2022  INR very slow to improve-1.4 today, pharmacy dosing Coumadin  On Lovenox bridge - can do at facility  Also on aspirin  Given multiple agents, watch for GI bleed or hematuria and patient with recent urological surgery  Remains on sotalol and beta-blocker  If we can make arrangements for Lovenox shots to be taken twice daily until therapeutic INR is obtained, okay for discharge from medicine standpoint     10/14/2022/date of discharge  Patient discharging to facility with Lovenox injections ordered and on Coumadin 5 mg. Patient is to have daily PT/INR and discontinue Lovenox injections once patient is therapeutic 2. Patient is to follow-up with EP and he is discharged in stable condition. Follow-up with oncology, to initiate chemotherapy/radiation once final biopsy results are back from [facility]     10/12/22  0610   WBC 5.9   HGB 10.0*   HCT 31.3*          Recent Labs     10/11/22  1432 10/12/22  0610 10/13/22  0559    140 138   K 4.6 4.5 4.7    107 105   CO2 20* 22 22   BUN 12 11 12   CREATININE 1.1 1.0 1.1   CALCIUM 8.6 8.7 9.4       XR CHEST PORTABLE    Result Date: 10/8/2022  EXAMINATION: ONE XRAY VIEW OF THE CHEST 10/8/2022 1:08 am COMPARISON: 07/22/2022 HISTORY: ORDERING SYSTEM PROVIDED HISTORY: CP TECHNOLOGIST PROVIDED HISTORY: Reason for exam:->CP What reading provider will be dictating this exam?->CRC FINDINGS: The lungs are without acute focal process. There is no effusion or pneumothorax. The cardiomediastinal silhouette is without acute process. The osseous structures are without acute process. Left-sided transvenous pacer device. No acute process. US RETROPERITONEAL COMPLETE    Result Date: 10/9/2022  EXAMINATION: RETROPERITONEAL ULTRASOUND OF THE KIDNEYS AND URINARY BLADDER 10/9/2022 COMPARISON: None HISTORY: ORDERING SYSTEM PROVIDED HISTORY: ro peylo TECHNOLOGIST PROVIDED HISTORY: Reason for exam:->ro peylo What reading provider will be dictating this exam?->CRC FINDINGS: Kidneys: The right kidney measures 11.5 cm in length and the left kidney measures 10.2 cm in length. Kidneys demonstrate normal cortical echogenicity. No evidence of hydronephrosis or intrarenal stones.   Anechoic exophytic cyst extending off the superior pole right kidney measuring 5.5 cm of a simple renal cortical cysts. Bladder: Unremarkable appearance of the bladder. No significant post void residual.     No acute findings. No hydronephrosis. Simple appearing exophytic right superior pole renal cortical cyst.       Discharge Exam:    HEENT: NCAT,  PERRLA, No JVD  Heart:  RRR, no murmurs, gallops, or rubs. Lungs:  CTA bilaterally, no wheeze, rales or rhonchi  Abd: bowel sounds present, nontender, nondistended, no masses  Extrem:  No clubbing, cyanosis, or edema    Disposition: [facility]     Patient Condition at Discharge: Stable    Patient Instructions:      Medication List        START taking these medications      enoxaparin 80 MG/0.8ML  Commonly known as: LOVENOX  Inject 0.8 mLs into the skin 2 times daily     pantoprazole 40 MG tablet  Commonly known as: PROTONIX  Take 1 tablet by mouth every morning (before breakfast)  Start taking on: October 15, 2022     warfarin 5 MG tablet  Commonly known as: Coumadin  Take as directed. If you are unsure how to take this medication, talk to your nurse or doctor. Original instructions:  Take 1 tablet by mouth daily            CHANGE how you take these medications      metoprolol succinate 50 MG extended release tablet  Commonly known as: TOPROL XL  Take 1 tablet by mouth 2 times daily  What changed: when to take this     sotalol 120 MG tablet  Commonly known as: BETAPACE  Take 1 tablet by mouth 2 times daily  What changed:   medication strength  how much to take            CONTINUE taking these medications      aspirin EC 81 MG EC tablet  Take 1 tablet by mouth daily     atorvastatin 40 MG tablet  Commonly known as: LIPITOR     insulin glargine 100 UNIT/ML injection vial  Commonly known as: LANTUS     lisinopril 10 MG tablet  Commonly known as: PRINIVIL;ZESTRIL            STOP taking these medications      metFORMIN 500 MG tablet  Commonly known as: GLUCOPHAGE               Where to Get Your Medications        [facility] [address] [address] [place] [address] Chasity Barber [phone]  [address], [address] [facility]      Phone: [phone]   enoxaparin 80 MG/0.8ML  metoprolol succinate 50 MG extended release tablet  pantoprazole 40 MG tablet  sotalol 120 MG tablet  warfarin 5 MG tablet       Activity: activity as tolerated  Diet: cardiac diet    Pt has been advised to: Follow-up with Meghna Colunga DO in 1 week. Follow-up with consultants as recommended by them    Note that over 30 minutes was spent in preparing discharge papers, discussing discharge with patient, medication review, etc.    Signed:  JERI Holt CNP  10/14/2022  1:03 PM     Above note edited to reflect my thoughts     I personally saw, examined and provided care for the patient. Radiographs, labs and medication list were reviewed by me independently. The case was discussed in detail and plans for care were established. Review of Dinah BOSCH CNP, documentation was conducted and revisions were made as appropriate directly by me. I agree with the above documented exam, problem list, and plan of care.      Holly Felix MD  6:47 PM  10/14/2022

## 2022-10-14 NOTE — CARE COORDINATION
10/14/22 Update CM Note; hens completed. Covid pending. Will follow.  Electronically signed by Mindy Cristina RN CM on 10/14/2022 at 1:13 PM

## 2022-10-14 NOTE — PROGRESS NOTES
Comprehensive Nutrition Assessment    Type and Reason for Visit:  Initial (LOS)    Nutrition Recommendations/Plan:   No nutritional supplementation recommended at this time. Malnutrition Assessment:  Malnutrition Status:  Insufficient data (10/14/22 0974)    Context:  Acute Illness     Findings of the 6 clinical characteristics of malnutrition:  Energy Intake:  No significant decrease in energy intake  Weight Loss:  Unable to assess (d/t no actual weights available since admission)     Body Fat Loss:  Unable to assess     Muscle Mass Loss:  Unable to assess    Fluid Accumulation:  No significant fluid accumulation     Strength:  Not Performed    Nutrition Assessment:    Patients po intake has been a little sporadic, although still averaging ~75% of most meals served ; adm w/ irregular heart beat ; hx of bladder cancer s/p recent cystoscopy prostatectomy and ileal conduit urinary diversion with urostomy placement on 9/13 ; hx of colon CA and mild dementia ; hx of DM/COPD/CAD/CHF/CKD ; no ONS recommended at this time    Nutrition Related Findings:    -I&Os (-6.1 L), no edema, active BS, A&O x 2, urostomy ; Wound Type: Surgical Incision (Incision x 1)       Current Nutrition Intake & Therapies:    Average Meal Intake: %     ADULT DIET; Regular; 4 carb choices (60 gm/meal)    Anthropometric Measures:  Height: 6' (182.9 cm)  Ideal Body Weight (IBW): 178 lbs (81 kg)    Admission Body Weight: 173 lb (78.5 kg) (10/7, no method)  Current Body Weight: 173 lb (78.5 kg) (10/8, stated), 97.2 % IBW.  Weight Source: Stated  Current BMI (kg/m2): 23.5  Usual Body Weight:  (UTO ; EMR shows past weight of 173# actual on 9/7/22)                       BMI Categories: Normal Weight (BMI 22.0 to 24.9) age over 72    Estimated Daily Nutrient Needs:  Energy Requirements Based On: Formula  Weight Used for Energy Requirements: Current  Energy (kcal/day): 3675-1683 (REE 1574 x 1.1-1.2)  Weight Used for Protein Requirements: Current  Protein (g/day):  (1.2-1.4g/kg CBW)  Method Used for Fluid Requirements: 1 ml/kcal  Fluid (ml/day): 3538-6993    Nutrition Diagnosis:   No nutrition diagnosis at this time     Nutrition Interventions:   Food and/or Nutrient Delivery: Continue Current Diet  Nutrition Education/Counseling: Education not indicated  Coordination of Nutrition Care: Continue to monitor while inpatient       Goals:  Previous Goal Met: Progressing toward Goal(s)  Goals: PO intake 75% or greater, by next RD assessment       Nutrition Monitoring and Evaluation:   Behavioral-Environmental Outcomes: None Identified  Food/Nutrient Intake Outcomes: Food and Nutrient Intake  Physical Signs/Symptoms Outcomes: Biochemical Data, Chewing or Swallowing, GI Status, Fluid Status or Edema, Hemodynamic Status, Meal Time Behavior, Nutrition Focused Physical Findings, Weight, Skin    Discharge Planning:     Too soon to determine     Sukhjinder Zimmerman RD, LD  Contact: [phone]

## 2022-10-14 NOTE — PROGRESS NOTES
Pharmacy Consultation Note  (Warfarin Dosing and Monitoring)    Initial consult date: 10/11/22  Consulting Provider: JERI Torrez Blane is a 67 y.o. male for whom pharmacy has been asked to manage warfarin therapy. Weight:   Wt Readings from Last 1 Encounters:   10/08/22 173 lb (78.5 kg)       TSH:    Lab Results   Component Value Date/Time    TSH 1.310 10/08/2022 02:34 PM       Hepatic Function Panel:                            Lab Results   Component Value Date/Time    ALKPHOS 267 07/22/2022 03:33 PM    ALT 16 07/22/2022 03:33 PM    AST 9 07/22/2022 03:33 PM    PROT 7.6 07/22/2022 03:33 PM    BILITOT 0.4 07/22/2022 03:33 PM    BILIDIR <0.2 01/11/2014 10:30 PM    LABALBU 3.8 07/22/2022 03:33 PM    LABALBU 4.1 03/03/2012 08:30 PM       Current warfarin drug-drug interactions include: No significant interactions    Recent Labs     10/12/22  0610   HGB 10.0*          Date Warfarin Dose INR Heparin or LMWH Comment   10/11 5 mg 1.2 Lovenox     10/12 5 mg 1.1 Lovenox    10/13 5 mg 1.4 Lovenox    10/14 5 mg 1.9 Lovenox             Assessment:  Patient is a 67 y.o. male on warfarin for Atrial Fibrillation. Patient's home warfarin dosing regimen is unclear; documented as 5 mg Sat/Sun/W and 4 mg M/T/Thu/F on 6/30/22 per Dr. Woodford Boast and dispense history shows warfarin 1 mg and warfarin 5 mg previously dispensed in April and May 2022. Previously seen by Cardiology; started on Eliquis; he could not afford and was switch to Warfarin. History of hematuria in May of 2022; patient diagnosed with bladder cancer, warfarin held at that time. Goal INR 2 - 3  10/11: INR= 1.2.    10/12: INR= 1.1.   10/13: INR= 1.4.   10/14: INR= 1.9. Plan:  Warfarin 5 mg today; recommending 5 mg tablet once daily for discharge with close INR follow up.   Pharmacist will follow and monitor/adjust dosing as necessary    Thank you for this consult,  Ilan Haywood, PharmD  PGY1 Pharmacy Resident  10/14/2022 7:21 AM

## 2022-10-15 LAB
ANION GAP SERPL CALCULATED.3IONS-SCNC: 12 MMOL/L (ref 7–16)
BASOPHILS ABSOLUTE: 0.08 E9/L (ref 0–0.2)
BASOPHILS RELATIVE PERCENT: 0.8 % (ref 0–2)
BUN BLDV-MCNC: 19 MG/DL (ref 6–23)
CALCIUM SERPL-MCNC: 9.5 MG/DL (ref 8.6–10.2)
CHLORIDE BLD-SCNC: 102 MMOL/L (ref 98–107)
CO2: 24 MMOL/L (ref 22–29)
CREAT SERPL-MCNC: 1 MG/DL (ref 0.7–1.2)
EOSINOPHILS ABSOLUTE: 0.57 E9/L (ref 0.05–0.5)
EOSINOPHILS RELATIVE PERCENT: 5.7 % (ref 0–6)
GFR AFRICAN AMERICAN: >60
GFR NON-AFRICAN AMERICAN: >60 ML/MIN/1.73
GLUCOSE BLD-MCNC: 134 MG/DL (ref 74–99)
HBA1C MFR BLD: 6 % (ref 4–5.6)
HCT VFR BLD CALC: 36 % (ref 37–54)
HEMOGLOBIN: 11.2 G/DL (ref 12.5–16.5)
IMMATURE GRANULOCYTES #: 0.11 E9/L
IMMATURE GRANULOCYTES %: 1.1 % (ref 0–5)
INR BLD: 2.6
LYMPHOCYTES ABSOLUTE: 1.46 E9/L (ref 1.5–4)
LYMPHOCYTES RELATIVE PERCENT: 14.7 % (ref 20–42)
MCH RBC QN AUTO: 30.9 PG (ref 26–35)
MCHC RBC AUTO-ENTMCNC: 31.1 % (ref 32–34.5)
MCV RBC AUTO: 99.2 FL (ref 80–99.9)
MONOCYTES ABSOLUTE: 0.79 E9/L (ref 0.1–0.95)
MONOCYTES RELATIVE PERCENT: 7.9 % (ref 2–12)
NEUTROPHILS ABSOLUTE: 6.93 E9/L (ref 1.8–7.3)
NEUTROPHILS RELATIVE PERCENT: 69.8 % (ref 43–80)
PDW BLD-RTO: 18.6 FL (ref 11.5–15)
PLATELET # BLD: 550 E9/L (ref 130–450)
PMV BLD AUTO: 8.9 FL (ref 7–12)
POTASSIUM SERPL-SCNC: 4.5 MMOL/L (ref 3.5–5)
PROTHROMBIN TIME: 28.2 SEC (ref 9.3–12.4)
RBC # BLD: 3.63 E12/L (ref 3.8–5.8)
SODIUM BLD-SCNC: 138 MMOL/L (ref 132–146)
WBC # BLD: 9.9 E9/L (ref 4.5–11.5)

## 2022-10-17 ENCOUNTER — CLINICAL DOCUMENTATION (OUTPATIENT)
Dept: FAMILY MEDICINE CLINIC | Age: 72
End: 2022-10-17

## 2022-10-17 DIAGNOSIS — I48.0 PAF (PAROXYSMAL ATRIAL FIBRILLATION) (HCC): ICD-10-CM

## 2022-10-17 DIAGNOSIS — C61 PROSTATE CANCER (HCC): ICD-10-CM

## 2022-10-17 DIAGNOSIS — C67.8 MALIGNANT NEOPLASM OF OVERLAPPING SITES OF BLADDER (HCC): Primary | ICD-10-CM

## 2022-10-17 DIAGNOSIS — N18.31 STAGE 3A CHRONIC KIDNEY DISEASE (HCC): ICD-10-CM

## 2022-10-17 DIAGNOSIS — I48.91 ATRIAL FIBRILLATION WITH RVR (HCC): ICD-10-CM

## 2022-10-17 DIAGNOSIS — E11.59 TYPE 2 DIABETES MELLITUS WITH CARDIAC COMPLICATION (HCC): ICD-10-CM

## 2022-10-17 DIAGNOSIS — I71.40 ABDOMINAL AORTIC ANEURYSM (AAA) WITHOUT RUPTURE, UNSPECIFIED PART: ICD-10-CM

## 2022-10-17 DIAGNOSIS — Z93.6 S/P ILEAL CONDUIT (HCC): ICD-10-CM

## 2022-10-17 DIAGNOSIS — I25.10 CORONARY ARTERY DISEASE INVOLVING NATIVE HEART WITHOUT ANGINA PECTORIS, UNSPECIFIED VESSEL OR LESION TYPE: ICD-10-CM

## 2022-10-17 DIAGNOSIS — Z90.6 H/O TOTAL CYSTECTOMY: ICD-10-CM

## 2022-10-17 DIAGNOSIS — I10 ESSENTIAL HYPERTENSION: Chronic | ICD-10-CM

## 2022-10-17 PROBLEM — Z79.4 CONTROLLED TYPE 2 DIABETES MELLITUS WITH DIABETIC NEPHROPATHY, WITH LONG-TERM CURRENT USE OF INSULIN (HCC): Status: RESOLVED | Noted: 2017-01-16 | Resolved: 2022-10-17

## 2022-10-17 PROBLEM — E11.21 CONTROLLED TYPE 2 DIABETES MELLITUS WITH DIABETIC NEPHROPATHY, WITH LONG-TERM CURRENT USE OF INSULIN (HCC): Status: RESOLVED | Noted: 2017-01-16 | Resolved: 2022-01-01

## 2022-10-17 LAB
ANION GAP SERPL CALCULATED.3IONS-SCNC: 11 MMOL/L (ref 7–16)
BUN BLDV-MCNC: 21 MG/DL (ref 6–23)
CALCIUM SERPL-MCNC: 9.7 MG/DL (ref 8.6–10.2)
CHLORIDE BLD-SCNC: 105 MMOL/L (ref 98–107)
CO2: 23 MMOL/L (ref 22–29)
CREAT SERPL-MCNC: 1.2 MG/DL (ref 0.7–1.2)
GFR AFRICAN AMERICAN: >60
GFR NON-AFRICAN AMERICAN: 59 ML/MIN/1.73
GLUCOSE BLD-MCNC: 109 MG/DL (ref 74–99)
HBA1C MFR BLD: 6.2 % (ref 4–5.6)
HCT VFR BLD CALC: 32.5 % (ref 37–54)
HEMOGLOBIN: 10.3 G/DL (ref 12.5–16.5)
INR BLD: 3.4
MCH RBC QN AUTO: 31 PG (ref 26–35)
MCHC RBC AUTO-ENTMCNC: 31.7 % (ref 32–34.5)
MCV RBC AUTO: 97.9 FL (ref 80–99.9)
PDW BLD-RTO: 18.1 FL (ref 11.5–15)
PLATELET # BLD: 515 E9/L (ref 130–450)
PMV BLD AUTO: 8.6 FL (ref 7–12)
POTASSIUM SERPL-SCNC: 5.3 MMOL/L (ref 3.5–5)
PROTHROMBIN TIME: 37 SEC (ref 9.3–12.4)
RBC # BLD: 3.32 E12/L (ref 3.8–5.8)
SODIUM BLD-SCNC: 139 MMOL/L (ref 132–146)
WBC # BLD: 9.8 E9/L (ref 4.5–11.5)

## 2022-10-17 ASSESSMENT — ENCOUNTER SYMPTOMS
TROUBLE SWALLOWING: 0
EYE REDNESS: 0
CHEST TIGHTNESS: 0
COLOR CHANGE: 0
PHOTOPHOBIA: 0
ANAL BLEEDING: 0
SORE THROAT: 0
EYE DISCHARGE: 0
WHEEZING: 0
BLOOD IN STOOL: 0
BACK PAIN: 0
SINUS PRESSURE: 0
ABDOMINAL PAIN: 1
DIARRHEA: 0
STRIDOR: 0
SINUS PAIN: 0
VOICE CHANGE: 0
VOMITING: 0
ALLERGIC/IMMUNOLOGIC NEGATIVE: 1
RESPIRATORY NEGATIVE: 1
RECTAL PAIN: 0
RHINORRHEA: 0
EYE PAIN: 0
ABDOMINAL DISTENTION: 0
CONSTIPATION: 0
NAUSEA: 0
APNEA: 0
CHOKING: 0
EYE ITCHING: 0
FACIAL SWELLING: 0
SHORTNESS OF BREATH: 0
COUGH: 0

## 2022-10-17 NOTE — PROGRESS NOTES
SUBJECTIVE  Berto Duran is a 67 y.o. male. HPI/Chief C/O:  He comes to our facility for rehab due to bladder cancer post op     Allergies   Allergen Reactions    Hydrocodone Nausea And Vomiting   This patient is here to establish care as a new patient in our facility   We will review all past medical history and go over past and current medications   We will review all medical records that are available to us  We will reconcile our care planning and treatment goals to past and present for this patient             ROS:  Review of Systems   Constitutional:  Positive for fatigue. Negative for activity change, appetite change, chills, diaphoresis, fever and unexpected weight change. HENT: Negative. Negative for congestion, dental problem, drooling, ear discharge, ear pain, facial swelling, hearing loss, mouth sores, nosebleeds, postnasal drip, rhinorrhea, sinus pressure, sinus pain, sneezing, sore throat, tinnitus, trouble swallowing and voice change. Eyes:  Negative for photophobia, pain, discharge, redness, itching and visual disturbance. Respiratory: Negative. Negative for apnea, cough, choking, chest tightness, shortness of breath, wheezing and stridor. Cardiovascular: Negative. Negative for chest pain, palpitations and leg swelling. Gastrointestinal:  Positive for abdominal pain (cystostomy bag). Negative for abdominal distention, anal bleeding, blood in stool, constipation, diarrhea, nausea, rectal pain and vomiting. Endocrine: Negative. Negative for cold intolerance, heat intolerance, polydipsia, polyphagia and polyuria. Genitourinary:  Negative for decreased urine volume, difficulty urinating, dysuria, enuresis, flank pain, frequency, genital sores, hematuria, penile discharge, penile pain, penile swelling, scrotal swelling, testicular pain and urgency.         H/O prostate cancer  H/O bladder cancer with cystectomy   He has a cystostomy in place    Musculoskeletal:  Positive for arthralgias, gait problem and myalgias. Negative for back pain, joint swelling, neck pain and neck stiffness. Skin: Negative. Negative for color change, pallor, rash and wound. Allergic/Immunologic: Negative. Negative for environmental allergies, food allergies and immunocompromised state. Neurological:  Positive for weakness. Negative for dizziness, tremors, seizures, syncope, facial asymmetry, speech difficulty, light-headedness, numbness and headaches. Hematological: Negative. Negative for adenopathy. Does not bruise/bleed easily. Psychiatric/Behavioral:  Positive for decreased concentration and dysphoric mood. Negative for agitation, behavioral problems, confusion, hallucinations, self-injury, sleep disturbance and suicidal ideas. The patient is nervous/anxious. The patient is not hyperactive. Past Medical/Surgical Hx;  Reviewed with patient      Diagnosis Date    Abdominal aortic aneurysm without rupture 9/3/2021    Arthritis     CAD (coronary artery disease)     Cancer ([facility] [address] 2017    Colon    CHF (congestive heart failure) ([facility])     Combined systolic and diastolic heart failure ([facility] [address] 6/15/13    echo LVEF of 30-35%  stage II diastolic dysfunction    COPD (chronic obstructive pulmonary disease) (HCC)     Diabetes mellitus (HCC)     GERD (gastroesophageal reflux disease)     History of placement of internal cardiac defibrillator 2014?     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 Douglass Plan  2014    3.5/15 Xience in [place] and 3.0/15 Resolute in th 1st obtuse marginal.    HIP FRACTURE SURGERY Right 2020    RIGHT HIP OPEN REDUCTION INTERNAL FIXATION NAIL-SYNTHES -- OC 2 performed by Juanita Singh DO at [facility]  2022    robotic cystoprostatectomy, bilateral plvic lymph node dissection, ileal conduit urinary diversion    UMBILICAL HERNIA REPAIR         Past Family Hx:  Reviewed with patient      Problem Relation Age of Onset    Heart Disease Mother     Cancer Father         abdominal    Cancer Brother         prostate    Cancer Brother         digestive       Social Hx:  Reviewed with patient  Social History     Tobacco Use    Smoking status: Former     Packs/day: 2.00     Years: 50.00     Pack years: 100.00     Types: Cigarettes     Start date: 1970     Quit date: 2014     Years since quittin.7    Smokeless tobacco: Never   Substance Use Topics    Alcohol use: Yes     Alcohol/week: 1.0 standard drink     Types: 1 Glasses of wine per week     Comment: rarely       OBJECTIVE  There were no vitals taken for this visit. Problem List:  Valeria Ramiro does not have any pertinent problems on file. PHYS EX:  Physical Exam  Vitals and nursing note reviewed. Constitutional:       General: He is not in acute distress. Appearance: He is well-developed. He is ill-appearing (cachexia). He is not toxic-appearing or diaphoretic. HENT:      Head: Normocephalic and atraumatic. Right Ear: External ear normal. There is no impacted cerumen. Left Ear: External ear normal. There is no impacted cerumen. Nose: Nose normal. No congestion or rhinorrhea. Mouth/Throat:      Mouth: Mucous membranes are moist.      Pharynx: Oropharynx is clear. No oropharyngeal exudate or posterior oropharyngeal erythema. Eyes:      General: No scleral icterus. Right eye: No discharge.          Left eye: No discharge. Neck:      Thyroid: No thyromegaly. Vascular: No carotid bruit. Trachea: No tracheal deviation. Cardiovascular:      Rate and Rhythm: Normal rate and regular rhythm. Pulses: Normal pulses. Heart sounds: Normal heart sounds. No murmur heard. No friction rub. No gallop. Comments: No atrial fibrillation today   Pulmonary:      Effort: Pulmonary effort is normal. No respiratory distress. Breath sounds: Normal breath sounds. No stridor. No wheezing, rhonchi or rales. Chest:      Chest wall: No tenderness. Abdominal:      General: Bowel sounds are normal. There is no distension. Palpations: Abdomen is soft. There is no mass. Tenderness: There is no abdominal tenderness. There is no right CVA tenderness, left CVA tenderness, guarding or rebound. Hernia: No hernia is present. Comments: Cystostomy bag    Genitourinary:     Penis: No tenderness. Comments: H/O prostate cancer post radiation  H/O total cystectomy due to bladder cancer   Musculoskeletal:         General: Tenderness (pain to his right leg and right hip) present. No swelling, deformity or signs of injury. Normal range of motion. Cervical back: Normal range of motion and neck supple. No rigidity. No muscular tenderness. Right lower leg: No edema. Left lower leg: No edema. Lymphadenopathy:      Cervical: No cervical adenopathy. Skin:     General: Skin is warm. Coloration: Skin is not jaundiced or pale. Findings: No bruising, erythema, lesion or rash. Neurological:      General: No focal deficit present. Mental Status: He is alert. Cranial Nerves: No cranial nerve deficit. Sensory: Sensory deficit present. Motor: Weakness present. No abnormal muscle tone.       Coordination: Coordination abnormal.      Gait: Gait abnormal.      Deep Tendon Reflexes: Reflexes abnormal.       ASSESSMENT/PLAN  Diagnoses and all orders for this visit:    Malignant neoplasm of overlapping sites of bladder ([facility]  --pos op is stable  --sarcomatoid variant of urothelial cancer      S/P ileal conduit (HCC)  --stable on current care planning  -- continue treatment as we are meeting goals       H/O total cystectomy  --stable on current care planning  -- continue treatment as we are meeting goals       Atrial fibrillation with RVR ([facility]  --stable on current care planning  -- continue treatment as we are meeting goals       Coronary artery disease involving native heart without angina pectoris, unspecified vessel or lesion type  --stable on current care planning  -- continue treatment as we are meeting goals       Prostate cancer [facility])  --stable on current care planning  -- continue treatment as we are meeting goals   --follows urology post radiation oncology     Abdominal aortic aneurysm (AAA) without rupture, unspecified part  --stable on current care planning  -- continue treatment as we are meeting goals   --post vascular repair     PAF (paroxysmal atrial fibrillation) ([facility]  --stable on current care planning  -- continue treatment as we are meeting goals       Essential hypertension  ---controlled       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 ([facility]    ---VASCULAR PANEL  A) asa, plavix, aggrenox  B) COUMADIN, pletal, tzd, statin  C) ACE,  hctz, folic, ccb  D) cannikinumab, fish oils     ---CARDIAC---COUMADIN, ACE,  BETA, statin, hctz, ( ccb )    A) ACE, or arb  B) lipitor ( 40-80 ) or crestor ( 20 to 40 )  C) glp-1 or sglt 2       Stage 3a chronic kidney disease ([facility]  --stable on current care planning  -- continue treatment as we are meeting goals         Outpatient Encounter Medications as of 10/17/2022   Medication Sig Dispense Refill    enoxaparin (LOVENOX) 80 MG/0.8ML Inject 0.8 mLs into the skin 2 times daily 2 each 0    metoprolol succinate (TOPROL XL) 50 MG extended release tablet Take 1 tablet by mouth 2 times daily 30 tablet 3 pantoprazole (PROTONIX) 40 MG tablet Take 1 tablet by mouth every morning (before breakfast) 30 tablet 3    warfarin (COUMADIN) 5 MG tablet Take 1 tablet by mouth daily 30 tablet 0    sotalol (BETAPACE) 120 MG tablet Take 1 tablet by mouth 2 times daily 60 tablet 3    insulin glargine (LANTUS) 100 UNIT/ML injection vial Inject 10 Units into the skin nightly *Plus Sliding Scale*      atorvastatin (LIPITOR) 40 MG tablet Take 40 mg by mouth daily      lisinopril (PRINIVIL;ZESTRIL) 10 MG tablet Take 10 mg by mouth daily      aspirin EC 81 MG EC tablet Take 1 tablet by mouth daily 90 tablet 1     No facility-administered encounter medications on file as of 10/17/2022. No follow-ups on file.         Reviewed recent labs related to Federico's current problems      Discussed importance of regular Health Maintenance follow up  Health Maintenance   Topic    Pneumococcal 65+ years Vaccine (1 - PCV)    Diabetic foot exam     Diabetic retinal exam     Hepatitis C screen     DTaP/Tdap/Td vaccine (1 - Tdap)    Shingles vaccine (1 of 2)    Low dose CT lung screening     Colorectal Cancer Screen     COVID-19 Vaccine (3 - Booster for Pfizer series)    Flu vaccine (1)    Lipids     Annual Wellness Visit (AWV)     Prostate Specific Antigen (PSA) Screening or Monitoring     Depression Screen     A1C test (Diabetic or Prediabetic)     Hepatitis A vaccine     Hib vaccine     Meningococcal (ACWY) vaccine

## 2022-10-19 LAB
INR BLD: 3.6
PROTHROMBIN TIME: 38.9 SEC (ref 9.3–12.4)

## 2022-10-23 LAB
ANION GAP SERPL CALCULATED.3IONS-SCNC: 12 MMOL/L (ref 7–16)
ANISOCYTOSIS: ABNORMAL
BACTERIA: ABNORMAL /HPF
BASOPHILS ABSOLUTE: 0 E9/L (ref 0–0.2)
BASOPHILS RELATIVE PERCENT: 0.4 % (ref 0–2)
BILIRUBIN URINE: NEGATIVE
BLOOD, URINE: ABNORMAL
BUN BLDV-MCNC: 30 MG/DL (ref 6–23)
CALCIUM SERPL-MCNC: 9.9 MG/DL (ref 8.6–10.2)
CHLORIDE BLD-SCNC: 99 MMOL/L (ref 98–107)
CLARITY: ABNORMAL
CO2: 22 MMOL/L (ref 22–29)
COLOR: YELLOW
CREAT SERPL-MCNC: 1.7 MG/DL (ref 0.7–1.2)
EOSINOPHILS ABSOLUTE: 0 E9/L (ref 0.05–0.5)
EOSINOPHILS RELATIVE PERCENT: 0 % (ref 0–6)
EPITHELIAL CELLS, UA: ABNORMAL /HPF
GFR SERPL CREATININE-BSD FRML MDRD: 42 ML/MIN/1.73
GLUCOSE BLD-MCNC: 230 MG/DL (ref 74–99)
GLUCOSE URINE: NEGATIVE MG/DL
HCT VFR BLD CALC: 35 % (ref 37–54)
HEMOGLOBIN: 11.2 G/DL (ref 12.5–16.5)
INR BLD: 1.8
KETONES, URINE: NEGATIVE MG/DL
LEUKOCYTE ESTERASE, URINE: ABNORMAL
LYMPHOCYTES ABSOLUTE: 0.89 E9/L (ref 1.5–4)
LYMPHOCYTES RELATIVE PERCENT: 2.6 % (ref 20–42)
MCH RBC QN AUTO: 30.9 PG (ref 26–35)
MCHC RBC AUTO-ENTMCNC: 32 % (ref 32–34.5)
MCV RBC AUTO: 96.4 FL (ref 80–99.9)
MONOCYTES ABSOLUTE: 2.98 E9/L (ref 0.1–0.95)
MONOCYTES RELATIVE PERCENT: 9.6 % (ref 2–12)
NEUTROPHILS ABSOLUTE: 26.22 E9/L (ref 1.8–7.3)
NEUTROPHILS RELATIVE PERCENT: 87.8 % (ref 43–80)
NITRITE, URINE: NEGATIVE
OVALOCYTES: ABNORMAL
PDW BLD-RTO: 17.4 FL (ref 11.5–15)
PH UA: 6.5 (ref 5–9)
PLATELET # BLD: 448 E9/L (ref 130–450)
PMV BLD AUTO: 9.2 FL (ref 7–12)
POIKILOCYTES: ABNORMAL
POLYCHROMASIA: ABNORMAL
POTASSIUM SERPL-SCNC: 5.5 MMOL/L (ref 3.5–5)
PROTEIN UA: 30 MG/DL
PROTHROMBIN TIME: 19 SEC (ref 9.3–12.4)
RBC # BLD: 3.63 E12/L (ref 3.8–5.8)
RBC UA: ABNORMAL /HPF (ref 0–2)
SODIUM BLD-SCNC: 133 MMOL/L (ref 132–146)
SPECIFIC GRAVITY UA: 1.01 (ref 1–1.03)
UROBILINOGEN, URINE: 0.2 E.U./DL
WBC # BLD: 29.8 E9/L (ref 4.5–11.5)
WBC UA: >20 /HPF (ref 0–5)

## 2022-11-22 NOTE — PROGRESS NOTES
PEPPER Palomares is a 67 y.o. male. Seen: 11/18/2022  HPI/Chief C/O:  He comes to our facility for rehab due to bladder cancer post op     HPI:   He has been going downhill. We have ran a course of LevaQuin and Meropenem. We then tried to stabilize with septra DS  All these attempts have failed. When we stopped the IV his BUN / Creatinine started to climb again. He is not eating or drinking. 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 I believe this is why we can not get the infection under control. The family wishes no more hospital, no more testing and no more consults. We will attempt to keep him comfortable      Allergies   Allergen Reactions    Hydrocodone Nausea And Vomiting   HPI      ROS:  Review of Systems   Constitutional:  Positive for fatigue. Negative for activity change, appetite change, chills, diaphoresis, fever and unexpected weight change. HENT: Negative. Negative for congestion, dental problem, drooling, ear discharge, ear pain, facial swelling, hearing loss, mouth sores, nosebleeds, postnasal drip, rhinorrhea, sinus pressure, sinus pain, sneezing, sore throat, tinnitus, trouble swallowing and voice change. Eyes:  Negative for photophobia, pain, discharge, redness, itching and visual disturbance. Respiratory: Negative. Negative for apnea, cough, choking, chest tightness, shortness of breath, wheezing and stridor. Cardiovascular: Negative. Negative for chest pain, palpitations and leg swelling. Gastrointestinal:  Positive for abdominal pain (cystostomy bag). Negative for abdominal distention, anal bleeding, blood in stool, constipation, diarrhea, nausea, rectal pain and vomiting. Endocrine: Negative. Negative for cold intolerance, heat intolerance, polydipsia, polyphagia and polyuria.    Genitourinary:  Negative for decreased urine volume, difficulty urinating, dysuria, enuresis, flank pain, frequency, genital sores, hematuria, penile discharge, penile pain, penile swelling, scrotal swelling, testicular pain and urgency. H/O prostate cancer  H/O bladder cancer with cystectomy   He has a cystostomy in place    Musculoskeletal:  Positive for arthralgias, gait problem and myalgias. Negative for back pain, joint swelling, neck pain and neck stiffness. Skin:  Positive for wound (to his coccyx). Negative for color change, pallor and rash. Allergic/Immunologic: Negative. Negative for environmental allergies, food allergies and immunocompromised state. Neurological:  Positive for dizziness, tremors, weakness and light-headedness. Negative for seizures, syncope, facial asymmetry, speech difficulty, numbness and headaches. Hematological: Negative. Negative for adenopathy. Does not bruise/bleed easily. Psychiatric/Behavioral:  Positive for confusion, decreased concentration and dysphoric mood. Negative for agitation, behavioral problems, hallucinations, self-injury, sleep disturbance and suicidal ideas. The patient is nervous/anxious. The patient is not hyperactive. Past Medical/Surgical Hx;  Reviewed with patient      Diagnosis Date    Abdominal aortic aneurysm without rupture 9/3/2021    Arthritis     CAD (coronary artery disease)     Cancer ([facility] [facility] 2017    Colon    CHF (congestive heart failure) ([facility])     Combined systolic and diastolic heart failure ([facility] [address] 6/15/13    echo LVEF of 30-35%  stage II diastolic dysfunction    COPD (chronic obstructive pulmonary disease) ([facility])     Diabetes mellitus (HCC)     GERD (gastroesophageal reflux disease)     History of placement of internal cardiac defibrillator 2014?     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 Douglass Plan  2014    3.5/15 Xience in [place] and 3.0/15 Resolute in th 1st obtuse marginal.    HIP FRACTURE SURGERY Right 2020    RIGHT HIP OPEN REDUCTION INTERNAL FIXATION NAIL-SYNTHES -- OC 2 performed by Juanita Singh DO at [facility]  2022    robotic cystoprostatectomy, bilateral plvic lymph node dissection, ileal conduit urinary diversion    UMBILICAL HERNIA REPAIR         Past Family Hx:  Reviewed with patient      Problem Relation Age of Onset    Heart Disease Mother     Cancer Father         abdominal    Cancer Brother         prostate    Cancer Brother         digestive       Social Hx:  Reviewed with patient  Social History     Tobacco Use    Smoking status: Former     Packs/day: 2.00     Years: 50.00     Pack years: 100.00     Types: Cigarettes     Start date: 1970     Quit date: 2014     Years since quittin.8    Smokeless tobacco: Never   Substance Use Topics    Alcohol use: Yes     Alcohol/week: 1.0 standard drink     Types: 1 Glasses of wine per week     Comment: rarely       OBJECTIVE  There were no vitals taken for this visit. Problem List:  Valeria Rubio does not have any pertinent problems on file. PHYS EX:  Physical Exam  Vitals and nursing note reviewed. Constitutional:       General: He is not in acute distress. Appearance: He is well-developed. He is ill-appearing (cachexia) and toxic-appearing. He is not diaphoretic. HENT:      Head: Normocephalic and atraumatic. Right Ear: External ear normal.      Left Ear: External ear normal.      Nose: Nose normal. No congestion or rhinorrhea. Mouth/Throat:      Mouth: Mucous membranes are moist.      Pharynx: Oropharynx is clear. No oropharyngeal exudate or posterior oropharyngeal erythema. Eyes:      General: No scleral icterus. Right eye: No discharge. Left eye: No discharge. Neck:      Thyroid: No thyromegaly. Vascular: No carotid bruit. Trachea: No tracheal deviation. Cardiovascular:      Rate and Rhythm: Normal rate and regular rhythm. Extrasystoles are present. Pulses: Normal pulses. Heart sounds: Heart sounds are distant. No murmur heard. No systolic murmur is present. No diastolic murmur is present. No friction rub. No gallop. Comments: No atrial fibrillation today   Pulmonary:      Effort: Pulmonary effort is normal. No respiratory distress. Breath sounds: Normal breath sounds. No stridor. No wheezing, rhonchi or rales. Chest:      Chest wall: No tenderness. Abdominal:      General: Bowel sounds are normal. There is no distension. Palpations: Abdomen is soft. There is no mass. Tenderness: There is no abdominal tenderness. There is no right CVA tenderness, left CVA tenderness, guarding or rebound. Hernia: No hernia is present. Comments: Cystostomy bag    Genitourinary:     Penis: No tenderness. Comments: H/O prostate cancer post radiation  H/O total cystectomy due to bladder cancer   Musculoskeletal:         General: Tenderness (pain to his right leg and right hip) present. No swelling, deformity or signs of injury. Normal range of motion. Cervical back: Normal range of motion and neck supple. No rigidity. No muscular tenderness. Right lower leg: No edema. Left lower leg: No edema. Lymphadenopathy:      Cervical: No cervical adenopathy. Skin:     General: Skin is warm. Coloration: Skin is not jaundiced or pale. Findings: Erythema and lesion (to his coccyx) present. No bruising or rash.    Neurological:      General: No focal deficit present. Mental Status: He is alert. Cranial Nerves: No cranial nerve deficit. Sensory: Sensory deficit present. Motor: Weakness present. No abnormal muscle tone.       Coordination: Coordination abnormal.      Gait: Gait abnormal.      Deep Tendon Reflexes: Reflexes abnormal.       ASSESSMENT/PLAN  Diagnoses and all orders for this visit:    Malignant neoplasm of overlapping sites of bladder ([facility]  --pos op is stable  --sarcomatoid variant of urothelial cancer      S/P ileal conduit (HCC)  --stable on current care planning  -- continue treatment as we are meeting goals       H/O total cystectomy  --stable on current care planning  -- continue treatment as we are meeting goals       [facility]  --stable on current care planning  -- continue treatment as we are meeting goals       Coronary artery disease involving native heart without angina pectoris, unspecified vessel or lesion type  --stable on current care planning  -- continue treatment as we are meeting goals       Prostate cancer [facility]  --stable on current care planning  -- continue treatment as we are meeting goals   --follows urology post radiation oncology     Abdominal aortic aneurysm (AAA) without rupture, unspecified part  --stable on current care planning  -- continue treatment as we are meeting goals   --post vascular repair     PAF (paroxysmal atrial fibrillation) [facility]  --stable on current care planning  -- continue treatment as we are meeting goals       Essential hypertension  ---controlled       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)    ---VASCULAR PANEL  A) asa, plavix, aggrenox  B) LOVENOX, pletal, tzd, statin  C) ace,  hctz, folic, ccb  D) cannikinumab, fish oils     ---CARDIAC---LOVENOX,ace,  BETA, statin, hctz, ( ccb )    A) ace, or arb  B) lipitor ( 40-80 ) or crestor ( 20 to 40 )  C) glp-1 or sglt 2       Stage 3a chronic kidney disease ([facility]  --he is going into renal failure  --the family;  wants  no more IV fluids     HE HAS BEEN ON TOPROL AND LOVENOX, SEPTRA DS    I HAVE HELD THE INSULIN AND ACE AS WELL AS SOTALOL     WE HAVE FINISHED THE LEVAQUIN AND MEROPENEM     Outpatient Encounter Medications as of 11/22/2022   Medication Sig Dispense Refill    metoprolol succinate (TOPROL XL) 50 MG extended release tablet Take 1 tablet by mouth 2 times daily 30 tablet 3    pantoprazole (PROTONIX) 40 MG tablet Take 1 tablet by mouth every morning (before breakfast) 30 tablet 3    warfarin (COUMADIN) 5 MG tablet Take 1 tablet by mouth daily 30 tablet 0    sotalol (BETAPACE) 120 MG tablet Take 1 tablet by mouth 2 times daily 60 tablet 3    insulin glargine (LANTUS) 100 UNIT/ML injection vial Inject 10 Units into the skin nightly *Plus Sliding Scale*      atorvastatin (LIPITOR) 40 MG tablet Take 40 mg by mouth daily      lisinopril (PRINIVIL;ZESTRIL) 10 MG tablet Take 10 mg by mouth daily       No facility-administered encounter medications on file as of 11/22/2022. No follow-ups on file.         Reviewed recent labs related to Federico's current problems      Discussed importance of regular Health Maintenance follow up  Health Maintenance   Topic    Pneumococcal 65+ years Vaccine (1 - PCV)    Diabetic foot exam     Diabetic retinal exam     Hepatitis C screen     DTaP/Tdap/Td vaccine (1 - Tdap)    Shingles vaccine (1 of 2)    Low dose CT lung screening     Colorectal Cancer Screen     COVID-19 Vaccine (3 - Booster for Pfizer series)    Flu vaccine (1)    Lipids     Annual Wellness Visit (AWV)     Prostate Specific Antigen (PSA) Screening or Monitoring     Depression Screen     A1C test (Diabetic or Prediabetic)     Hepatitis A vaccine     Hib vaccine     Meningococcal (ACWY) vaccine

## 2024-03-03 NOTE — PATIENT INSTRUCTIONS

## (undated) DEVICE — DOUBLE BASIN SET: Brand: MEDLINE INDUSTRIES, INC.

## (undated) DEVICE — CLOTH SURG PREP PREOPERATIVE CHLORHEXIDINE GLUC 2% READYPREP

## (undated) DEVICE — TUBING ANGIO L48IN POLYUR HI PRSS 1200PSI AIRLESS ROT ADPT

## (undated) DEVICE — COVER,TABLE,60X90,STERILE: Brand: MEDLINE

## (undated) DEVICE — MEDIA CONTRAST RX ISOVUE-300 61% 30ML VIALS

## (undated) DEVICE — INTENDED FOR TISSUE SEPARATION, AND OTHER PROCEDURES THAT REQUIRE A SHARP SURGICAL BLADE TO PUNCTURE OR CUT.: Brand: BARD-PARKER ® STAINLESS STEEL BLADES

## (undated) DEVICE — RADIFOCUS GLIDEWIRE ADVANTAGE GUIDEWIRE: Brand: GLIDEWIRE ADVANTAGE

## (undated) DEVICE — 18GA (1.3MM OD: 1.0MM ID) X 7CM INTRODUCER18GA X 7CM NEEDLENEEDLE: Brand: INTRODUCER NEEDLEINTRODUCER NEEDLE

## (undated) DEVICE — BIT DRL L145MM DIA4.2MM ST 3 FLUT NDL PNT QUIK CPL FOR FEM

## (undated) DEVICE — SPONGE LAP W18XL18IN WHT COT 4 PLY FLD STRUNG RADPQ DISP ST

## (undated) DEVICE — SURGICAL PROCEDURE PACK BASIC

## (undated) DEVICE — DRAPE C ARM W41XL74IN UNIV MOB W RUBBERBAND CLP

## (undated) DEVICE — 3M™ COBAN™ NL STERILE NON-LATEX SELF-ADHERENT WRAP, 2084S, 4 IN X 5 YD (10 CM X 4,5 M), 18 ROLLS/CASE: Brand: 3M™ COBAN™

## (undated) DEVICE — BASIN NEURO

## (undated) DEVICE — APPLICATOR MEDICATED 26 CC SOLUTION HI LT ORNG CHLORAPREP

## (undated) DEVICE — DRIP REDUCTION MANIFOLD

## (undated) DEVICE — SURGICAL PROCEDURE PACK VASC MAJ CUST

## (undated) DEVICE — TUBING, SUCTION, 3/16" X 12', STRAIGHT: Brand: MEDLINE

## (undated) DEVICE — GLOVE ORANGE PI 8   MSG9080

## (undated) DEVICE — FLEXOR, CHECK-FLO, INTRODUCER SET: Brand: FLEXOR

## (undated) DEVICE — SET SURG INSTR ART III

## (undated) DEVICE — RADIFOCUS GLIDEWIRE: Brand: GLIDEWIRE

## (undated) DEVICE — SHEATH INTRO 16FR L33CM OD6.1MM ID5.3MM HYDRPHLC KINK

## (undated) DEVICE — GOWN,SIRUS,FABRNF,L,20/CS: Brand: MEDLINE

## (undated) DEVICE — DRAPE PATIENT ISOL IRRIG POUCH

## (undated) DEVICE — BLADE CLIPPER GEN PURP NS

## (undated) DEVICE — Device

## (undated) DEVICE — MEDIA CONTRAST SYRINGE 125ML PREFLL OPTIRAY 320 PWR INJ

## (undated) DEVICE — DRILL SYSTEM 7

## (undated) DEVICE — Z DISCONTINUED PER MEDLINE USE 2741942 DRESSING AQUACEL 6 IN ALG W9XL15CM SIL CVR WTRPRF VIR BACT BARR ANTIMIC

## (undated) DEVICE — SET ORTHO STD STORTSTD1

## (undated) DEVICE — ADHESIVE SKIN CLOSURE TOP 36 CC HI VISC DERMBND MINI

## (undated) DEVICE — 3M™ STERI-DRAPE™ INCISE DRAPE 1050 (60CM X 45CM): Brand: STERI-DRAPE™

## (undated) DEVICE — CODA, LP BALLOON CATHETER: Brand: CODA

## (undated) DEVICE — TOTAL TRAY, 16FR 10ML SIL FOLEY, URN: Brand: MEDLINE

## (undated) DEVICE — LABEL MED 4 IN SURG PANEL W/ PEN STRL

## (undated) DEVICE — CATHETER VASC DIAG MOD PERIPH W/O HYDRPHLC COAT AD

## (undated) DEVICE — Z DISCONTINUED USE 2275686 GLOVE SURG SZ 8 L12IN FNGR THK13MIL WHT ISOLEX POLYISOPRENE

## (undated) DEVICE — DRAPE XR CASS L UNIV FIT ADH CLSR

## (undated) DEVICE — PERCLOSE PROGLIDE™ SUTURE-MEDIATED CLOSURE SYSTEM: Brand: PERCLOSE PROGLIDE™

## (undated) DEVICE — SET INSTR ART 1

## (undated) DEVICE — CHECK-FLO PERFORMER INTRODUCER: Brand: PERFORMER

## (undated) DEVICE — SYRINGE 20ML LL S/C 50

## (undated) DEVICE — YANKAUER,OPEN TIP,W/O VENT,STERILE: Brand: MEDLINE INDUSTRIES, INC.

## (undated) DEVICE — GOWN,BREATHABLE SLV,AURORA,XLG,STRL: Brand: MEDLINE

## (undated) DEVICE — 72" ARTERIAL PRESSURE TUBING: Brand: ICU MEDICAL

## (undated) DEVICE — GOWN,AURORA,BRTHSLV,2XL,18/CS: Brand: MEDLINE

## (undated) DEVICE — SODIUM CHL 09% SOL EXCEL

## (undated) DEVICE — FIXED CORE WIRE GUIDE SAFE-T-J, CURVED: Brand: COOK

## (undated) DEVICE — PATIENT RETURN ELECTRODE, SINGLE-USE, CONTACT QUALITY MONITORING, ADULT, WITH 9FT CORD, FOR PATIENTS WEIGING OVER 33LBS. (15KG): Brand: MEGADYNE

## (undated) DEVICE — SYRINGE MED 50ML LUERLOCK TIP

## (undated) DEVICE — TOWEL,OR,DSP,ST,BLUE,STD,6/PK,12PK/CS: Brand: MEDLINE

## (undated) DEVICE — BLANKET WRM W35.4XL86.6IN FULL UNDERBODY + FORC AIR

## (undated) DEVICE — LARGE BORE STOPCOCK WITH ROTATING MALE LUER LOCK

## (undated) DEVICE — LOOP VES W25MM THK1MM MAXI RED SIL FLD REPELLENT 100 PER

## (undated) DEVICE — CHLORAPREP 26ML ORANGE

## (undated) DEVICE — MICROPUNCTURE INTRODUCER SET SILHOUETTE TRANSITIONLESS PUSH-PLUS DESIGN - STIFFENED CANNULA WITH STAINLESS STEEL WIRE GUIDE: Brand: MICROPUNCTURE

## (undated) DEVICE — CATHETER ANGIO AD 5FR L65CM DIA0.046IN GWIRE 0.035IN BERN

## (undated) DEVICE — SET ORTHO STD STORTSTD2

## (undated) DEVICE — ELECTRODE PT RET AD L9FT HI MOIST COND ADH HYDRGEL CORDED

## (undated) DEVICE — SHEATH INTRO 18FR L33CM OD6.7MM ID6MM HYDRPHLC KINK

## (undated) DEVICE — BENTSON WIRE GUIDE 20CM DISTAL FLEXIBILITY WITH SOFTENED TIP: Brand: BENTSON

## (undated) DEVICE — DRAPE, MULTI FENESTRATED, HYBRID OR, STE: Brand: MEDLINE

## (undated) DEVICE — GOWN,SIRUS,FABRNF,XL,20/CS: Brand: MEDLINE

## (undated) DEVICE — CLIP LIG M BLU TI HRT SHP WIRE HORZ 600 PER BX

## (undated) DEVICE — TRAY,SKIN SCRUB,DRY,W/GAUZE: Brand: MEDLINE

## (undated) DEVICE — GLOVE SURG SZ 75 L12IN FNGR THK94MIL TRNSLUC YEL LTX

## (undated) DEVICE — DRAPE EQUIP BANDED BG 36X28 IN W/ROUNDED CORNER SNAPKOVER

## (undated) DEVICE — MARKER,SKIN,WI/RULER AND LABELS: Brand: MEDLINE

## (undated) DEVICE — CATHETER PTCA BLLN L4CM INFL 10-37MM CATH L90CM MOLDING